# Patient Record
Sex: FEMALE | Race: WHITE | NOT HISPANIC OR LATINO
[De-identification: names, ages, dates, MRNs, and addresses within clinical notes are randomized per-mention and may not be internally consistent; named-entity substitution may affect disease eponyms.]

---

## 2017-10-13 ENCOUNTER — APPOINTMENT (OUTPATIENT)
Dept: ORTHOPEDIC SURGERY | Facility: CLINIC | Age: 62
End: 2017-10-13
Payer: MEDICAID

## 2017-10-13 VITALS
WEIGHT: 293 LBS | DIASTOLIC BLOOD PRESSURE: 82 MMHG | HEART RATE: 77 BPM | SYSTOLIC BLOOD PRESSURE: 160 MMHG | HEIGHT: 70 IN | BODY MASS INDEX: 41.95 KG/M2

## 2017-10-13 DIAGNOSIS — M17.0 BILATERAL PRIMARY OSTEOARTHRITIS OF KNEE: ICD-10-CM

## 2017-10-13 DIAGNOSIS — M25.562 PAIN IN RIGHT KNEE: ICD-10-CM

## 2017-10-13 DIAGNOSIS — M19.011 PRIMARY OSTEOARTHRITIS, RIGHT SHOULDER: ICD-10-CM

## 2017-10-13 DIAGNOSIS — M25.561 PAIN IN RIGHT KNEE: ICD-10-CM

## 2017-10-13 PROCEDURE — 99203 OFFICE O/P NEW LOW 30 MIN: CPT

## 2017-10-13 PROCEDURE — 73562 X-RAY EXAM OF KNEE 3: CPT | Mod: 50

## 2017-10-13 PROCEDURE — 73030 X-RAY EXAM OF SHOULDER: CPT | Mod: RT

## 2019-08-04 ENCOUNTER — EMERGENCY (EMERGENCY)
Facility: HOSPITAL | Age: 64
LOS: 1 days | Discharge: HOME | End: 2019-08-04
Attending: EMERGENCY MEDICINE
Payer: MEDICARE

## 2019-08-04 VITALS
HEART RATE: 79 BPM | HEIGHT: 68.5 IN | TEMPERATURE: 98 F | OXYGEN SATURATION: 96 % | WEIGHT: 293 LBS | RESPIRATION RATE: 20 BRPM | SYSTOLIC BLOOD PRESSURE: 151 MMHG | DIASTOLIC BLOOD PRESSURE: 84 MMHG

## 2019-08-04 LAB
ALBUMIN SERPL ELPH-MCNC: 3.8 G/DL — SIGNIFICANT CHANGE UP (ref 3.3–5)
ALP SERPL-CCNC: 218 U/L — HIGH (ref 40–120)
ALT FLD-CCNC: 60 U/L — HIGH (ref 10–45)
ANION GAP SERPL CALC-SCNC: 17 MMOL/L — SIGNIFICANT CHANGE UP (ref 5–17)
APTT BLD: 31.2 SEC — SIGNIFICANT CHANGE UP (ref 27.5–36.3)
AST SERPL-CCNC: 28 U/L — SIGNIFICANT CHANGE UP (ref 10–40)
BASOPHILS # BLD AUTO: 0.1 K/UL — SIGNIFICANT CHANGE UP (ref 0–0.2)
BASOPHILS NFR BLD AUTO: 0.7 % — SIGNIFICANT CHANGE UP (ref 0–2)
BILIRUB SERPL-MCNC: 0.2 MG/DL — SIGNIFICANT CHANGE UP (ref 0.2–1.2)
BUN SERPL-MCNC: 11 MG/DL — SIGNIFICANT CHANGE UP (ref 7–23)
CALCIUM SERPL-MCNC: 9.2 MG/DL — SIGNIFICANT CHANGE UP (ref 8.4–10.5)
CHLORIDE SERPL-SCNC: 99 MMOL/L — SIGNIFICANT CHANGE UP (ref 96–108)
CO2 SERPL-SCNC: 23 MMOL/L — SIGNIFICANT CHANGE UP (ref 22–31)
CREAT SERPL-MCNC: 0.72 MG/DL — SIGNIFICANT CHANGE UP (ref 0.5–1.3)
EOSINOPHIL # BLD AUTO: 0.5 K/UL — SIGNIFICANT CHANGE UP (ref 0–0.5)
EOSINOPHIL NFR BLD AUTO: 5.8 % — SIGNIFICANT CHANGE UP (ref 0–6)
GLUCOSE SERPL-MCNC: 193 MG/DL — HIGH (ref 70–99)
HCT VFR BLD CALC: 39.5 % — SIGNIFICANT CHANGE UP (ref 34.5–45)
HGB BLD-MCNC: 12.8 G/DL — SIGNIFICANT CHANGE UP (ref 11.5–15.5)
INR BLD: 1.09 RATIO — SIGNIFICANT CHANGE UP (ref 0.88–1.16)
LYMPHOCYTES # BLD AUTO: 1.4 K/UL — SIGNIFICANT CHANGE UP (ref 1–3.3)
LYMPHOCYTES # BLD AUTO: 17.8 % — SIGNIFICANT CHANGE UP (ref 13–44)
MCHC RBC-ENTMCNC: 26.2 PG — LOW (ref 27–34)
MCHC RBC-ENTMCNC: 32.3 GM/DL — SIGNIFICANT CHANGE UP (ref 32–36)
MCV RBC AUTO: 81 FL — SIGNIFICANT CHANGE UP (ref 80–100)
MONOCYTES # BLD AUTO: 0.7 K/UL — SIGNIFICANT CHANGE UP (ref 0–0.9)
MONOCYTES NFR BLD AUTO: 9.1 % — SIGNIFICANT CHANGE UP (ref 2–14)
NEUTROPHILS # BLD AUTO: 5.3 K/UL — SIGNIFICANT CHANGE UP (ref 1.8–7.4)
NEUTROPHILS NFR BLD AUTO: 66.6 % — SIGNIFICANT CHANGE UP (ref 43–77)
PLATELET # BLD AUTO: 295 K/UL — SIGNIFICANT CHANGE UP (ref 150–400)
POTASSIUM SERPL-MCNC: 4.3 MMOL/L — SIGNIFICANT CHANGE UP (ref 3.5–5.3)
POTASSIUM SERPL-SCNC: 4.3 MMOL/L — SIGNIFICANT CHANGE UP (ref 3.5–5.3)
PROT SERPL-MCNC: 6.9 G/DL — SIGNIFICANT CHANGE UP (ref 6–8.3)
PROTHROM AB SERPL-ACNC: 12.6 SEC — SIGNIFICANT CHANGE UP (ref 10–12.9)
RBC # BLD: 4.88 M/UL — SIGNIFICANT CHANGE UP (ref 3.8–5.2)
RBC # FLD: 15.1 % — HIGH (ref 10.3–14.5)
SODIUM SERPL-SCNC: 139 MMOL/L — SIGNIFICANT CHANGE UP (ref 135–145)
WBC # BLD: 7.9 K/UL — SIGNIFICANT CHANGE UP (ref 3.8–10.5)
WBC # FLD AUTO: 7.9 K/UL — SIGNIFICANT CHANGE UP (ref 3.8–10.5)

## 2019-08-04 PROCEDURE — 73140 X-RAY EXAM OF FINGER(S): CPT | Mod: 26,LT

## 2019-08-04 PROCEDURE — 99218: CPT

## 2019-08-04 RX ORDER — SODIUM CHLORIDE 9 MG/ML
1000 INJECTION INTRAMUSCULAR; INTRAVENOUS; SUBCUTANEOUS ONCE
Refills: 0 | Status: COMPLETED | OUTPATIENT
Start: 2019-08-04 | End: 2019-08-04

## 2019-08-04 RX ORDER — METRONIDAZOLE 500 MG
TABLET ORAL
Refills: 0 | Status: DISCONTINUED | OUTPATIENT
Start: 2019-08-05 | End: 2019-08-09

## 2019-08-04 RX ORDER — AMPICILLIN SODIUM AND SULBACTAM SODIUM 250; 125 MG/ML; MG/ML
3 INJECTION, POWDER, FOR SUSPENSION INTRAMUSCULAR; INTRAVENOUS EVERY 6 HOURS
Refills: 0 | Status: DISCONTINUED | OUTPATIENT
Start: 2019-08-04 | End: 2019-08-04

## 2019-08-04 RX ADMIN — AMPICILLIN SODIUM AND SULBACTAM SODIUM 200 GRAM(S): 250; 125 INJECTION, POWDER, FOR SUSPENSION INTRAMUSCULAR; INTRAVENOUS at 22:28

## 2019-08-04 RX ADMIN — SODIUM CHLORIDE 1000 MILLILITER(S): 9 INJECTION INTRAMUSCULAR; INTRAVENOUS; SUBCUTANEOUS at 22:28

## 2019-08-04 NOTE — ED ADULT NURSE NOTE - OBJECTIVE STATEMENT
63 yo f reporting to ED for animal bite (cat). PMH of DM, HLD, HTN, Osteoarthritis, Fibromyalgia, Chronic Sinusitis, & Depression. Pt reporting her cat bit her at midnight on 8/3/19 on left thumb. pt reports she went to Urgent Care yesterday for evaluation and received Augmentin (4 doses taken) as treatment and was told to come to ED if redness spreads up the left arm. Pt reports she felt nauseous/shaky and the redness spread so she reported to ED for evaluation. Small bite wound on anterior and posterior region of thumb. Left thumb and left forearm pink in color. No swelling. Warm to touch. Sister at bedside. bed locked & in lowest position. Safety maintained. Will continue to reassess.

## 2019-08-04 NOTE — ED PROVIDER NOTE - OBJECTIVE STATEMENT
63yo female pt with Obese, DM (on Insulin, Metformin), HLD, HTN, Osteoarthritis, Fibromyalgia, Chronic Sinusitis, Depression, c/o worsening pain, swelling and redness to left non dominant thumb s/p cat bite. Pt stated her own cat bit her left thumb yesterday and evaluated by UC yesterday. She is on Augmentin but noticed worsening swelling/ redness left hand. She also c/o intermittent chills. Denies sensory changes or weakness to extremities. Denies CP/SOB/ABD pain or N/V/D. TD- utded.

## 2019-08-04 NOTE — ED PROVIDER NOTE - ATTENDING CONTRIBUTION TO CARE
64F presenting to the ED w/ cat bite to L thumb, states that it happened midnight yesterday, went to urgent care and was prescribed Augmentin, took 4 doses so far. Patient states that the redness has expanded now and she has moderate pain over the L thumb, non-radiating, exacerbated by palpation and movement, denies alleviating factors. States also noticed worsening swelling to the hand. Denies any sensory changes or weakness to extremities.     PMHx: Obesity, DM (on Insulin, Metformin), HLD, HTN, Osteoarthritis, Fibromyalgia, Chronic Sinusitis, Depression  PSHx: Hernia repair, RODRIGO, paniculectomy  Allergies: see EMR  SocHx: Denies ETOH, drugs smoking    ROS:  GEN: (-) fevers/chills  HEENT: (-) visual change, (-) photophobia  NECK: (-) stiffness, (-) swelling  RESP: (-) shortness of breath, (-) cough, (-) sputum  CV: (-) chest pain, (-) palpitations  GI: (-) nausea, (-) vomiting, (-) pain, (-) constipation, (-) diarrhea  : (-) frequency/urgency, (-) hematuria, (-) dysuria, (-) incontinence, (-) discharge  EXT: (-) edema, (-) cyanosis, (+) thumb pain  ENDO: (-) polyuria  NEURO: (-) paresthesias, (-) weakness, (-) headache, (-) dizziness, (-) syncope  SKIN: (+) erythema, (+) warmth, (+) animal bite    VITALS REVIEWED.  Hypertensive, otherwise normal  GENERALIZED APPEARANCE:  Comfortable, no acute distress, ambulating without difficulty  SKIN:  Warm, dry, no cyanosis  HEAD:  No obvious scalp lesions  EYES:  Conjunctiva pink, no icterus  ENMT:  Mucus membranes moist, no stridor  NECK:  Supple, non-tender  CHEST AND RESPIRATORY:  Clear to auscultation B/L, good air entry B/L, equal chest expansion  HEART AND CARDIOVASCULAR:  Regular rate, no obvious murmur  ABDOMEN AND GI:  Soft, non-tender, non-distended.  No rebound, no guarding, no CVA-area tenderness  EXTREMITIES:  L hand with thumb cat bite over PIP, erythema extending from cat bite to midforearm, slight tenderness over bite site and around it, mild warmth, ROM of thumb intact, radial/median/nerve distributions evaluated and intact, cap refill <2s, radial pulse 2+  NEURO: AAOx3, gross motor and sensory intact    Impression:  Hand cellulitis s/p cat bite, expanding  Labs, imaging, pain management, IV abx brief period, hand evaluation, CDU observation

## 2019-08-05 VITALS
RESPIRATION RATE: 20 BRPM | OXYGEN SATURATION: 98 % | SYSTOLIC BLOOD PRESSURE: 145 MMHG | HEART RATE: 68 BPM | DIASTOLIC BLOOD PRESSURE: 72 MMHG | TEMPERATURE: 98 F

## 2019-08-05 LAB
GLUCOSE BLDC GLUCOMTR-MCNC: 108 MG/DL — HIGH (ref 70–99)
GLUCOSE BLDC GLUCOMTR-MCNC: 155 MG/DL — HIGH (ref 70–99)
GLUCOSE BLDC GLUCOMTR-MCNC: 156 MG/DL — HIGH (ref 70–99)

## 2019-08-05 PROCEDURE — 85027 COMPLETE CBC AUTOMATED: CPT

## 2019-08-05 PROCEDURE — 82962 GLUCOSE BLOOD TEST: CPT

## 2019-08-05 PROCEDURE — 99217: CPT

## 2019-08-05 PROCEDURE — 99284 EMERGENCY DEPT VISIT MOD MDM: CPT | Mod: 25

## 2019-08-05 PROCEDURE — 85730 THROMBOPLASTIN TIME PARTIAL: CPT

## 2019-08-05 PROCEDURE — 73140 X-RAY EXAM OF FINGER(S): CPT

## 2019-08-05 PROCEDURE — 80053 COMPREHEN METABOLIC PANEL: CPT

## 2019-08-05 PROCEDURE — 96375 TX/PRO/DX INJ NEW DRUG ADDON: CPT

## 2019-08-05 PROCEDURE — 94660 CPAP INITIATION&MGMT: CPT

## 2019-08-05 PROCEDURE — 96365 THER/PROPH/DIAG IV INF INIT: CPT

## 2019-08-05 PROCEDURE — 96376 TX/PRO/DX INJ SAME DRUG ADON: CPT

## 2019-08-05 PROCEDURE — 96367 TX/PROPH/DG ADDL SEQ IV INF: CPT

## 2019-08-05 PROCEDURE — G0378: CPT

## 2019-08-05 PROCEDURE — 85610 PROTHROMBIN TIME: CPT

## 2019-08-05 RX ORDER — INSULIN LISPRO 100/ML
VIAL (ML) SUBCUTANEOUS
Refills: 0 | Status: DISCONTINUED | OUTPATIENT
Start: 2019-08-05 | End: 2019-08-09

## 2019-08-05 RX ORDER — BUPROPION HYDROCHLORIDE 150 MG/1
300 TABLET, EXTENDED RELEASE ORAL DAILY
Refills: 0 | Status: DISCONTINUED | OUTPATIENT
Start: 2019-08-05 | End: 2019-08-09

## 2019-08-05 RX ORDER — DEXTROSE 50 % IN WATER 50 %
15 SYRINGE (ML) INTRAVENOUS ONCE
Refills: 0 | Status: DISCONTINUED | OUTPATIENT
Start: 2019-08-05 | End: 2019-08-09

## 2019-08-05 RX ORDER — METFORMIN HYDROCHLORIDE 850 MG/1
1000 TABLET ORAL
Refills: 0 | Status: DISCONTINUED | OUTPATIENT
Start: 2019-08-05 | End: 2019-08-09

## 2019-08-05 RX ORDER — METRONIDAZOLE 500 MG
500 TABLET ORAL EVERY 8 HOURS
Refills: 0 | Status: DISCONTINUED | OUTPATIENT
Start: 2019-08-05 | End: 2019-08-09

## 2019-08-05 RX ORDER — PANTOPRAZOLE SODIUM 20 MG/1
40 TABLET, DELAYED RELEASE ORAL
Refills: 0 | Status: DISCONTINUED | OUTPATIENT
Start: 2019-08-05 | End: 2019-08-09

## 2019-08-05 RX ORDER — DEXTROSE 50 % IN WATER 50 %
25 SYRINGE (ML) INTRAVENOUS ONCE
Refills: 0 | Status: DISCONTINUED | OUTPATIENT
Start: 2019-08-05 | End: 2019-08-09

## 2019-08-05 RX ORDER — ATORVASTATIN CALCIUM 80 MG/1
40 TABLET, FILM COATED ORAL AT BEDTIME
Refills: 0 | Status: DISCONTINUED | OUTPATIENT
Start: 2019-08-05 | End: 2019-08-09

## 2019-08-05 RX ORDER — CELECOXIB 200 MG/1
200 CAPSULE ORAL DAILY
Refills: 0 | Status: DISCONTINUED | OUTPATIENT
Start: 2019-08-05 | End: 2019-08-09

## 2019-08-05 RX ORDER — DEXTROSE 50 % IN WATER 50 %
12.5 SYRINGE (ML) INTRAVENOUS ONCE
Refills: 0 | Status: DISCONTINUED | OUTPATIENT
Start: 2019-08-05 | End: 2019-08-09

## 2019-08-05 RX ORDER — INSULIN LISPRO 100/ML
12 VIAL (ML) SUBCUTANEOUS
Refills: 0 | Status: DISCONTINUED | OUTPATIENT
Start: 2019-08-05 | End: 2019-08-09

## 2019-08-05 RX ORDER — GLUCAGON INJECTION, SOLUTION 0.5 MG/.1ML
1 INJECTION, SOLUTION SUBCUTANEOUS ONCE
Refills: 0 | Status: DISCONTINUED | OUTPATIENT
Start: 2019-08-05 | End: 2019-08-09

## 2019-08-05 RX ORDER — GABAPENTIN 400 MG/1
600 CAPSULE ORAL THREE TIMES A DAY
Refills: 0 | Status: DISCONTINUED | OUTPATIENT
Start: 2019-08-05 | End: 2019-08-09

## 2019-08-05 RX ORDER — LEVOTHYROXINE SODIUM 125 MCG
125 TABLET ORAL DAILY
Refills: 0 | Status: DISCONTINUED | OUTPATIENT
Start: 2019-08-05 | End: 2019-08-09

## 2019-08-05 RX ORDER — INSULIN LISPRO 100/ML
VIAL (ML) SUBCUTANEOUS AT BEDTIME
Refills: 0 | Status: DISCONTINUED | OUTPATIENT
Start: 2019-08-05 | End: 2019-08-09

## 2019-08-05 RX ORDER — ESCITALOPRAM OXALATE 10 MG/1
20 TABLET, FILM COATED ORAL DAILY
Refills: 0 | Status: DISCONTINUED | OUTPATIENT
Start: 2019-08-05 | End: 2019-08-09

## 2019-08-05 RX ORDER — METRONIDAZOLE 500 MG
1 TABLET ORAL
Qty: 30 | Refills: 0
Start: 2019-08-05 | End: 2019-08-14

## 2019-08-05 RX ORDER — HYDROMORPHONE HYDROCHLORIDE 2 MG/ML
4 INJECTION INTRAMUSCULAR; INTRAVENOUS; SUBCUTANEOUS ONCE
Refills: 0 | Status: DISCONTINUED | OUTPATIENT
Start: 2019-08-05 | End: 2019-08-05

## 2019-08-05 RX ORDER — SODIUM CHLORIDE 9 MG/ML
1000 INJECTION, SOLUTION INTRAVENOUS
Refills: 0 | Status: DISCONTINUED | OUTPATIENT
Start: 2019-08-05 | End: 2019-08-09

## 2019-08-05 RX ORDER — METRONIDAZOLE 500 MG
500 TABLET ORAL ONCE
Refills: 0 | Status: COMPLETED | OUTPATIENT
Start: 2019-08-05 | End: 2019-08-05

## 2019-08-05 RX ORDER — ASPIRIN/CALCIUM CARB/MAGNESIUM 324 MG
81 TABLET ORAL DAILY
Refills: 0 | Status: DISCONTINUED | OUTPATIENT
Start: 2019-08-05 | End: 2019-08-09

## 2019-08-05 RX ORDER — INSULIN GLARGINE 100 [IU]/ML
40 INJECTION, SOLUTION SUBCUTANEOUS AT BEDTIME
Refills: 0 | Status: DISCONTINUED | OUTPATIENT
Start: 2019-08-05 | End: 2019-08-09

## 2019-08-05 RX ORDER — CLONAZEPAM 1 MG
0.5 TABLET ORAL ONCE
Refills: 0 | Status: DISCONTINUED | OUTPATIENT
Start: 2019-08-05 | End: 2019-08-05

## 2019-08-05 RX ADMIN — Medication 12 UNIT(S): at 13:24

## 2019-08-05 RX ADMIN — Medication 0.5 MILLIGRAM(S): at 01:37

## 2019-08-05 RX ADMIN — Medication 110 MILLIGRAM(S): at 00:21

## 2019-08-05 RX ADMIN — HYDROMORPHONE HYDROCHLORIDE 4 MILLIGRAM(S): 2 INJECTION INTRAMUSCULAR; INTRAVENOUS; SUBCUTANEOUS at 02:43

## 2019-08-05 RX ADMIN — PANTOPRAZOLE SODIUM 40 MILLIGRAM(S): 20 TABLET, DELAYED RELEASE ORAL at 13:26

## 2019-08-05 RX ADMIN — BUPROPION HYDROCHLORIDE 300 MILLIGRAM(S): 150 TABLET, EXTENDED RELEASE ORAL at 13:27

## 2019-08-05 RX ADMIN — Medication 110 MILLIGRAM(S): at 14:43

## 2019-08-05 RX ADMIN — METFORMIN HYDROCHLORIDE 1000 MILLIGRAM(S): 850 TABLET ORAL at 13:31

## 2019-08-05 RX ADMIN — Medication 125 MICROGRAM(S): at 13:26

## 2019-08-05 RX ADMIN — Medication 20 MILLIGRAM(S): at 13:26

## 2019-08-05 RX ADMIN — Medication 81 MILLIGRAM(S): at 13:28

## 2019-08-05 RX ADMIN — HYDROMORPHONE HYDROCHLORIDE 4 MILLIGRAM(S): 2 INJECTION INTRAMUSCULAR; INTRAVENOUS; SUBCUTANEOUS at 02:04

## 2019-08-05 RX ADMIN — CELECOXIB 200 MILLIGRAM(S): 200 CAPSULE ORAL at 13:27

## 2019-08-05 RX ADMIN — Medication 500 MILLIGRAM(S): at 03:01

## 2019-08-05 RX ADMIN — Medication 100 MILLIGRAM(S): at 11:12

## 2019-08-05 RX ADMIN — Medication 100 MILLIGRAM(S): at 02:01

## 2019-08-05 RX ADMIN — GABAPENTIN 600 MILLIGRAM(S): 400 CAPSULE ORAL at 01:37

## 2019-08-05 RX ADMIN — Medication 1: at 13:24

## 2019-08-05 RX ADMIN — GABAPENTIN 600 MILLIGRAM(S): 400 CAPSULE ORAL at 13:27

## 2019-08-05 RX ADMIN — Medication 100 MILLIGRAM(S): at 01:26

## 2019-08-05 NOTE — ED CDU PROVIDER DISPOSITION NOTE - NSFOLLOWUPINSTRUCTIONS_ED_ALL_ED_FT
1. Follow up with your primary care doctor in the next 1-2 days   2. Recommend call Dr. Soto office tomorrow to make follow up for evaluation of your hand   3. Take antibiotics Doxycycline 1 tab every 12 hours and Flagyl 1 tab every 8 hours for 10 days   4. Continue all other at home medications   5. Return to ED for change of symptoms including increased swelling, redness, streaking, pain, fevers, chills, numbness, weakness and all other concerns

## 2019-08-05 NOTE — ED CDU PROVIDER SUBSEQUENT DAY NOTE - PROGRESS NOTE DETAILS
CDU NOTE KENDY Lawrence: VSS NAD. Patient is resting comfortably and is without any complaints. Overall feels improved. Mild swelling warmth and erythema over left thumb with improved streaking erythema well within marked borders. Full FROM left thumb and wrist without difficulty Spoke to Dr. Soto who states given consult later last night planned to see pt after office hours this afternoon   Marcelina Lawrence PA-C CDU NOTE KENDY Lawrence: VSS NAD. Patient is resting comfortably and is without any complaints. Overall improving. Pending eval by Dr. Soto this afternoon Patient seen and evaluated by hand Dr. Soto who is happy with patient exam and feels comfortable with d/c home. Patient has had continued improvement in CDU. Completed second dose flagyl and scheduled for next dose doxy now. Will d/c after abx infusion. Pt seen and evaluated by Dr. Campbell who agrees for d/c home will send rx flagyl and doxy and follow up w/ Dr. Brittany Lawrence PA-C Patient finished abx will d/c now   Marcelina Lawrence PA-C

## 2019-08-05 NOTE — ED ADULT NURSE REASSESSMENT NOTE - COMFORT CARE
plan of care explained/ambulated to bathroom/po fluids offered/meal provided
darkened lights/repositioned/plan of care explained/po fluids offered/warm blanket provided

## 2019-08-05 NOTE — ED CDU PROVIDER INITIAL DAY NOTE - ATTENDING CONTRIBUTION TO CARE
See ED provider note for HPI/ROS/PE from myself.  See ED provider note from today for MDM. Cat bite, hand cellulitis, not improving with Augmentin, will require IV abx and monitoring, hand evaluation in AM.

## 2019-08-05 NOTE — ED CDU PROVIDER SUBSEQUENT DAY NOTE - HISTORY
CDU PROGRESS NOTE PA ROSY: Pt resting comfortably, NAD, VSS. + L hand with thumb cat bite over PIP, erythema extending from cat bite to midforearm contained within marked lines, slight tenderness over bite site and around it, mild warmth, ROM of thumb intact, radial/median/nerve distributions evaluated and intact, cap refill <2s, radial pulse 2+

## 2019-08-05 NOTE — ED CDU PROVIDER INITIAL DAY NOTE - PHYSICAL EXAMINATION
NAD, VSS, Afebrile, Lungs clear. + Left 1st phalanx; 2 small puncture wound with tender, swelling and cellulitis streaking to left elbow. No active discharge. Full ROMs of fingers with intact N/V. + L hand with thumb cat bite over PIP, erythema extending from cat bite to midforearm, slight tenderness over bite site and around it, mild warmth, ROM of thumb intact, radial/median/nerve distributions evaluated and intact, cap refill <2s, radial pulse 2+

## 2019-08-05 NOTE — ED CDU PROVIDER DISPOSITION NOTE - PLAN OF CARE
As recommended by Hand surgeon..........  Take Doxycycline and Flagyl as prescribed.  Follow up with your Primary Care Physician within the next 2-3 days  Bring a copy of your test results with you to your appointment  Continue your current home medication regimen  Return to the Emergency Room if you experience new or worsening symptoms

## 2019-08-05 NOTE — ED CDU PROVIDER INITIAL DAY NOTE - OBJECTIVE STATEMENT
Patient is 63yo female pt Obese, hx of DM (on Insulin, Metformin), HLD, HTN, Osteoarthritis, Fibromyalgia, Chronic Sinusitis, Depression, c/o worsening pain, swelling and redness to left non dominant thumb s/p cat bite. Pt stated her own cat bit her left thumb yesterday and evaluated by UC yesterday. She is on Augmentin but noticed worsening swelling/ redness left hand s/p 4 doses. She also c/o intermittent chills. Denies sensory changes or weakness to extremities. Denies CP/SOB/ABD pain or N/V/D. TD- utded.  In ED, patient had x ray showing no signs of acute pathology. Pt was started on Flagyl and Doxycycline IVPB. Hand surgeon consulted and patient sent to CDU for frequent reeval, vitals q 4hrs, iv abx, pain control. Patient is 65yo female pt Obese, hx of DM (on Insulin, Metformin), HLD, HTN, Osteoarthritis, Fibromyalgia, Chronic Sinusitis, Depression, c/o worsening pain, swelling and redness to left non dominant thumb s/p cat bite. Pt stated her own cat bit her left thumb yesterday and evaluated by UC yesterday. She is on Augmentin but noticed worsening swelling/ redness left hand s/p 4 doses. She also c/o intermittent chills. Denies sensory changes or weakness to extremities. Denies CP/SOB/ABD pain or N/V/D. TD- utded. Patient reported taking Basaglar 40units prior to ED visit.  In ED, patient had x ray showing no signs of acute pathology. Pt was started on Flagyl and Doxycycline IVPB. Hand surgeon consulted and patient sent to CDU for frequent reeval, vitals q 4hrs, iv abx, pain control.

## 2019-08-05 NOTE — ED CDU PROVIDER SUBSEQUENT DAY NOTE - ATTENDING CONTRIBUTION TO CARE
I, Lauren Campbell MD have personally performed a face to face diagnostic evaluation on this patient.  I have reviewed the ACP note and agree with the history, exam, and plan of care, except as noted.     Exam:  + L hand with thumb cat bite over PIP, erythema extending from cat bite to midforearm contained within marked lines,  mild warmth, ROM of thumb intact, radial/median/nerve distributions evaluated and intact, cap refill <2s, radial pulse 2+    --pt notes that is feeling better pending hand surgery eval and another dose of iv abx

## 2019-08-05 NOTE — ED CDU PROVIDER DISPOSITION NOTE - ATTENDING CONTRIBUTION TO CARE
I, Lauren Campbell MD have personally performed a face to face diagnostic evaluation on this patient.  I have reviewed the ACP note and agree with the history, exam, and plan of care, except as noted.     cat bite no complaints at this time; tx wit habx cleared by hand--redness improving-- will dc

## 2019-08-05 NOTE — ED CDU PROVIDER DISPOSITION NOTE - CLINICAL COURSE
Patient is 63yo female pt Obese, hx of DM (on Insulin, Metformin), HLD, HTN, Osteoarthritis, Fibromyalgia, Chronic Sinusitis, Depression, c/o worsening pain, swelling and redness to left non dominant thumb s/p cat bite. Pt stated her own cat bit her left thumb yesterday and evaluated by UC yesterday. She is on Augmentin but noticed worsening swelling/ redness left hand s/p 4 doses. She also c/o intermittent chills. Denies sensory changes or weakness to extremities. Denies CP/SOB/ABD pain or N/V/D. TD- utded.  In ED, patient had x ray showing no signs of acute pathology. Pt was started on Flagyl and Doxycycline IVPB. Hand surgeon consulted and patient sent to CDU for frequent reeval, vitals q 4hrs, iv abx, pain control. Patient is 63yo female pt Obese, hx of DM (on Insulin, Metformin), HLD, HTN, Osteoarthritis, Fibromyalgia, Chronic Sinusitis, Depression, c/o worsening pain, swelling and redness to left non dominant thumb s/p cat bite. Pt stated her own cat bit her left thumb yesterday and evaluated by UC yesterday. She is on Augmentin but noticed worsening swelling/ redness left hand s/p 4 doses. She also c/o intermittent chills. Denies sensory changes or weakness to extremities. Denies CP/SOB/ABD pain or N/V/D. TD- utded.  In ED, patient had x ray showing no signs of acute pathology. Pt was started on Flagyl and Doxycycline IVPB. Hand surgeon consulted and patient sent to CDU for frequent reeval, vitals q 4hrs, iv abx, pain control. Patient seen and evaluated by hand Dr. Soto who is happy with patient exam and feels comfortable with d/c home. Patient has had continued improvement in CDU. Completed second dose flagyl and scheduled for next dose doxy now. Will d/c after abx infusion. Pt seen and evaluated by Dr. Campbell who agrees for d/c home will send rx flagyl and doxy and follow up w/ Dr. Soto

## 2019-08-05 NOTE — ED CDU PROVIDER DISPOSITION NOTE - CARE PROVIDER_API CALL
Heladio Soto (DO)  Plastic Surgery  [address] [place] [address]  Phone: [phone]  Fax: [phone]  Follow Up Time:

## 2019-08-05 NOTE — ED ADULT NURSE REASSESSMENT NOTE - NS ED NURSE REASSESS COMMENT FT1
Pt awaiting to be evaluated by CDU.
Pt to X-ray
report taken from Deandra RICHARDSON. states pt had good night with no complaints. Will continue to monitor.
Received pt from DEBRA Montiel, received pt alert and responsive, oriented x4, denies any respiratory distress, SOB, or difficulty breathing. Pt transferred to CDU for L thumb redness, swelling and pain with movement after pt was bitten by own cat. Declined pain med at this time. Pt aware will receive IV doxycycline and IV flagyl as ordered. IV in place, patent and free of signs of infiltration,  V/S stable, pt afebrile, pt denies pain at this time. Pt educated on unit and unit rules, instructed patient to notify RN of any needed assistance, Pt verbalizes understanding, Call bell placed within reach. Safety maintained. Will continue to monitor.

## 2019-08-05 NOTE — ED CDU PROVIDER SUBSEQUENT DAY NOTE - PHYSICAL EXAMINATION
NAD, VSS, Afebrile, Lungs clear. + Left 1st phalanx; 2 small puncture wound with tender, swelling and cellulitis streaking to left elbow. No active discharge. Full ROMs of fingers with intact N/V. + L hand with thumb cat bite over PIP, erythema extending from cat bite to midforearm contained within marked lines, slight tenderness over bite site and around it, mild warmth, ROM of thumb intact, radial/median/nerve distributions evaluated and intact, cap refill <2s, radial pulse 2+

## 2020-07-25 ENCOUNTER — INPATIENT (INPATIENT)
Facility: HOSPITAL | Age: 65
LOS: 3 days | Discharge: NURSING, REHAB OR LONG-TERM CARE | DRG: 313 | End: 2020-07-29
Attending: INTERNAL MEDICINE | Admitting: INTERNAL MEDICINE
Payer: MEDICARE

## 2020-07-25 VITALS
RESPIRATION RATE: 19 BRPM | OXYGEN SATURATION: 96 % | HEART RATE: 64 BPM | DIASTOLIC BLOOD PRESSURE: 45 MMHG | HEIGHT: 68 IN | WEIGHT: 293 LBS | TEMPERATURE: 98 F | SYSTOLIC BLOOD PRESSURE: 117 MMHG

## 2020-07-25 DIAGNOSIS — R53.1 WEAKNESS: ICD-10-CM

## 2020-07-25 LAB
A1C WITH ESTIMATED AVERAGE GLUCOSE RESULT: 6.4 % — HIGH (ref 4–5.6)
ALBUMIN SERPL ELPH-MCNC: 3.8 G/DL — SIGNIFICANT CHANGE UP (ref 3.3–5)
ALP SERPL-CCNC: 65 U/L — SIGNIFICANT CHANGE UP (ref 40–120)
ALT FLD-CCNC: 10 U/L — SIGNIFICANT CHANGE UP (ref 10–45)
APPEARANCE UR: CLEAR — SIGNIFICANT CHANGE UP
AST SERPL-CCNC: 11 U/L — SIGNIFICANT CHANGE UP (ref 10–40)
BACTERIA # UR AUTO: NEGATIVE — SIGNIFICANT CHANGE UP
BILIRUB DIRECT SERPL-MCNC: <0.1 MG/DL — SIGNIFICANT CHANGE UP (ref 0–0.2)
BILIRUB INDIRECT FLD-MCNC: >0.3 MG/DL — SIGNIFICANT CHANGE UP (ref 0.2–1)
BILIRUB SERPL-MCNC: 0.4 MG/DL — SIGNIFICANT CHANGE UP (ref 0.2–1.2)
BILIRUB UR-MCNC: NEGATIVE — SIGNIFICANT CHANGE UP
COLOR SPEC: YELLOW — SIGNIFICANT CHANGE UP
DIFF PNL FLD: NEGATIVE — SIGNIFICANT CHANGE UP
EPI CELLS # UR: 3 /HPF — SIGNIFICANT CHANGE UP
ESTIMATED AVERAGE GLUCOSE: 137 MG/DL — HIGH (ref 68–114)
GLUCOSE BLDC GLUCOMTR-MCNC: 126 MG/DL — HIGH (ref 70–99)
GLUCOSE BLDC GLUCOMTR-MCNC: 166 MG/DL — HIGH (ref 70–99)
GLUCOSE UR QL: NEGATIVE — SIGNIFICANT CHANGE UP
HCT VFR BLD CALC: 36.3 % — SIGNIFICANT CHANGE UP (ref 34.5–45)
HGB BLD-MCNC: 11.2 G/DL — LOW (ref 11.5–15.5)
HYALINE CASTS # UR AUTO: 1 /LPF — SIGNIFICANT CHANGE UP (ref 0–2)
KETONES UR-MCNC: NEGATIVE — SIGNIFICANT CHANGE UP
LEUKOCYTE ESTERASE UR-ACNC: NEGATIVE — SIGNIFICANT CHANGE UP
MCHC RBC-ENTMCNC: 25.2 PG — LOW (ref 27–34)
MCHC RBC-ENTMCNC: 30.9 GM/DL — LOW (ref 32–36)
MCV RBC AUTO: 81.8 FL — SIGNIFICANT CHANGE UP (ref 80–100)
NITRITE UR-MCNC: NEGATIVE — SIGNIFICANT CHANGE UP
NRBC # BLD: 0 /100 WBCS — SIGNIFICANT CHANGE UP (ref 0–0)
PH UR: 8 — SIGNIFICANT CHANGE UP (ref 5–8)
PLATELET # BLD AUTO: 306 K/UL — SIGNIFICANT CHANGE UP (ref 150–400)
PROT SERPL-MCNC: 6.9 G/DL — SIGNIFICANT CHANGE UP (ref 6–8.3)
PROT UR-MCNC: ABNORMAL
RBC # BLD: 4.44 M/UL — SIGNIFICANT CHANGE UP (ref 3.8–5.2)
RBC # FLD: 17.4 % — HIGH (ref 10.3–14.5)
RBC CASTS # UR COMP ASSIST: 1 /HPF — SIGNIFICANT CHANGE UP (ref 0–4)
SARS-COV-2 IGG SERPL QL IA: NEGATIVE — SIGNIFICANT CHANGE UP
SARS-COV-2 IGM SERPL IA-ACNC: 0.09 INDEX — SIGNIFICANT CHANGE UP
SARS-COV-2 RNA SPEC QL NAA+PROBE: SIGNIFICANT CHANGE UP
SP GR SPEC: 1.02 — SIGNIFICANT CHANGE UP (ref 1.01–1.02)
T3 SERPL-MCNC: 90 NG/DL — SIGNIFICANT CHANGE UP (ref 80–200)
T4 AB SER-ACNC: 9 UG/DL — SIGNIFICANT CHANGE UP (ref 4.6–12)
TROPONIN T, HIGH SENSITIVITY RESULT: 18 NG/L — SIGNIFICANT CHANGE UP (ref 0–51)
TROPONIN T, HIGH SENSITIVITY RESULT: 19 NG/L — SIGNIFICANT CHANGE UP (ref 0–51)
TSH SERPL-MCNC: 1.97 UIU/ML — SIGNIFICANT CHANGE UP (ref 0.27–4.2)
UROBILINOGEN FLD QL: NEGATIVE — SIGNIFICANT CHANGE UP
WBC # BLD: 7.89 K/UL — SIGNIFICANT CHANGE UP (ref 3.8–10.5)
WBC # FLD AUTO: 7.89 K/UL — SIGNIFICANT CHANGE UP (ref 3.8–10.5)
WBC UR QL: 1 /HPF — SIGNIFICANT CHANGE UP (ref 0–5)

## 2020-07-25 PROCEDURE — 99285 EMERGENCY DEPT VISIT HI MDM: CPT | Mod: CS,GC

## 2020-07-25 PROCEDURE — 71045 X-RAY EXAM CHEST 1 VIEW: CPT | Mod: 26

## 2020-07-25 PROCEDURE — 93010 ELECTROCARDIOGRAM REPORT: CPT

## 2020-07-25 PROCEDURE — 71275 CT ANGIOGRAPHY CHEST: CPT | Mod: 26

## 2020-07-25 RX ORDER — DEXTROSE 50 % IN WATER 50 %
25 SYRINGE (ML) INTRAVENOUS ONCE
Refills: 0 | Status: DISCONTINUED | OUTPATIENT
Start: 2020-07-25 | End: 2020-07-29

## 2020-07-25 RX ORDER — GABAPENTIN 400 MG/1
600 CAPSULE ORAL THREE TIMES A DAY
Refills: 0 | Status: DISCONTINUED | OUTPATIENT
Start: 2020-07-25 | End: 2020-07-29

## 2020-07-25 RX ORDER — ATORVASTATIN CALCIUM 80 MG/1
40 TABLET, FILM COATED ORAL AT BEDTIME
Refills: 0 | Status: DISCONTINUED | OUTPATIENT
Start: 2020-07-25 | End: 2020-07-29

## 2020-07-25 RX ORDER — BUPROPION HYDROCHLORIDE 150 MG/1
300 TABLET, EXTENDED RELEASE ORAL DAILY
Refills: 0 | Status: DISCONTINUED | OUTPATIENT
Start: 2020-07-25 | End: 2020-07-29

## 2020-07-25 RX ORDER — DEXTROSE 50 % IN WATER 50 %
12.5 SYRINGE (ML) INTRAVENOUS ONCE
Refills: 0 | Status: DISCONTINUED | OUTPATIENT
Start: 2020-07-25 | End: 2020-07-29

## 2020-07-25 RX ORDER — ASPIRIN/CALCIUM CARB/MAGNESIUM 324 MG
81 TABLET ORAL DAILY
Refills: 0 | Status: DISCONTINUED | OUTPATIENT
Start: 2020-07-25 | End: 2020-07-29

## 2020-07-25 RX ORDER — GLUCAGON INJECTION, SOLUTION 0.5 MG/.1ML
1 INJECTION, SOLUTION SUBCUTANEOUS ONCE
Refills: 0 | Status: DISCONTINUED | OUTPATIENT
Start: 2020-07-25 | End: 2020-07-29

## 2020-07-25 RX ORDER — FUROSEMIDE 40 MG
40 TABLET ORAL DAILY
Refills: 0 | Status: DISCONTINUED | OUTPATIENT
Start: 2020-07-25 | End: 2020-07-29

## 2020-07-25 RX ORDER — HYDROMORPHONE HYDROCHLORIDE 2 MG/ML
4 INJECTION INTRAMUSCULAR; INTRAVENOUS; SUBCUTANEOUS EVERY 6 HOURS
Refills: 0 | Status: DISCONTINUED | OUTPATIENT
Start: 2020-07-25 | End: 2020-07-29

## 2020-07-25 RX ORDER — INSULIN GLARGINE 100 [IU]/ML
40 INJECTION, SOLUTION SUBCUTANEOUS AT BEDTIME
Refills: 0 | Status: DISCONTINUED | OUTPATIENT
Start: 2020-07-25 | End: 2020-07-25

## 2020-07-25 RX ORDER — ENOXAPARIN SODIUM 100 MG/ML
40 INJECTION SUBCUTANEOUS DAILY
Refills: 0 | Status: DISCONTINUED | OUTPATIENT
Start: 2020-07-25 | End: 2020-07-29

## 2020-07-25 RX ORDER — DEXTROSE 50 % IN WATER 50 %
15 SYRINGE (ML) INTRAVENOUS ONCE
Refills: 0 | Status: DISCONTINUED | OUTPATIENT
Start: 2020-07-25 | End: 2020-07-29

## 2020-07-25 RX ORDER — INSULIN LISPRO 100/ML
VIAL (ML) SUBCUTANEOUS
Refills: 0 | Status: DISCONTINUED | OUTPATIENT
Start: 2020-07-25 | End: 2020-07-29

## 2020-07-25 RX ORDER — SODIUM CHLORIDE 9 MG/ML
1000 INJECTION, SOLUTION INTRAVENOUS
Refills: 0 | Status: DISCONTINUED | OUTPATIENT
Start: 2020-07-25 | End: 2020-07-29

## 2020-07-25 RX ORDER — ESCITALOPRAM OXALATE 10 MG/1
20 TABLET, FILM COATED ORAL DAILY
Refills: 0 | Status: DISCONTINUED | OUTPATIENT
Start: 2020-07-25 | End: 2020-07-29

## 2020-07-25 RX ORDER — CLONAZEPAM 1 MG
0.5 TABLET ORAL AT BEDTIME
Refills: 0 | Status: DISCONTINUED | OUTPATIENT
Start: 2020-07-25 | End: 2020-07-25

## 2020-07-25 RX ORDER — INSULIN GLARGINE 100 [IU]/ML
30 INJECTION, SOLUTION SUBCUTANEOUS AT BEDTIME
Refills: 0 | Status: DISCONTINUED | OUTPATIENT
Start: 2020-07-25 | End: 2020-07-29

## 2020-07-25 RX ORDER — LEVOTHYROXINE SODIUM 125 MCG
125 TABLET ORAL DAILY
Refills: 0 | Status: DISCONTINUED | OUTPATIENT
Start: 2020-07-25 | End: 2020-07-29

## 2020-07-25 RX ORDER — CLONAZEPAM 1 MG
0.5 TABLET ORAL THREE TIMES A DAY
Refills: 0 | Status: DISCONTINUED | OUTPATIENT
Start: 2020-07-25 | End: 2020-07-29

## 2020-07-25 RX ADMIN — ATORVASTATIN CALCIUM 40 MILLIGRAM(S): 80 TABLET, FILM COATED ORAL at 16:57

## 2020-07-25 RX ADMIN — GABAPENTIN 600 MILLIGRAM(S): 400 CAPSULE ORAL at 22:42

## 2020-07-25 RX ADMIN — Medication 1: at 16:57

## 2020-07-25 RX ADMIN — HYDROMORPHONE HYDROCHLORIDE 4 MILLIGRAM(S): 2 INJECTION INTRAMUSCULAR; INTRAVENOUS; SUBCUTANEOUS at 22:18

## 2020-07-25 RX ADMIN — Medication 81 MILLIGRAM(S): at 16:58

## 2020-07-25 RX ADMIN — GABAPENTIN 600 MILLIGRAM(S): 400 CAPSULE ORAL at 18:32

## 2020-07-25 RX ADMIN — INSULIN GLARGINE 30 UNIT(S): 100 INJECTION, SOLUTION SUBCUTANEOUS at 21:45

## 2020-07-25 RX ADMIN — HYDROMORPHONE HYDROCHLORIDE 4 MILLIGRAM(S): 2 INJECTION INTRAMUSCULAR; INTRAVENOUS; SUBCUTANEOUS at 21:44

## 2020-07-25 RX ADMIN — Medication 0.5 MILLIGRAM(S): at 21:44

## 2020-07-25 NOTE — ED PROVIDER NOTE - CARE PLAN
Principal Discharge DX:	Weakness Principal Discharge DX:	Weakness  Secondary Diagnosis:	Chest pain, unspecified type

## 2020-07-25 NOTE — ED ADULT NURSE NOTE - NSIMPLEMENTINTERV_GEN_ALL_ED
Implemented All Universal Safety Interventions:  [place] to call system. Call bell, personal items and telephone within reach. Instruct patient to call for assistance. Room bathroom lighting operational. Non-slip footwear when patient is off stretcher. Physically safe environment: no spills, clutter or unnecessary equipment. Stretcher in lowest position, wheels locked, appropriate side rails in place.

## 2020-07-25 NOTE — ED PROVIDER NOTE - OBJECTIVE STATEMENT
Patient is a 65 year-old female with PMH of DM2 on insulin, fibromyalgia, osteoarthritis, sleep apnea on CPAP, depression, venous insufficiency, endometrial CA s/p hysterectomy (2010), HTN, HLD, hypothyroidism, presenting with increasing weakness x 1 week. Patient has generalized pain and weakness at her baseline, is able to alk using a walker. She has had a gradual worsening of her weakness this past week, but has not come into the ED until today due to concerns about COVID-19. She also reports worsening of her generalized pain, new pain in her right thigh, increased lower extremity swelling L>R, occasional mild chest pain, occasional dyspnea on exertion, and chills. She denies fever. She has not started any new medications recently other than vitamin D. Patient is a 65 year-old female with PMH of DM2 on insulin, fibromyalgia, osteoarthritis, sleep apnea on CPAP, depression, venous insufficiency, endometrial CA s/p hysterectomy (2010), HTN, HLD, hypothyroidism, presenting with increasing weakness x 1 week. Patient has generalized pain and weakness at her baseline, is able to alk using a walker. She has had a gradual worsening of her weakness this past week, with difficulty walking. She has not come into the ED until today due to concerns about COVID-19. She also reports worsening of her generalized pain, new pain in her right thigh, increased lower extremity swelling L>R, occasional mild chest pain, occasional dyspnea on exertion, and chills. She denies fever. She has not started any new medications recently other than vitamin D.

## 2020-07-25 NOTE — ED PROVIDER NOTE - PHYSICAL EXAMINATION
Gen: No acute distress. Obese.  HEENT: Atraumatic, normocephalic, pupils equally round and reactive to light, extraocular muscles intact, no conjunctival injection  CV: Regular rate and rhythym, normal S1/S2, no murmurs, rubs,or gallops  Resp: Lungs clear to ausculatation bilaterally, no rales, whonchi, or wheezes  GI: Soft, nonender, nondistended, BSx4  MSK: Nonpitting lower extremity edema b/l.   Skin: mild redness and warmth of the lower extremities b/l  Neuro: No focal deficits, sensation grossly intact  Psych: Alert and oriented x3, appropriate mood and affect

## 2020-07-25 NOTE — ED PROVIDER NOTE - NS ED ROS FT
Constitutional: +fatigue, +weakness, +generalized pain, no weight loss  ENT/mouth: +right jaw and tooth pain, No hearing changes, no sore throat, no rhinorrhea  Eyes: No eye pain, no eye redness, no eye swelling, no vision changes  CV: +occasional mild chest pain, +mild dyspnea on exertion, no palpitations  Resp: + mild shortness of breath, no cough, no wheezing  GI: No nausea, no vomiting, no diarrhea, no constipation  : +increased urinary frequency, No dysuria, no hematuria  Skin/Ext: +lower extremity swelling L>R  Neuro: + weakness, + mild headache, no numbness, no loss of consciousness, no syncope, no dizziness

## 2020-07-25 NOTE — ED ADULT NURSE NOTE - OBJECTIVE STATEMENT
Received pt via ambulance c/o generalized weakness Pt came in c/o increasing weakness x 1 week. Patient has generalized pain and weakness at her baseline, is able to walk using a walker. Lower extremities with redness and swelling Pt with hx: peripheral venous insufficiency. Pt alert and orientedX4. Pt mildly anxious at times. Emotional support offered. Pt's' skin warm and dry to touch. No chills. No diaphoresis.

## 2020-07-25 NOTE — ED PROVIDER NOTE - PROGRESS NOTE DETAILS
Troponin 15, will f/u repeat level. Will admit to tele. Spoke to Dr Grande who agreed to accept patient.

## 2020-07-25 NOTE — ED PROVIDER NOTE - PMH
Constipation    Depression    Diabetes Mellitus    Diarrhea    Environmental Allergies    Fibromyalgia    GERD with Apnea    High Triglycerides    Hyperlipidemia    Hypothyroidism    Morbidly Obese    Obstructive Sleep Apnea    Osteoarthritis of Knee  right. requires cane  Personal History of Arthritis    Personal History of Female Genital Cancer    Personal History of Hypertension    Restless Legs Syndrome (RLS)    Umbilical Hernia

## 2020-07-25 NOTE — H&P ADULT - HISTORY OF PRESENT ILLNESS
Patient is a 65 year-old female  with PMH of DM2 on insulin, fibromyalgia, osteoarthritis, sleep apnea on CPAP, depression, venous insufficiency, endometrial CA s/p hysterectomy (2010)  , HTN,   HLD,   hypothyroidism,/  ca  uterus,  2010,  obesity     presenting with increasing weakness/  intermiitent  cp  x 1 week . Patient has generalized pain and weakness at her baseline, is able to alk using a walker. She has had a gradual worsening of her weakness this past week, with difficulty walking. She has not come into the ED until today due to concerns about COVID-19.  She also reports worsening of her generalized pain, new pain in her right thigh, i mild chest pain, occasional dyspnea on exertion, and chills.  She denies fever. She has not started any new medications recently other than vitamin D.

## 2020-07-25 NOTE — ED CLERICAL - NS ED CLERK UNITS
CHIEF COMPLAINT: dental pain    HISTORY OF PRESENT ILLNESS:  41-year-old presents with several day history of dental pain. She's had issues with her teeth in the past. She really tell what tooth this is coming from it seems to be a front lower molar. There's been no gum swelling. She doesn't feel ill or sick. No fever. She has a dentist but didn't call them because she is thinking of changing to a different dentist. She's not been really happy with the one she has.    Past Medical History   Diagnosis Date   • Abnormal glandular Papanicolaou smear of cervix    • Calcaneal spur    • Cannabis abuse, unspecified    • Loss of weight    • Lump or mass in breast    • Morbid obesity    • Pain in joint involving ankle and foot    • Pain in limb    • Swelling, mass, or lump in chest        Past Surgical History   Procedure Laterality Date   • Screening mammography  2011   • Ankle arthroscopy w/ open repair     •  section, low transverse     • Pap,thin prep w hpv(inc 82116)  2012     ~Normal - No HPV Detected       Current Outpatient Prescriptions   Medication Sig Dispense Refill   • clindamycin (CLEOCIN) 300 MG capsule Take 1 capsule by mouth 3 times daily for 10 days. 30 capsule 0   • HYDROcodone-acetaminophen (NORCO) 5-325 MG per tablet Take 1 tablet by mouth every 4 hours as needed for Pain. Caution: sedation 10 tablet 0   • Multiple Vitamins-Minerals (MULTIVITAMIN PO) Take  by mouth.       No current facility-administered medications for this visit.        Allergies as of 2017 - Jeff as Reviewed 2017   Allergen Reaction Noted   • Penicillins Other (See Comments) 2017       Social History     Social History   • Marital status: Single     Spouse name: N/A   • Number of children: N/A   • Years of education: N/A     Occupational History   • Not on file.     Social History Main Topics   • Smoking status: Never Smoker   • Smokeless tobacco: Never Used   • Alcohol use 0.0 oz/week     3 - 6  APER Standard drinks or equivalent per week      Comment: couple times per week   • Drug use: No   • Sexual activity: Not on file     Other Topics Concern   • Not on file     Social History Narrative       Family History   Problem Relation Age of Onset   • High blood pressure Father          REVIEW OF SYSTEMS:  General: Denies fevers, chills, fatigue.  Cardiovascular: Denies chest pain.  Respiratory: Denies cough or shortness of breath.  Gastrointestinal: Denies nausea, vomiting, or diarrhea.  Normal appetite.  Genitourinary: Normal urine output.  Musculoskeletal: Denies joint pain.  Neurological: Denies headache.  Skin: Denies rash.      OBJECTIVE:  VITAL SIGNS:    Visit Vitals   • BP (!) 156/92 [place] [facility] [place]: [place]itting, Cuff Size: Large Adult)   • Pulse 72   • Temp 98.7 °F (37.1 °C) (Oral)   • Resp 20   • Ht 5' 6\" (1.676 m)   • Wt 106 kg   • LMP 02/03/2017   • BMI 37.7 kg/m2     GENERAL:  Lily is a 41 year old-year-old female who appears comfortable and in no acute distress.   HEENT: NC/AT(Normocephalic/atraumatic), EOMI(extra-ocular muscles intact), PERRLA(pupils equal, round, reactive to light and accommodation).  Mucous membranes are moist, trmal.  ORAL DENTAL HYGIENE IS FAIR TO GOOD>> No pain with tapping of the teeth. No gum swelling  Redness or drainage  NEUROLOGICAL:  Alert and awake.   PSYCHIATRIC:  Speech and behavior appropriate.         ASSESSMENT:   Dental pain    PLAN:   Clindamycin 300 o. T.i.d. ×10 days  Vicodin 5/325 #10 no efills  Call yourdentist  Patient verbalizes understanding and agreement of the plan as documented in the patient instructions.

## 2020-07-25 NOTE — H&P ADULT - NSHPPHYSICALEXAM_GEN_ALL_CORE
PHYSICAL EXAMINATION:  Vital Signs Last 24 Hrs  T(C): 36.9 (25 Jul 2020 09:51), Max: 36.9 (25 Jul 2020 09:51)  T(F): 98.5 (25 Jul 2020 09:51), Max: 98.5 (25 Jul 2020 09:51)  HR: 64 (25 Jul 2020 09:51) (64 - 64)  BP: 117/45 (25 Jul 2020 09:51) (117/45 - 117/45)  BP(mean): --  RR: 19 (25 Jul 2020 09:51) (19 - 19)  SpO2: 96% (25 Jul 2020 09:51) (96% - 96%)  CAPILLARY BLOOD GLUCOSE            GENERAL: NAD, well-groomed,  HEAD:  atraumatic, normocephalic  EYES: sclera anicteric  ENMT: mucous membranes moist  NECK: supple, No JVD  CHEST/LUNG: clear to auscultation bilaterally;    no      rales   ,   no rhonchi,   HEART: normal S1, S2  ABDOMEN: BS+, soft, ND, NT   EXTREMITIES:    no    edema    b/l LEs  NEURO: awake, ,     moves all extremities  no  leg  weakness  SKIN: no     rash

## 2020-07-25 NOTE — ED PROVIDER NOTE - CLINICAL SUMMARY MEDICAL DECISION MAKING FREE TEXT BOX
Patient is a 65 year-old female with PMH of DM2 on insulin, fibromyalgia, osteoarthritis, sleep apnea on CPAP, depression, venous insufficiency, endometrial CA s/p hysterectomy (2010), HTN, HLD, hypothyroidism, presenting with increasing weakness x 1 week.    -Differential diagnosis is broad and includes infection, metabolic disturbances, worsening diabetes, worsening hypothyroidism, cardiac causes, malignancy  -Labs: CBC/CMP, Hemoglobin A1c, Thyroid function panel, Hepatic function panel, Troponin  -EKG  -Chest Xray  -Urinalysis  -COVID-19 PCR Patient is a 65 year-old female with PMH of DM2 on insulin, fibromyalgia, osteoarthritis, sleep apnea on CPAP, depression, venous insufficiency, endometrial CA s/p hysterectomy (2010), HTN, HLD, hypothyroidism, presenting with increasing weakness x 1 week.    -Differential diagnosis is broad and includes infection, metabolic disturbances, worsening diabetes, worsening hypothyroidism, cardiac causes, malignancy  -Labs: CBC/CMP, Hemoglobin A1c, Thyroid function panel, Hepatic function panel, Troponin  -EKG  sinus domingo ,no acute changes   -Chest Xray  -Urinalysis  -COVID-19 PCR  and re-evaluate ZR

## 2020-07-25 NOTE — H&P ADULT - NSICDXPASTMEDICALHX_GEN_ALL_CORE_FT
PAST MEDICAL HISTORY:  Constipation     Depression     Diabetes Mellitus     Diarrhea     Environmental Allergies     Fibromyalgia     GERD with Apnea     High Triglycerides     Hyperlipidemia     Hypothyroidism     Morbidly Obese     Obstructive Sleep Apnea     Osteoarthritis of Knee right. requires cane    Personal History of Arthritis     Personal History of Female Genital Cancer     Personal History of Hypertension     Restless Legs Syndrome (RLS)     Umbilical Hernia

## 2020-07-25 NOTE — H&P ADULT - ASSESSMENT
Patient is a 65 year-old female    h/o   DM2 on insulin, fibromyalgia, osteoarthritis, sleep apnea on CPAP, depression, venous insufficiency, endometrial CA s/p hysterectomy (2010)  , HTN,   HLD,   hypothyroidism,/  ca  uterus,  2010,  obesity      presenting with increasing weakness/  intermittent  cp  x 1 week . Patient has generalized pain and weakness at her baseline, uses  a walker. She has had a gradual worsening of her weakness this past week, with difficulty walking. She has not come into the ED until today due to concerns about COVID-19.  She also reports worsening of her generalized pain, new pain in her right thigh, i mild chest pain, occasional dyspnea on exertion, and chills.  She denies fever. She has not started any new medications recently other than vitamin D.	    pt  with  cp/  leg  roshni/  morbid  obesity of  60   doppler  legs/  CT angio chest   CP/ tele/  trend  troponin/ negative  troponin in  ER   echo/ card  eval  DM, on lantus/  humalog  hTN, on  asa/  enalapril   depression, on  mlple  meds   on  dvt ppx/  PT  eval Patient is a 65 year-old female    h/o   DM2 on insulin, fibromyalgia, osteoarthritis, sleep apnea on CPAP, depression, venous insufficiency, endometrial CA s/p hysterectomy (2010)  , HTN,   HLD,   hypothyroidism,/  ca  uterus,  2010,  obesity      presenting with increasing weakness/  intermittent  cp  x 1 week . Patient has generalized pain and weakness at her baseline, uses  a walker. She has had a gradual worsening of her weakness this past week, with difficulty walking. She has not come into the ED until today due to concerns about COVID-19.  She also reports worsening of her generalized pain, new pain in her right thigh, i mild chest pain, occasional dyspnea on exertion, and chills.  She denies fever. She has not started any new medications recently other than vitamin D.	    pt  with  cp/  leg  roshni/   h/o  morbid  obesity/  BMI  of  60   doppler  legs/  CT angio chest, ordered   CP/ tele/  trend  troponin/ negative  troponin in  ER/    echo/ card  eval  DM, on lantus/  humalog at  home/  lantus  lowered   insulin, per endo   HTN,  on  asa/  enalapril   depression, on  mlple  meds   on  dvt ppx/  PT  eval Patient is a 65 year-old female    h/o   DM2 on insulin, fibromyalgia, osteoarthritis, sleep apnea on CPAP, depression, venous insufficiency, endometrial CA s/p hysterectomy (2010)  , HTN,   HLD,   hypothyroidism,/  ca  uterus,  2010,  obesity      presenting with increasing weakness/  intermittent  cp  x 1 week . Patient has generalized pain and weakness at her baseline, uses  a walker. She has had a gradual worsening of her weakness this past week, with difficulty walking. She has not come into the ED until today due to concerns about COVID-19.  She also reports worsening of her generalized pain, new pain in her right thigh, i mild chest pain, occasional dyspnea on exertion, and chills.  She denies fever. She has not started any new medications recently other than vitamin D.	    pt  with  cp/  leg  pain/    h/o  morbid  obesity/  BMI  of  60/  with  massive  truncal  obesity   h/o  lymphedema. with hyperpigmented  lesions santosh  right leg   able  to  raise  b/l  lega/  no  weakness   doppler  legs/  CT angio chest, ordered   CP/ tele/  trend  troponin/ negative  troponin in  ER/    echo/ card  eval  DM, on lantus/  humalog at  home/  lantus  lowered   insulin, per endo   HTN,  on  asa/  enalapril   depression, on  mlple  meds/  fibromyalgia / pt wants  clonazepam  and   hydromprphone/  see  i  top/ in  event  note   on  dvt ppx/  PT  eval

## 2020-07-25 NOTE — H&P ADULT - NSHPLABSRESULTS_GEN_ALL_CORE
LABS:                        11.2   7.89  )-----------( 306      ( 2020 10:52 )             36.3     07-25    139  |  100  |  20  ----------------------------<  106<H>  4.1   |  25  |  0.90    Ca    9.4      2020 10:52    TPro  6.9  /  Alb  3.8  /  TBili  0.4  /  DBili  <0.1  /  AST  11  /  ALT  10  /  AlkPhos  65  07-25          Urinalysis Basic - ( 2020 12:14 )    Color: Yellow / Appearance: Clear / S.025 / pH: x  Gluc: x / Ketone: Negative  / Bili: Negative / Urobili: Negative   Blood: x / Protein: Trace / Nitrite: Negative   Leuk Esterase: Negative / RBC: 1 /hpf / WBC 1 /HPF   Sq Epi: x / Non Sq Epi: 3 /hpf / Bacteria: Negative

## 2020-07-25 NOTE — CHART NOTE - NSCHARTNOTEFT_GEN_A_CORE
This report was requested by: Rosas Payton | Reference #: [id number]    You have not added a TELLY number. Keeping your TELLY number(s) up to date on the My TELLY Numbers page will enable the separation of your prescriptions from others in the search results.    Others' Prescriptions  Patient Name: Cyn Suarez Date: 1955  Address: [address] [place] [id number]: Female  Rx Written	Rx Dispensed	Drug	Quantity	Days Supply	Prescriber Name	Payment Method	Dispenser  07/08/2020	07/09/2020	hydromorphone 4 mg tablet	120	30	Thomas Hi S	Medicare	[facility] #[id number]  06/26/2020	06/26/2020	clonazepam 0.5 mg tablet	90	30	Finkelstein, Sharon, A, MD	Medicare	[facility] #[id number]  05/04/2020	05/11/2020	hydromorphone 4 mg tablet	120	30	Thomas iH S	Medicare	[facility] #[id number]  05/01/2020	05/09/2020	clonazepam 0.5 mg tablet	90	30	Finkelstein, Sharon, A, MD	Medicare	[facility] #[id number]  03/31/2020	04/05/2020	hydromorphone 4 mg tablet	100	25	Thomas Hi S	Medicare	[facility] #[id number]  04/03/2020	04/05/2020	clonazepam 0.5 mg tablet	90	30	Finkelstein, Sharon, A, MD	Medicare	[facility] #[id number]  01/28/2020	02/13/2020	hydromorphone 4 mg tablet	120	30	Pati Panchal PA-C	Medicare	[facility] #[id number]  12/27/2019	12/29/2019	hydromorphone 4 mg tablet	120	30	Pati Panchal PA-C	[facility] #[id number]  12/10/2019	12/21/2019	clonazepam 0.5 mg tablet	90	30	Finkelstein, Sharon, A, MD	[facility] #[id number]  10/19/2019	10/23/2019	clonazepam 0.5 mg tablet	90	30	Finkelstein, Sharon, A, MD	[facility] #[id number]  09/26/2019	10/10/2019	hydromorphone 4 mg tablet	120	30	Chuck Cornell	[facility] #[id number]  08/13/2019	08/13/2019	hydromorphone 4 mg tablet	120	30	Pati Panchal PA-C	[facility] #[id number]    Rosas RENDON  Dept of Medicine  [id number]

## 2020-07-26 LAB
A1C WITH ESTIMATED AVERAGE GLUCOSE RESULT: 6.4 % — HIGH (ref 4–5.6)
ANION GAP SERPL CALC-SCNC: 12 MMOL/L — SIGNIFICANT CHANGE UP (ref 5–17)
BUN SERPL-MCNC: 14 MG/DL — SIGNIFICANT CHANGE UP (ref 7–23)
CALCIUM SERPL-MCNC: 9.3 MG/DL — SIGNIFICANT CHANGE UP (ref 8.4–10.5)
CHLORIDE SERPL-SCNC: 104 MMOL/L — SIGNIFICANT CHANGE UP (ref 96–108)
CO2 SERPL-SCNC: 26 MMOL/L — SIGNIFICANT CHANGE UP (ref 22–31)
CREAT SERPL-MCNC: 0.82 MG/DL — SIGNIFICANT CHANGE UP (ref 0.5–1.3)
ESTIMATED AVERAGE GLUCOSE: 137 MG/DL — HIGH (ref 68–114)
GLUCOSE BLDC GLUCOMTR-MCNC: 112 MG/DL — HIGH (ref 70–99)
GLUCOSE BLDC GLUCOMTR-MCNC: 117 MG/DL — HIGH (ref 70–99)
GLUCOSE BLDC GLUCOMTR-MCNC: 156 MG/DL — HIGH (ref 70–99)
GLUCOSE BLDC GLUCOMTR-MCNC: 193 MG/DL — HIGH (ref 70–99)
GLUCOSE SERPL-MCNC: 95 MG/DL — SIGNIFICANT CHANGE UP (ref 70–99)
HCT VFR BLD CALC: 36.3 % — SIGNIFICANT CHANGE UP (ref 34.5–45)
HCV AB S/CO SERPL IA: 0.11 S/CO — SIGNIFICANT CHANGE UP (ref 0–0.99)
HCV AB SERPL-IMP: SIGNIFICANT CHANGE UP
HGB BLD-MCNC: 11.1 G/DL — LOW (ref 11.5–15.5)
MCHC RBC-ENTMCNC: 25 PG — LOW (ref 27–34)
MCHC RBC-ENTMCNC: 30.6 GM/DL — LOW (ref 32–36)
MCV RBC AUTO: 81.8 FL — SIGNIFICANT CHANGE UP (ref 80–100)
NRBC # BLD: 0 /100 WBCS — SIGNIFICANT CHANGE UP (ref 0–0)
PLATELET # BLD AUTO: 280 K/UL — SIGNIFICANT CHANGE UP (ref 150–400)
POTASSIUM SERPL-MCNC: 3.7 MMOL/L — SIGNIFICANT CHANGE UP (ref 3.5–5.3)
POTASSIUM SERPL-SCNC: 3.7 MMOL/L — SIGNIFICANT CHANGE UP (ref 3.5–5.3)
RBC # BLD: 4.44 M/UL — SIGNIFICANT CHANGE UP (ref 3.8–5.2)
RBC # FLD: 17.5 % — HIGH (ref 10.3–14.5)
SODIUM SERPL-SCNC: 142 MMOL/L — SIGNIFICANT CHANGE UP (ref 135–145)
WBC # BLD: 4.98 K/UL — SIGNIFICANT CHANGE UP (ref 3.8–10.5)
WBC # FLD AUTO: 4.98 K/UL — SIGNIFICANT CHANGE UP (ref 3.8–10.5)

## 2020-07-26 PROCEDURE — 93306 TTE W/DOPPLER COMPLETE: CPT | Mod: 26

## 2020-07-26 RX ADMIN — HYDROMORPHONE HYDROCHLORIDE 4 MILLIGRAM(S): 2 INJECTION INTRAMUSCULAR; INTRAVENOUS; SUBCUTANEOUS at 15:57

## 2020-07-26 RX ADMIN — INSULIN GLARGINE 30 UNIT(S): 100 INJECTION, SOLUTION SUBCUTANEOUS at 21:26

## 2020-07-26 RX ADMIN — BUPROPION HYDROCHLORIDE 300 MILLIGRAM(S): 150 TABLET, EXTENDED RELEASE ORAL at 11:30

## 2020-07-26 RX ADMIN — Medication 40 MILLIGRAM(S): at 05:48

## 2020-07-26 RX ADMIN — HYDROMORPHONE HYDROCHLORIDE 4 MILLIGRAM(S): 2 INJECTION INTRAMUSCULAR; INTRAVENOUS; SUBCUTANEOUS at 05:48

## 2020-07-26 RX ADMIN — Medication 0.5 MILLIGRAM(S): at 21:26

## 2020-07-26 RX ADMIN — Medication 1: at 16:42

## 2020-07-26 RX ADMIN — Medication 125 MICROGRAM(S): at 05:49

## 2020-07-26 RX ADMIN — GABAPENTIN 600 MILLIGRAM(S): 400 CAPSULE ORAL at 21:26

## 2020-07-26 RX ADMIN — HYDROMORPHONE HYDROCHLORIDE 4 MILLIGRAM(S): 2 INJECTION INTRAMUSCULAR; INTRAVENOUS; SUBCUTANEOUS at 23:50

## 2020-07-26 RX ADMIN — GABAPENTIN 600 MILLIGRAM(S): 400 CAPSULE ORAL at 05:49

## 2020-07-26 RX ADMIN — HYDROMORPHONE HYDROCHLORIDE 4 MILLIGRAM(S): 2 INJECTION INTRAMUSCULAR; INTRAVENOUS; SUBCUTANEOUS at 23:20

## 2020-07-26 RX ADMIN — ATORVASTATIN CALCIUM 40 MILLIGRAM(S): 80 TABLET, FILM COATED ORAL at 21:26

## 2020-07-26 RX ADMIN — Medication 81 MILLIGRAM(S): at 11:30

## 2020-07-26 RX ADMIN — HYDROMORPHONE HYDROCHLORIDE 4 MILLIGRAM(S): 2 INJECTION INTRAMUSCULAR; INTRAVENOUS; SUBCUTANEOUS at 16:48

## 2020-07-26 RX ADMIN — ENOXAPARIN SODIUM 40 MILLIGRAM(S): 100 INJECTION SUBCUTANEOUS at 11:30

## 2020-07-26 RX ADMIN — HYDROMORPHONE HYDROCHLORIDE 4 MILLIGRAM(S): 2 INJECTION INTRAMUSCULAR; INTRAVENOUS; SUBCUTANEOUS at 07:33

## 2020-07-26 RX ADMIN — ESCITALOPRAM OXALATE 20 MILLIGRAM(S): 10 TABLET, FILM COATED ORAL at 11:30

## 2020-07-26 RX ADMIN — GABAPENTIN 600 MILLIGRAM(S): 400 CAPSULE ORAL at 13:07

## 2020-07-26 NOTE — PROGRESS NOTE ADULT - ASSESSMENT
Patient is a 65 year-old female    h/o   DM2 on insulin, fibromyalgia, osteoarthritis, sleep apnea on CPAP,  RENARD/   depression, venous insufficiency,   endometrial CA s/p hysterectomy (2010)  , HTN,   HLD,   hypothyroidism,  morbid   obesity      presenting with increasing weakness/  intermittent  cp  x 1 week . Patient has generalized pain and weakness at her baseline, uses  a walker. She has had a gradual worsening of her weakness this past week, with difficulty walking    s/p  i mild chest pain, occasional dyspnea on exertion, /  denies  fevers .	    pt  with  cp/  leg  pain/    h/o  morbid  obesity/  BMI  of  60/  with  massive  truncal  obesity/ on nocturnal CPAP   h/o  lymphedema. with hyperpigmented /  raised  lesions santosh  right leg/  wound  care eval   able  to  raise  b/l  legs/   no  weakness   doppler  legs, pending    CT angio chest  ,no PE   CP/  negative  troponin in  ER/   DM, on lantus/  humalog at  home/  lantus  lowered   insulin, per endo   HTN,  on  asa/  enalapril/ lipitor/ synthroid/ lasix   depression, on  mlple  meds/  fibromyalgia / pt wants  clonazepam  and   Hydromorphone  /  see  i S top/ in  event  note  Echo, pending  on dvt ppx                  on  dvt ppx/  PT  eval

## 2020-07-26 NOTE — PHYSICAL THERAPY INITIAL EVALUATION ADULT - ADDITIONAL COMMENTS
Pt lives alone on a pvt home as 4 steps at entry +LHR, and a flight to her bedroom +LHR. Pt was independent w/ all ADLs and functional mobility at baseline owns a rollator. Pt was feeling week since the last 4 weeks where her mobility was very limited and was "hunching over the rollator".

## 2020-07-26 NOTE — PHYSICAL THERAPY INITIAL EVALUATION ADULT - PERTINENT HX OF CURRENT PROBLEM, REHAB EVAL
65 year-old female with PMH of DM2 on insulin, fibromyalgia, osteoarthritis, sleep apnea on CPAP, depression, venous insufficiency, endometrial CA s/p hysterectomy (2010), HTN, HLD, hypothyroidism, presenting with increasing weakness x 1 week. Pt also reports worsening of her generalized pain, new pain in her right thigh, intermittent mild chest pain, occasional dyspnea on exertion, and chills. CTA (-) for PE.

## 2020-07-26 NOTE — CHART NOTE - NSCHARTNOTEFT_GEN_A_CORE
Consult called for diabetes management but the patient has great control inpatient as evidenced by her POC glucose ranging from 117-166 and her HbA1c of 6.4%. Her current Lantus dose is 75% of her home dose so there is no major discrepancy in what she is doing at home and here. As a result, there is no indication for an endocrine consult at this time. If the patient would like to follow-up with our practice an appointment can be made Monday-Friday between the hours of 9am and 5pm: [phone]. Today is Sunday so the office is currently closed. Discussed with MARY Olvera.   Sarah Kelsey MD  Pager: [phone], between 9am-6pm  Nights and Weekends: [phone]

## 2020-07-27 ENCOUNTER — TRANSCRIPTION ENCOUNTER (OUTPATIENT)
Age: 65
End: 2020-07-27

## 2020-07-27 LAB
ANION GAP SERPL CALC-SCNC: 14 MMOL/L — SIGNIFICANT CHANGE UP (ref 5–17)
BUN SERPL-MCNC: 14 MG/DL — SIGNIFICANT CHANGE UP (ref 7–23)
CALCIUM SERPL-MCNC: 9.5 MG/DL — SIGNIFICANT CHANGE UP (ref 8.4–10.5)
CHLORIDE SERPL-SCNC: 103 MMOL/L — SIGNIFICANT CHANGE UP (ref 96–108)
CO2 SERPL-SCNC: 25 MMOL/L — SIGNIFICANT CHANGE UP (ref 22–31)
CREAT SERPL-MCNC: 0.94 MG/DL — SIGNIFICANT CHANGE UP (ref 0.5–1.3)
GLUCOSE BLDC GLUCOMTR-MCNC: 127 MG/DL — HIGH (ref 70–99)
GLUCOSE BLDC GLUCOMTR-MCNC: 153 MG/DL — HIGH (ref 70–99)
GLUCOSE BLDC GLUCOMTR-MCNC: 181 MG/DL — HIGH (ref 70–99)
GLUCOSE SERPL-MCNC: 113 MG/DL — HIGH (ref 70–99)
MAGNESIUM SERPL-MCNC: 2 MG/DL — SIGNIFICANT CHANGE UP (ref 1.6–2.6)
POTASSIUM SERPL-MCNC: 3.7 MMOL/L — SIGNIFICANT CHANGE UP (ref 3.5–5.3)
POTASSIUM SERPL-SCNC: 3.7 MMOL/L — SIGNIFICANT CHANGE UP (ref 3.5–5.3)
SODIUM SERPL-SCNC: 142 MMOL/L — SIGNIFICANT CHANGE UP (ref 135–145)

## 2020-07-27 PROCEDURE — 93970 EXTREMITY STUDY: CPT | Mod: 26

## 2020-07-27 PROCEDURE — 99232 SBSQ HOSP IP/OBS MODERATE 35: CPT

## 2020-07-27 RX ORDER — LORATADINE 10 MG/1
10 TABLET ORAL ONCE
Refills: 0 | Status: COMPLETED | OUTPATIENT
Start: 2020-07-27 | End: 2020-07-27

## 2020-07-27 RX ADMIN — Medication 40 MILLIGRAM(S): at 06:26

## 2020-07-27 RX ADMIN — Medication 0.5 MILLIGRAM(S): at 16:48

## 2020-07-27 RX ADMIN — HYDROMORPHONE HYDROCHLORIDE 4 MILLIGRAM(S): 2 INJECTION INTRAMUSCULAR; INTRAVENOUS; SUBCUTANEOUS at 19:50

## 2020-07-27 RX ADMIN — BUPROPION HYDROCHLORIDE 300 MILLIGRAM(S): 150 TABLET, EXTENDED RELEASE ORAL at 12:42

## 2020-07-27 RX ADMIN — GABAPENTIN 600 MILLIGRAM(S): 400 CAPSULE ORAL at 06:26

## 2020-07-27 RX ADMIN — ESCITALOPRAM OXALATE 20 MILLIGRAM(S): 10 TABLET, FILM COATED ORAL at 12:42

## 2020-07-27 RX ADMIN — GABAPENTIN 600 MILLIGRAM(S): 400 CAPSULE ORAL at 22:04

## 2020-07-27 RX ADMIN — Medication 1: at 16:45

## 2020-07-27 RX ADMIN — ENOXAPARIN SODIUM 40 MILLIGRAM(S): 100 INJECTION SUBCUTANEOUS at 22:05

## 2020-07-27 RX ADMIN — Medication 81 MILLIGRAM(S): at 12:42

## 2020-07-27 RX ADMIN — Medication 0.5 MILLIGRAM(S): at 22:12

## 2020-07-27 RX ADMIN — GABAPENTIN 600 MILLIGRAM(S): 400 CAPSULE ORAL at 12:42

## 2020-07-27 RX ADMIN — HYDROMORPHONE HYDROCHLORIDE 4 MILLIGRAM(S): 2 INJECTION INTRAMUSCULAR; INTRAVENOUS; SUBCUTANEOUS at 12:46

## 2020-07-27 RX ADMIN — ATORVASTATIN CALCIUM 40 MILLIGRAM(S): 80 TABLET, FILM COATED ORAL at 22:04

## 2020-07-27 RX ADMIN — INSULIN GLARGINE 30 UNIT(S): 100 INJECTION, SOLUTION SUBCUTANEOUS at 22:04

## 2020-07-27 RX ADMIN — HYDROMORPHONE HYDROCHLORIDE 4 MILLIGRAM(S): 2 INJECTION INTRAMUSCULAR; INTRAVENOUS; SUBCUTANEOUS at 13:50

## 2020-07-27 RX ADMIN — Medication 125 MICROGRAM(S): at 06:26

## 2020-07-27 RX ADMIN — HYDROMORPHONE HYDROCHLORIDE 4 MILLIGRAM(S): 2 INJECTION INTRAMUSCULAR; INTRAVENOUS; SUBCUTANEOUS at 06:26

## 2020-07-27 RX ADMIN — LORATADINE 10 MILLIGRAM(S): 10 TABLET ORAL at 12:42

## 2020-07-27 RX ADMIN — HYDROMORPHONE HYDROCHLORIDE 4 MILLIGRAM(S): 2 INJECTION INTRAMUSCULAR; INTRAVENOUS; SUBCUTANEOUS at 20:51

## 2020-07-27 NOTE — DISCHARGE NOTE PROVIDER - HOSPITAL COURSE
Patient is a 65 year-old female  with PMH of DM2 on insulin, fibromyalgia, osteoarthritis, sleep apnea on CPAP, depression, venous insufficiency, endometrial CA s/p hysterectomy (2010)  , HTN,   HLD,   hypothyroidism,/  ca  uterus,  2010,  obesity         presented with increasing weakness/  intermittent  cp  x 1 week . Patient has generalized pain and weakness at her baseline, is able to walk using a walker. She has had a gradual worsening of her weakness in the week previous to this admission with difficulty walking. She did not come into the ED when symptoms first occurred due to concerns about COVID-19.  She also reported worsening of her generalized pain, new pain in her right thigh, and mild chest pain, occasional dyspnea on exertion, and chills    chest pain musculoskeletal  with significant risk factor for cad    - beta blocker added  due to run of PAT - tolerating medication, no further arrhythmias were noted on telemetry    echo noted with RV dysfunction probably due to sleep apnea/ obesity    On oral diuretics    CT Angiogram with no acute findings    Will need a stress test as an out patient

## 2020-07-27 NOTE — DISCHARGE NOTE PROVIDER - CARE PROVIDER_API CALL
Norma Britt  INTERNAL MEDICINE  [address] [place] [address]  Phone: [phone]  Fax: [phone]  Follow Up Time:     Don Dawn  SURGERY - [facility] [address] [address]  Phone: [phone]  Fax: [phone]  Follow Up Time:     Abdulkadir Beckett (DPM)  Podiatric Medicine and Surgery  [address] [place] [address]  Phone: [phone]  Fax: [phone]  Follow Up Time: Norma Britt  INTERNAL MEDICINE  [address] [facility] [address]  [place] [address]  Phone: [phone]  Fax: [phone]  Follow Up Time:     Don Dawn  SURGERY - [address] [facility] [address] [address]  Phone: [phone]  Fax: [phone]  Follow Up Time:     Abdulkadir Beckett (DPM)  Podiatric Medicine and Surgery  [address] [place] [address]  Phone: [phone]  Fax: [phone]  Follow Up Time:     Carlos Alberto Cardenas  CARDIOVASCULAR DISEASE  [address] [facility] [address]  [place] [address]  Phone: [phone]  Fax: [phone]  Follow Up Time: Don Dawn  SURGERY - [address] [facility]  [address] [place] [address]  Phone: [phone]  Fax: [phone]  Follow Up Time:     Abdulkadir Beckett (DPM)  Podiatric Medicine and Surgery  [address] [place] [address]  Phone: [phone]  Fax: [phone]  Follow Up Time:     Carlos Alberto Cardenas  CARDIOVASCULAR DISEASE  [address] [facility], [address]  [place] [address]  Phone: [phone]  Fax: [phone]  Follow Up Time:     EVELIO GARRISON  [id number]  [address] [facility], [address]  [place] [address]  Phone: [phone]  Fax: [phone]  Follow Up Time:     DON SINGLETON  [address] [facility] [address]  [place] [address]  Phone: [phone]  Fax: [phone]  Follow Up Time:

## 2020-07-27 NOTE — DISCHARGE NOTE PROVIDER - NSDCCPCAREPLAN_GEN_ALL_CORE_FT
PRINCIPAL DISCHARGE DIAGNOSIS  Diagnosis: Chest pain, unspecified type  Assessment and Plan of Treatment: CT angio negative for pulmonary embolus        SECONDARY DISCHARGE DIAGNOSES  Diagnosis: Weakness  Assessment and Plan of Treatment: Physical therapy at rehab facility PRINCIPAL DISCHARGE DIAGNOSIS  Diagnosis: Chest pain, unspecified type  Assessment and Plan of Treatment: CT angio negative for pulmonary embolus  HOME CARE INSTRUCTIONS  For the next few days, avoid physical activities that bring on chest pain. Continue physical activities as directed.  Do not smoke.  Avoid drinking alcohol.   Only take over-the-counter or prescription medicine for pain, discomfort, or fever as directed by your caregiver.  Follow your caregiver's suggestions for further testing if your chest pain does not go away.  Keep any follow-up appointments you made. If you do not go to an appointment, you could develop lasting (chronic) problems with pain. If there is any problem keeping an appointment, you must call to reschedule.   SEEK MEDICAL CARE IF:  You think you are having problems from the medicine you are taking. Read your medicine instructions carefully.  Your chest pain does not go away, even after treatment.  You develop a rash with blisters on your chest.  SEEK IMMEDIATE MEDICAL CARE IF:  You have increased chest pain or pain that spreads to your arm, neck, jaw, back, or abdomen.   You develop shortness of breath, an increasing cough, or you are coughing up blood.  You have severe back or abdominal pain, feel nauseous, or vomit.  You develop severe weakness, fainting, or chills.  You have a fever.  THIS IS AN EMERGENCY. Do not wait to see if the pain will go away. Get medical help at once. Call your local emergency services (_____________________). Do not drive yourself to the hospital.        SECONDARY DISCHARGE DIAGNOSES  Diagnosis: Weakness  Assessment and Plan of Treatment: Physical therapy at rehab facility PRINCIPAL DISCHARGE DIAGNOSIS  Diagnosis: Chest pain, unspecified type  Assessment and Plan of Treatment: CT angio negative for pulmonary embolus  Cardiac enzyme (troponin)within acceptable range  HOME CARE INSTRUCTIONS  For the next few days, avoid physical activities that bring on chest pain. Continue physical activities as directed.  Do not smoke.  Avoid drinking alcohol.   Only take over-the-counter or prescription medicine for pain, discomfort, or fever as directed by your caregiver.  Follow your caregiver's suggestions for further testing if your chest pain does not go away.  Keep any follow-up appointments you made. If you do not go to an appointment, you could develop lasting (chronic) problems with pain. If there is any problem keeping an appointment, you must call to reschedule.   SEEK MEDICAL CARE IF:  You think you are having problems from the medicine you are taking. Read your medicine instructions carefully.  Your chest pain does not go away, even after treatment.  You develop a rash with blisters on your chest.  SEEK IMMEDIATE MEDICAL CARE IF:  You have increased chest pain or pain that spreads to your arm, neck, jaw, back, or abdomen.   You develop shortness of breath, an increasing cough, or you are coughing up blood.  You have severe back or abdominal pain, feel nauseous, or vomit.  You develop severe weakness, fainting, or chills.  You have a fever.  THIS IS AN EMERGENCY. Do not wait to see if the pain will go away. Get medical help at once. Call your local emergency services (_____________________). Do not drive yourself to the hospital.        SECONDARY DISCHARGE DIAGNOSES  Diagnosis: Weakness  Assessment and Plan of Treatment: Physical therapy at rehab facility PRINCIPAL DISCHARGE DIAGNOSIS  Diagnosis: Chest pain, unspecified type  Assessment and Plan of Treatment: CT angio negative for pulmonary embolus  Cardiac enzyme (troponin)within acceptable range  HOME CARE INSTRUCTIONS  For the next few days, avoid physical activities that bring on chest pain. Continue physical activities as directed.  Do not smoke.  Avoid drinking alcohol.   Only take over-the-counter or prescription medicine for pain, discomfort, or fever as directed by your caregiver.  Follow your caregiver's suggestions for further testing if your chest pain does not go away.  Keep any follow-up appointments you made. If you do not go to an appointment, you could develop lasting (chronic) problems with pain. If there is any problem keeping an appointment, you must call to reschedule.   SEEK MEDICAL CARE IF:  You think you are having problems from the medicine you are taking. Read your medicine instructions carefully.  Your chest pain does not go away, even after treatment.  You develop a rash with blisters on your chest.  SEEK IMMEDIATE MEDICAL CARE IF:  You have increased chest pain or pain that spreads to your arm, neck, jaw, back, or abdomen.   You develop shortness of breath, an increasing cough, or you are coughing up blood.  You have severe back or abdominal pain, feel nauseous, or vomit.  You develop severe weakness, fainting, or chills.  You have a fever.  THIS IS AN EMERGENCY. Do not wait to see if the pain will go away. Get medical help at once. Call your local emergency services (_____________________). Do not drive yourself to the hospital.        SECONDARY DISCHARGE DIAGNOSES  Diagnosis: Venous stasis dermatitis  Assessment and Plan of Treatment: 1.) Emollient therapy: BLE - Moisturize intact skin w/ SWEEN cream daily  2.) BLE elevation  3.) Regular Podiatry follow up for routine diabetic foot and nail care, Dr. Beckett, wound care team Podiatrist, to see patient for nail debridement  4.) Follow up with vascular surgeon or [facility] wound care center for compression options  5.) Nutrition optimization  6.) Glycemic control    Diagnosis: Weakness  Assessment and Plan of Treatment: Physical therapy at rehab facility PRINCIPAL DISCHARGE DIAGNOSIS  Diagnosis: Chest pain, unspecified type  Assessment and Plan of Treatment: CT angio negative for pulmonary embolus  Cardiac enzyme (troponin)within acceptable range  HOME CARE INSTRUCTIONS  For the next few days, avoid physical activities that bring on chest pain. Continue physical activities as directed.  Do not smoke.  Avoid drinking alcohol.   Only take over-the-counter or prescription medicine for pain, discomfort, or fever as directed by your caregiver.  Follow your caregiver's suggestions for further testing if your chest pain does not go away.  Keep any follow-up appointments you made. If you do not go to an appointment, you could develop lasting (chronic) problems with pain. If there is any problem keeping an appointment, you must call to reschedule.   SEEK MEDICAL CARE IF:  You think you are having problems from the medicine you are taking. Read your medicine instructions carefully.  Your chest pain does not go away, even after treatment.  You develop a rash with blisters on your chest.  SEEK IMMEDIATE MEDICAL CARE IF:  You have increased chest pain or pain that spreads to your arm, neck, jaw, back, or abdomen.   You develop shortness of breath, an increasing cough, or you are coughing up blood.  You have severe back or abdominal pain, feel nauseous, or vomit.  You develop severe weakness, fainting, or chills.  You have a fever.  Follow up with with cardiology in 1 week to schedule oupatient STRESS TEST         SECONDARY DISCHARGE DIAGNOSES  Diagnosis: RENARD (obstructive sleep apnea)  Assessment and Plan of Treatment: Follow up with PMD for evalaution    Diagnosis: CHF, chronic  Assessment and Plan of Treatment: Weigh yourself daily.  If you gain 3lbs in 3 days, or 5lbs in a week call your Health Care Provider.  Do not eat or drink foods containing more than 2000mg of salt (sodium) in your diet every day.  Call your Health Care Provider if you have any swelling or increased swelling in your feet, ankles, and/or stomach.  Take all of your medication as directed.  If you become dizzy call your Health Care Provider.      Diagnosis: DM (diabetes mellitus)  Assessment and Plan of Treatment: HgA1C this admission.  Make sure you get your HgA1c checked every three months.  If you take oral diabetes medications, check your blood glucose two times a day.  If you take insulin, check your blood glucose before meals and at bedtime.  It's important not to skip any meals.  Keep a log of your blood glucose results and always take it with you to your doctor appointments.  Keep a list of your current medications including injectables and over the counter medications and bring this medication list with you to all your doctor appointments.  If you have not seen your ophthalmologist this year call for appointment.  Check your feet daily for redness, sores, or openings. Do not self treat. If no improvement in two days call your primary care physician for an appointment.  Low blood sugar (hypoglycemia) is a blood sugar below 70mg/dl. Check your blood sugar if you feel signs/symptoms of hypoglycemia. If your blood sugar is below 70 take 15 grams of carbohydrates (ex 4 oz of apple juice, 3-4 glucose tablets, or 4-6 oz of regular soda) wait 15 minutes and repeat blood sugar to make sure it comes up above 70.  If your blood sugar is above 70 and you are due for a meal, have a meal.  If you are not due for a meal have a snack.  This snack helps keeps your blood sugar at a safe range.      Diagnosis: Venous stasis dermatitis  Assessment and Plan of Treatment: 1.) Emollient therapy: BLE - Moisturize intact skin w/ SWEEN cream daily  2.) BLE elevation  3.) Regular Podiatry follow up for routine diabetic foot and nail care, Dr. Beckett, wound care team Podiatrist, to see patient for nail debridement  4.) Follow up with vascular surgeon or [facility] wound care center for compression options  5.) Nutrition optimization  6.) Glycemic control    Diagnosis: Weakness  Assessment and Plan of Treatment: Physical therapy at rehab facility PRINCIPAL DISCHARGE DIAGNOSIS  Diagnosis: Chest pain, unspecified type  Assessment and Plan of Treatment: CT angio negative for pulmonary embolus  Cardiac enzyme (troponin)within acceptable range  HOME CARE INSTRUCTIONS  For the next few days, avoid physical activities that bring on chest pain. Continue physical activities as directed.  Do not smoke.  Avoid drinking alcohol.   Only take over-the-counter or prescription medicine for pain, discomfort, or fever as directed by your caregiver.  Follow your caregiver's suggestions for further testing if your chest pain does not go away.  Keep any follow-up appointments you made. If you do not go to an appointment, you could develop lasting (chronic) problems with pain. If there is any problem keeping an appointment, you must call to reschedule.   SEEK MEDICAL CARE IF:  You think you are having problems from the medicine you are taking. Read your medicine instructions carefully.  Your chest pain does not go away, even after treatment.  You develop a rash with blisters on your chest.  SEEK IMMEDIATE MEDICAL CARE IF:  You have increased chest pain or pain that spreads to your arm, neck, jaw, back, or abdomen.   You develop shortness of breath, an increasing cough, or you are coughing up blood.  You have severe back or abdominal pain, feel nauseous, or vomit.  You develop severe weakness, fainting, or chills.  You have a fever.  Follow up with with cardiology in 1 week to schedule oupatient STRESS TEST         SECONDARY DISCHARGE DIAGNOSES  Diagnosis: RENARD (obstructive sleep apnea)  Assessment and Plan of Treatment: Follow up with PMD and pulmonology  for management of CPAP    Diagnosis: CHF, chronic  Assessment and Plan of Treatment: Weigh yourself daily.  If you gain 3lbs in 3 days, or 5lbs in a week call your Health Care Provider.  Do not eat or drink foods containing more than 2000mg of salt (sodium) in your diet every day.  Call your Health Care Provider if you have any swelling or increased swelling in your feet, ankles, and/or stomach.  Take all of your medication as directed.  If you become dizzy call your Health Care Provider.      Diagnosis: DM (diabetes mellitus)  Assessment and Plan of Treatment: Make sure you get your HgA1c checked every three months.  If you take oral diabetes medications, check your blood glucose two times a day.  If you take insulin, check your blood glucose before meals and at bedtime.  It's important not to skip any meals.  Keep a log of your blood glucose results and always take it with you to your doctor appointments.  Keep a list of your current medications including injectables and over the counter medications and bring this medication list with you to all your doctor appointments.  If you have not seen your ophthalmologist this year call for appointment.  Check your feet daily for redness, sores, or openings. Do not self treat. If no improvement in two days call your primary care physician for an appointment.  Low blood sugar (hypoglycemia) is a blood sugar below 70mg/dl. Check your blood sugar if you feel signs/symptoms of hypoglycemia. If your blood sugar is below 70 take 15 grams of carbohydrates (ex 4 oz of apple juice, 3-4 glucose tablets, or 4-6 oz of regular soda) wait 15 minutes and repeat blood sugar to make sure it comes up above 70.  If your blood sugar is above 70 and you are due for a meal, have a meal.  If you are not due for a meal have a snack.  This snack helps keeps your blood sugar at a safe range.      Diagnosis: Venous stasis dermatitis  Assessment and Plan of Treatment: 1.) Emollient therapy: BLE - Moisturize intact skin w/ SWEEN cream daily  2.) BLE elevation  3.) Regular Podiatry follow up for routine diabetic foot and nail care, Dr. Beckett, wound care team Podiatrist, to see patient for nail debridement  4.) Follow up with vascular surgeon or [facility] wound care center for compression options  5.) Nutrition optimization  6.) Glycemic control    Diagnosis: Weakness  Assessment and Plan of Treatment: Physical therapy at rehab facility PRINCIPAL DISCHARGE DIAGNOSIS  Diagnosis: Chest pain, unspecified type  Assessment and Plan of Treatment: CT angio negative for pulmonary embolus  Cardiac enzyme (troponin)within acceptable range  HOME CARE INSTRUCTIONS  For the next few days, avoid physical activities that bring on chest pain. Continue physical activities as directed.  Do not smoke.  Avoid drinking alcohol.   Only take over-the-counter or prescription medicine for pain, discomfort, or fever as directed by your caregiver.  Follow your caregiver's suggestions for further testing if your chest pain does not go away.  Keep any follow-up appointments you made. If you do not go to an appointment, you could develop lasting (chronic) problems with pain. If there is any problem keeping an appointment, you must call to reschedule.   SEEK MEDICAL CARE IF:  You think you are having problems from the medicine you are taking. Read your medicine instructions carefully.  Your chest pain does not go away, even after treatment.  You develop a rash with blisters on your chest.  SEEK IMMEDIATE MEDICAL CARE IF:  You have increased chest pain or pain that spreads to your arm, neck, jaw, back, or abdomen.   You develop shortness of breath, an increasing cough, or you are coughing up blood.  You have severe back or abdominal pain, feel nauseous, or vomit.  You develop severe weakness, fainting, or chills.  You have a fever.  ****Follow up with with cardiology in 1 week to schedule oupatient STRESS TEST         SECONDARY DISCHARGE DIAGNOSES  Diagnosis: PAT (paroxysmal atrial tachycardia)  Assessment and Plan of Treatment: Continue current medications, follow up with cardiology    Diagnosis: RENARD (obstructive sleep apnea)  Assessment and Plan of Treatment: Follow up with PMD and pulmonology  for management of CPAP    Diagnosis: CHF, chronic  Assessment and Plan of Treatment: Weigh yourself daily.  If you gain 3lbs in 3 days, or 5lbs in a week call your Health Care Provider.  Do not eat or drink foods containing more than 2000mg of salt (sodium) in your diet every day.  Call your Health Care Provider if you have any swelling or increased swelling in your feet, ankles, and/or stomach.  Take all of your medication as directed.  If you become dizzy call your Health Care Provider.      Diagnosis: DM (diabetes mellitus)  Assessment and Plan of Treatment: Make sure you get your HgA1c checked every three months.  If you take oral diabetes medications, check your blood glucose two times a day.  If you take insulin, check your blood glucose before meals and at bedtime.  It's important not to skip any meals.  Keep a log of your blood glucose results and always take it with you to your doctor appointments.  Keep a list of your current medications including injectables and over the counter medications and bring this medication list with you to all your doctor appointments.  If you have not seen your ophthalmologist this year call for appointment.  Check your feet daily for redness, sores, or openings. Do not self treat. If no improvement in two days call your primary care physician for an appointment.  Low blood sugar (hypoglycemia) is a blood sugar below 70mg/dl. Check your blood sugar if you feel signs/symptoms of hypoglycemia. If your blood sugar is below 70 take 15 grams of carbohydrates (ex 4 oz of apple juice, 3-4 glucose tablets, or 4-6 oz of regular soda) wait 15 minutes and repeat blood sugar to make sure it comes up above 70.  If your blood sugar is above 70 and you are due for a meal, have a meal.  If you are not due for a meal have a snack.  This snack helps keeps your blood sugar at a safe range.      Diagnosis: Venous stasis dermatitis  Assessment and Plan of Treatment: 1.) Emollient therapy: BLE - Moisturize intact skin w/ SWEEN cream daily  2.) BLE elevation  3.) Regular Podiatry follow up for routine diabetic foot and nail care, Dr. Beckett, wound care team Podiatrist, to see patient for nail debridement  4.) Follow up with vascular surgeon or [facility] wound care center for compression options  5.) Nutrition optimization  6.) Glycemic control

## 2020-07-27 NOTE — CONSULT NOTE ADULT - ASSESSMENT
Patient is a 65 year-old female  with PMH of DM2 on insulin, fibromyalgia, osteoarthritis, sleep apnea on CPAP, depression, venous insufficiency, endometrial CA s/p hysterectomy (2010)  , HTN,   HLD,   hypothyroidism,/  ca  uterus,  2010,  obesity     presenting with increasing weakness/  intermittent  cp  x 1 week . Patient has generalized pain and weakness at her baseline, is able to alk using a walker. She has had a gradual worsening of her weakness this past week, with difficulty walking. She has not come into the ED until today due to concerns about COVID-19.  She also reports worsening of her generalized pain, new pain in her right thigh, i mild chest pain, occasional dyspnea on exertion, and chills.  She denies fever. She has not started any new medications recently other than vitamin D. (25 Jul 2020 12:48)  chest pain musculoskeletal , sig risk factor for cad  tele  cardiac enzymes noted  agree with CTA  asa daily  schedule for stress test/ echo  will add beta blocker as tolerate
Impression:    BLE venous stasis dermatitis  PVD  Elongated toenails    Recommend:  1.) Emollient therapy: BLE - Moisturize intact skin w/ SWEEN cream daily  2.) BLE elevation  3.) Regular Podiatry follow up for routine diabetic foot and nail care, Dr. Beckett, wound care team Podiatrist, to see patient for nail debridement  4.) Follow up with vascular surgeon or [facility] wound care center for compression options  5.) Nutrition optimization  6.) Glycemic control    Care as per medicine. Will not follow. Please recall for new issues.  Upon [facility] [address] [place] [address] [phone]  Seen with Dr. Dawn and discussed with clinical nurse  Thank you for this consult  Connie Dalal, MARY-C. [facility] [id number]

## 2020-07-27 NOTE — CONSULT NOTE ADULT - SUBJECTIVE AND OBJECTIVE BOX
CHIEF COMPLAINT:Patient is a 65y old  Female who presents with a chief complaint of cp/  weakness (25 Jul 2020 12:48)      HPI:  Patient is a 65 year-old female  with PMH of DM2 on insulin, fibromyalgia, osteoarthritis, sleep apnea on CPAP, depression, venous insufficiency, endometrial CA s/p hysterectomy (2010)  , HTN,   HLD,   hypothyroidism,/  ca  uterus,  2010,  obesity     presenting with increasing weakness/  intermiitent  cp  x 1 week . Patient has generalized pain and weakness at her baseline, is able to alk using a walker. She has had a gradual worsening of her weakness this past week, with difficulty walking. She has not come into the ED until today due to concerns about COVID-19.  She also reports worsening of her generalized pain, new pain in her right thigh, i mild chest pain, occasional dyspnea on exertion, and chills.  She denies fever. She has not started any new medications recently other than vitamin D. (25 Jul 2020 12:48)      PAST MEDICAL & SURGICAL HISTORY:  Umbilical Hernia  Restless Legs Syndrome (RLS)  GERD with Apnea  Fibromyalgia  Morbidly Obese  Environmental Allergies  Depression  Diarrhea  Constipation  Obstructive Sleep Apnea  Osteoarthritis of Knee: right. requires cane  High Triglycerides  Hyperlipidemia  Diabetes Mellitus  Hypothyroidism  Personal History of Hypertension  Personal History of Female Genital Cancer  Personal History of Arthritis  Incarcerated Ventral Hernia  Endometrial Ca s/p RODRIGO, BSO  Carpal Tunnel Syndrome: release left  S/P FESS (Functional Endoscopic Sinus Surgery)  S/P Tonsillectomy and Adenoidectomy      MEDICATIONS  (STANDING):  aspirin enteric coated 81 milliGRAM(s) Oral daily  atorvastatin 40 milliGRAM(s) Oral at bedtime  buPROPion  milliGRAM(s) Oral daily  clonazePAM  Tablet 0.5 milliGRAM(s) Oral at bedtime  dextrose 5%. 1000 milliLiter(s) (50 mL/Hr) IV Continuous <Continuous>  dextrose 50% Injectable 12.5 Gram(s) IV Push once  dextrose 50% Injectable 25 Gram(s) IV Push once  dextrose 50% Injectable 25 Gram(s) IV Push once  enalapril 20 milliGRAM(s) Oral Once  enoxaparin Injectable 40 milliGRAM(s) SubCutaneous daily  escitalopram 20 milliGRAM(s) Oral daily  gabapentin 600 milliGRAM(s) Oral three times a day  insulin glargine Injectable (LANTUS) 30 Unit(s) SubCutaneous at bedtime  insulin lispro (HumaLOG) corrective regimen sliding scale   SubCutaneous three times a day before meals  levothyroxine 125 MICROGram(s) Oral daily    MEDICATIONS  (PRN):  dextrose 40% Gel 15 Gram(s) Oral once PRN Blood Glucose LESS THAN 70 milliGRAM(s)/deciliter  glucagon  Injectable 1 milliGRAM(s) IntraMuscular once PRN Glucose LESS THAN 70 milligrams/deciliter      FAMILY HISTORY:      SOCIAL HISTORY:    [ ] Non-smoker  [ ] Smoker  [ ] Alcohol    Allergies    adhesives (Rash)  aerosols, perfumes, fabric softeners (Rash; Rhinitis; Rhinorrhea)  dust (Rhinitis; Rhinorrhea)  Lyrica (Rash)  pollen (Rhinitis; Rhinorrhea)  smoke (Rhinitis; Rhinorrhea)  Tin/costume jewelry (Rash)    Intolerances    	    REVIEW OF SYSTEMS:  CONSTITUTIONAL: No fever, weight loss, or fatigue  EYES: No eye pain, visual disturbances, or discharge  ENT:  No difficulty hearing, tinnitus, vertigo; No sinus or throat pain  NECK: No pain or stiffness  RESPIRATORY: No cough, wheezing, chills or hemoptysis; + exertional Shortness of Breath  CARDIOVASCULAR: + chest pain, no  palpitations, passing out, dizziness, or leg swelling  GASTROINTESTINAL: No abdominal or epigastric pain. No nausea, vomiting, or hematemesis; No diarrhea or constipation. No melena or hematochezia.  GENITOURINARY: No dysuria, frequency, hematuria, or incontinence  NEUROLOGICAL: No headaches, memory loss, loss of strength, numbness, or tremors  SKIN: No itching, burning, rashes, or lesions   LYMPH Nodes: No enlarged glands  ENDOCRINE: No heat or cold intolerance; No hair loss  MUSCULOSKELETAL: No joint pain or swelling; No muscle, back, or extremity pain  PSYCHIATRIC: No depression, anxiety, mood swings, or difficulty sleeping  HEME/LYMPH: No easy bruising, or bleeding gums  ALLERGY AND IMMUNOLOGIC: No hives or eczema	    [ ] All others negative	  [ ] Unable to obtain    PHYSICAL EXAM:  T(C): 36.8 (07-25-20 @ 14:27), Max: 36.9 (07-25-20 @ 09:51)  HR: 62 (07-25-20 @ 14:27) (55 - 64)  BP: 146/74 (07-25-20 @ 14:27) (117/45 - 146/74)  RR: 18 (07-25-20 @ 14:27) (18 - 19)  SpO2: 96% (07-25-20 @ 14:27) (96% - 96%)  Wt(kg): --  I&O's Summary    25 Jul 2020 07:01  -  25 Jul 2020 17:21  --------------------------------------------------------  IN: 360 mL / OUT: 350 mL / NET: 10 mL        Appearance: Normal	  HEENT:   Normal oral mucosa, PERRL, EOMI	  Lymphatic: No lymphadenopathy  Cardiovascular: Normal S1 S2, No JVD, + murmurs, No edema  Respiratory: Lungs clear to auscultation, tenderness on acw on palpation	  Psychiatry: A & O x 3, Mood & affect appropriate  Gastrointestinal:  Soft, Non-tender, + BS	  Skin: No rashes, No ecchymoses, No cyanosis	  Neurologic: Non-focal  Extremities: Normal range of motion, No clubbing, cyanosis or edema  Vascular: Peripheral pulses palpable 2+ bilaterally    TELEMETRY: 	    ECG:  	  RADIOLOGY:  OTHER: 	  	  LABS:	 	    CARDIAC MARKERS:                              11.2   7.89  )-----------( 306      ( 25 Jul 2020 10:52 )             36.3     07-25    139  |  100  |  20  ----------------------------<  106<H>  4.1   |  25  |  0.90    Ca    9.4      25 Jul 2020 10:52    TPro  6.9  /  Alb  3.8  /  TBili  0.4  /  DBili  <0.1  /  AST  11  /  ALT  10  /  AlkPhos  65  07-25    proBNP:   Lipid Profile:   HgA1c:   TSH: Thyroid Stimulating Hormone, Serum: 1.97 uIU/mL (07-25 @ 15:03)        PREVIOUS DIAGNOSTIC TESTING:    < from: 12 Lead ECG (09.13.10 @ 03:51) >  Diagnosis Line NORMAL SINUS RHYTHM  MINIMAL VOLTAGE CRITERIA FOR LVH, MAY BE NORMAL VARIANT  INFERIOR INFARCT , AGE UNDETERMINED    Troponin T, High Sensitivity (07.25.20 @ 13:58)    Troponin T, High Sensitivity Result: 18: Rapid upward or downward changes in high-sensitivity troponin levels  suggest acute myocardial injury. Renal impairment may cause sustained  troponin elevations.  Normal: <6 - 14 ng/L  Indeterminate: 15-51 ng/L  Elevated: > 51 ng/L  See http://labs/test/TROPTHS on the [facility] intranet for more  information ng/L    Triiodothyronine, Total (T3 Total) (07.25.20 @ 15:03)    Triiodothyronine, Total (T3 Total): 90 ng/dL  < from: Xray Chest 1 View- PORTABLE-Urgent (07.25.20 @ 11:08) >  INTERPRETATION:  CLINICAL INFORMATION: Chest pain and weakness.    TECHNIQUE: AP view of the chest.    COMPARISON: Chest radiograph from 9/7/2010.    FINDINGS:    The lungs are clear.   No pleural effusion or pneumothorax.   Heart size cannot be appropriately assessed in this projection.   No acute osseous abnormalities.      IMPRESSION:    Lungs are clear.    < end of copied text >
Wound Surgery Consult Note:    HPI:  Patient is a 65 year-old female  with PMH of DM2 on insulin, fibromyalgia, osteoarthritis, sleep apnea on CPAP, depression, venous insufficiency, endometrial CA s/p hysterectomy (2010)  , HTN,   HLD,   hypothyroidism,/  ca  uterus,  2010,  obesity     presenting with increasing weakness/  intermiitent  cp  x 1 week . Patient has generalized pain and weakness at her baseline, is able to alk using a walker. She has had a gradual worsening of her weakness this past week, with difficulty walking. She has not come into the ED until today due to concerns about COVID-19.  She also reports worsening of her generalized pain, new pain in her right thigh, i mild chest pain, occasional dyspnea on exertion, and chills.  She denies fever. She has not started any new medications recently other than vitamin D. (25 Jul 2020 12:48)    Request for wound care consult for BLE swelling received. Patient encountered sitting up in a chair with legs dependent. She reported that she has pain in her right knee when her legs are elevated in a recliner. She further stated that she walks very little and lives alone. She reported that she can not reach her toes to clip her nails and has not been able to get to a podiatrist. She was seen with Dr. Dawn.    PAST MEDICAL & SURGICAL HISTORY:  Umbilical Hernia  Restless Legs Syndrome (RLS)  GERD with Apnea  Fibromyalgia  Morbidly Obese  Environmental Allergies  Depression  Diarrhea  Constipation  Obstructive Sleep Apnea  Osteoarthritis of Knee: right. requires cane  High Triglycerides  Hyperlipidemia  Diabetes Mellitus  Hypothyroidism  Personal History of Hypertension  Personal History of Female Genital Cancer  Personal History of Arthritis  Incarcerated Ventral Hernia  Endometrial Ca s/p RODRIGO, BSO  Carpal Tunnel Syndrome: release left  S/P FESS (Functional Endoscopic Sinus Surgery)  S/P Tonsillectomy and Adenoidectomy      REVIEW OF SYSTEMS  General:	no fevers or chills  Respiratory and Thorax: +RENARD, no SOB   Cardiovascular:	intermittent CP  Gastrointestinal:	 no n/v  Genitourinary:	no dysuria  Musculoskeletal:	 limited walking with cane  Neurological:	 no LOC  Psychiatric:	depression  Endocrine:	DM  Vascular: PVD  Skin: chronic BLE venous stasis dermatitis    MEDICATIONS  (STANDING):  aspirin enteric coated 81 milliGRAM(s) Oral daily  atorvastatin 40 milliGRAM(s) Oral at bedtime  buPROPion  milliGRAM(s) Oral daily  dextrose 5%. 1000 milliLiter(s) (50 mL/Hr) IV Continuous <Continuous>  dextrose 50% Injectable 12.5 Gram(s) IV Push once  dextrose 50% Injectable 25 Gram(s) IV Push once  dextrose 50% Injectable 25 Gram(s) IV Push once  enoxaparin Injectable 40 milliGRAM(s) SubCutaneous daily  escitalopram 20 milliGRAM(s) Oral daily  furosemide   Injectable 40 milliGRAM(s) IV Push daily  gabapentin 600 milliGRAM(s) Oral three times a day  insulin glargine Injectable (LANTUS) 30 Unit(s) SubCutaneous at bedtime  insulin lispro (HumaLOG) corrective regimen sliding scale   SubCutaneous three times a day before meals  levothyroxine 125 MICROGram(s) Oral daily    MEDICATIONS  (PRN):  clonazePAM  Tablet 0.5 milliGRAM(s) Oral three times a day PRN anxiety  dextrose 40% Gel 15 Gram(s) Oral once PRN Blood Glucose LESS THAN 70 milliGRAM(s)/deciliter  glucagon  Injectable 1 milliGRAM(s) IntraMuscular once PRN Glucose LESS THAN 70 milligrams/deciliter  HYDROmorphone   Tablet 4 milliGRAM(s) Oral every 6 hours PRN Severe Pain (7 - 10)    Allergies    adhesives (Rash)  aerosols, perfumes, fabric softeners (Rash; Rhinitis; Rhinorrhea)  dust (Rhinitis; Rhinorrhea)  Lyrica (Rash)  pollen (Rhinitis; Rhinorrhea)  smoke (Rhinitis; Rhinorrhea)  Tin/costume jewelry (Rash)    Intolerances    SOCIAL HISTORY:  Lives alone, Denies smoking, ETOH    FAMILY HISTORY: non contributory    Vital Signs Last 24 Hrs  T(C): 36.7 (27 Jul 2020 12:40), Max: 37.2 (26 Jul 2020 19:42)  T(F): 98 (27 Jul 2020 12:40), Max: 99 (26 Jul 2020 19:42)  HR: 70 (27 Jul 2020 12:40) (57 - 95)  BP: 142/74 (27 Jul 2020 12:40) (126/70 - 142/74)  BP(mean): --  RR: 18 (27 Jul 2020 12:40) (17 - 19)  SpO2: 98% (27 Jul 2020 12:40) (93% - 98%)    Physical Exam:  General: A&Ox3, MO  Respiratory: no SOB on room air  Gastrointestinal: soft NT/ND   Neurology: weakened strength & sensation grossly intact  Musculoskeletal: no contractures or deformities  Vascular: BLE edema equal, DP pulses palpable, BLE equally warm, no acute ischemia noted  Skin:  BLE with hemosiderin staining, dry, flaking intact skin with lipodermatosclerosis in the gaiter area, no drainage, toenails, thick, discolored, long, no wounds  No odor, erythema, increased warmth, tenderness, induration, fluctuance    LABS:  07-27    142  |  103  |  14  ----------------------------<  113<H>  3.7   |  25  |  0.94    Ca    9.5      27 Jul 2020 06:17  Mg     2.0     07-27                            11.1   4.98  )-----------( 280      ( 26 Jul 2020 06:12 )             36.3

## 2020-07-27 NOTE — CONSULT NOTE ADULT - ATTENDING COMMENTS
Above noted   Leg edema reportedly improved with use of diuretics  Weight issues discussed  OP f/u advised- contact info provided

## 2020-07-27 NOTE — DISCHARGE NOTE PROVIDER - NSDCMRMEDTOKEN_GEN_ALL_CORE_FT
aspirin 81 mg oral tablet: 1 tab(s) orally once a day  atorvastatin 40 mg oral tablet: 1 tab(s) orally once a day  Basaglar KwikPen 100 units/mL subcutaneous solution: 40 unit(s) subcutaneous once a day (at bedtime)  buPROPion 300 mg/24 hours (XL) oral tablet, extended release: 1 tab(s) orally every 24 hours  clonazePAM 0.5 mg oral tablet: 1 tab(s) orally once a day (at bedtime)  doxycycline hyclate 100 mg oral capsule: 1 cap(s) orally 2 times a day   enalapril 20 mg oral tablet: 1 tab(s) orally once a day  escitalopram 20 mg oral tablet: 1 tab(s) orally once a day  gabapentin 600 mg oral tablet: 1 tab(s) orally 3 times a day  Levothroid 125 mcg (0.125 mg) oral tablet: 1 tab(s) orally once a day  loratadine 10 mg oral tablet: 1 tab(s) orally once a day  metFORMIN 1000 mg oral tablet: 1 tab(s) orally 2 times a day  metroNIDAZOLE 500 mg oral tablet: 1 tab(s) orally every 8 hours   NovoLOG 100 units/mL subcutaneous solution: 12 unit(s) subcutaneous 3 times a day (before meals)  omeprazole 40 mg oral delayed release capsule: 1 cap(s) orally once a day aspirin 81 mg oral delayed release tablet: 1 tab(s) orally once a day  atorvastatin 40 mg oral tablet: 1 tab(s) orally once a day (at bedtime)  buPROPion 300 mg/24 hours (XL) oral tablet, extended release: 1 tab(s) orally once a day  clonazePAM 0.5 mg oral tablet: 1 tab(s) orally 3 times a day, As needed, anxiety  enalapril 20 mg oral tablet: 1 tab(s) orally once a day  escitalopram 20 mg oral tablet: 1 tab(s) orally once a day  gabapentin 600 mg oral tablet: 1 tab(s) orally 3 times a day  HumaLOG 100 units/mL subcutaneous solution: Sliding Scale:  1 Unit(s) if Glucose 151 - 200  2 Unit(s) if Glucose 201 - 250  3 Unit(s) if Glucose 251 - 300  4 Unit(s) if Glucose 301 - 350  5 Unit(s) if Glucose 351 - 400  6 Unit(s) if Glucose Greater Than 400  Lantus 100 units/mL subcutaneous solution: 30 unit(s) subcutaneous once a day (at bedtime)  Lasix 40 mg oral tablet: 1 tab(s) orally once a day  levothyroxine 125 mcg (0.125 mg) oral tablet: 1 tab(s) orally once a day  omeprazole 40 mg oral delayed release capsule: 1 cap(s) orally once a day  Sween topical cream: Apply topically to both legs once a day

## 2020-07-27 NOTE — PROGRESS NOTE ADULT - ASSESSMENT
Patient is a 65 year-old female    h/o   DM2 on insulin, fibromyalgia, osteoarthritis, sleep apnea on CPAP,  RENARD/   depression, venous insufficiency,   endometrial CA s/p hysterectomy (2010)  , HTN,   HLD,   hypothyroidism,  morbid   obesity      presenting with increasing weakness/  intermittent  cp  x 1 week . Patient has generalized pain and weakness at her baseline, uses  a walker. She has had a gradual worsening of her weakness this past week, with difficulty walking    s/p  i mild chest pain, occasional dyspnea on exertion, /  denies  fevers .	    pt  with  cp/  leg  pain/    h/o  morbid  obesity/  BMI  of  60/  with  massive  truncal  obesity/ on nocturnal CPAP   h/o  lymphedema. with hyperpigmented /  raised  lesions santosh  right leg/  wound  care eval   able  to  raise  b/l  legs/   no  weakness   doppler  legs, pending    CT angio chest  ,no PE   CP/  negative  troponin in  ER/   DM, on lantus/  humalog at  home/  lantus  lowered     per   house endo,   note  seen/  stated , no need  for  endo  comnsult   HTN,  on  asa/  enalapril/ lipitor/ synthroid/ lasix   depression, on  mlple  meds/  fibromyalgia / pt wants  clonazepam  and   Hydromorphone  /  see  i S top/ in  event  note  Echo,   jorje;  ef  on dvt ppx  if  doppler legs, jorje;, then may  /d  cpt                  on  dvt ppx/  PT  eval

## 2020-07-27 NOTE — DISCHARGE NOTE PROVIDER - PROVIDER TOKENS
PROVIDER:[TOKEN:[[id number]]],PROVIDER:[TOKEN:[[id number]]],PROVIDER:[TOKEN:[[id number]]] PROVIDER:[TOKEN:[[id number]]],PROVIDER:[TOKEN:[[id number]]],PROVIDER:[TOKEN:[[id number]]],PROVIDER:[TOKEN:[[id number]]] PROVIDER:[TOKEN:[[id number]]],PROVIDER:[TOKEN:[[id number]]],PROVIDER:[TOKEN:[[id number]]],PROVIDER:[TOKEN:[[id number]]],PROVIDER:[TOKEN:[[id number]]]

## 2020-07-27 NOTE — DISCHARGE NOTE PROVIDER - NSDCFUADDINST_GEN_ALL_CORE_FT
Discharge to Rehab - follow with the providers at rehab  Bilateral Lower Extremity BLE venous stasis dermatitis -Emollient therapy:- Moisturize intact skin w/ SWEEN cream daily and BLE elevation  - Regular Podiatry follow up for routine diabetic foot and nail care (nails debrided by Dr Beckett on 7/28/20)  - Follow up with vascular surgeon or [facility] wound care center for compression options Discharge to Rehab - follow with the providers at rehab  Bilateral Lower Extremity BLE venous stasis dermatitis -Emollient therapy:- Moisturize intact skin w/ SWEEN cream daily and BLE elevation  - Regular Podiatry follow up for routine diabetic foot and nail care (nails debrided by Dr Beckett on 7/28/20)  - Cardiology as out patient for stress test  - Follow up with vascular surgeon or [facility] wound care center for compression options

## 2020-07-27 NOTE — DISCHARGE NOTE PROVIDER - CARE PROVIDERS DIRECT ADDRESSES
,DirectAddress_Unknown,[email or web address] ,[email or web address],[email or web address],DirectAddress_Unknown ,[email or web address],DirectAddress_Unknown,DirectAddress_Unknown,DirectAddress_Unknown

## 2020-07-28 LAB
CULTURE RESULTS: SIGNIFICANT CHANGE UP
GLUCOSE BLDC GLUCOMTR-MCNC: 135 MG/DL — HIGH (ref 70–99)
GLUCOSE BLDC GLUCOMTR-MCNC: 142 MG/DL — HIGH (ref 70–99)
GLUCOSE BLDC GLUCOMTR-MCNC: 142 MG/DL — HIGH (ref 70–99)
GLUCOSE BLDC GLUCOMTR-MCNC: 145 MG/DL — HIGH (ref 70–99)
GLUCOSE BLDC GLUCOMTR-MCNC: 161 MG/DL — HIGH (ref 70–99)
SPECIMEN SOURCE: SIGNIFICANT CHANGE UP

## 2020-07-28 RX ORDER — BUPROPION HYDROCHLORIDE 150 MG/1
1 TABLET, EXTENDED RELEASE ORAL
Qty: 0 | Refills: 0 | DISCHARGE
Start: 2020-07-28

## 2020-07-28 RX ORDER — GABAPENTIN 400 MG/1
1 CAPSULE ORAL
Qty: 0 | Refills: 0 | DISCHARGE

## 2020-07-28 RX ORDER — LEVOTHYROXINE SODIUM 125 MCG
1 TABLET ORAL
Qty: 0 | Refills: 0 | DISCHARGE
Start: 2020-07-28

## 2020-07-28 RX ORDER — ASPIRIN/CALCIUM CARB/MAGNESIUM 324 MG
1 TABLET ORAL
Qty: 0 | Refills: 0 | DISCHARGE

## 2020-07-28 RX ORDER — CLONAZEPAM 1 MG
1 TABLET ORAL
Qty: 0 | Refills: 0 | DISCHARGE

## 2020-07-28 RX ORDER — METFORMIN HYDROCHLORIDE 850 MG/1
1 TABLET ORAL
Qty: 0 | Refills: 0 | DISCHARGE

## 2020-07-28 RX ORDER — BUPROPION HYDROCHLORIDE 150 MG/1
1 TABLET, EXTENDED RELEASE ORAL
Qty: 0 | Refills: 0 | DISCHARGE

## 2020-07-28 RX ORDER — LEVOTHYROXINE SODIUM 125 MCG
1 TABLET ORAL
Qty: 0 | Refills: 0 | DISCHARGE

## 2020-07-28 RX ORDER — ESCITALOPRAM OXALATE 10 MG/1
1 TABLET, FILM COATED ORAL
Qty: 0 | Refills: 0 | DISCHARGE
Start: 2020-07-28

## 2020-07-28 RX ORDER — INSULIN GLARGINE 100 [IU]/ML
40 INJECTION, SOLUTION SUBCUTANEOUS
Qty: 0 | Refills: 0 | DISCHARGE

## 2020-07-28 RX ORDER — CLONAZEPAM 1 MG
1 TABLET ORAL
Qty: 0 | Refills: 0 | DISCHARGE
Start: 2020-07-28

## 2020-07-28 RX ORDER — GABAPENTIN 400 MG/1
1 CAPSULE ORAL
Qty: 0 | Refills: 0 | DISCHARGE
Start: 2020-07-28

## 2020-07-28 RX ORDER — ATORVASTATIN CALCIUM 80 MG/1
1 TABLET, FILM COATED ORAL
Qty: 0 | Refills: 0 | DISCHARGE

## 2020-07-28 RX ORDER — LORATADINE 10 MG/1
1 TABLET ORAL
Qty: 0 | Refills: 0 | DISCHARGE

## 2020-07-28 RX ORDER — INSULIN ASPART 100 [IU]/ML
12 INJECTION, SOLUTION SUBCUTANEOUS
Qty: 0 | Refills: 0 | DISCHARGE

## 2020-07-28 RX ORDER — ESCITALOPRAM OXALATE 10 MG/1
1 TABLET, FILM COATED ORAL
Qty: 0 | Refills: 0 | DISCHARGE

## 2020-07-28 RX ORDER — ASPIRIN/CALCIUM CARB/MAGNESIUM 324 MG
1 TABLET ORAL
Qty: 0 | Refills: 0 | DISCHARGE
Start: 2020-07-28

## 2020-07-28 RX ORDER — ATORVASTATIN CALCIUM 80 MG/1
1 TABLET, FILM COATED ORAL
Qty: 0 | Refills: 0 | DISCHARGE
Start: 2020-07-28

## 2020-07-28 RX ADMIN — HYDROMORPHONE HYDROCHLORIDE 4 MILLIGRAM(S): 2 INJECTION INTRAMUSCULAR; INTRAVENOUS; SUBCUTANEOUS at 11:15

## 2020-07-28 RX ADMIN — ENOXAPARIN SODIUM 40 MILLIGRAM(S): 100 INJECTION SUBCUTANEOUS at 22:10

## 2020-07-28 RX ADMIN — ATORVASTATIN CALCIUM 40 MILLIGRAM(S): 80 TABLET, FILM COATED ORAL at 22:10

## 2020-07-28 RX ADMIN — GABAPENTIN 600 MILLIGRAM(S): 400 CAPSULE ORAL at 22:10

## 2020-07-28 RX ADMIN — ESCITALOPRAM OXALATE 20 MILLIGRAM(S): 10 TABLET, FILM COATED ORAL at 08:47

## 2020-07-28 RX ADMIN — Medication 20 MILLIGRAM(S): at 05:32

## 2020-07-28 RX ADMIN — HYDROMORPHONE HYDROCHLORIDE 4 MILLIGRAM(S): 2 INJECTION INTRAMUSCULAR; INTRAVENOUS; SUBCUTANEOUS at 10:14

## 2020-07-28 RX ADMIN — BUPROPION HYDROCHLORIDE 300 MILLIGRAM(S): 150 TABLET, EXTENDED RELEASE ORAL at 08:47

## 2020-07-28 RX ADMIN — GABAPENTIN 600 MILLIGRAM(S): 400 CAPSULE ORAL at 05:32

## 2020-07-28 RX ADMIN — Medication 81 MILLIGRAM(S): at 08:47

## 2020-07-28 RX ADMIN — INSULIN GLARGINE 30 UNIT(S): 100 INJECTION, SOLUTION SUBCUTANEOUS at 22:20

## 2020-07-28 RX ADMIN — HYDROMORPHONE HYDROCHLORIDE 4 MILLIGRAM(S): 2 INJECTION INTRAMUSCULAR; INTRAVENOUS; SUBCUTANEOUS at 23:38

## 2020-07-28 RX ADMIN — HYDROMORPHONE HYDROCHLORIDE 4 MILLIGRAM(S): 2 INJECTION INTRAMUSCULAR; INTRAVENOUS; SUBCUTANEOUS at 17:34

## 2020-07-28 RX ADMIN — Medication 40 MILLIGRAM(S): at 06:19

## 2020-07-28 RX ADMIN — GABAPENTIN 600 MILLIGRAM(S): 400 CAPSULE ORAL at 13:00

## 2020-07-28 RX ADMIN — Medication 125 MICROGRAM(S): at 05:32

## 2020-07-28 RX ADMIN — Medication 0.5 MILLIGRAM(S): at 22:10

## 2020-07-28 NOTE — DIETITIAN INITIAL EVALUATION ADULT. - ADD RECOMMEND
1. Recommend add Low Sodium to Consistent Carbohydrate (with evening snacks) therapeutic diet 2. Provide 1 glucerna daily per pt request 3. Provide/review nutrition education as indicated 4. Will continue to monitor nutrient intake, wt, labs, f/u per protocol

## 2020-07-28 NOTE — PROGRESS NOTE ADULT - ASSESSMENT
Patient is a 65 year-old female    h/o   DM2 on insulin, fibromyalgia, osteoarthritis, sleep apnea on CPAP,  RENARD/   depression, venous insufficiency,   endometrial CA s/p hysterectomy (2010)  , HTN,   HLD,   hypothyroidism,  morbid   obesity      presenting with increasing weakness/  intermittent  cp  x 1 week . Patient has generalized pain and weakness at her baseline, uses  a walker. She has had a gradual worsening of her weakness this past week, with difficulty walking    s/p  i mild chest pain, occasional dyspnea on exertion, /  denies  fevers .	    pt  with  cp/  leg  pain/    h/o  morbid  obesity/  BMI  of  60/  with  massive  truncal  obesity/ on nocturnal CPAP   h/o  lymphedema. with hyperpigmented /  raised  lesions santosh  right leg/  wound  care eval   able  to  raise  b/l  legs/   no  weakness   doppler  legs, pending    CT angio chest  ,no PE   CP/  negative  troponin in  ER/   DM, on lantus/  humalog at  home/  lantus  lowered     per   house endo,   note  seen/  stated , no need  for  endo  comnsult   HTN,  on  asa/  enalapril/ lipitor/ synthroid/ lasix   depression, on  mlple  meds/  fibromyalgia / pt wants  clonazepam  and   Hydromorphone  /  see  i S top/ in  event  note  Echo,   jorje;  ef  on dvt ppx/  no dvt on dopplers   plan.   d/c to  rehab

## 2020-07-28 NOTE — PROGRESS NOTE ADULT - ASSESSMENT
Assessment/Plan:    -Ingrown toenails bilateral  --DM     --aseptic debridemont of ingrown nails x 10 with betadine applied  --recommend continued dm routine foot care as outpatient  --thank you for consult

## 2020-07-28 NOTE — DIETITIAN INITIAL EVALUATION ADULT. - OTHER INFO
Pt is 65yoF with PMH significant for T2DM, OA, RENARD, depression, endometrial CA s/p hysterectomy, HTN, HLD, morbid obesity, presenting with chest pain, leg pain.    Pt reports recent loss of her sister who used to do the cooking at home. States she lives alone and has been struggling with depression and food apathy, has limited desire to prepare food for herself. States she has been relying on oral nutrition supplements, cereal and milk, and cream cheese sandwiches. Reports skipped meals and is unsure when to check fingersticks as a result of sporadic food intake in recent months.   Therapeutic Diet PTA: low sodium and low sugar; no concentrated sweets or fruit juice. No take out foods.  Pt endorses SMBG at home with usual range ~100 mg/dL, no hypoglycemia events reported. Pt endorses taking metformin, basaglar, novolog, and trulicity for BG control at home. Last HgbA1c 6.4% and pt endorses following up with an endocrinologist.  Nutrition Supplements PTA: vitamin D, vitamin B12 injections, iron  NKFA reported.    Pt UBW: ~375 lbs, recent wt gain with fluid retention noted. Dosing wt 399.9 lbs and wt obtained on 7/26 noted as 381.3 lbs.     Pt reports good po intake in-patient since food is prepared for her.  Pt denies chewing/swallowing difficulties.  Pt has no c/o nausea, vomiting, diarrhea, or constipation.     Nutrition education provided: Pt is 65yoF with PMH significant for T2DM, OA, RENARD, depression, endometrial CA s/p hysterectomy, HTN, HLD, morbid obesity, presenting with chest pain, leg pain.    Pt reports recent loss of her sister who used to do the cooking at home. States she lives alone and has been struggling with depression and food apathy, has limited desire to prepare food for herself. States she has been relying on oral nutrition supplements, cereal and milk, and cream cheese sandwiches. Reports skipped meals and is unsure when to check fingersticks as a result of sporadic food intake in recent months.   Therapeutic Diet PTA: low sodium and low sugar; no concentrated sweets or fruit juice. No take out foods.  Pt endorses SMBG at home with usual range ~100 mg/dL, no hypoglycemia events reported. Pt endorses taking metformin, basaglar, novolog, and trulicity for BG control at home. Last HgbA1c 6.4% and pt endorses following up with an endocrinologist.  Nutrition Supplements PTA: vitamin D, vitamin B12 injections, iron  NKFA reported.    Pt UBW: ~375 lbs, recent wt gain with fluid retention noted. Dosing wt 399.9 lbs and wt obtained on 7/26 noted as 381.3 lbs.     Pt reports good po intake in-patient since food is prepared for her.  Pt denies chewing/swallowing difficulties.  Pt has no c/o nausea, vomiting, diarrhea, or constipation.     Nutrition education provided: reviewed heart healthy Consistent Carbohydrate nutrition recommendations in brief given pt emotional state. Pt teary eyed and voices feelings of depression PTA given recent passing of her sister. Emotional support provided as able, pt encouraged to reach out to mental health providers as needed and to follow up with MDs as needed. Written materials provided for nutrition recommendations per pt request. Pt requesting glucerna in-patient.

## 2020-07-28 NOTE — CHART NOTE - NSCHARTNOTEFT_GEN_A_CORE
Upon Nutritional Assessment by the Registered Dietitian your patient was determined to meet criteria / has evidence of the following diagnosis/diagnoses:          [ ]  Mild Protein Calorie Malnutrition        [ ]  Moderate Protein Calorie Malnutrition        [ ] Severe Protein Calorie Malnutrition        [ ] Unspecified Protein Calorie Malnutrition        [ ] Underweight / BMI <19        [x] Morbid Obesity / BMI > 40      Findings as based on:  [x] Comprehensive nutrition assessment   [ ] Nutrition Focused Physical Exam  [x] Other: BMI 58.0      Nutrition Plan/Recommendations:    1. Recommend add Low Sodium to Consistent Carbohydrate (with evening snacks) therapeutic diet   2. Provide 1 glucerna daily per pt request   3. Provide/review nutrition education as indicated   4. Will continue to monitor nutrient intake, wt, labs, f/u per protocol    RD remains available. Janelle Clemente RD, CDN Pager: [phone]         PROVIDER Section:     By signing this assessment you are acknowledging and agree with the diagnosis/diagnoses assigned by the Registered Dietitian    Comments:

## 2020-07-28 NOTE — DIETITIAN INITIAL EVALUATION ADULT. - PHYSICAL APPEARANCE
obese/well nourished/other (specify) Height: 68 inches, Weight: 381.3 pounds (7/26)  BMI: 58.0 kg/m2 IBW: 140 pounds (+/-10%), %IBW: 272%  Pertinent Info: 2+ edema to b/l legs noted, no pressure injuries noted at this time in nursing flow sheet.

## 2020-07-28 NOTE — DIETITIAN INITIAL EVALUATION ADULT. - PERTINENT MEDS FT
MEDICATIONS  (STANDING):  aspirin enteric coated 81 milliGRAM(s) Oral daily  atorvastatin 40 milliGRAM(s) Oral at bedtime  buPROPion  milliGRAM(s) Oral daily  dextrose 5%. 1000 milliLiter(s) (50 mL/Hr) IV Continuous <Continuous>  dextrose 50% Injectable 12.5 Gram(s) IV Push once  dextrose 50% Injectable 25 Gram(s) IV Push once  dextrose 50% Injectable 25 Gram(s) IV Push once  enalapril 20 milliGRAM(s) Oral daily  enoxaparin Injectable 40 milliGRAM(s) SubCutaneous daily  escitalopram 20 milliGRAM(s) Oral daily  furosemide   Injectable 40 milliGRAM(s) IV Push daily  gabapentin 600 milliGRAM(s) Oral three times a day  insulin glargine Injectable (LANTUS) 30 Unit(s) SubCutaneous at bedtime  insulin lispro (HumaLOG) corrective regimen sliding scale   SubCutaneous three times a day before meals  levothyroxine 125 MICROGram(s) Oral daily    MEDICATIONS  (PRN):  clonazePAM  Tablet 0.5 milliGRAM(s) Oral three times a day PRN anxiety  dextrose 40% Gel 15 Gram(s) Oral once PRN Blood Glucose LESS THAN 70 milliGRAM(s)/deciliter  glucagon  Injectable 1 milliGRAM(s) IntraMuscular once PRN Glucose LESS THAN 70 milligrams/deciliter  HYDROmorphone   Tablet 4 milliGRAM(s) Oral every 6 hours PRN Severe Pain (7 - 10)

## 2020-07-29 ENCOUNTER — TRANSCRIPTION ENCOUNTER (OUTPATIENT)
Age: 65
End: 2020-07-29

## 2020-07-29 VITALS
HEART RATE: 55 BPM | RESPIRATION RATE: 18 BRPM | SYSTOLIC BLOOD PRESSURE: 108 MMHG | TEMPERATURE: 99 F | OXYGEN SATURATION: 96 % | DIASTOLIC BLOOD PRESSURE: 69 MMHG

## 2020-07-29 LAB
CULTURE RESULTS: SIGNIFICANT CHANGE UP
GLUCOSE BLDC GLUCOMTR-MCNC: 150 MG/DL — HIGH (ref 70–99)
GLUCOSE BLDC GLUCOMTR-MCNC: 166 MG/DL — HIGH (ref 70–99)
SARS-COV-2 RNA SPEC QL NAA+PROBE: SIGNIFICANT CHANGE UP
SPECIMEN SOURCE: SIGNIFICANT CHANGE UP

## 2020-07-29 PROCEDURE — 86769 SARS-COV-2 COVID-19 ANTIBODY: CPT

## 2020-07-29 PROCEDURE — U0003: CPT

## 2020-07-29 PROCEDURE — 93005 ELECTROCARDIOGRAM TRACING: CPT

## 2020-07-29 PROCEDURE — 83036 HEMOGLOBIN GLYCOSYLATED A1C: CPT

## 2020-07-29 PROCEDURE — 71275 CT ANGIOGRAPHY CHEST: CPT

## 2020-07-29 PROCEDURE — 99285 EMERGENCY DEPT VISIT HI MDM: CPT

## 2020-07-29 PROCEDURE — 94660 CPAP INITIATION&MGMT: CPT

## 2020-07-29 PROCEDURE — 81001 URINALYSIS AUTO W/SCOPE: CPT

## 2020-07-29 PROCEDURE — 71045 X-RAY EXAM CHEST 1 VIEW: CPT

## 2020-07-29 PROCEDURE — 97162 PT EVAL MOD COMPLEX 30 MIN: CPT

## 2020-07-29 PROCEDURE — 80048 BASIC METABOLIC PNL TOTAL CA: CPT

## 2020-07-29 PROCEDURE — 85027 COMPLETE CBC AUTOMATED: CPT

## 2020-07-29 PROCEDURE — 84436 ASSAY OF TOTAL THYROXINE: CPT

## 2020-07-29 PROCEDURE — 84443 ASSAY THYROID STIM HORMONE: CPT

## 2020-07-29 PROCEDURE — 87177 OVA AND PARASITES SMEARS: CPT

## 2020-07-29 PROCEDURE — 82962 GLUCOSE BLOOD TEST: CPT

## 2020-07-29 PROCEDURE — 83735 ASSAY OF MAGNESIUM: CPT

## 2020-07-29 PROCEDURE — 84484 ASSAY OF TROPONIN QUANT: CPT

## 2020-07-29 PROCEDURE — 93306 TTE W/DOPPLER COMPLETE: CPT

## 2020-07-29 PROCEDURE — 97530 THERAPEUTIC ACTIVITIES: CPT

## 2020-07-29 PROCEDURE — 93970 EXTREMITY STUDY: CPT

## 2020-07-29 PROCEDURE — 86803 HEPATITIS C AB TEST: CPT

## 2020-07-29 PROCEDURE — 80076 HEPATIC FUNCTION PANEL: CPT

## 2020-07-29 PROCEDURE — 84480 ASSAY TRIIODOTHYRONINE (T3): CPT

## 2020-07-29 PROCEDURE — 87507 IADNA-DNA/RNA PROBE TQ 12-25: CPT

## 2020-07-29 PROCEDURE — 97116 GAIT TRAINING THERAPY: CPT

## 2020-07-29 RX ORDER — FUROSEMIDE 40 MG
40 TABLET ORAL DAILY
Refills: 0 | Status: DISCONTINUED | OUTPATIENT
Start: 2020-07-29 | End: 2020-07-29

## 2020-07-29 RX ADMIN — HYDROMORPHONE HYDROCHLORIDE 4 MILLIGRAM(S): 2 INJECTION INTRAMUSCULAR; INTRAVENOUS; SUBCUTANEOUS at 00:40

## 2020-07-29 RX ADMIN — HYDROMORPHONE HYDROCHLORIDE 4 MILLIGRAM(S): 2 INJECTION INTRAMUSCULAR; INTRAVENOUS; SUBCUTANEOUS at 10:40

## 2020-07-29 RX ADMIN — Medication 125 MICROGRAM(S): at 05:09

## 2020-07-29 RX ADMIN — ENOXAPARIN SODIUM 40 MILLIGRAM(S): 100 INJECTION SUBCUTANEOUS at 11:56

## 2020-07-29 RX ADMIN — GABAPENTIN 600 MILLIGRAM(S): 400 CAPSULE ORAL at 05:09

## 2020-07-29 RX ADMIN — Medication 20 MILLIGRAM(S): at 05:09

## 2020-07-29 RX ADMIN — Medication 40 MILLIGRAM(S): at 09:41

## 2020-07-29 RX ADMIN — GABAPENTIN 600 MILLIGRAM(S): 400 CAPSULE ORAL at 13:10

## 2020-07-29 RX ADMIN — HYDROMORPHONE HYDROCHLORIDE 4 MILLIGRAM(S): 2 INJECTION INTRAMUSCULAR; INTRAVENOUS; SUBCUTANEOUS at 09:36

## 2020-07-29 RX ADMIN — Medication 1: at 11:54

## 2020-07-29 RX ADMIN — Medication 81 MILLIGRAM(S): at 11:55

## 2020-07-29 RX ADMIN — ESCITALOPRAM OXALATE 20 MILLIGRAM(S): 10 TABLET, FILM COATED ORAL at 11:55

## 2020-07-29 RX ADMIN — BUPROPION HYDROCHLORIDE 300 MILLIGRAM(S): 150 TABLET, EXTENDED RELEASE ORAL at 11:56

## 2020-07-29 NOTE — CHART NOTE - NSCHARTNOTEFT_GEN_A_CORE
Medically cleared for discharge by Dr. Britt. PT for [facility]. Reports some " wet gas" . Pt states her stomach was a little upset yesterday, seems to be better today. No complaints. Continue current medications, follow up with DR. Cardenas and PMD  Dr. Denise Ovalle

## 2020-07-29 NOTE — PROGRESS NOTE ADULT - PROVIDER SPECIALTY LIST ADULT
Cardiology
Internal Medicine
Podiatry
Cardiology

## 2020-07-29 NOTE — PROGRESS NOTE ADULT - SUBJECTIVE AND OBJECTIVE BOX
CARDIOLOGY     PROGRESS  NOTE   ________________________________________________    CHIEF COMPLAINT:Patient is a 65y old  Female who presents with a chief complaint of cp/  weakness (26 Jul 2020 09:11)  no complain.  	  REVIEW OF SYSTEMS:  CONSTITUTIONAL: No fever, weight loss, or fatigue  EYES: No eye pain, visual disturbances, or discharge  ENT:  No difficulty hearing, tinnitus, vertigo; No sinus or throat pain  NECK: No pain or stiffness  RESPIRATORY: No cough, wheezing, chills or hemoptysis; + Shortness of Breath  CARDIOVASCULAR: No chest pain, palpitations, passing out, dizziness, or leg swelling  GASTROINTESTINAL: No abdominal or epigastric pain. No nausea, vomiting, or hematemesis; No diarrhea or constipation. No melena or hematochezia.  GENITOURINARY: No dysuria, frequency, hematuria, or incontinence  NEUROLOGICAL: No headaches, memory loss, loss of strength, numbness, or tremors  SKIN: No itching, burning, rashes, or lesions   LYMPH Nodes: No enlarged glands  ENDOCRINE: No heat or cold intolerance; No hair loss  MUSCULOSKELETAL: No joint pain or swelling; No muscle, back, or extremity pain  PSYCHIATRIC: No depression, anxiety, mood swings, or difficulty sleeping  HEME/LYMPH: No easy bruising, or bleeding gums  ALLERGY AND IMMUNOLOGIC: No hives or eczema	    [ ] All others negative	  [ ] Unable to obtain    PHYSICAL EXAM:  T(C): 36.7 (07-26-20 @ 05:26), Max: 37.2 (07-25-20 @ 20:03)  HR: 55 (07-26-20 @ 10:02) (53 - 69)  BP: 132/70 (07-26-20 @ 05:26) (117/55 - 146/74)  RR: 17 (07-26-20 @ 05:26) (17 - 18)  SpO2: 97% (07-26-20 @ 10:02) (95% - 99%)  Wt(kg): --  I&O's Summary    25 Jul 2020 07:01  -  26 Jul 2020 07:00  --------------------------------------------------------  IN: 360 mL / OUT: 2090 mL / NET: -1730 mL    26 Jul 2020 07:01  -  26 Jul 2020 10:39  --------------------------------------------------------  IN: 0 mL / OUT: 200 mL / NET: -200 mL        Appearance: Normal	  HEENT:   Normal oral mucosa, PERRL, EOMI	  Lymphatic: No lymphadenopathy  Cardiovascular: Normal S1 S2, No JVD, + murmurs, No edema  Respiratory: Lungs clear to auscultation	  Psychiatry: A & O x 3, Mood & affect appropriate  Gastrointestinal:  Soft, Non-tender, + BS	  Skin: No rashes, No ecchymoses, No cyanosis	  Neurologic: Non-focal  Extremities: Normal range of motion, No clubbing, cyanosis or edema  Vascular: Peripheral pulses palpable 2+ bilaterally    MEDICATIONS  (STANDING):  aspirin enteric coated 81 milliGRAM(s) Oral daily  atorvastatin 40 milliGRAM(s) Oral at bedtime  buPROPion  milliGRAM(s) Oral daily  dextrose 5%. 1000 milliLiter(s) (50 mL/Hr) IV Continuous <Continuous>  dextrose 50% Injectable 12.5 Gram(s) IV Push once  dextrose 50% Injectable 25 Gram(s) IV Push once  dextrose 50% Injectable 25 Gram(s) IV Push once  enoxaparin Injectable 40 milliGRAM(s) SubCutaneous daily  escitalopram 20 milliGRAM(s) Oral daily  furosemide   Injectable 40 milliGRAM(s) IV Push daily  gabapentin 600 milliGRAM(s) Oral three times a day  insulin glargine Injectable (LANTUS) 30 Unit(s) SubCutaneous at bedtime  insulin lispro (HumaLOG) corrective regimen sliding scale   SubCutaneous three times a day before meals  levothyroxine 125 MICROGram(s) Oral daily      TELEMETRY: 	    ECG:  	  RADIOLOGY:  OTHER: 	  	  LABS:	 	    CARDIAC MARKERS:                                11.1   4.98  )-----------( 280      ( 26 Jul 2020 06:12 )             36.3     07-26    142  |  104  |  14  ----------------------------<  95  3.7   |  26  |  0.82    Ca    9.3      26 Jul 2020 06:12    TPro  6.9  /  Alb  3.8  /  TBili  0.4  /  DBili  <0.1  /  AST  11  /  ALT  10  /  AlkPhos  65  07-25    proBNP:   Lipid Profile:   HgA1c:   TSH: Thyroid Stimulating Hormone, Serum: 1.97 uIU/mL (07-25 @ 15:03)    < from: CT Angio Chest w/ IV Cont (07.25.20 @ 17:37) >  1.  Limited exam. The segmental and subsegmental pulmonary arteries are not evaluated secondary to poor opacification.    2.  Clear lungs. No aortic aneurysm or dissection.    < end of copied text >        Assessment and plan  ------------------------  Patient is a 65 year-old female  with PMH of DM2 on insulin, fibromyalgia, osteoarthritis, sleep apnea on CPAP, depression, venous insufficiency, endometrial CA s/p hysterectomy (2010)  , HTN,   HLD,   hypothyroidism,/  ca  uterus,  2010,  obesity     presenting with increasing weakness/  intermittent  cp  x 1 week . Patient has generalized pain and weakness at her baseline, is able to alk using a walker. She has had a gradual worsening of her weakness this past week, with difficulty walking. She has not come into the ED until today due to concerns about COVID-19.  She also reports worsening of her generalized pain, new pain in her right thigh, i mild chest pain, occasional dyspnea on exertion, and chills.  She denies fever. She has not started any new medications recently other than vitamin D. (25 Jul 2020 12:48)  chest pain musculoskeletal , sig risk factor for cad  tele  cardiac enzymes noted  agree with CTA negative for PE  asa daily  schedule for stress test/ echo  will add beta blocker as tolerated sec to run of PAT, observe sec to bradycardia
CARDIOLOGY     PROGRESS  NOTE   ________________________________________________    CHIEF COMPLAINT:Patient is a 65y old  Female who presents with a chief complaint of cp/  weakness (27 Jul 2020 08:20)  doing better.  	  REVIEW OF SYSTEMS:  CONSTITUTIONAL: No fever, weight loss, or fatigue  EYES: No eye pain, visual disturbances, or discharge  ENT:  No difficulty hearing, tinnitus, vertigo; No sinus or throat pain  NECK: No pain or stiffness  RESPIRATORY: No cough, wheezing, chills or hemoptysis; No Shortness of Breath  CARDIOVASCULAR: No chest pain, palpitations, passing out, dizziness, or leg swelling  GASTROINTESTINAL: No abdominal or epigastric pain. No nausea, vomiting, or hematemesis; No diarrhea or constipation. No melena or hematochezia.  GENITOURINARY: No dysuria, frequency, hematuria, or incontinence  NEUROLOGICAL: No headaches, memory loss, loss of strength, numbness, or tremors  SKIN: No itching, burning, rashes, or lesions   LYMPH Nodes: No enlarged glands  ENDOCRINE: No heat or cold intolerance; No hair loss  MUSCULOSKELETAL: No joint pain or swelling; No muscle, back, or extremity pain  PSYCHIATRIC: No depression, anxiety, mood swings, or difficulty sleeping  HEME/LYMPH: No easy bruising, or bleeding gums  ALLERGY AND IMMUNOLOGIC: No hives or eczema	    [ ] All others negative	  [ ] Unable to obtain    PHYSICAL EXAM:  T(C): 36.7 (07-27-20 @ 05:17), Max: 37.2 (07-26-20 @ 19:42)  HR: 57 (07-27-20 @ 05:36) (53 - 61)  BP: 136/65 (07-27-20 @ 05:17) (126/70 - 156/71)  RR: 17 (07-27-20 @ 05:17) (17 - 18)  SpO2: 97% (07-27-20 @ 05:36) (93% - 98%)  Wt(kg): --  I&O's Summary    26 Jul 2020 07:01  -  27 Jul 2020 07:00  --------------------------------------------------------  IN: 537 mL / OUT: 1050 mL / NET: -513 mL    27 Jul 2020 07:01  -  27 Jul 2020 08:33  --------------------------------------------------------  IN: 0 mL / OUT: 400 mL / NET: -400 mL        Appearance: Normal	  HEENT:   Normal oral mucosa, PERRL, EOMI	  Lymphatic: No lymphadenopathy  Cardiovascular: Normal S1 S2, No JVD, + murmurs, No edema  Respiratory: Lungs clear to auscultation	  Psychiatry: A & O x 3, Mood & affect appropriate  Gastrointestinal:  Soft, Non-tender, + BS	  Skin: No rashes, No ecchymoses, No cyanosis	  Neurologic: Non-focal  Extremities: Normal range of motion, No clubbing, cyanosis or edema  Vascular: Peripheral pulses palpable 2+ bilaterally    MEDICATIONS  (STANDING):  aspirin enteric coated 81 milliGRAM(s) Oral daily  atorvastatin 40 milliGRAM(s) Oral at bedtime  buPROPion  milliGRAM(s) Oral daily  dextrose 5%. 1000 milliLiter(s) (50 mL/Hr) IV Continuous <Continuous>  dextrose 50% Injectable 12.5 Gram(s) IV Push once  dextrose 50% Injectable 25 Gram(s) IV Push once  dextrose 50% Injectable 25 Gram(s) IV Push once  enoxaparin Injectable 40 milliGRAM(s) SubCutaneous daily  escitalopram 20 milliGRAM(s) Oral daily  furosemide   Injectable 40 milliGRAM(s) IV Push daily  gabapentin 600 milliGRAM(s) Oral three times a day  insulin glargine Injectable (LANTUS) 30 Unit(s) SubCutaneous at bedtime  insulin lispro (HumaLOG) corrective regimen sliding scale   SubCutaneous three times a day before meals  levothyroxine 125 MICROGram(s) Oral daily      TELEMETRY: 	    ECG:  	  RADIOLOGY:  OTHER: 	  	  LABS:	 	    CARDIAC MARKERS:                                11.1   4.98  )-----------( 280      ( 26 Jul 2020 06:12 )             36.3     07-27    142  |  103  |  14  ----------------------------<  113<H>  3.7   |  25  |  0.94    Ca    9.5      27 Jul 2020 06:17  Mg     2.0     07-27    TPro  6.9  /  Alb  3.8  /  TBili  0.4  /  DBili  <0.1  /  AST  11  /  ALT  10  /  AlkPhos  65  07-25    proBNP:   Lipid Profile:   HgA1c:   TSH: Thyroid Stimulating Hormone, Serum: 1.97 uIU/mL (07-25 @ 15:03)  < from: Transthoracic Echocardiogram (07.26.20 @ 10:36) >  Mitral Valve: Mitral annular calcification, otherwise  normal mitral valve. Minimal mitral regurgitation.  Aortic Valve/Aorta: Calcified trileaflet aortic valve with  normal opening.  Normal aortic root size. (Ao: 3.3 cm at the sinuses of  Valsalva).  Left Atrium: Normal left atrium.  Left Ventricle: Endocardium not well visualized; grossly  preserved overall left ventricular systolic function.  Unable to exclude segmental wall motion abnormalities.  Normal left ventricular internal dimensions and wall  thicknesses.  Right Heart: Normal right atrium. Right ventricular  enlargement with decreased right ventricular systolic  function. Normal tricuspid valve. Minimal tricuspid  regurgitation. Normal pulmonic valve.  Pericardium/Pleura: Normal pericardium with no pericardial  effusion.  Hemodynamic: Estimated right atrial pressure is 8 mm Hg.  ------------------------------------------------------------------------  Conclusions:  Technically difficult study.  1. Mitral annular calcification, otherwise normal mitral  valve. Minimal mitral regurgitation.  2. Normal left ventricular internal dimensions and wall  thicknesses.  3. Endocardium not well visualized; grossly preserved  overall left ventricular systolic function.  Unable to  exclude segmental wall motion abnormalities.  4. Right ventricular enlargement with decreased right  ventricular systolic function.    < from: CT Angio Chest w/ IV Cont (07.25.20 @ 17:37) >  1.  Limited exam. The segmental and subsegmental pulmonary arteries are not evaluated secondary to poor opacification.    2.  Clear lungs. No aortic aneurysm or dissection.    < end of copied text >          Assessment and plan  ---------------------------  Patient is a 65 year-old female  with PMH of DM2 on insulin, fibromyalgia, osteoarthritis, sleep apnea on CPAP, depression, venous insufficiency, endometrial CA s/p hysterectomy (2010)  , HTN,   HLD,   hypothyroidism,/  ca  uterus,  2010,  obesity     presenting with increasing weakness/  intermittent  cp  x 1 week . Patient has generalized pain and weakness at her baseline, is able to alk using a walker. She has had a gradual worsening of her weakness this past week, with difficulty walking. She has not come into the ED until today due to concerns about COVID-19.  She also reports worsening of her generalized pain, new pain in her right thigh, i mild chest pain, occasional dyspnea on exertion, and chills.  She denies fever. She has not started any new medications recently other than vitamin D. (25 Jul 2020 12:48)  chest pain musculoskeletal , sig risk factor for cad  tele  cardiac enzymes noted  asa daily  schedule for stress test/ echo  will add beta blocker as tolerated sec to run of PAT, observe sec to bradycardia  echo noted with RV dysfunction ?sec to sleep apnea/ obesity  change Lasix to po  cta negative
CARDIOLOGY     PROGRESS  NOTE   ________________________________________________    CHIEF COMPLAINT:Patient is a 65y old  Female who presents with a chief complaint of cp/  weakness (28 Jul 2020 07:33)  no complain.  	  REVIEW OF SYSTEMS:  CONSTITUTIONAL: No fever, weight loss, or fatigue  EYES: No eye pain, visual disturbances, or discharge  ENT:  No difficulty hearing, tinnitus, vertigo; No sinus or throat pain  NECK: No pain or stiffness  RESPIRATORY: No cough, wheezing, chills or hemoptysis; No Shortness of Breath  CARDIOVASCULAR: No chest pain, palpitations, passing out, dizziness, or leg swelling  GASTROINTESTINAL: No abdominal or epigastric pain. No nausea, vomiting, or hematemesis; No diarrhea or constipation. No melena or hematochezia.  GENITOURINARY: No dysuria, frequency, hematuria, or incontinence  NEUROLOGICAL: No headaches, memory loss, loss of strength, numbness, or tremors  SKIN: No itching, burning, rashes, or lesions   LYMPH Nodes: No enlarged glands  ENDOCRINE: No heat or cold intolerance; No hair loss  MUSCULOSKELETAL: No joint pain or swelling; No muscle, back, or extremity pain  PSYCHIATRIC: No depression, anxiety, mood swings, or difficulty sleeping  HEME/LYMPH: No easy bruising, or bleeding gums  ALLERGY AND IMMUNOLOGIC: No hives or eczema	    [ ] All others negative	  [ ] Unable to obtain    PHYSICAL EXAM:  T(C): 36.7 (07-28-20 @ 04:39), Max: 36.7 (07-27-20 @ 12:40)  HR: 60 (07-28-20 @ 06:18) (55 - 95)  BP: 134/83 (07-28-20 @ 06:18) (134/83 - 144/70)  RR: 18 (07-28-20 @ 04:39) (18 - 19)  SpO2: 98% (07-28-20 @ 06:16) (97% - 98%)  Wt(kg): --  I&O's Summary    27 Jul 2020 07:01  -  28 Jul 2020 07:00  --------------------------------------------------------  IN: 960 mL / OUT: 1450 mL / NET: -490 mL        Appearance: Normal	  HEENT:   Normal oral mucosa, PERRL, EOMI	  Lymphatic: No lymphadenopathy  Cardiovascular: Normal S1 S2, No JVD, + murmurs, No edema  Respiratory: Lungs clear to auscultation	  Psychiatry: A & O x 3, Mood & affect appropriate  Gastrointestinal:  Soft, Non-tender, + BS	  Skin: No rashes, No ecchymoses, No cyanosis	  Neurologic: Non-focal  Extremities: Normal range of motion, No clubbing, cyanosis or edema  Vascular: Peripheral pulses palpable 2+ bilaterally    MEDICATIONS  (STANDING):  aspirin enteric coated 81 milliGRAM(s) Oral daily  atorvastatin 40 milliGRAM(s) Oral at bedtime  buPROPion  milliGRAM(s) Oral daily  dextrose 5%. 1000 milliLiter(s) (50 mL/Hr) IV Continuous <Continuous>  dextrose 50% Injectable 12.5 Gram(s) IV Push once  dextrose 50% Injectable 25 Gram(s) IV Push once  dextrose 50% Injectable 25 Gram(s) IV Push once  enalapril 20 milliGRAM(s) Oral daily  enoxaparin Injectable 40 milliGRAM(s) SubCutaneous daily  escitalopram 20 milliGRAM(s) Oral daily  furosemide   Injectable 40 milliGRAM(s) IV Push daily  gabapentin 600 milliGRAM(s) Oral three times a day  insulin glargine Injectable (LANTUS) 30 Unit(s) SubCutaneous at bedtime  insulin lispro (HumaLOG) corrective regimen sliding scale   SubCutaneous three times a day before meals  levothyroxine 125 MICROGram(s) Oral daily      TELEMETRY: 	    ECG:  	  RADIOLOGY:  OTHER: 	  	  LABS:	 	    CARDIAC MARKERS:            07-27    142  |  103  |  14  ----------------------------<  113<H>  3.7   |  25  |  0.94    Ca    9.5      27 Jul 2020 06:17  Mg     2.0     07-27      proBNP:   Lipid Profile:   HgA1c:   TSH: Thyroid Stimulating Hormone, Serum: 1.97 uIU/mL (07-25 @ 15:03)          Assessment and plan  ---------------------------  Patient is a 65 year-old female  with PMH of DM2 on insulin, fibromyalgia, osteoarthritis, sleep apnea on CPAP, depression, venous insufficiency, endometrial CA s/p hysterectomy (2010)  , HTN,   HLD,   hypothyroidism,/  ca  uterus,  2010,  obesity     presenting with increasing weakness/  intermittent  cp  x 1 week . Patient has generalized pain and weakness at her baseline, is able to alk using a walker. She has had a gradual worsening of her weakness this past week, with difficulty walking. She has not come into the ED until today due to concerns about COVID-19.  She also reports worsening of her generalized pain, new pain in her right thigh, i mild chest pain, occasional dyspnea on exertion, and chills.  She denies fever. She has not started any new medications recently other than vitamin D. (25 Jul 2020 12:48)  chest pain musculoskeletal , sig risk factor for cad  tele  cardiac enzymes noted  asa daily  schedule for stress test/ echo  will add beta blocker as tolerated sec to run of PAT, observe sec to bradycardia  echo noted with RV dysfunction ?sec to sleep apnea/ obesity  change Lasix to po  cta negative  need stress test as out pt
CARDIOLOGY     PROGRESS  NOTE   ________________________________________________    CHIEF COMPLAINT:Patient is a 65y old  Female who presents with a chief complaint of cp/  weakness (28 Jul 2020 09:21)  no complain.  	  REVIEW OF SYSTEMS:  CONSTITUTIONAL: No fever, weight loss, or fatigue  EYES: No eye pain, visual disturbances, or discharge  ENT:  No difficulty hearing, tinnitus, vertigo; No sinus or throat pain  NECK: No pain or stiffness  RESPIRATORY: No cough, wheezing, chills or hemoptysis; decrease  Shortness of Breath  CARDIOVASCULAR: No chest pain, palpitations, passing out, dizziness, or leg swelling  GASTROINTESTINAL: No abdominal or epigastric pain. No nausea, vomiting, or hematemesis; No diarrhea or constipation. No melena or hematochezia.  GENITOURINARY: No dysuria, frequency, hematuria, or incontinence  NEUROLOGICAL: No headaches, memory loss, loss of strength, numbness, or tremors  SKIN: No itching, burning, rashes, or lesions   LYMPH Nodes: No enlarged glands  ENDOCRINE: No heat or cold intolerance; No hair loss  MUSCULOSKELETAL: No joint pain or swelling; No muscle, back, or extremity pain  PSYCHIATRIC: No depression, anxiety, mood swings, or difficulty sleeping  HEME/LYMPH: No easy bruising, or bleeding gums  ALLERGY AND IMMUNOLOGIC: No hives or eczema	    [ ] All others negative	  [ ] Unable to obtain    PHYSICAL EXAM:  T(C): 36.7 (07-29-20 @ 04:45), Max: 37.1 (07-28-20 @ 21:09)  HR: 54 (07-29-20 @ 06:27) (50 - 60)  BP: 110/62 (07-29-20 @ 04:45) (110/62 - 121/78)  RR: 18 (07-29-20 @ 04:45) (18 - 19)  SpO2: 98% (07-29-20 @ 06:27) (95% - 98%)  Wt(kg): --  I&O's Summary    28 Jul 2020 07:01  -  29 Jul 2020 07:00  --------------------------------------------------------  IN: 960 mL / OUT: 200 mL / NET: 760 mL        Appearance: Normal	  HEENT:   Normal oral mucosa, PERRL, EOMI	  Lymphatic: No lymphadenopathy  Cardiovascular: Normal S1 S2, No JVD, +murmurs, No edema  Respiratory: Lungs clear to auscultation	  Psychiatry: A & O x 3, Mood & affect appropriate  Gastrointestinal:  Soft, Non-tender, + BS	  Skin: No rashes, No ecchymoses, No cyanosis	  Neurologic: Non-focal  Extremities: Normal range of motion, No clubbing, cyanosis or edema  Vascular: Peripheral pulses palpable 2+ bilaterally    MEDICATIONS  (STANDING):  aspirin enteric coated 81 milliGRAM(s) Oral daily  atorvastatin 40 milliGRAM(s) Oral at bedtime  buPROPion  milliGRAM(s) Oral daily  dextrose 5%. 1000 milliLiter(s) (50 mL/Hr) IV Continuous <Continuous>  dextrose 50% Injectable 12.5 Gram(s) IV Push once  dextrose 50% Injectable 25 Gram(s) IV Push once  dextrose 50% Injectable 25 Gram(s) IV Push once  enalapril 20 milliGRAM(s) Oral daily  enoxaparin Injectable 40 milliGRAM(s) SubCutaneous daily  escitalopram 20 milliGRAM(s) Oral daily  gabapentin 600 milliGRAM(s) Oral three times a day  insulin glargine Injectable (LANTUS) 30 Unit(s) SubCutaneous at bedtime  insulin lispro (HumaLOG) corrective regimen sliding scale   SubCutaneous three times a day before meals  levothyroxine 125 MICROGram(s) Oral daily      TELEMETRY: 	    ECG:  	  RADIOLOGY:  OTHER: 	  	  LABS:	 	    CARDIAC MARKERS:                  proBNP:   Lipid Profile:   HgA1c:   TSH: Thyroid Stimulating Hormone, Serum: 1.97 uIU/mL (07-25 @ 15:03)          Assessment and plan  ---------------------------  Patient is a 65 year-old female  with PMH of DM2 on insulin, fibromyalgia, osteoarthritis, sleep apnea on CPAP, depression, venous insufficiency, endometrial CA s/p hysterectomy (2010)  , HTN,   HLD,   hypothyroidism,/  ca  uterus,  2010,  obesity     presenting with increasing weakness/  intermittent  cp  x 1 week . Patient has generalized pain and weakness at her baseline, is able to alk using a walker. She has had a gradual worsening of her weakness this past week, with difficulty walking. She has not come into the ED until today due to concerns about COVID-19.  She also reports worsening of her generalized pain, new pain in her right thigh, i mild chest pain, occasional dyspnea on exertion, and chills.  She denies fever. She has not started any new medications recently other than vitamin D. (25 Jul 2020 12:48)  chest pain musculoskeletal , sig risk factor for cad  tele  cardiac enzymes noted  asa daily  schedule for stress test/ echo  will add beta blocker as tolerated sec to run of PAT, observe sec to bradycardia  echo noted with RV dysfunction ?sec to sleep apnea/ obesity  change Lasix to po  cta negative  need stress test as out pt  needs lasix
Podiatry pager #: [phone]/ [phone]    Patient is a 65y old  Female who presents with a chief complaint of cp/  weakness (28 Jul 2020 08:05) Podiatry seen today for painful toenails      HPI:  Patient is a 65 year-old female  with PMH of DM2 on insulin, fibromyalgia, osteoarthritis, sleep apnea on CPAP, depression, venous insufficiency, endometrial CA s/p hysterectomy (2010)  , HTN,   HLD,   hypothyroidism,/  ca  uterus,  2010,  obesity     presenting with increasing weakness/  intermiitent  cp  x 1 week . Patient has generalized pain and weakness at her baseline, is able to alk using a walker. She has had a gradual worsening of her weakness this past week, with difficulty walking. She has not come into the ED until today due to concerns about COVID-19.  She also reports worsening of her generalized pain, new pain in her right thigh, i mild chest pain, occasional dyspnea on exertion, and chills.  She denies fever. She has not started any new medications recently other than vitamin D. (25 Jul 2020 12:48)      PAST MEDICAL & SURGICAL HISTORY:  Umbilical Hernia  Restless Legs Syndrome (RLS)  GERD with Apnea  Fibromyalgia  Morbidly Obese  Environmental Allergies  Depression  Diarrhea  Constipation  Obstructive Sleep Apnea  Osteoarthritis of Knee: right. requires cane  High Triglycerides  Hyperlipidemia  Diabetes Mellitus  Hypothyroidism  Personal History of Hypertension  Personal History of Female Genital Cancer  Personal History of Arthritis  Incarcerated Ventral Hernia  Endometrial Ca s/p RODRIGO, BSO  Carpal Tunnel Syndrome: release left  S/P FESS (Functional Endoscopic Sinus Surgery)  S/P Tonsillectomy and Adenoidectomy      MEDICATIONS  (STANDING):  aspirin enteric coated 81 milliGRAM(s) Oral daily  atorvastatin 40 milliGRAM(s) Oral at bedtime  buPROPion  milliGRAM(s) Oral daily  dextrose 5%. 1000 milliLiter(s) (50 mL/Hr) IV Continuous <Continuous>  dextrose 50% Injectable 12.5 Gram(s) IV Push once  dextrose 50% Injectable 25 Gram(s) IV Push once  dextrose 50% Injectable 25 Gram(s) IV Push once  enalapril 20 milliGRAM(s) Oral daily  enoxaparin Injectable 40 milliGRAM(s) SubCutaneous daily  escitalopram 20 milliGRAM(s) Oral daily  furosemide   Injectable 40 milliGRAM(s) IV Push daily  gabapentin 600 milliGRAM(s) Oral three times a day  insulin glargine Injectable (LANTUS) 30 Unit(s) SubCutaneous at bedtime  insulin lispro (HumaLOG) corrective regimen sliding scale   SubCutaneous three times a day before meals  levothyroxine 125 MICROGram(s) Oral daily    MEDICATIONS  (PRN):  clonazePAM  Tablet 0.5 milliGRAM(s) Oral three times a day PRN anxiety  dextrose 40% Gel 15 Gram(s) Oral once PRN Blood Glucose LESS THAN 70 milliGRAM(s)/deciliter  glucagon  Injectable 1 milliGRAM(s) IntraMuscular once PRN Glucose LESS THAN 70 milligrams/deciliter  HYDROmorphone   Tablet 4 milliGRAM(s) Oral every 6 hours PRN Severe Pain (7 - 10)      Allergies    adhesives (Rash)  aerosols, perfumes, fabric softeners (Rash; Rhinitis; Rhinorrhea)  dust (Rhinitis; Rhinorrhea)  Lyrica (Rash)  pollen (Rhinitis; Rhinorrhea)  smoke (Rhinitis; Rhinorrhea)  Tin/costume jewelry (Rash)    Intolerances        VITALS:    Vital Signs Last 24 Hrs  T(C): 36.7 (28 Jul 2020 04:39), Max: 36.7 (27 Jul 2020 12:40)  T(F): 98 (28 Jul 2020 04:39), Max: 98 (27 Jul 2020 12:40)  HR: 60 (28 Jul 2020 06:18) (55 - 95)  BP: 134/83 (28 Jul 2020 06:18) (134/83 - 144/70)  BP(mean): --  RR: 18 (28 Jul 2020 04:39) (18 - 19)  SpO2: 98% (28 Jul 2020 06:16) (97% - 98%)    LABS:        07-27    142  |  103  |  14  ----------------------------<  113<H>  3.7   |  25  |  0.94    Ca    9.5      27 Jul 2020 06:17  Mg     2.0     07-27        CAPILLARY BLOOD GLUCOSE      POCT Blood Glucose.: 142 mg/dL (28 Jul 2020 07:47)  POCT Blood Glucose.: 153 mg/dL (27 Jul 2020 21:08)  POCT Blood Glucose.: 181 mg/dL (27 Jul 2020 16:24)          LOWER EXTREMITY PHYSICAL EXAM:    Vasular: DP/PT _1/4, B/L, CFT <_2 seconds B/L, Temperature gradient wnl_, B/L. chronic stasis hyperpigmentation b/l lower extremity  Neuro: Epicritic sensation intact_ to the level of toes_, B/L.  Skin:Positive dystrophic, hypertrophic ingrowing toenails with subungual debris x10, No foot ulcerations or clinical signs of infection
afebrile  REVIEW OF SYSTEMS:  GEN: no fever,    no chills  RESP: no SOB,   no cough  CVS: no chest pain,   no palpitations  GI: no abdominal pain,   no nausea,   no vomiting,   no constipation,   no diarrhea  : no dysuria,   no frequency  NEURO: no headache,   no dizziness  PSYCH: no depression,   not anxious  Derm : no rash    MEDICATIONS  (STANDING):  aspirin enteric coated 81 milliGRAM(s) Oral daily  atorvastatin 40 milliGRAM(s) Oral at bedtime  buPROPion  milliGRAM(s) Oral daily  dextrose 5%. 1000 milliLiter(s) (50 mL/Hr) IV Continuous <Continuous>  dextrose 50% Injectable 12.5 Gram(s) IV Push once  dextrose 50% Injectable 25 Gram(s) IV Push once  dextrose 50% Injectable 25 Gram(s) IV Push once  enalapril 20 milliGRAM(s) Oral daily  enoxaparin Injectable 40 milliGRAM(s) SubCutaneous daily  escitalopram 20 milliGRAM(s) Oral daily  furosemide   Injectable 40 milliGRAM(s) IV Push daily  gabapentin 600 milliGRAM(s) Oral three times a day  insulin glargine Injectable (LANTUS) 30 Unit(s) SubCutaneous at bedtime  insulin lispro (HumaLOG) corrective regimen sliding scale   SubCutaneous three times a day before meals  levothyroxine 125 MICROGram(s) Oral daily    MEDICATIONS  (PRN):  clonazePAM  Tablet 0.5 milliGRAM(s) Oral three times a day PRN anxiety  dextrose 40% Gel 15 Gram(s) Oral once PRN Blood Glucose LESS THAN 70 milliGRAM(s)/deciliter  glucagon  Injectable 1 milliGRAM(s) IntraMuscular once PRN Glucose LESS THAN 70 milligrams/deciliter  HYDROmorphone   Tablet 4 milliGRAM(s) Oral every 6 hours PRN Severe Pain (7 - 10)      Vital Signs Last 24 Hrs  T(C): 36.7 (28 Jul 2020 04:39), Max: 36.7 (27 Jul 2020 12:40)  T(F): 98 (28 Jul 2020 04:39), Max: 98 (27 Jul 2020 12:40)  HR: 60 (28 Jul 2020 06:18) (55 - 95)  BP: 134/83 (28 Jul 2020 06:18) (134/83 - 144/70)  BP(mean): --  RR: 18 (28 Jul 2020 04:39) (18 - 19)  SpO2: 98% (28 Jul 2020 06:16) (97% - 98%)  CAPILLARY BLOOD GLUCOSE      POCT Blood Glucose.: 153 mg/dL (27 Jul 2020 21:08)  POCT Blood Glucose.: 181 mg/dL (27 Jul 2020 16:24)    I&O's Summary    27 Jul 2020 07:01  -  28 Jul 2020 07:00  --------------------------------------------------------  IN: 960 mL / OUT: 1450 mL / NET: -490 mL        PHYSICAL EXAM:  HEAD:  Atraumatic, Normocephalic  NECK: Supple, No   JVD  CHEST/LUNG:   no     rales,     no,    rhonchi  HEART: Regular rate and rhythm;         murmur  ABDOMEN: Soft, Nontender, ;   EXTREMITIES:   less     edema  NEUROLOGY:  alert    LABS:    07-27    142  |  103  |  14  ----------------------------<  113<H>  3.7   |  25  |  0.94    Ca    9.5      27 Jul 2020 06:17  Mg     2.0     07-27                      Thyroid Stimulating Hormone, Serum: 1.97 uIU/mL (07-25 @ 15:03)          Consultant(s) Notes Reviewed:      Care Discussed with Consultants/Other Providers:
afebrile/ no cp/sob at rest    REVIEW OF SYSTEMS:  GEN: no fever,    no chills  RESP: no SOB,   no cough  CVS: no chest pain,   no palpitations  GI: no abdominal pain,   no nausea,   no vomiting,   no constipation,   no diarrhea  : no dysuria,   no frequency  NEURO: no headache,   no dizziness  PSYCH: no depression,   not anxious  Derm : no rash    MEDICATIONS  (STANDING):  aspirin enteric coated 81 milliGRAM(s) Oral daily  atorvastatin 40 milliGRAM(s) Oral at bedtime  buPROPion  milliGRAM(s) Oral daily  dextrose 5%. 1000 milliLiter(s) (50 mL/Hr) IV Continuous <Continuous>  dextrose 50% Injectable 12.5 Gram(s) IV Push once  dextrose 50% Injectable 25 Gram(s) IV Push once  dextrose 50% Injectable 25 Gram(s) IV Push once  enoxaparin Injectable 40 milliGRAM(s) SubCutaneous daily  escitalopram 20 milliGRAM(s) Oral daily  furosemide   Injectable 40 milliGRAM(s) IV Push daily  gabapentin 600 milliGRAM(s) Oral three times a day  insulin glargine Injectable (LANTUS) 30 Unit(s) SubCutaneous at bedtime  insulin lispro (HumaLOG) corrective regimen sliding scale   SubCutaneous three times a day before meals  levothyroxine 125 MICROGram(s) Oral daily    MEDICATIONS  (PRN):  clonazePAM  Tablet 0.5 milliGRAM(s) Oral three times a day PRN anxiety  dextrose 40% Gel 15 Gram(s) Oral once PRN Blood Glucose LESS THAN 70 milliGRAM(s)/deciliter  glucagon  Injectable 1 milliGRAM(s) IntraMuscular once PRN Glucose LESS THAN 70 milligrams/deciliter  HYDROmorphone   Tablet 4 milliGRAM(s) Oral every 6 hours PRN Severe Pain (7 - 10)      Vital Signs Last 24 Hrs  T(C): 36.7 (2020 05:26), Max: 37.2 (2020 20:03)  T(F): 98.1 (2020 05:26), Max: 98.9 (2020 20:03)  HR: 53 (2020 06:14) (53 - 69)  BP: 132/70 (2020 05:26) (117/45 - 146/74)  BP(mean): --  RR: 17 (2020 05:26) (17 - 19)  SpO2: 95% (2020 06:14) (95% - 99%)  CAPILLARY BLOOD GLUCOSE      POCT Blood Glucose.: 117 mg/dL (2020 07:26)  POCT Blood Glucose.: 126 mg/dL (2020 21:03)  POCT Blood Glucose.: 166 mg/dL (2020 16:48)    I&O's Summary    2020 07:01  -  2020 07:00  --------------------------------------------------------  IN: 360 mL / OUT: 2090 mL / NET: -1730 mL    2020 07:01  -  2020 09:11  --------------------------------------------------------  IN: 0 mL / OUT: 200 mL / NET: -200 mL        PHYSICAL EXAM:  HEAD:  Atraumatic, Normocephalic  NECK: Supple, No   JVD  CHEST/LUNG:   no     rales,     no,    rhonchi  HEART: Regular rate and rhythm;         murmur  ABDOMEN: Soft, Nontender, ;   EXTREMITIES:  c/c lymph    edema  NEUROLOGY:  alert    LABS:                        11.1   4.98  )-----------( 280      ( 2020 06:12 )             36.3     -26    142  |  104  |  14  ----------------------------<  95  3.7   |  26  |  0.82    Ca    9.3      2020 06:12    TPro  6.9  /  Alb  3.8  /  TBili  0.4  /  DBili  <0.1  /  AST  11  /  ALT  10  /  AlkPhos  65  07-25          Urinalysis Basic - ( 2020 12:14 )    Color: Yellow / Appearance: Clear / S.025 / pH: x  Gluc: x / Ketone: Negative  / Bili: Negative / Urobili: Negative   Blood: x / Protein: Trace / Nitrite: Negative   Leuk Esterase: Negative / RBC: 1 /hpf / WBC 1 /HPF   Sq Epi: x / Non Sq Epi: 3 /hpf / Bacteria: Negative              Thyroid Stimulating Hormone, Serum: 1.97 uIU/mL ( @ 15:03)          Consultant(s) Notes Reviewed:      Care Discussed with Consultants/Other Providers:
no  cp/sob    REVIEW OF SYSTEMS:  GEN: no fever,    no chills  RESP: no SOB,   no cough  CVS: no chest pain,   no palpitations  GI: no abdominal pain,   no nausea,   no vomiting,   no constipation,   no diarrhea  : no dysuria,   no frequency  NEURO: no headache,   no dizziness  PSYCH: no depression,   not anxious  Derm : no rash    MEDICATIONS  (STANDING):  aspirin enteric coated 81 milliGRAM(s) Oral daily  atorvastatin 40 milliGRAM(s) Oral at bedtime  buPROPion  milliGRAM(s) Oral daily  dextrose 5%. 1000 milliLiter(s) (50 mL/Hr) IV Continuous <Continuous>  dextrose 50% Injectable 12.5 Gram(s) IV Push once  dextrose 50% Injectable 25 Gram(s) IV Push once  dextrose 50% Injectable 25 Gram(s) IV Push once  enoxaparin Injectable 40 milliGRAM(s) SubCutaneous daily  escitalopram 20 milliGRAM(s) Oral daily  furosemide   Injectable 40 milliGRAM(s) IV Push daily  gabapentin 600 milliGRAM(s) Oral three times a day  insulin glargine Injectable (LANTUS) 30 Unit(s) SubCutaneous at bedtime  insulin lispro (HumaLOG) corrective regimen sliding scale   SubCutaneous three times a day before meals  levothyroxine 125 MICROGram(s) Oral daily    MEDICATIONS  (PRN):  clonazePAM  Tablet 0.5 milliGRAM(s) Oral three times a day PRN anxiety  dextrose 40% Gel 15 Gram(s) Oral once PRN Blood Glucose LESS THAN 70 milliGRAM(s)/deciliter  glucagon  Injectable 1 milliGRAM(s) IntraMuscular once PRN Glucose LESS THAN 70 milligrams/deciliter  HYDROmorphone   Tablet 4 milliGRAM(s) Oral every 6 hours PRN Severe Pain (7 - 10)      Vital Signs Last 24 Hrs  T(C): 36.7 (2020 05:17), Max: 37.2 (2020 19:42)  T(F): 98.1 (2020 05:17), Max: 99 (2020 19:42)  HR: 57 (2020 05:36) (53 - 61)  BP: 136/65 (2020 05:17) (126/70 - 156/71)  BP(mean): --  RR: 17 (2020 05:17) (17 - 18)  SpO2: 97% (2020 05:36) (93% - 98%)  CAPILLARY BLOOD GLUCOSE      POCT Blood Glucose.: 127 mg/dL (2020 07:29)  POCT Blood Glucose.: 156 mg/dL (2020 21:06)  POCT Blood Glucose.: 193 mg/dL (2020 16:16)  POCT Blood Glucose.: 112 mg/dL (2020 11:45)    I&O's Summary    2020 07:01  -  2020 07:00  --------------------------------------------------------  IN: 537 mL / OUT: 1050 mL / NET: -513 mL    2020 07:01  -  2020 08:20  --------------------------------------------------------  IN: 0 mL / OUT: 400 mL / NET: -400 mL        PHYSICAL EXAM:  HEAD:  Atraumatic, Normocephalic  NECK: Supple, No   JVD  CHEST/LUNG:   no     rales,     no,    rhonchi  HEART: Regular rate and rhythm;         murmur  ABDOMEN: Soft, Nontender, ;   EXTREMITIES:     no   edema  NEUROLOGY:  alert    LABS:                        11.1   4.98  )-----------( 280      ( 2020 06:12 )             36.3     -27    142  |  103  |  14  ----------------------------<  113<H>  3.7   |  25  |  0.94    Ca    9.5      2020 06:17  Mg     2.0         TPro  6.9  /  Alb  3.8  /  TBili  0.4  /  DBili  <0.1  /  AST  11  /  ALT  10  /  AlkPhos  65  07-25          Urinalysis Basic - ( 2020 12:14 )    Color: Yellow / Appearance: Clear / S.025 / pH: x  Gluc: x / Ketone: Negative  / Bili: Negative / Urobili: Negative   Blood: x / Protein: Trace / Nitrite: Negative   Leuk Esterase: Negative / RBC: 1 /hpf / WBC 1 /HPF   Sq Epi: x / Non Sq Epi: 3 /hpf / Bacteria: Negative              Thyroid Stimulating Hormone, Serum: 1.97 uIU/mL ( @ 15:03)          Consultant(s) Notes Reviewed:      Care Discussed with Consultants/Other Providers:
no cp/sob    REVIEW OF SYSTEMS:  GEN: no fever,    no chills  RESP: no SOB,   no cough  CVS: no chest pain,   no palpitations  GI: no abdominal pain,   no nausea,   no vomiting,   no constipation,   no diarrhea  : no dysuria,   no frequency  NEURO: no headache,   no dizziness  PSYCH: no depression,   not anxious  Derm : no rash    MEDICATIONS  (STANDING):  aspirin enteric coated 81 milliGRAM(s) Oral daily  atorvastatin 40 milliGRAM(s) Oral at bedtime  buPROPion  milliGRAM(s) Oral daily  dextrose 5%. 1000 milliLiter(s) (50 mL/Hr) IV Continuous <Continuous>  dextrose 50% Injectable 12.5 Gram(s) IV Push once  dextrose 50% Injectable 25 Gram(s) IV Push once  dextrose 50% Injectable 25 Gram(s) IV Push once  enalapril 20 milliGRAM(s) Oral daily  enoxaparin Injectable 40 milliGRAM(s) SubCutaneous daily  escitalopram 20 milliGRAM(s) Oral daily  furosemide    Tablet 40 milliGRAM(s) Oral daily  gabapentin 600 milliGRAM(s) Oral three times a day  insulin glargine Injectable (LANTUS) 30 Unit(s) SubCutaneous at bedtime  insulin lispro (HumaLOG) corrective regimen sliding scale   SubCutaneous three times a day before meals  levothyroxine 125 MICROGram(s) Oral daily    MEDICATIONS  (PRN):  clonazePAM  Tablet 0.5 milliGRAM(s) Oral three times a day PRN anxiety  dextrose 40% Gel 15 Gram(s) Oral once PRN Blood Glucose LESS THAN 70 milliGRAM(s)/deciliter  glucagon  Injectable 1 milliGRAM(s) IntraMuscular once PRN Glucose LESS THAN 70 milligrams/deciliter  HYDROmorphone   Tablet 4 milliGRAM(s) Oral every 6 hours PRN Severe Pain (7 - 10)      Vital Signs Last 24 Hrs  T(C): 36.7 (29 Jul 2020 04:45), Max: 37.1 (28 Jul 2020 21:09)  T(F): 98 (29 Jul 2020 04:45), Max: 98.8 (28 Jul 2020 21:09)  HR: 54 (29 Jul 2020 06:27) (50 - 60)  BP: 110/62 (29 Jul 2020 04:45) (110/62 - 121/78)  BP(mean): --  RR: 18 (29 Jul 2020 04:45) (18 - 19)  SpO2: 98% (29 Jul 2020 06:27) (95% - 98%)  CAPILLARY BLOOD GLUCOSE      POCT Blood Glucose.: 150 mg/dL (29 Jul 2020 07:28)  POCT Blood Glucose.: 135 mg/dL (28 Jul 2020 22:17)  POCT Blood Glucose.: 142 mg/dL (28 Jul 2020 21:07)  POCT Blood Glucose.: 161 mg/dL (28 Jul 2020 16:26)  POCT Blood Glucose.: 145 mg/dL (28 Jul 2020 11:03)    I&O's Summary    28 Jul 2020 07:01  -  29 Jul 2020 07:00  --------------------------------------------------------  IN: 960 mL / OUT: 200 mL / NET: 760 mL        PHYSICAL EXAM:  HEAD:  Atraumatic, Normocephalic  NECK: Supple, No   JVD  CHEST/LUNG:   no     rales,     no,    rhonchi  HEART: Regular rate and rhythm;         murmur  ABDOMEN: Soft, Nontender, ;   EXTREMITIES:   less     edema  NEUROLOGY:  alert    LABS:                          Thyroid Stimulating Hormone, Serum: 1.97 uIU/mL (07-25 @ 15:03)        GI PCR Panel, Stool (collected 07-28-20 @ 08:58)  Source: .Stool roldan carroll  Final Report (07-28-20 @ 13:19):    GI PCR Results: NOT detected    *******Please Note:*******    GI panel PCR evaluates for:    Campylobacter, Plesiomonas shigelloides, Salmonella,    Vibrio, Yersinia enterocolitica, Enteroaggregative    Escherichia coli (EAEC), Enteropathogenic E.coli (EPEC),    Enterotoxigenic E. coli (ETEC) lt/st, Shiga-like    toxin-producing E. coli (STEC) stx1/stx2,    Shigella/ Enteroinvasive E. coli (EIEC), Cryptosporidium,    Cyclospora cayetanensis, Entamoeba histolytica,    Giardia lamblia, Adenovirus F 40/41, Astrovirus,    Norovirus GI/GII, Rotavirus A, Sapovirus        Consultant(s) Notes Reviewed:      Care Discussed with Consultants/Other Providers:

## 2020-07-29 NOTE — PROGRESS NOTE ADULT - REASON FOR ADMISSION
cp/  weakness

## 2020-07-29 NOTE — PROGRESS NOTE ADULT - ASSESSMENT
Patient is a 65 year-old female    h/o   DM2 on insulin, fibromyalgia, osteoarthritis, sleep apnea on CPAP,  RENARD/   depression, venous insufficiency,   endometrial CA s/p hysterectomy (2010)  , HTN,   HLD,   hypothyroidism,  morbid   obesity      presenting with increasing weakness/  intermittent  cp  x 1 week . Patient has generalized pain and weakness at her baseline, uses  a walker. She has had a gradual worsening of her weakness this past week, with difficulty walking    s/p  i mild chest pain, occasional dyspnea on exertion, /  denies  fevers .	    pt  with  cp/  leg  pain/    h/o  morbid  obesity/  BMI  of  60/  with  massive  truncal  obesity/ on nocturnal CPAP   h/o  lymphedema. with hyperpigmented /  raised  lesions santosh  right leg/  wound  care eval   able  to  raise  b/l  legs/   no  weakness   doppler  legs, pending    CT angio chest  ,no PE   CP/  negative  troponin in  ER/   DM, on lantus/  humalog at  home/  lantus  lowered     per   house endo,   note  seen/  stated , no need  for  endo  comnsult   HTN,  on  asa/  enalapril/ lipitor/ synthroid/ lasix   depression, on  mlple  meds/  fibromyalgia / pt wants  clonazepam  and   Hydromorphone  /  see  i S top/ in  event  note  Echo,   jorje;  ef  on dvt ppx/  no dvt on dopplers   plan.   d/c to  rehab/  still  awiating   d/c/  awiating acceptance  from facility

## 2020-07-29 NOTE — DISCHARGE NOTE NURSING/CASE MANAGEMENT/SOCIAL WORK - PATIENT PORTAL LINK FT
You can access the FollowMyHealth Patient Portal offered by [facility] by registering at the following website: [email or web address]. By joining [employer]’s FollowMyHealth portal, you will also be able to view your health information using other applications (apps) compatible with our system.

## 2021-02-04 ENCOUNTER — INPATIENT (INPATIENT)
Facility: HOSPITAL | Age: 66
LOS: 4 days | Discharge: NURSING, REHAB OR LONG-TERM CARE | DRG: 872 | End: 2021-02-09
Attending: INTERNAL MEDICINE | Admitting: STUDENT IN AN ORGANIZED HEALTH CARE EDUCATION/TRAINING PROGRAM
Payer: MEDICARE

## 2021-02-04 VITALS
RESPIRATION RATE: 20 BRPM | HEART RATE: 80 BPM | DIASTOLIC BLOOD PRESSURE: 87 MMHG | TEMPERATURE: 99 F | SYSTOLIC BLOOD PRESSURE: 123 MMHG | HEIGHT: 68 IN | OXYGEN SATURATION: 97 % | WEIGHT: 293 LBS

## 2021-02-04 DIAGNOSIS — L03.90 CELLULITIS, UNSPECIFIED: ICD-10-CM

## 2021-02-04 LAB
ALBUMIN SERPL ELPH-MCNC: 4 G/DL — SIGNIFICANT CHANGE UP (ref 3.3–5)
ALP SERPL-CCNC: 101 U/L — SIGNIFICANT CHANGE UP (ref 40–120)
ALT FLD-CCNC: 10 U/L — SIGNIFICANT CHANGE UP (ref 10–45)
ANION GAP SERPL CALC-SCNC: 14 MMOL/L — SIGNIFICANT CHANGE UP (ref 5–17)
APPEARANCE UR: CLEAR — SIGNIFICANT CHANGE UP
AST SERPL-CCNC: 11 U/L — SIGNIFICANT CHANGE UP (ref 10–40)
BASE EXCESS BLDV CALC-SCNC: 6.9 MMOL/L — HIGH (ref -2–2)
BASOPHILS # BLD AUTO: 0 K/UL — SIGNIFICANT CHANGE UP (ref 0–0.2)
BASOPHILS NFR BLD AUTO: 0 % — SIGNIFICANT CHANGE UP (ref 0–2)
BILIRUB SERPL-MCNC: 0.6 MG/DL — SIGNIFICANT CHANGE UP (ref 0.2–1.2)
BILIRUB UR-MCNC: NEGATIVE — SIGNIFICANT CHANGE UP
BUN SERPL-MCNC: 49 MG/DL — HIGH (ref 7–23)
CA-I SERPL-SCNC: 1.18 MMOL/L — SIGNIFICANT CHANGE UP (ref 1.12–1.3)
CALCIUM SERPL-MCNC: 9.9 MG/DL — SIGNIFICANT CHANGE UP (ref 8.4–10.5)
CHLORIDE BLDV-SCNC: 96 MMOL/L — SIGNIFICANT CHANGE UP (ref 96–108)
CHLORIDE SERPL-SCNC: 93 MMOL/L — LOW (ref 96–108)
CK MB BLD-MCNC: 1.2 % — SIGNIFICANT CHANGE UP (ref 0–3.5)
CK MB CFR SERPL CALC: 2.2 NG/ML — SIGNIFICANT CHANGE UP (ref 0–3.8)
CK SERPL-CCNC: 187 U/L — HIGH (ref 25–170)
CO2 BLDV-SCNC: 36 MMOL/L — HIGH (ref 22–30)
CO2 SERPL-SCNC: 30 MMOL/L — SIGNIFICANT CHANGE UP (ref 22–31)
COLOR SPEC: YELLOW — SIGNIFICANT CHANGE UP
CREAT SERPL-MCNC: 1.49 MG/DL — HIGH (ref 0.5–1.3)
DIFF PNL FLD: NEGATIVE — SIGNIFICANT CHANGE UP
EOSINOPHIL # BLD AUTO: 0 K/UL — SIGNIFICANT CHANGE UP (ref 0–0.5)
EOSINOPHIL NFR BLD AUTO: 0 % — SIGNIFICANT CHANGE UP (ref 0–6)
GAS PNL BLDV: 136 MMOL/L — SIGNIFICANT CHANGE UP (ref 135–145)
GAS PNL BLDV: SIGNIFICANT CHANGE UP
GAS PNL BLDV: SIGNIFICANT CHANGE UP
GLUCOSE BLDC GLUCOMTR-MCNC: 133 MG/DL — HIGH (ref 70–99)
GLUCOSE BLDC GLUCOMTR-MCNC: 143 MG/DL — HIGH (ref 70–99)
GLUCOSE BLDC GLUCOMTR-MCNC: 168 MG/DL — HIGH (ref 70–99)
GLUCOSE BLDC GLUCOMTR-MCNC: 191 MG/DL — HIGH (ref 70–99)
GLUCOSE BLDC GLUCOMTR-MCNC: 210 MG/DL — HIGH (ref 70–99)
GLUCOSE BLDV-MCNC: 185 MG/DL — HIGH (ref 70–99)
GLUCOSE SERPL-MCNC: 191 MG/DL — HIGH (ref 70–99)
GLUCOSE UR QL: NEGATIVE — SIGNIFICANT CHANGE UP
HCO3 BLDV-SCNC: 34 MMOL/L — HIGH (ref 21–29)
HCT VFR BLD CALC: 39.4 % — SIGNIFICANT CHANGE UP (ref 34.5–45)
HCT VFR BLDA CALC: 40 % — SIGNIFICANT CHANGE UP (ref 39–50)
HGB BLD CALC-MCNC: 13.2 G/DL — SIGNIFICANT CHANGE UP (ref 11.5–15.5)
HGB BLD-MCNC: 12.4 G/DL — SIGNIFICANT CHANGE UP (ref 11.5–15.5)
KETONES UR-MCNC: NEGATIVE — SIGNIFICANT CHANGE UP
LACTATE BLDV-MCNC: 1.8 MMOL/L — SIGNIFICANT CHANGE UP (ref 0.7–2)
LACTATE BLDV-MCNC: 2.6 MMOL/L — HIGH (ref 0.7–2)
LEUKOCYTE ESTERASE UR-ACNC: NEGATIVE — SIGNIFICANT CHANGE UP
LYMPHOCYTES # BLD AUTO: 0.39 K/UL — LOW (ref 1–3.3)
LYMPHOCYTES # BLD AUTO: 1.8 % — LOW (ref 13–44)
MAGNESIUM SERPL-MCNC: 1.8 MG/DL — SIGNIFICANT CHANGE UP (ref 1.6–2.6)
MANUAL SMEAR VERIFICATION: SIGNIFICANT CHANGE UP
MCHC RBC-ENTMCNC: 25.5 PG — LOW (ref 27–34)
MCHC RBC-ENTMCNC: 31.5 GM/DL — LOW (ref 32–36)
MCV RBC AUTO: 80.9 FL — SIGNIFICANT CHANGE UP (ref 80–100)
MONOCYTES # BLD AUTO: 1.69 K/UL — HIGH (ref 0–0.9)
MONOCYTES NFR BLD AUTO: 7.9 % — SIGNIFICANT CHANGE UP (ref 2–14)
NEUTROPHILS # BLD AUTO: 18.76 K/UL — HIGH (ref 1.8–7.4)
NEUTROPHILS NFR BLD AUTO: 87.7 % — HIGH (ref 43–77)
NITRITE UR-MCNC: NEGATIVE — SIGNIFICANT CHANGE UP
PCO2 BLDV: 61 MMHG — HIGH (ref 35–50)
PH BLDV: 7.36 — SIGNIFICANT CHANGE UP (ref 7.35–7.45)
PH UR: 6.5 — SIGNIFICANT CHANGE UP (ref 5–8)
PHOSPHATE SERPL-MCNC: 3.9 MG/DL — SIGNIFICANT CHANGE UP (ref 2.5–4.5)
PLAT MORPH BLD: NORMAL — SIGNIFICANT CHANGE UP
PLATELET # BLD AUTO: 317 K/UL — SIGNIFICANT CHANGE UP (ref 150–400)
PO2 BLDV: 21 MMHG — LOW (ref 25–45)
POTASSIUM BLDV-SCNC: 3.9 MMOL/L — SIGNIFICANT CHANGE UP (ref 3.5–5.3)
POTASSIUM SERPL-MCNC: 4 MMOL/L — SIGNIFICANT CHANGE UP (ref 3.5–5.3)
POTASSIUM SERPL-SCNC: 4 MMOL/L — SIGNIFICANT CHANGE UP (ref 3.5–5.3)
PROT SERPL-MCNC: 7.9 G/DL — SIGNIFICANT CHANGE UP (ref 6–8.3)
PROT UR-MCNC: SIGNIFICANT CHANGE UP
RBC # BLD: 4.87 M/UL — SIGNIFICANT CHANGE UP (ref 3.8–5.2)
RBC # FLD: 16.8 % — HIGH (ref 10.3–14.5)
RBC BLD AUTO: SIGNIFICANT CHANGE UP
SAO2 % BLDV: 26 % — LOW (ref 67–88)
SARS-COV-2 RNA SPEC QL NAA+PROBE: SIGNIFICANT CHANGE UP
SODIUM SERPL-SCNC: 137 MMOL/L — SIGNIFICANT CHANGE UP (ref 135–145)
SP GR SPEC: 1.02 — SIGNIFICANT CHANGE UP (ref 1.01–1.02)
TROPONIN T, HIGH SENSITIVITY RESULT: 26 NG/L — SIGNIFICANT CHANGE UP (ref 0–51)
UROBILINOGEN FLD QL: NEGATIVE — SIGNIFICANT CHANGE UP
VARIANT LYMPHS # BLD: 2.6 % — SIGNIFICANT CHANGE UP (ref 0–6)
WBC # BLD: 21.39 K/UL — HIGH (ref 3.8–10.5)
WBC # FLD AUTO: 21.39 K/UL — HIGH (ref 3.8–10.5)

## 2021-02-04 PROCEDURE — 71045 X-RAY EXAM CHEST 1 VIEW: CPT | Mod: 26

## 2021-02-04 PROCEDURE — 93010 ELECTROCARDIOGRAM REPORT: CPT | Mod: GC

## 2021-02-04 PROCEDURE — 99285 EMERGENCY DEPT VISIT HI MDM: CPT | Mod: GC

## 2021-02-04 RX ORDER — GABAPENTIN 400 MG/1
600 CAPSULE ORAL THREE TIMES A DAY
Refills: 0 | Status: DISCONTINUED | OUTPATIENT
Start: 2021-02-04 | End: 2021-02-09

## 2021-02-04 RX ORDER — INSULIN LISPRO 100/ML
VIAL (ML) SUBCUTANEOUS
Refills: 0 | Status: DISCONTINUED | OUTPATIENT
Start: 2021-02-04 | End: 2021-02-09

## 2021-02-04 RX ORDER — ATORVASTATIN CALCIUM 80 MG/1
40 TABLET, FILM COATED ORAL AT BEDTIME
Refills: 0 | Status: DISCONTINUED | OUTPATIENT
Start: 2021-02-04 | End: 2021-02-09

## 2021-02-04 RX ORDER — ESCITALOPRAM OXALATE 10 MG/1
20 TABLET, FILM COATED ORAL DAILY
Refills: 0 | Status: DISCONTINUED | OUTPATIENT
Start: 2021-02-04 | End: 2021-02-09

## 2021-02-04 RX ORDER — LANOLIN/MINERAL OIL
1 LOTION (ML) TOPICAL
Qty: 0 | Refills: 0 | DISCHARGE

## 2021-02-04 RX ORDER — DEXTROSE 50 % IN WATER 50 %
15 SYRINGE (ML) INTRAVENOUS ONCE
Refills: 0 | Status: DISCONTINUED | OUTPATIENT
Start: 2021-02-04 | End: 2021-02-09

## 2021-02-04 RX ORDER — SODIUM CHLORIDE 9 MG/ML
1000 INJECTION, SOLUTION INTRAVENOUS
Refills: 0 | Status: DISCONTINUED | OUTPATIENT
Start: 2021-02-04 | End: 2021-02-09

## 2021-02-04 RX ORDER — GLUCAGON INJECTION, SOLUTION 0.5 MG/.1ML
1 INJECTION, SOLUTION SUBCUTANEOUS ONCE
Refills: 0 | Status: DISCONTINUED | OUTPATIENT
Start: 2021-02-04 | End: 2021-02-09

## 2021-02-04 RX ORDER — LEVOTHYROXINE SODIUM 125 MCG
125 TABLET ORAL DAILY
Refills: 0 | Status: DISCONTINUED | OUTPATIENT
Start: 2021-02-04 | End: 2021-02-09

## 2021-02-04 RX ORDER — INSULIN GLARGINE 100 [IU]/ML
18 INJECTION, SOLUTION SUBCUTANEOUS AT BEDTIME
Refills: 0 | Status: DISCONTINUED | OUTPATIENT
Start: 2021-02-04 | End: 2021-02-04

## 2021-02-04 RX ORDER — PIPERACILLIN AND TAZOBACTAM 4; .5 G/20ML; G/20ML
3.38 INJECTION, POWDER, LYOPHILIZED, FOR SOLUTION INTRAVENOUS EVERY 8 HOURS
Refills: 0 | Status: DISCONTINUED | OUTPATIENT
Start: 2021-02-04 | End: 2021-02-04

## 2021-02-04 RX ORDER — ASPIRIN/CALCIUM CARB/MAGNESIUM 324 MG
81 TABLET ORAL DAILY
Refills: 0 | Status: DISCONTINUED | OUTPATIENT
Start: 2021-02-04 | End: 2021-02-09

## 2021-02-04 RX ORDER — ACETAMINOPHEN 500 MG
650 TABLET ORAL ONCE
Refills: 0 | Status: COMPLETED | OUTPATIENT
Start: 2021-02-04 | End: 2021-02-04

## 2021-02-04 RX ORDER — INSULIN GLARGINE 100 [IU]/ML
25 INJECTION, SOLUTION SUBCUTANEOUS AT BEDTIME
Refills: 0 | Status: DISCONTINUED | OUTPATIENT
Start: 2021-02-04 | End: 2021-02-09

## 2021-02-04 RX ORDER — PIPERACILLIN AND TAZOBACTAM 4; .5 G/20ML; G/20ML
3.38 INJECTION, POWDER, LYOPHILIZED, FOR SOLUTION INTRAVENOUS ONCE
Refills: 0 | Status: COMPLETED | OUTPATIENT
Start: 2021-02-04 | End: 2021-02-04

## 2021-02-04 RX ORDER — CLONAZEPAM 1 MG
0.5 TABLET ORAL THREE TIMES A DAY
Refills: 0 | Status: DISCONTINUED | OUTPATIENT
Start: 2021-02-04 | End: 2021-02-09

## 2021-02-04 RX ORDER — DEXTROSE 50 % IN WATER 50 %
25 SYRINGE (ML) INTRAVENOUS ONCE
Refills: 0 | Status: DISCONTINUED | OUTPATIENT
Start: 2021-02-04 | End: 2021-02-09

## 2021-02-04 RX ORDER — PIPERACILLIN AND TAZOBACTAM 4; .5 G/20ML; G/20ML
3.38 INJECTION, POWDER, LYOPHILIZED, FOR SOLUTION INTRAVENOUS EVERY 8 HOURS
Refills: 0 | Status: DISCONTINUED | OUTPATIENT
Start: 2021-02-04 | End: 2021-02-05

## 2021-02-04 RX ORDER — BUPROPION HYDROCHLORIDE 150 MG/1
300 TABLET, EXTENDED RELEASE ORAL DAILY
Refills: 0 | Status: DISCONTINUED | OUTPATIENT
Start: 2021-02-04 | End: 2021-02-09

## 2021-02-04 RX ORDER — DEXTROSE 50 % IN WATER 50 %
12.5 SYRINGE (ML) INTRAVENOUS ONCE
Refills: 0 | Status: DISCONTINUED | OUTPATIENT
Start: 2021-02-04 | End: 2021-02-09

## 2021-02-04 RX ORDER — SODIUM CHLORIDE 9 MG/ML
1000 INJECTION INTRAMUSCULAR; INTRAVENOUS; SUBCUTANEOUS
Refills: 0 | Status: DISCONTINUED | OUTPATIENT
Start: 2021-02-04 | End: 2021-02-07

## 2021-02-04 RX ORDER — ACETAMINOPHEN 500 MG
975 TABLET ORAL ONCE
Refills: 0 | Status: COMPLETED | OUTPATIENT
Start: 2021-02-04 | End: 2021-02-04

## 2021-02-04 RX ORDER — SODIUM CHLORIDE 9 MG/ML
1000 INJECTION INTRAMUSCULAR; INTRAVENOUS; SUBCUTANEOUS ONCE
Refills: 0 | Status: COMPLETED | OUTPATIENT
Start: 2021-02-04 | End: 2021-02-04

## 2021-02-04 RX ORDER — VANCOMYCIN HCL 1 G
1000 VIAL (EA) INTRAVENOUS ONCE
Refills: 0 | Status: COMPLETED | OUTPATIENT
Start: 2021-02-04 | End: 2021-02-04

## 2021-02-04 RX ORDER — HEPARIN SODIUM 5000 [USP'U]/ML
5000 INJECTION INTRAVENOUS; SUBCUTANEOUS EVERY 8 HOURS
Refills: 0 | Status: DISCONTINUED | OUTPATIENT
Start: 2021-02-04 | End: 2021-02-09

## 2021-02-04 RX ADMIN — SODIUM CHLORIDE 1000 MILLILITER(S): 9 INJECTION INTRAMUSCULAR; INTRAVENOUS; SUBCUTANEOUS at 02:31

## 2021-02-04 RX ADMIN — PIPERACILLIN AND TAZOBACTAM 200 GRAM(S): 4; .5 INJECTION, POWDER, LYOPHILIZED, FOR SOLUTION INTRAVENOUS at 04:37

## 2021-02-04 RX ADMIN — ATORVASTATIN CALCIUM 40 MILLIGRAM(S): 80 TABLET, FILM COATED ORAL at 21:51

## 2021-02-04 RX ADMIN — Medication 250 MILLIGRAM(S): at 05:39

## 2021-02-04 RX ADMIN — Medication 2: at 13:56

## 2021-02-04 RX ADMIN — INSULIN GLARGINE 25 UNIT(S): 100 INJECTION, SOLUTION SUBCUTANEOUS at 22:35

## 2021-02-04 RX ADMIN — Medication 975 MILLIGRAM(S): at 05:38

## 2021-02-04 RX ADMIN — GABAPENTIN 600 MILLIGRAM(S): 400 CAPSULE ORAL at 17:09

## 2021-02-04 RX ADMIN — PIPERACILLIN AND TAZOBACTAM 200 GRAM(S): 4; .5 INJECTION, POWDER, LYOPHILIZED, FOR SOLUTION INTRAVENOUS at 17:13

## 2021-02-04 RX ADMIN — HEPARIN SODIUM 5000 UNIT(S): 5000 INJECTION INTRAVENOUS; SUBCUTANEOUS at 17:09

## 2021-02-04 RX ADMIN — SODIUM CHLORIDE 1000 MILLILITER(S): 9 INJECTION INTRAMUSCULAR; INTRAVENOUS; SUBCUTANEOUS at 04:37

## 2021-02-04 RX ADMIN — Medication 650 MILLIGRAM(S): at 18:46

## 2021-02-04 RX ADMIN — Medication 81 MILLIGRAM(S): at 17:08

## 2021-02-04 RX ADMIN — PIPERACILLIN AND TAZOBACTAM 25 GRAM(S): 4; .5 INJECTION, POWDER, LYOPHILIZED, FOR SOLUTION INTRAVENOUS at 21:51

## 2021-02-04 RX ADMIN — GABAPENTIN 600 MILLIGRAM(S): 400 CAPSULE ORAL at 22:35

## 2021-02-04 RX ADMIN — HEPARIN SODIUM 5000 UNIT(S): 5000 INJECTION INTRAVENOUS; SUBCUTANEOUS at 22:35

## 2021-02-04 NOTE — ED ADULT NURSE NOTE - OBJECTIVE STATEMENT
65 yo female with pmh DM2, fibromyalgia, osteoarthritis, sleep apnea, HTN, HLD, obesity presents to ED c/o H/A, new onset weakness, and chills starting this morning. Pt reports associated "low energy and feeling tired," nausea, and "a few episodes of urinary incontinence this afternoon." Pt reports baseline bilateral LLE redness/swelling "for a while." Pt reports "I normally ambulate with a walker but I couldn't even do that today I felt so week." Pt denies CP, SOB, dizziness, lightheadedness, numbness/tingling, loss of sensation, emesis, burning/painful urination, foul smelling urine, fevers, body aches, sick contacts. Upon assessment, pt a&ox3, nad, no increased wob noted, speaking full sentences, skin warm and pallor in color, able to follow all commands, appears tired, bilateral LLE redness and swelling noted, no loss of sensation noted, bilateral peripheral pedal pulses noted, able to move all extremities. MD at bedside. Pt placed on cardiac monitor. Pt safety and comfort provided. 67 yo female with pmh DM2, fibromyalgia, osteoarthritis, sleep apnea, HTN, HLD, obesity presents to ED c/o H/A, new onset weakness, and chills starting this morning. Pt reports associated "low energy and feeling tired," nausea, and "a few episodes of urinary incontinence this afternoon." Pt reports baseline bilateral LLE redness/swelling/pain "for a while," but reports increased pain. Pt reports "I normally ambulate with a walker but I couldn't even do that today I felt so week." Pt denies CP, SOB, dizziness, lightheadedness, numbness/tingling, loss of sensation, emesis, burning/painful urination, foul smelling urine, fevers, body aches, sick contacts. Upon assessment, pt a&ox3, nad, no increased wob noted, speaking full sentences, skin warm and pallor in color, able to follow all commands, appears tired, bilateral LLE redness and swelling noted/warm to touch, no loss of sensation noted, bilateral peripheral pedal pulses noted, able to move all extremities. MD at bedside. Pt placed on cardiac monitor. Pt safety and comfort provided.

## 2021-02-04 NOTE — ED ADULT NURSE REASSESSMENT NOTE - NS ED NURSE REASSESS COMMENT FT1
Pt straight cath as ordered, 600 cc of malodorous yellow collected, UA and UC collected and sent to lab. 2nd RN present to confirm sterility

## 2021-02-04 NOTE — H&P ADULT - NSHPPHYSICALEXAM_GEN_ALL_CORE
PHYSICAL EXAMINATION:  Vital Signs Last 24 Hrs  T(C): 37.2 (04 Feb 2021 08:22), Max: 38.1 (04 Feb 2021 01:52)  T(F): 99 (04 Feb 2021 08:22), Max: 100.5 (04 Feb 2021 01:52)  HR: 70 (04 Feb 2021 08:22) (70 - 80)  BP: 90/56 (04 Feb 2021 08:22) (90/56 - 129/60)  BP(mean): 61 (04 Feb 2021 06:10) (61 - 61)  RR: 16 (04 Feb 2021 08:22) (16 - 20)  SpO2: 94% (04 Feb 2021 08:22) (94% - 100%)  CAPILLARY BLOOD GLUCOSE      POCT Blood Glucose.: 168 mg/dL (04 Feb 2021 09:34)  POCT Blood Glucose.: 191 mg/dL (04 Feb 2021 06:48)        GENERAL: NAD, well-groomed,  HEAD:  atraumatic, normocephalic  EYES: sclera anicteric  ENMT: mucous membranes moist  NECK: supple, No JVD  CHEST/LUNG: clear to auscultation bilaterally;    no      rales   ,   no rhonchi,   HEART: normal S1, S2  ABDOMEN: BS+, soft, ND, NT   EXTREMITIES:    no    edema    b/l LEs  NEURO: awake, ,     moves all extremities  SKIN: no     rash

## 2021-02-04 NOTE — ED PROVIDER NOTE - PROGRESS NOTE DETAILS
UA negative. Will check CXR. If negative most likely source is cellulitis of legs. COVID negative. lactate 2.6  Will give another L bolus. Admit for sepsis 2/2 cellulitis vs. pna UA negative. Will check CXR. If negative most likely source is cellulitis of legs. COVID negative. lactate 2.6  Will give another L bolus. Admit for sepsis 2/2 cellulitis vs. pna. Will give Vanc + zosyn

## 2021-02-04 NOTE — H&P ADULT - NSHPREVIEWOFSYSTEMS_GEN_ALL_CORE
REVIEW OF SYSTEMS:  GEN:  ? fever,    no chills  RESP: no SOB,   no cough  CVS: no chest pain,   no palpitations  GI: no abdominal pain,   no nausea,   no vomiting,   no constipation,   no diarrhea  : no dysuria,   no frequency  NEURO: no headache,   no dizziness  PSYCH: no depression,   not anxious  Derm : no rash

## 2021-02-04 NOTE — ED PROVIDER NOTE - ATTENDING CONTRIBUTION TO CARE
pt presents with fever, chills, generalized weakness. on exam, pt well appearing, not toxic. sepsis w/u done - no clear source of infection on labs, may be due to cellulitis. pt got abx, blood cultures, admitted for further care.

## 2021-02-04 NOTE — ED ADULT NURSE REASSESSMENT NOTE - NS ED NURSE REASSESS COMMENT FT1
Admitting team/MAR #[id number] called and made aware of pt's diastolic BP. As per MD Liriano, no interventions needed at this time, Admitting team/MAR #[id number] called and made aware of pt's diastolic BP. Pt a&ox3, speaking full sentences, skin pallor in color, appears tired. Pt reports "I feel very tired and I have no energy." Admitting team aware. As per MD Liriano (#[id number]), no interventions needed at this time. Admitting team/MAR #[id number] called and made aware of pt's diastolic BP. Pt a&ox3, speaking full sentences, skin pallor in color, appears tired, NSR on cardiac monitor. Pt reports "I feel very tired and I have no energy." Admitting team aware. As per MD Liriano (#[id number]), no interventions needed at this time.

## 2021-02-04 NOTE — H&P ADULT - HISTORY OF PRESENT ILLNESS
66 year-old female   with PMH of DM2 on insulin, fibromyalgia, osteoarthritis, sleep apnea on CPAP, depression, venous insufficiency, endometrial CA s/p hysterectomy (2010),   HTN, HLD, hypothyroidism, obesity   presenting with onset of weakness, chills this morning. Notes she woke up and felt generally crummy. Has had low energy, fatigue, generalized SOB, no fevers at home, episodes of incontinence today without dysuria, foul urine, flank pain. Notes sometimes she feels this was with fibromyalgia flares.   Also reports generalized nausea, but no vomiting. Notes legs are red, but have been this way previously. "maybe a little warmer than usual."

## 2021-02-04 NOTE — ED PROVIDER NOTE - PHYSICAL EXAMINATION
VITALS:     GENERAL: NAD, lying in bed comfortably, fatigued appearing  HEAD:  Atraumatic, Normocephalic  EYES: EOMI, PERRLA, conjunctiva and sclera clear  ENT: Moist mucous membranes  NECK: Supple, No JVD  CHEST/LUNG: Clear to auscultation bilaterally; No rales, rhonchi, wheezing, or rubs. Unlabored respirations  HEART: Regular rate and rhythm; No murmurs, rubs, or gallops  ABDOMEN: Bowel sounds present; Soft, Nontender, Nondistended.   EXTREMITIES:  2+ Peripheral Pulses, brisk capillary refill. No clubbing, cyanosis, or edema  NERVOUS SYSTEM:  Alert & Oriented X3, speech clear. No deficits   MSK: FROM all 4 extremities, full and equal strength. bilateral lower extremity erythema and warmth of shins. No exudate. No boils.  SKIN: No rashes or lesions  PSYCH: No SI/HI, normal affect

## 2021-02-04 NOTE — H&P ADULT - NSICDXPASTSURGICALHX_GEN_ALL_CORE_FT
PAST SURGICAL HISTORY:  Carpal Tunnel Syndrome release left    Endometrial Ca s/p RODRIGO, BSO     Incarcerated Ventral Hernia     S/P FESS (Functional Endoscopic Sinus Surgery)     S/P Tonsillectomy and Adenoidectomy

## 2021-02-04 NOTE — ED ADULT NURSE NOTE - NSIMPLEMENTINTERV_GEN_ALL_ED
Implemented All Fall Risk Interventions:  [place] to call system. Call bell, personal items and telephone within reach. Instruct patient to call for assistance. Room bathroom lighting operational. Non-slip footwear when patient is off stretcher. Physically safe environment: no spills, clutter or unnecessary equipment. Stretcher in lowest position, wheels locked, appropriate side rails in place. Provide visual cue, wrist band, yellow gown, etc. Monitor gait and stability. Monitor for mental status changes and reorient to person, place, and time. Review medications for side effects contributing to fall risk. Reinforce activity limits and safety measures with patient and family.

## 2021-02-04 NOTE — ED PROVIDER NOTE - PSH
Carpal Tunnel Syndrome  release left  Endometrial Ca s/p RODRIGO, BSO    Incarcerated Ventral Hernia    S/P FESS (Functional Endoscopic Sinus Surgery)    S/P Tonsillectomy and Adenoidectomy

## 2021-02-04 NOTE — H&P ADULT - NSHPLABSRESULTS_GEN_ALL_CORE
LABS:                        12.4   21.39 )-----------( 317      ( 2021 02:06 )             39.4     02-04    137  |  93<L>  |  49<H>  ----------------------------<  191<H>  4.0   |  30  |  1.49<H>    Ca    9.9      2021 02:06  Phos  3.9     02-04  Mg     1.8     -04    TPro  7.9  /  Alb  4.0  /  TBili  0.6  /  DBili  x   /  AST  11  /  ALT  10  /  AlkPhos  101  02-04          Urinalysis Basic - ( 2021 03:44 )    Color: Yellow / Appearance: Clear / S.025 / pH: x  Gluc: x / Ketone: Negative  / Bili: Negative / Urobili: Negative   Blood: x / Protein: Trace / Nitrite: Negative   Leuk Esterase: Negative / RBC: x / WBC x   Sq Epi: x / Non Sq Epi: x / Bacteria: x          -04 @ 02:05  3.9  21

## 2021-02-04 NOTE — ED ADULT NURSE REASSESSMENT NOTE - NS ED NURSE REASSESS COMMENT FT1
Pt resting in stretcher, a&ox3, nad, no increased wob noted, skin warm and pallor in color, speaking full sentences, able to follow all commands, appears tired/weak. Pt c/o h/a and feeling weak, MD Garza made aware, awaiting further instructions. Pt denies CP, SOB, numbness/tingling, loss of sensation, abdominal pain, n. Pending dispo

## 2021-02-04 NOTE — ED PROVIDER NOTE - OBJECTIVE STATEMENT
66 year-old female with PMH of DM2 on insulin, fibromyalgia, osteoarthritis, sleep apnea on CPAP, depression, venous insufficiency, endometrial CA s/p hysterectomy (2010), HTN, HLD, hypothyroidism, obesity presenting with onset of weakness, chills this morning. Notes she woke up and felt generally crummy. Has had low energy, fatigue, generalized SOB, no fevers at home, episodes of incontinence today without dysuria, foul urine, flank pain. Notes sometimes she feels this was with fibromyalgia flares. Also reports generalized nausea, but no vomiting. Notes legs are red, but have been this way previously. "maybe a little warmer than usual." No exudate.

## 2021-02-04 NOTE — CONSULT NOTE ADULT - ASSESSMENT
66 year-old femal  with PMH of DM2 on insulin, fibromyalgia, osteoarthritis, sleep apnea on CPAP, depression, venous insufficiency, endometrial CA s/p hysterectomy (2010), HTN, HLD, hypothyroidism, obesity presenting with onset of weakness, chills this morning. Notes she woke up and felt generally crummy. Has had low energy, fatigue, generalized SOB, no fevers at home, episodes of incontinence today without dysuria, foul urine, flank pain. Notes sometimes she feels this was with fibromyalgia flares.   Also reports generalized nausea, but no vomiting. Notes legs are red, but have been this way previously. "maybe a little warmer than usual."  pt with hx of obesity and with  RENARD ,with increasing sob. no chest pain.  hypotension, r/o sepsis  check cultures  ivf  chest x ray noted  will add midodrine if decrease bp  le venous doppler  dvt prophylaxis  tsh  esr  physical therapy

## 2021-02-04 NOTE — H&P ADULT - ASSESSMENT
65 year-old female     h/o   DM2 on insulin, fibromyalgia, osteoarthritis, sleep apnea on CPAP,  RENARD/   depression, venous insufficiency,    endometrial CA s/p hysterectomy (2010)  , HTN,   HLD,   hypothyroidism,  morbid   obesity. CT angio on last visit, no PE       .     presenting with increasing weakness. ?  fevers at home  .  has  generalized pain and weakness at her baseline, uses  a walker.   She has had a gradual worsening of her weakness this past week, with difficulty walking    on arrival, temp is  99, with low  sbp. wbc of  21,000   r/o sepsis, follow blood/urine  c/s   on zosyn         h/o  morbid  obesity/  BMI  of  60/  with  massive  truncal  obesity/ on nocturnal CPAP   h/o  lymphedema. with hyperpigmented /  raised  lesions santosh  right leg/  wound  care eval  DM, on lantus/  humalog at  home   HTN, Hypothyroid,  on  asa/  lipitor/ synthroid    hold lasix  and enalapril,/ low  sbp   depression, on  mlple  meds/  fibromyalgia / pt wants  clonazepam  and   Hydromorphone/ verified  last viist  Echo,   jorje;  ef  on dvt ppx/      ra< from: CT Angio Chest w/ IV Cont (07.25.20 @ 17:37) >  IMPRESSION:  1.  Limited exam. The segmental and subsegmental pulmonary arteries are not evaluated secondary to poor opacification.  2.  Clear lungs. No aortic aneurysm or dissection.  < end of copied text >

## 2021-02-04 NOTE — ED PROVIDER NOTE - CLINICAL SUMMARY MEDICAL DECISION MAKING FREE TEXT BOX
Garza PGY-2: 66 year-old female with PMH of DM2 on insulin, fibromyalgia, osteoarthritis, sleep apnea on CPAP, depression, venous insufficiency, endometrial CA s/p hysterectomy (2010), HTN, HLD, hypothyroidism, obesity presenting with onset of weakness, chills this morning, possibly secondary to UTI, infectious cause?   rectal temp of 100.5. Will send BCx, UA, basic labs. Possibly secondary to fibromyalgia as well. labs--> dispo Garza PGY-2: 66 year-old female with PMH of DM2 on insulin, fibromyalgia, osteoarthritis, sleep apnea on CPAP, depression, venous insufficiency, endometrial CA s/p hysterectomy (2010), HTN, HLD, hypothyroidism, obesity presenting with onset of weakness, chills this morning, possibly secondary to UTI, infectious cause?   rectal temp of 100.5. Will send BCx, UA, basic labs. Possibly secondary to fibromyalgia as well.

## 2021-02-04 NOTE — CONSULT NOTE ADULT - SUBJECTIVE AND OBJECTIVE BOX
CHIEF COMPLAINT:Patient is a 66y old  Female who presents with a chief complaint of     HPI:   66 year-old femal  with PMH of DM2 on insulin, fibromyalgia, osteoarthritis, sleep apnea on CPAP, depression, venous insufficiency, endometrial CA s/p hysterectomy (2010), HTN, HLD, hypothyroidism, obesity presenting with onset of weakness, chills this morning. Notes she woke up and felt generally crummy. Has had low energy, fatigue, generalized SOB, no fevers at home, episodes of incontinence today without dysuria, foul urine, flank pain. Notes sometimes she feels this was with fibromyalgia flares.   Also reports generalized nausea, but no vomiting. Notes legs are red, but have been this way previously. "maybe a little warmer than usual."  pt with hx of obesity and with  RENARD ,with increasing sob. no chest [pain.      PAST MEDICAL & SURGICAL HISTORY:  Umbilical Hernia    Restless Legs Syndrome (RLS)    GERD with Apnea    Fibromyalgia    Morbidly Obese    Environmental Allergies    Depression    Diarrhea    Constipation    Obstructive Sleep Apnea    Osteoarthritis of Knee  right. requires cane    High Triglycerides    Hyperlipidemia    Diabetes Mellitus    Hypothyroidism    Personal History of Hypertension    Personal History of Female Genital Cancer    Personal History of Arthritis    Incarcerated Ventral Hernia    Endometrial Ca s/p RODRIGO, BSO    Carpal Tunnel Syndrome  release left    S/P FESS (Functional Endoscopic Sinus Surgery)    S/P Tonsillectomy and Adenoidectomy        MEDICATIONS  (STANDING):  aspirin enteric coated 81 milliGRAM(s) Oral daily  atorvastatin 40 milliGRAM(s) Oral at bedtime  buPROPion XL . 300 milliGRAM(s) Oral daily  dextrose 40% Gel 15 Gram(s) Oral once  dextrose 5%. 1000 milliLiter(s) (50 mL/Hr) IV Continuous <Continuous>  dextrose 5%. 1000 milliLiter(s) (100 mL/Hr) IV Continuous <Continuous>  dextrose 50% Injectable 25 Gram(s) IV Push once  dextrose 50% Injectable 12.5 Gram(s) IV Push once  dextrose 50% Injectable 25 Gram(s) IV Push once  escitalopram 20 milliGRAM(s) Oral daily  gabapentin 600 milliGRAM(s) Oral three times a day  glucagon  Injectable 1 milliGRAM(s) IntraMuscular once  heparin   Injectable 5000 Unit(s) SubCutaneous every 8 hours  insulin glargine Injectable (LANTUS) 25 Unit(s) SubCutaneous at bedtime  insulin lispro (ADMELOG) corrective regimen sliding scale   SubCutaneous three times a day before meals  levothyroxine 125 MICROGram(s) Oral daily  piperacillin/tazobactam IVPB.. 3.375 Gram(s) IV Intermittent every 8 hours  sodium chloride 0.9%. 1000 milliLiter(s) (80 mL/Hr) IV Continuous <Continuous>    MEDICATIONS  (PRN):  clonazePAM  Tablet 0.5 milliGRAM(s) Oral three times a day PRN anxiety      FAMILY HISTORY:      SOCIAL HISTORY:    [ ] Non-smoker  [ ] Smoker  [ ] Alcohol    Allergies    adhesives (Rash)  aerosols, perfumes, fabric softeners (Rash; Rhinitis; Rhinorrhea)  dust (Rhinitis; Rhinorrhea)  Lyrica (Rash)  pollen (Rhinitis; Rhinorrhea)  smoke (Rhinitis; Rhinorrhea)  Tin/costume jewelry (Rash)    Intolerances    	    REVIEW OF SYSTEMS:  CONSTITUTIONAL: No fever, weight loss, or fatigue  EYES: No eye pain, visual disturbances, or discharge  ENT:  No difficulty hearing, tinnitus, vertigo; No sinus or throat pain  NECK: No pain or stiffness  RESPIRATORY: No cough, wheezing, chills or hemoptysis; + Shortness of Breath  CARDIOVASCULAR: No chest pain, palpitations, passing out, dizziness, or leg swelling  GASTROINTESTINAL: No abdominal or epigastric pain. No nausea, vomiting, or hematemesis; No diarrhea or constipation. No melena or hematochezia.  GENITOURINARY: No dysuria, frequency, hematuria, or incontinence  NEUROLOGICAL: No headaches, memory loss, loss of strength, numbness, or tremors  SKIN: No itching, burning, rashes, or lesions   LYMPH Nodes: No enlarged glands  ENDOCRINE: No heat or cold intolerance; No hair loss  MUSCULOSKELETAL: No joint pain or swelling; No muscle, back, or extremity pain  PSYCHIATRIC: No depression, anxiety, mood swings, or difficulty sleeping  HEME/LYMPH: No easy bruising, or bleeding gums  ALLERGY AND IMMUNOLOGIC: No hives or eczema	    [ ] All others negative	  [ ] Unable to obtain    PHYSICAL EXAM:  T(C): 37 (02-04-21 @ 17:56), Max: 38.1 (02-04-21 @ 01:52)  HR: 68 (02-04-21 @ 17:56) (68 - 80)  BP: 98/48 (02-04-21 @ 17:56) (90/56 - 129/60)  RR: 16 (02-04-21 @ 17:56) (16 - 20)  SpO2: 98% (02-04-21 @ 17:56) (94% - 100%)  Wt(kg): --  I&O's Summary      Appearance: Normal	  HEENT:   Normal oral mucosa, PERRL, EOMI	  Lymphatic: No lymphadenopathy  Cardiovascular: Normal S1 S2, No JVD, + murmurs, + edema  Respiratory: Lungs clear to auscultation	  Psychiatry: A & O x 3, Mood & affect appropriate  Gastrointestinal:  Soft, Non-tender, + BS	  Skin: No rashes, No ecchymoses, No cyanosis	  Neurologic: Non-focal  Extremities: Normal range of motion, No clubbing, cyanosis. +  edema  Vascular: Peripheral pulses palpable 2+ bilaterally    TELEMETRY: 	    ECG:  	  RADIOLOGY:  OTHER: 	  	  LABS:	 	    CARDIAC MARKERS:                              12.4   21.39 )-----------( 317      ( 04 Feb 2021 02:06 )             39.4     02-04    137  |  93<L>  |  49<H>  ----------------------------<  191<H>  4.0   |  30  |  1.49<H>    Ca    9.9      04 Feb 2021 02:06  Phos  3.9     02-04  Mg     1.8     02-04    TPro  7.9  /  Alb  4.0  /  TBili  0.6  /  DBili  x   /  AST  11  /  ALT  10  /  AlkPhos  101  02-04    proBNP:   Lipid Profile:   HgA1c:   TSH:       PREVIOUS DIAGNOSTIC TESTING:      < from: 12 Lead ECG (02.04.21 @ 02:10) >  Diagnosis Line NORMAL SINUS RHYTHM  NONSPECIFIC ST ABNORMALITY  ABNORMAL ECG  WHEN COMPARED WITH ECG OF 25-JUL-2020 11:01,  SIGNIFICANT CHANGES HAVE OCCURRED  LOW VOLTAGE    < from: Transthoracic Echocardiogram (07.26.20 @ 10:36) >  1. Mitral annular calcification, otherwise normal mitral  valve. Minimal mitral regurgitation.  2. Normal left ventricular internal dimensions and wall  thicknesses.  3. Endocardium not well visualized; grossly preserved  overall left ventricular systolic function.  Unable to  exclude segmental wall motion abnormalities.  4. Right ventricular enlargement with decreased right  ventricular systolic function.    < from: CT Angio Chest w/ IV Cont (07.25.20 @ 17:37) >  1.  Limited exam. The segmental and subsegmental pulmonary arteries are not evaluated secondary to poor opacification.    2.  Clear lungs. No aortic aneurysm or dissection.    < end of copied text >    < from: Xray Chest 1 View- PORTABLE-Urgent (Xray Chest 1 View- PORTABLE-Urgent .) (02.04.21 @ 05:30) >  Heart size and the mediastinum cannot be accurately evaluated on this projection. The thoracic aorta is calcified.  An elevated right hemidiaphragm is again seen.  There are low lung volumes.  The lungs are clear.  No pleural effusion or pneumothorax is seen.  There is osteoarthritic degenerative change of the spine.      IMPRESSION:  Low lung volumes.    Continued elevation of right hemidiaphragm.    Clear lungs.    Urinalysis (02.04.21 @ 03:44)    pH Urine: 6.5    Blood, Urine: Negative    Glucose Qualitative, Urine: Negative    Color: Yellow    Urine Appearance: Clear    Bilirubin: Negative    Ketone - Urine: Negative    Specific Gravity: 1.025    Protein, Urine: Trace    Urobilinogen: Negative    Nitrite: Negative    Leukocyte Esterase Concentration: Negative

## 2021-02-05 LAB
A1C WITH ESTIMATED AVERAGE GLUCOSE RESULT: 6.2 % — HIGH (ref 4–5.6)
ANION GAP SERPL CALC-SCNC: 10 MMOL/L — SIGNIFICANT CHANGE UP (ref 5–17)
BUN SERPL-MCNC: 30 MG/DL — HIGH (ref 7–23)
CALCIUM SERPL-MCNC: 9.1 MG/DL — SIGNIFICANT CHANGE UP (ref 8.4–10.5)
CHLORIDE SERPL-SCNC: 102 MMOL/L — SIGNIFICANT CHANGE UP (ref 96–108)
CO2 SERPL-SCNC: 27 MMOL/L — SIGNIFICANT CHANGE UP (ref 22–31)
CREAT SERPL-MCNC: 1.27 MG/DL — SIGNIFICANT CHANGE UP (ref 0.5–1.3)
ESTIMATED AVERAGE GLUCOSE: 131 MG/DL — HIGH (ref 68–114)
GLUCOSE BLDC GLUCOMTR-MCNC: 125 MG/DL — HIGH (ref 70–99)
GLUCOSE BLDC GLUCOMTR-MCNC: 125 MG/DL — HIGH (ref 70–99)
GLUCOSE BLDC GLUCOMTR-MCNC: 150 MG/DL — HIGH (ref 70–99)
GLUCOSE BLDC GLUCOMTR-MCNC: 154 MG/DL — HIGH (ref 70–99)
GLUCOSE SERPL-MCNC: 127 MG/DL — HIGH (ref 70–99)
HCT VFR BLD CALC: 31.6 % — LOW (ref 34.5–45)
HCT VFR BLD CALC: 34.1 % — LOW (ref 34.5–45)
HGB BLD-MCNC: 10.2 G/DL — LOW (ref 11.5–15.5)
HGB BLD-MCNC: 10.7 G/DL — LOW (ref 11.5–15.5)
LACTATE SERPL-SCNC: 0.8 MMOL/L — SIGNIFICANT CHANGE UP (ref 0.7–2)
MCHC RBC-ENTMCNC: 25.6 PG — LOW (ref 27–34)
MCHC RBC-ENTMCNC: 26.1 PG — LOW (ref 27–34)
MCHC RBC-ENTMCNC: 31.4 GM/DL — LOW (ref 32–36)
MCHC RBC-ENTMCNC: 32.3 GM/DL — SIGNIFICANT CHANGE UP (ref 32–36)
MCV RBC AUTO: 80.8 FL — SIGNIFICANT CHANGE UP (ref 80–100)
MCV RBC AUTO: 81.6 FL — SIGNIFICANT CHANGE UP (ref 80–100)
NRBC # BLD: 0 /100 WBCS — SIGNIFICANT CHANGE UP (ref 0–0)
NRBC # BLD: 0 /100 WBCS — SIGNIFICANT CHANGE UP (ref 0–0)
PLATELET # BLD AUTO: 244 K/UL — SIGNIFICANT CHANGE UP (ref 150–400)
PLATELET # BLD AUTO: 247 K/UL — SIGNIFICANT CHANGE UP (ref 150–400)
POTASSIUM SERPL-MCNC: 3.5 MMOL/L — SIGNIFICANT CHANGE UP (ref 3.5–5.3)
POTASSIUM SERPL-SCNC: 3.5 MMOL/L — SIGNIFICANT CHANGE UP (ref 3.5–5.3)
RBC # BLD: 3.91 M/UL — SIGNIFICANT CHANGE UP (ref 3.8–5.2)
RBC # BLD: 4.18 M/UL — SIGNIFICANT CHANGE UP (ref 3.8–5.2)
RBC # FLD: 16.9 % — HIGH (ref 10.3–14.5)
RBC # FLD: 17 % — HIGH (ref 10.3–14.5)
SARS-COV-2 IGG SERPL QL IA: NEGATIVE — SIGNIFICANT CHANGE UP
SARS-COV-2 IGM SERPL IA-ACNC: <0.1 INDEX — SIGNIFICANT CHANGE UP
SODIUM SERPL-SCNC: 139 MMOL/L — SIGNIFICANT CHANGE UP (ref 135–145)
WBC # BLD: 6.39 K/UL — SIGNIFICANT CHANGE UP (ref 3.8–10.5)
WBC # BLD: 7.98 K/UL — SIGNIFICANT CHANGE UP (ref 3.8–10.5)
WBC # FLD AUTO: 6.39 K/UL — SIGNIFICANT CHANGE UP (ref 3.8–10.5)
WBC # FLD AUTO: 7.98 K/UL — SIGNIFICANT CHANGE UP (ref 3.8–10.5)

## 2021-02-05 PROCEDURE — 93970 EXTREMITY STUDY: CPT | Mod: 26

## 2021-02-05 PROCEDURE — 99221 1ST HOSP IP/OBS SF/LOW 40: CPT

## 2021-02-05 PROCEDURE — 99222 1ST HOSP IP/OBS MODERATE 55: CPT | Mod: GC

## 2021-02-05 RX ORDER — LIDOCAINE 4 G/100G
2 CREAM TOPICAL DAILY
Refills: 0 | Status: DISCONTINUED | OUTPATIENT
Start: 2021-02-05 | End: 2021-02-09

## 2021-02-05 RX ORDER — HYDROMORPHONE HYDROCHLORIDE 2 MG/ML
4 INJECTION INTRAMUSCULAR; INTRAVENOUS; SUBCUTANEOUS
Refills: 0 | Status: DISCONTINUED | OUTPATIENT
Start: 2021-02-05 | End: 2021-02-05

## 2021-02-05 RX ORDER — SENNA PLUS 8.6 MG/1
2 TABLET ORAL AT BEDTIME
Refills: 0 | Status: DISCONTINUED | OUTPATIENT
Start: 2021-02-05 | End: 2021-02-08

## 2021-02-05 RX ORDER — FLUTICASONE PROPIONATE 50 MCG
1 SPRAY, SUSPENSION NASAL
Refills: 0 | Status: DISCONTINUED | OUTPATIENT
Start: 2021-02-05 | End: 2021-02-09

## 2021-02-05 RX ORDER — CEFAZOLIN SODIUM 1 G
1000 VIAL (EA) INJECTION EVERY 8 HOURS
Refills: 0 | Status: DISCONTINUED | OUTPATIENT
Start: 2021-02-05 | End: 2021-02-08

## 2021-02-05 RX ORDER — HYDROMORPHONE HYDROCHLORIDE 2 MG/ML
4 INJECTION INTRAMUSCULAR; INTRAVENOUS; SUBCUTANEOUS EVERY 6 HOURS
Refills: 0 | Status: DISCONTINUED | OUTPATIENT
Start: 2021-02-05 | End: 2021-02-09

## 2021-02-05 RX ADMIN — Medication 0: at 07:55

## 2021-02-05 RX ADMIN — Medication 0: at 11:48

## 2021-02-05 RX ADMIN — HEPARIN SODIUM 5000 UNIT(S): 5000 INJECTION INTRAVENOUS; SUBCUTANEOUS at 04:19

## 2021-02-05 RX ADMIN — HYDROMORPHONE HYDROCHLORIDE 4 MILLIGRAM(S): 2 INJECTION INTRAMUSCULAR; INTRAVENOUS; SUBCUTANEOUS at 21:15

## 2021-02-05 RX ADMIN — PIPERACILLIN AND TAZOBACTAM 25 GRAM(S): 4; .5 INJECTION, POWDER, LYOPHILIZED, FOR SOLUTION INTRAVENOUS at 04:20

## 2021-02-05 RX ADMIN — Medication 1: at 16:09

## 2021-02-05 RX ADMIN — HEPARIN SODIUM 5000 UNIT(S): 5000 INJECTION INTRAVENOUS; SUBCUTANEOUS at 21:14

## 2021-02-05 RX ADMIN — SODIUM CHLORIDE 80 MILLILITER(S): 9 INJECTION INTRAMUSCULAR; INTRAVENOUS; SUBCUTANEOUS at 06:39

## 2021-02-05 RX ADMIN — Medication 81 MILLIGRAM(S): at 13:09

## 2021-02-05 RX ADMIN — INSULIN GLARGINE 25 UNIT(S): 100 INJECTION, SOLUTION SUBCUTANEOUS at 21:41

## 2021-02-05 RX ADMIN — Medication 100 MILLIGRAM(S): at 21:13

## 2021-02-05 RX ADMIN — ESCITALOPRAM OXALATE 20 MILLIGRAM(S): 10 TABLET, FILM COATED ORAL at 13:09

## 2021-02-05 RX ADMIN — BUPROPION HYDROCHLORIDE 300 MILLIGRAM(S): 150 TABLET, EXTENDED RELEASE ORAL at 13:09

## 2021-02-05 RX ADMIN — PIPERACILLIN AND TAZOBACTAM 25 GRAM(S): 4; .5 INJECTION, POWDER, LYOPHILIZED, FOR SOLUTION INTRAVENOUS at 13:09

## 2021-02-05 RX ADMIN — GABAPENTIN 600 MILLIGRAM(S): 400 CAPSULE ORAL at 21:15

## 2021-02-05 RX ADMIN — GABAPENTIN 600 MILLIGRAM(S): 400 CAPSULE ORAL at 04:19

## 2021-02-05 RX ADMIN — SENNA PLUS 2 TABLET(S): 8.6 TABLET ORAL at 21:14

## 2021-02-05 RX ADMIN — GABAPENTIN 600 MILLIGRAM(S): 400 CAPSULE ORAL at 13:09

## 2021-02-05 RX ADMIN — ATORVASTATIN CALCIUM 40 MILLIGRAM(S): 80 TABLET, FILM COATED ORAL at 21:14

## 2021-02-05 RX ADMIN — Medication 125 MICROGRAM(S): at 04:19

## 2021-02-05 RX ADMIN — SODIUM CHLORIDE 80 MILLILITER(S): 9 INJECTION INTRAMUSCULAR; INTRAVENOUS; SUBCUTANEOUS at 18:40

## 2021-02-05 RX ADMIN — HEPARIN SODIUM 5000 UNIT(S): 5000 INJECTION INTRAVENOUS; SUBCUTANEOUS at 13:09

## 2021-02-05 NOTE — CONSULT NOTE ADULT - ASSESSMENT
66F PMHx DM2 on insulin, fibromyalgia, osteoarthritis, sleep apnea on CPAP, depression, venous insufficiency, endometrial CA s/p hysterectomy (2010),   HTN, HLD, hypothyroidism, obesity  Admitted with weakness and chills, leukocytosis and fever in ER - sepsis    Current out- patient pain regimen: Dilaudid 4 mg every 6 hours, also on  Klonopin 0.5 mg TID  Out Patient Pain Management provider: Dr. William Hi    Called to consult for chronic pain. Pt with generalized pain 2/2 OA worst at B/L knees R>L and left shoulder   This is her chronic pain, unchanged, good relief with home regimen    Recommend:  Dilaudid home dose - 4 mg PO every 6 hours PRN  Pt requesting Flonase for sinus headache (home med)  Lidoderm patches to right shoulder and right knee daily  Warm/cool packs for comfort  Bowel Regimen - Start Senna, no BM in 2 days, goes daily  Incentive Spirometer  PT per primary team  Monitor for sedation, respiratory depression  Follow up with Dr. Hi for continued pain management after discharge      Time spent on encounter:    30    Minutes    Chronic Pain Service  [phone] 66F PMHx DM2 on insulin, fibromyalgia, osteoarthritis, sleep apnea on CPAP, depression, venous insufficiency, endometrial CA s/p hysterectomy (2010),   HTN, HLD, hypothyroidism, obesity  Admitted with weakness and chills, leukocytosis and fever in ER - sepsis    Current out- patient pain regimen: Dilaudid 4 mg every 6 hours, also on  Klonopin 0.5 mg TID  Out Patient Pain Management provider: Dr. William Hi    Called to consult for chronic pain. Pt with generalized pain 2/2 OA worst at B/L knees R>L and left shoulder   This is her chronic pain, unchanged, good relief with home regimen    Recommend:  Dilaudid home dose - 4 mg PO every 6 hours PRN  Pt requesting Flonase for sinus headache (home med)  Bowel Regimen - Start Senna, no BM in 2 days, goes daily  Lidoderm patches to right shoulder and right knee daily  Continue gabapentin 600 mg every 8 hours, Lexapro 20 mg daily, Wellbutrin  mg daily  Warm/cool packs for comfort  Incentive Spirometer  PT per primary team  Monitor for sedation, respiratory depression  Follow up with Dr. Hi for continued pain management after discharge      Time spent on encounter:    30    Minutes    Chronic Pain Service  [phone]

## 2021-02-05 NOTE — PROGRESS NOTE ADULT - ASSESSMENT
65 year-old female     h/o   DM2 on insulin, fibromyalgia, osteoarthritis, sleep apnea on CPAP,  RENARD/   depression, venous insufficiency,    endometrial CA s/p hysterectomy (2010)  , HTN,   HLD,   hypothyroidism,  morbid   obesity. CT angio on last visit, no PE       .     presenting with increasing weakness. ?  fevers at home  .  has  generalized pain and weakness at her baseline, uses  a walker.   She has had a gradual worsening of her weakness this past week, with difficulty walking    on arrival, temp is  99, with low  sbp. wbc of  21,000   r/o sepsis, follow blood/urine  c/s      h/o  morbid  obesity/  BMI  of  60/  with  massive  truncal  obesity/ on nocturnal CPAP   h/o  lymphedema. with hyperpigmented /  raised  lesions santosh  right leg/  wound  care eval  DM, on lantus/  humalog at  home   HTN, Hypothyroid,  on  asa/  lipitor/ synthroid    hold lasix  and enalapril,/ low  sbp   depression, on  mlple  meds/  fibromyalgia / pt wants  clonazepam  and   Hydromorphone/ verified  last viist  Echo,   jorje;  ef  on dvt ppx/  normal  wbc,  blood  c/s,  negative/  s/p  cp, normal  troponin/  check  rpt  hb/  follow  bp  curve      ra< from: CT Angio Chest w/ IV Cont (07.25.20 @ 17:37) >  IMPRESSION:  1.  Limited exam. The segmental and subsegmental pulmonary arteries are not evaluated secondary to poor opacification.  2.  Clear lungs. No aortic aneurysm or dissection.  < end of copied text >        65 year-old female     h/o   DM2 on insulin, fibromyalgia, osteoarthritis, sleep apnea on CPAP,  RENARD/   depression, venous insufficiency,    endometrial CA s/p hysterectomy (2010)  , HTN,   HLD,   hypothyroidism,  morbid   obesity. CT angio on last visit, no PE       .     presenting with increasing weakness. ?  fevers at home  .  has  generalized pain and weakness at her baseline, uses  a walker.   She has had a gradual worsening of her weakness this past week, with difficulty walking    on arrival, temp is  99, with low  sbp. wbc of  21,000   r/o sepsis, follow blood/urine  c/s      h/o  morbid  obesity/  BMI  of  60/  with  massive  truncal  obesity/ on nocturnal CPAP   h/o  lymphedema. with hyperpigmented /  raised  lesions santosh  right leg/  wound  care eval  DM, on lantus/  humalog at  home   HTN, Hypothyroid,  on  asa/  lipitor/ synthroid    hold lasix  and enalapril,/ low  sbp   depression, on  mlple  meds/  fibromyalgia / pt wants  clonazepam  and   Hydromorphone/ verified  last viist  Echo,   jorje;  ef  on dvt ppx/  normal  wbc,  blood  c/s,  negative/  s/p  cp, normal  troponin/  check  rpt  hb/  follow  bp  curve   c/c  stasis/ redness  , distal legs/  defer ab to ID      ra< from: CT Angio Chest w/ IV Cont (07.25.20 @ 17:37) >  IMPRESSION:  1.  Limited exam. The segmental and subsegmental pulmonary arteries are not evaluated secondary to poor opacification.  2.  Clear lungs. No aortic aneurysm or dissection.  < end of copied text >

## 2021-02-05 NOTE — CONSULT NOTE ADULT - SUBJECTIVE AND OBJECTIVE BOX
Patient is a 66y old  Female who presents with a chief complaint of hypotension (2021 18:11)    HPI:     66 year-old female   with PMH of DM2 on insulin, fibromyalgia, osteoarthritis, sleep apnea on CPAP, depression, venous insufficiency, endometrial CA s/p hysterectomy (),   HTN, HLD, hypothyroidism, obesity   presenting with onset of weakness, chills this morning. Notes she woke up and felt generally crummy. Has had low energy, fatigue, generalized SOB, no fevers at home, episodes of incontinence today without dysuria, foul urine, flank pain. Notes sometimes she feels this was with fibromyalgia flares.   Also reports generalized nausea, but no vomiting. Notes legs are red, but have been this way previously. "maybe a little warmer than usual."     (2021 12:46)       prior hospital charts reviewed [ X ]  primary team notes reviewed [ X ]  other consultant notes reviewed [ X ]    PAST MEDICAL & SURGICAL HISTORY:  Umbilical Hernia    Restless Legs Syndrome (RLS)    GERD with Apnea    Fibromyalgia    Morbidly Obese    Environmental Allergies    Depression    Diarrhea    Constipation    Obstructive Sleep Apnea    Osteoarthritis of Knee  right. requires cane    High Triglycerides    Hyperlipidemia    Diabetes Mellitus    Hypothyroidism    Personal History of Hypertension    Personal History of Female Genital Cancer    Personal History of Arthritis    Incarcerated Ventral Hernia    Endometrial Ca s/p RODRIGO, BSO    Carpal Tunnel Syndrome  release left    S/P FESS (Functional Endoscopic Sinus Surgery)    S/P Tonsillectomy and Adenoidectomy        Allergies  adhesives (Rash)  aerosols, perfumes, fabric softeners (Rash; Rhinitis; Rhinorrhea)  dust (Rhinitis; Rhinorrhea)  Lyrica (Rash)  pollen (Rhinitis; Rhinorrhea)  smoke (Rhinitis; Rhinorrhea)  Tin/costume jewelry (Rash)    ANTIMICROBIALS (past 90 days)  MEDICATIONS  (STANDING):  piperacillin/tazobactam IVPB.   200 mL/Hr IV Intermittent (21 @ 17:13)    piperacillin/tazobactam IVPB..   25 mL/Hr IV Intermittent (21 @ 13:09)   25 mL/Hr IV Intermittent (21 @ 04:20)   25 mL/Hr IV Intermittent (21 @ 21:51)    piperacillin/tazobactam IVPB...   200 mL/Hr IV Intermittent (21 @ 04:37)    vancomycin  IVPB.   250 mL/Hr IV Intermittent (21 @ 05:39)        piperacillin/tazobactam IVPB.. 3.375 every 8 hours    OTHER MEDS: MEDICATIONS  (STANDING):  aspirin enteric coated 81 daily  atorvastatin 40 at bedtime  buPROPion XL . 300 daily  clonazePAM  Tablet 0.5 three times a day PRN  dextrose 40% Gel 15 once  dextrose 50% Injectable 25 once  dextrose 50% Injectable 12.5 once  dextrose 50% Injectable 25 once  escitalopram 20 daily  gabapentin 600 three times a day  glucagon  Injectable 1 once  heparin   Injectable 5000 every 8 hours  HYDROmorphone   Tablet 4 two times a day PRN  insulin glargine Injectable (LANTUS) 25 at bedtime  insulin lispro (ADMELOG) corrective regimen sliding scale  three times a day before meals  levothyroxine 125 daily    SOCIAL HISTORY:       FAMILY HISTORY:    REVIEW OF SYSTEMS  [  ] ROS unobtainable because:    [  ] All other systems negative except as noted below:	    Constitutional:  [ ] fever [ ] chills  [ ] weight loss  [ ] weakness  Skin:  [ ] rash [ ] phlebitis	  Eyes: [ ] icterus [ ] pain  [ ] discharge	  ENMT: [ ] sore throat  [ ] thrush [ ] ulcers [ ] exudates  Respiratory: [ ] dyspnea [ ] hemoptysis [ ] cough [ ] sputum	  Cardiovascular:  [ ] chest pain [ ] palpitations [ ] edema	  Gastrointestinal:  [ ] nausea [ ] vomiting [ ] diarrhea [ ] constipation [ ] pain	  Genitourinary:  [ ] dysuria [ ] frequency [ ] hematuria [ ] discharge [ ] flank pain  [ ] incontinence  Musculoskeletal:  [ ] myalgias [ ] arthralgias [ ] arthritis  [ ] back pain  Neurological:  [ ] headache [ ] seizures  [ ] confusion/altered mental status  Psychiatric:  [ ] anxiety [ ] depression	  Hematology/Lymphatics:  [ ] lymphadenopathy  Endocrine:  [ ] adrenal [ ] thyroid  Allergic/Immunologic:	 [ ] transplant [ ] seasonal    Vital Signs Last 24 Hrs  T(F): 98.4 (21 @ 12:08), Max: 100.5 (21 @ 01:52)  Vital Signs Last 24 Hrs  HR: 64 (21 @ 12:08) (56 - 68)  BP: 142/75 (21 @ 12:08) (95/51 - 142/75)  RR: 18 (21 @ 12:08)  SpO2: 95% (21 @ 12:08) (94% - 98%)  Wt(kg): --    PHYSICAL EXAM:  Constitutional: non-toxic, no distress  HEAD/EYES: anicteric, no conjunctival injection  ENT:  supple, no thrush  Cardiovascular:   normal S1, S2, no murmur, no edema  Respiratory:  clear BS bilaterally, no wheezes, no rales  GI:  soft, non-tender, normal bowel sounds  :  no patel, no CVA tenderness  Musculoskeletal:  no synovitis, normal ROM  Neurologic: awake and alert, normal strength, no focal findings  Skin:  no rash, no erythema, no phlebitis  Heme/Onc: no lymphadenopathy   Psychiatric:  awake, alert, appropriate mood                            10.7   6.39  )-----------( 244      ( 2021 08:37 )             34.1   02-    139  |  102  |  30<H>  ----------------------------<  127<H>  3.5   |  27  |  1.27    Ca    9.1      2021 06:10  Phos  3.9     02-04  Mg     1.8     02-04    TPro  7.9  /  Alb  4.0  /  TBili  0.6  /  DBili  x   /  AST  11  /  ALT  10  /  AlkPhos  101  02-04    Urinalysis Basic - ( 2021 03:44 )    Color: Yellow / Appearance: Clear / S.025 / pH: x  Gluc: x / Ketone: Negative  / Bili: Negative / Urobili: Negative   Blood: x / Protein: Trace / Nitrite: Negative   Leuk Esterase: Negative / RBC: x / WBC x   Sq Epi: x / Non Sq Epi: x / Bacteria: x    MICROBIOLOGY:  Culture - Urine (collected 2021 07:07)  Source: Kidney Bladder (from O.R.)  Preliminary Report (2021 08:28):    No growth    Culture - Blood (collected 2021 05:33)  Source: .Blood Blood-Venous  Preliminary Report (2021 06:01):    No growth to date.    Culture - Blood (collected 2021 05:33)  Source: .Blood Blood-Peripheral  Preliminary Report (2021 06:01):    No growth to date.      RADIOLOGY:  imaging below personally reviewed and agree with findings    < from: Xray Chest 1 View- PORTABLE-Urgent (Xray Chest 1 View- PORTABLE-Urgent .) (21 @ 05:30) >  COMPARISON:  AP chest x-ray andCT scan of the chest from 2020.    TECHNIQUE:   AP Portable chest x-ray.    INTERPRETATION:    Heart size and the mediastinum cannot be accurately evaluated on this projection. The thoracic aorta is calcified.  An elevated right hemidiaphragm is again seen.  There are low lung volumes.  The lungs are clear.  No pleural effusion or pneumothorax is seen.  There is osteoarthritic degenerative change of the spine.      IMPRESSION:  Low lung volumes.    Continued elevation of right hemidiaphragm.    Clear lungs.    < end of copied text > Patient is a 66y old  Female who presents with a chief complaint of hypotension (2021 18:11)    HPI:     66 year-old female with PMH of DM2 on insulin, fibromyalgia, osteoarthritis, sleep apnea on CPAP, depression, venous insufficiency, endometrial CA s/p hysterectomy (), HTN, HLD, hypothyroidism, obesity presenting with onset of weakness, chills this morning. Notes she woke up and felt generally crummy. Has had low energy, fatigue, generalized SOB, no fevers at home, episodes of incontinence today without dysuria, foul urine, flank pain. Notes sometimes she feels this was with fibromyalgia flares. Also reports generalized nausea, but no vomiting. Notes legs are red, but have been this way previously. "maybe a little warmer than usual."  (2021 12:46)    Since admission, redness and pain over lower legs have worsened. Pt has not noticed anything like this before. Pt reports no new sick contacts, no new soaps/detergents, no new medications, and no recent sick contacts. Additionally, no reported fever, chills, CP, SOB, cough, abd pain, or n/v/d.    prior hospital charts reviewed [ X ]  primary team notes reviewed [ X ]  other consultant notes reviewed [ X ]    PAST MEDICAL & SURGICAL HISTORY:  Umbilical Hernia    Restless Legs Syndrome (RLS)    GERD with Apnea    Fibromyalgia    Morbidly Obese    Environmental Allergies    Depression    Diarrhea    Constipation    Obstructive Sleep Apnea    Osteoarthritis of Knee  right. requires cane    High Triglycerides    Hyperlipidemia    Diabetes Mellitus    Hypothyroidism    Personal History of Hypertension    Personal History of Female Genital Cancer    Personal History of Arthritis    Incarcerated Ventral Hernia    Endometrial Ca s/p RODRIGO, BSO    Carpal Tunnel Syndrome  release left    S/P FESS (Functional Endoscopic Sinus Surgery)    S/P Tonsillectomy and Adenoidectomy        Allergies  adhesives (Rash)  aerosols, perfumes, fabric softeners (Rash; Rhinitis; Rhinorrhea)  dust (Rhinitis; Rhinorrhea)  Lyrica (Rash)  pollen (Rhinitis; Rhinorrhea)  smoke (Rhinitis; Rhinorrhea)  Tin/costume jewelry (Rash)    ANTIMICROBIALS (past 90 days)  MEDICATIONS  (STANDING):  piperacillin/tazobactam IVPB.   200 mL/Hr IV Intermittent (21 @ 17:13)    piperacillin/tazobactam IVPB..   25 mL/Hr IV Intermittent (21 @ 13:09)   25 mL/Hr IV Intermittent (21 @ 04:20)   25 mL/Hr IV Intermittent (21 @ 21:51)    piperacillin/tazobactam IVPB...   200 mL/Hr IV Intermittent (21 @ 04:37)    vancomycin  IVPB.   250 mL/Hr IV Intermittent (21 @ 05:39)        piperacillin/tazobactam IVPB.. 3.375 every 8 hours    OTHER MEDS: MEDICATIONS  (STANDING):  aspirin enteric coated 81 daily  atorvastatin 40 at bedtime  buPROPion XL . 300 daily  clonazePAM  Tablet 0.5 three times a day PRN  dextrose 40% Gel 15 once  dextrose 50% Injectable 25 once  dextrose 50% Injectable 12.5 once  dextrose 50% Injectable 25 once  escitalopram 20 daily  gabapentin 600 three times a day  glucagon  Injectable 1 once  heparin   Injectable 5000 every 8 hours  HYDROmorphone   Tablet 4 two times a day PRN  insulin glargine Injectable (LANTUS) 25 at bedtime  insulin lispro (ADMELOG) corrective regimen sliding scale  three times a day before meals  levothyroxine 125 daily    SOCIAL HISTORY:       FAMILY HISTORY:    REVIEW OF SYSTEMS  [  ] ROS unobtainable because:    [ X ] All other systems negative except as noted below:	    Constitutional:  [ ] fever [ ] chills  [ ] weight loss  [ ] weakness  Skin:  [X] rash [ ] phlebitis	  Eyes: [ ] icterus [ ] pain  [ ] discharge	  ENMT: [ ] sore throat  [ ] thrush [ ] ulcers [ ] exudates  Respiratory: [ ] dyspnea [ ] hemoptysis [ ] cough [ ] sputum	  Cardiovascular:  [ ] chest pain [ ] palpitations [ ] edema	  Gastrointestinal:  [ ] nausea [ ] vomiting [ ] diarrhea [ ] constipation [ ] pain	  Genitourinary:  [ ] dysuria [ ] frequency [ ] hematuria [ ] discharge [ ] flank pain  [ ] incontinence  Musculoskeletal:  [ ] myalgias [ ] arthralgias [ ] arthritis  [ ] back pain  Neurological:  [ ] headache [ ] seizures  [ ] confusion/altered mental status  Psychiatric:  [ ] anxiety [ ] depression	  Hematology/Lymphatics:  [ ] lymphadenopathy  Endocrine:  [ ] adrenal [ ] thyroid  Allergic/Immunologic:	 [ ] transplant [ ] seasonal    Vital Signs Last 24 Hrs  T(F): 98.4 (21 @ 12:08), Max: 100.5 (21 @ 01:52)  Vital Signs Last 24 Hrs  HR: 64 (21 @ 12:08) (56 - 68)  BP: 142/75 (21 @ 12:08) (95/51 - 142/75)  RR: 18 (21 @ 12:08)  SpO2: 95% (21 @ 12:08) (94% - 98%)  Wt(kg): --    PHYSICAL EXAM:  Constitutional: non-toxic, no distress  HEAD/EYES: anicteric, no conjunctival injection  ENT:  supple, no thrush  Cardiovascular:   normal S1, S2, no murmur, no edema  Respiratory:  clear BS bilaterally, no wheezes, no rales  GI:  soft, non-tender, normal bowel sounds  :  no patel, no CVA tenderness  Musculoskeletal:  no synovitis, normal ROM  Neurologic: awake and alert, normal strength, no focal findings  Skin:  erythema, warmth, swelling, and tenderness to palpation circumferentially over b/l lower legs. Erythema is most intense over anterior lower legs. Erythema also extends into lateral left foot.  Heme/Onc: no lymphadenopathy   Psychiatric:  awake, alert, appropriate mood                            10.7   6.39  )-----------( 244      ( 2021 08:37 )             34.1   02-05    139  |  102  |  30<H>  ----------------------------<  127<H>  3.5   |  27  |  1.27    Ca    9.1      2021 06:10  Phos  3.9     02-04  Mg     1.8     -    TPro  7.9  /  Alb  4.0  /  TBili  0.6  /  DBili  x   /  AST  11  /  ALT  10  /  AlkPhos  101  02-04    Urinalysis Basic - ( 2021 03:44 )    Color: Yellow / Appearance: Clear / S.025 / pH: x  Gluc: x / Ketone: Negative  / Bili: Negative / Urobili: Negative   Blood: x / Protein: Trace / Nitrite: Negative   Leuk Esterase: Negative / RBC: x / WBC x   Sq Epi: x / Non Sq Epi: x / Bacteria: x    MICROBIOLOGY:  Culture - Urine (collected 2021 07:07)  Source: Kidney Bladder (from O.R.)  Preliminary Report (2021 08:28):    No growth    Culture - Blood (collected 2021 05:33)  Source: .Blood Blood-Venous  Preliminary Report (2021 06:01):    No growth to date.    Culture - Blood (collected 2021 05:33)  Source: .Blood Blood-Peripheral  Preliminary Report (2021 06:01):    No growth to date.      RADIOLOGY:  imaging below personally reviewed and agree with findings    < from: Xray Chest 1 View- PORTABLE-Urgent (Xray Chest 1 View- PORTABLE-Urgent .) (21 @ 05:30) >  COMPARISON:  AP chest x-ray andCT scan of the chest from 2020.    TECHNIQUE:   AP Portable chest x-ray.    INTERPRETATION:    Heart size and the mediastinum cannot be accurately evaluated on this projection. The thoracic aorta is calcified.  An elevated right hemidiaphragm is again seen.  There are low lung volumes.  The lungs are clear.  No pleural effusion or pneumothorax is seen.  There is osteoarthritic degenerative change of the spine.      IMPRESSION:  Low lung volumes.    Continued elevation of right hemidiaphragm.    Clear lungs.    < end of copied text > Patient is a 66y old  Female who presents with a chief complaint of hypotension (2021 18:11)    HPI:     66 year-old female with PMH of DM2 on insulin, fibromyalgia, osteoarthritis, sleep apnea on CPAP, depression, venous insufficiency, endometrial CA s/p hysterectomy (), HTN, HLD, hypothyroidism, obesity presenting with onset of weakness, chills this morning. Notes she woke up and felt generally crummy. Has had low energy, fatigue, generalized SOB, no fevers at home, episodes of incontinence today without dysuria, foul urine, flank pain. Notes sometimes she feels this was with fibromyalgia flares. Also reports generalized nausea, but no vomiting. Notes legs are red, but have been this way previously. "maybe a little warmer than usual."  (2021 12:46)    Since admission, redness and pain over lower legs have worsened. Pt has not noticed anything like this before. Pt reports no new sick contacts, no new soaps/detergents, no new medications, and no recent sick contacts. Additionally, no reported fever, chills, CP, SOB, cough, abd pain, or n/v/d.    prior hospital charts reviewed [ X ]  primary team notes reviewed [ X ]  other consultant notes reviewed [ X ]    PAST MEDICAL & SURGICAL HISTORY:  Umbilical Hernia    Restless Legs Syndrome (RLS)    GERD with Apnea    Fibromyalgia    Morbidly Obese    Environmental Allergies    Depression    Diarrhea    Constipation    Obstructive Sleep Apnea    Osteoarthritis of Knee  right. requires cane    High Triglycerides    Hyperlipidemia    Diabetes Mellitus    Hypothyroidism    Personal History of Hypertension    Personal History of Female Genital Cancer    Personal History of Arthritis    Incarcerated Ventral Hernia    Endometrial Ca s/p RODRIGO, BSO    Carpal Tunnel Syndrome  release left    S/P FESS (Functional Endoscopic Sinus Surgery)    S/P Tonsillectomy and Adenoidectomy        Allergies  adhesives (Rash)  aerosols, perfumes, fabric softeners (Rash; Rhinitis; Rhinorrhea)  dust (Rhinitis; Rhinorrhea)  Lyrica (Rash)  pollen (Rhinitis; Rhinorrhea)  smoke (Rhinitis; Rhinorrhea)  Tin/costume jewelry (Rash)    ANTIMICROBIALS (past 90 days)  MEDICATIONS  (STANDING):  piperacillin/tazobactam IVPB.   200 mL/Hr IV Intermittent (21 @ 17:13)    piperacillin/tazobactam IVPB..   25 mL/Hr IV Intermittent (21 @ 13:09)   25 mL/Hr IV Intermittent (21 @ 04:20)   25 mL/Hr IV Intermittent (21 @ 21:51)    piperacillin/tazobactam IVPB...   200 mL/Hr IV Intermittent (21 @ 04:37)    vancomycin  IVPB.   250 mL/Hr IV Intermittent (21 @ 05:39)        piperacillin/tazobactam IVPB.. 3.375 every 8 hours    OTHER MEDS: MEDICATIONS  (STANDING):  aspirin enteric coated 81 daily  atorvastatin 40 at bedtime  buPROPion XL . 300 daily  clonazePAM  Tablet 0.5 three times a day PRN  dextrose 40% Gel 15 once  dextrose 50% Injectable 25 once  dextrose 50% Injectable 12.5 once  dextrose 50% Injectable 25 once  escitalopram 20 daily  gabapentin 600 three times a day  glucagon  Injectable 1 once  heparin   Injectable 5000 every 8 hours  HYDROmorphone   Tablet 4 two times a day PRN  insulin glargine Injectable (LANTUS) 25 at bedtime  insulin lispro (ADMELOG) corrective regimen sliding scale  three times a day before meals  levothyroxine 125 daily    SOCIAL HISTORY:       FAMILY HISTORY:    REVIEW OF SYSTEMS  [  ] ROS unobtainable because:    [ X ] All other systems negative except as noted below:	    Constitutional:  [ ] fever [ ] chills  [ ] weight loss  [ ] weakness  Skin:  [X] rash [ ] phlebitis	  Eyes: [ ] icterus [ ] pain  [ ] discharge	  ENMT: [ ] sore throat  [ ] thrush [ ] ulcers [ ] exudates  Respiratory: [ ] dyspnea [ ] hemoptysis [ ] cough [ ] sputum	  Cardiovascular:  [ ] chest pain [ ] palpitations [ ] edema	  Gastrointestinal:  [ ] nausea [ ] vomiting [ ] diarrhea [ ] constipation [ ] pain	  Genitourinary:  [ ] dysuria [ ] frequency [ ] hematuria [ ] discharge [ ] flank pain  [ ] incontinence  Musculoskeletal:  [ ] myalgias [ ] arthralgias [ ] arthritis  [ ] back pain  Neurological:  [ ] headache [ ] seizures  [ ] confusion/altered mental status  Psychiatric:  [ ] anxiety [ ] depression	  Hematology/Lymphatics:  [ ] lymphadenopathy  Endocrine:  [ ] adrenal [ ] thyroid  Allergic/Immunologic:	 [ ] transplant [ ] seasonal    Vital Signs Last 24 Hrs  T(F): 98.4 (21 @ 12:08), Max: 100.5 (21 @ 01:52)  Vital Signs Last 24 Hrs  HR: 64 (21 @ 12:08) (56 - 68)  BP: 142/75 (21 @ 12:08) (95/51 - 142/75)  RR: 18 (21 @ 12:08)  SpO2: 95% (21 @ 12:08) (94% - 98%)  Wt(kg): --    PHYSICAL EXAM:  Constitutional: non-toxic, no distress  HEAD/EYES: anicteric, no conjunctival injection  ENT:  supple, no thrush  Cardiovascular:   normal S1, S2, no murmur, no edema  Respiratory:  clear BS bilaterally, no wheezes, no rales  GI:  soft, non-tender, normal bowel sounds  :  no patel, no CVA tenderness  Musculoskeletal:  no synovitis, normal ROM  Neurologic: awake and alert, normal strength, no focal findings  Skin:  erythema, warmth, swelling, and tenderness to palpation circumferentially over b/l lower legs. Erythema is most intense over anterior lower legs. Erythema also extends into lateral left foot.  Heme/Onc: no lymphadenopathy   Psychiatric:  awake, alert, appropriate mood                          10.7   6.39  )-----------( 244      ( 2021 08:37 )             34.1   02-05    139  |  102  |  30<H>  ----------------------------<  127<H>  3.5   |  27  |  1.27    Ca    9.1      2021 06:10  Phos  3.9     02-04  Mg     1.8     -    TPro  7.9  /  Alb  4.0  /  TBili  0.6  /  DBili  x   /  AST  11  /  ALT  10  /  AlkPhos  101  02-04    Urinalysis Basic - ( 2021 03:44 )    Color: Yellow / Appearance: Clear / S.025 / pH: x  Gluc: x / Ketone: Negative  / Bili: Negative / Urobili: Negative   Blood: x / Protein: Trace / Nitrite: Negative   Leuk Esterase: Negative / RBC: x / WBC x   Sq Epi: x / Non Sq Epi: x / Bacteria: x    MICROBIOLOGY:  Culture - Urine (collected 2021 07:07)  Source: Kidney Bladder (from O.R.)  Preliminary Report (2021 08:28):    No growth    Culture - Blood (collected 2021 05:33)  Source: .Blood Blood-Venous  Preliminary Report (2021 06:01):    No growth to date.    Culture - Blood (collected 2021 05:33)  Source: .Blood Blood-Peripheral  Preliminary Report (2021 06:01):    No growth to date.      RADIOLOGY:  imaging below personally reviewed and agree with findings    < from: [facility] Duplex Lower Ext Vein Scan, Bilat (21 @ 15:21) >  COMPARISON: Prior Doppler dated 2020.    TECHNIQUE: Duplex sonography of the BILATERAL LOWER extremity veins with color and spectral Doppler, with and without compression.    FINDINGS:    There is normal compressibility of the bilateral common femoral, femoral and popliteal veins.  Doppler examination shows normal spontaneous and phasic flow.    No calf vein thrombosis is detected.    IMPRESSION:  No evidence of deep venous thrombosis in either lower extremity.    < end of copied text >    < from: Xray Chest 1 View- PORTABLE-Urgent (Xray Chest 1 View- PORTABLE-Urgent .) (21 @ 05:30) >  COMPARISON:  AP chest x-ray andCT scan of the chest from 2020.    TECHNIQUE:   AP Portable chest x-ray.    INTERPRETATION:    Heart size and the mediastinum cannot be accurately evaluated on this projection. The thoracic aorta is calcified.  An elevated right hemidiaphragm is again seen.  There are low lung volumes.  The lungs are clear.  No pleural effusion or pneumothorax is seen.  There is osteoarthritic degenerative change of the spine.      IMPRESSION:  Low lung volumes.    Continued elevation of right hemidiaphragm.    Clear lungs.    < end of copied text > Patient is a 66y old  Female who presents with a chief complaint of hypotension (2021 18:11)    HPI:     66 year-old female with PMH of DM2 on insulin, fibromyalgia, osteoarthritis, sleep apnea on CPAP, depression, venous insufficiency, endometrial CA s/p hysterectomy (), HTN, HLD, hypothyroidism, obesity presenting with onset of weakness, chills this morning. Notes she woke up and felt generally crummy. Has had low energy, fatigue, generalized SOB, no fevers at home, episodes of incontinence today without dysuria, foul urine, flank pain. Notes sometimes she feels this was with fibromyalgia flares. Also reports generalized nausea, but no vomiting. Notes legs are red, but have been this way previously. "maybe a little warmer than usual."  (2021 12:46)    Since admission, redness and pain over lower legs have worsened. Pt has not noticed anything like this before. Pt reports no new sick contacts, no new soaps/detergents, no new medications, and no recent sick contacts. Additionally, no reported fever, chills, CP, SOB, cough, abd pain, or n/v/d.    prior hospital charts reviewed [ X ]  primary team notes reviewed [ X ]  other consultant notes reviewed [ X ]    PAST MEDICAL & SURGICAL HISTORY:  Umbilical Hernia    Restless Legs Syndrome (RLS)    GERD with Apnea    Fibromyalgia    Morbidly Obese    Environmental Allergies    Depression    Diarrhea    Constipation    Obstructive Sleep Apnea    Osteoarthritis of Knee  right. requires cane    High Triglycerides    Hyperlipidemia    Diabetes Mellitus    Hypothyroidism    Personal History of Hypertension    Personal History of Female Genital Cancer    Personal History of Arthritis    Incarcerated Ventral Hernia    Endometrial Ca s/p RODRIGO, BSO    Carpal Tunnel Syndrome  release left    S/P FESS (Functional Endoscopic Sinus Surgery)    S/P Tonsillectomy and Adenoidectomy        Allergies  adhesives (Rash)  aerosols, perfumes, fabric softeners (Rash; Rhinitis; Rhinorrhea)  dust (Rhinitis; Rhinorrhea)  Lyrica (Rash)  pollen (Rhinitis; Rhinorrhea)  smoke (Rhinitis; Rhinorrhea)  Tin/costume jewelry (Rash)    ANTIMICROBIALS (past 90 days)  MEDICATIONS  (STANDING):  piperacillin/tazobactam IVPB.   200 mL/Hr IV Intermittent (21 @ 17:13)    piperacillin/tazobactam IVPB..   25 mL/Hr IV Intermittent (21 @ 13:09)   25 mL/Hr IV Intermittent (21 @ 04:20)   25 mL/Hr IV Intermittent (21 @ 21:51)    piperacillin/tazobactam IVPB...   200 mL/Hr IV Intermittent (21 @ 04:37)    vancomycin  IVPB.   250 mL/Hr IV Intermittent (21 @ 05:39)        piperacillin/tazobactam IVPB.. 3.375 every 8 hours    OTHER MEDS: MEDICATIONS  (STANDING):  aspirin enteric coated 81 daily  atorvastatin 40 at bedtime  buPROPion XL . 300 daily  clonazePAM  Tablet 0.5 three times a day PRN  dextrose 40% Gel 15 once  dextrose 50% Injectable 25 once  dextrose 50% Injectable 12.5 once  dextrose 50% Injectable 25 once  escitalopram 20 daily  gabapentin 600 three times a day  glucagon  Injectable 1 once  heparin   Injectable 5000 every 8 hours  HYDROmorphone   Tablet 4 two times a day PRN  insulin glargine Injectable (LANTUS) 25 at bedtime  insulin lispro (ADMELOG) corrective regimen sliding scale  three times a day before meals  levothyroxine 125 daily    SOCIAL HISTORY:   lives alone, has 3 cats, no smoking, alcohol or drug abuse, no recent travel    FAMILY HISTORY:  no recent febrile illness in family members  REVIEW OF SYSTEMS  [  ] ROS unobtainable because:    [ X ] All other systems negative except as noted below:	    Constitutional:  [ x] fever [x ] chills  [ ] weight loss  [ ] weakness  Skin:  [X] rash [ ] phlebitis	  Eyes: [ ] icterus [ ] pain  [ ] discharge	  ENMT: [ ] sore throat  [ ] thrush [ ] ulcers [ ] exudates  Respiratory: [ ] dyspnea [ ] hemoptysis [ ] cough [ ] sputum	  Cardiovascular:  [ ] chest pain [ ] palpitations [ ] edema	  Gastrointestinal:  [ ] nausea [ ] vomiting [ ] diarrhea [ ] constipation [ ] pain	  Genitourinary:  [ ] dysuria [ ] frequency [ ] hematuria [ ] discharge [ ] flank pain  [ ] incontinence  Musculoskeletal:  Le pain  Neurological:  [ ] headache [ ] seizures  [ ] confusion/altered mental status  Psychiatric:  [ ] anxiety [ ] depression	  Hematology/Lymphatics:  [ ] lymphadenopathy  Endocrine:  [ ] adrenal [ ] thyroid  Allergic/Immunologic:	 [ ] transplant [ ] seasonal    Vital Signs Last 24 Hrs  T(F): 98.4 (21 @ 12:08), Max: 100.5 (21 @ 01:52)  Vital Signs Last 24 Hrs  HR: 64 (21 @ 12:08) (56 - 68)  BP: 142/75 (21 @ 12:08) (95/51 - 142/75)  RR: 18 (21 @ 12:08)  SpO2: 95% (21 @ 12:08) (94% - 98%)  Wt(kg): --    PHYSICAL EXAM:  Constitutional: non-toxic, no distress  HEAD/EYES: anicteric, no conjunctival injection  ENT:  supple, no thrush  Cardiovascular:   normal S1, S2, no murmur, no edema  Respiratory:  clear BS bilaterally, no wheezes, no rales  GI:  soft, non-tender, normal bowel sounds  :  no patel, no CVA tenderness  Musculoskeletal:  no synovitis, normal ROM  Neurologic: awake and alert, normal strength, no focal findings  Skin:  L let with bright red erythema and tenderness/warmth, R leg hyperpigmentation but an area of erythema +tenderness on R lateral ankle and foot  Heme/Onc: no lymphadenopathy   Psychiatric:  awake, alert, appropriate mood                          10.7   6.39  )-----------( 244      ( 2021 08:37 )             34.1   02-    139  |  102  |  30<H>  ----------------------------<  127<H>  3.5   |  27  |  1.27    Ca    9.1      2021 06:10  Phos  3.9     02-04  Mg     1.8     -    TPro  7.9  /  Alb  4.0  /  TBili  0.6  /  DBili  x   /  AST  11  /  ALT  10  /  AlkPhos  101  02-04    Urinalysis Basic - ( 2021 03:44 )    Color: Yellow / Appearance: Clear / S.025 / pH: x  Gluc: x / Ketone: Negative  / Bili: Negative / Urobili: Negative   Blood: x / Protein: Trace / Nitrite: Negative   Leuk Esterase: Negative / RBC: x / WBC x   Sq Epi: x / Non Sq Epi: x / Bacteria: x    MICROBIOLOGY:  Culture - Urine (collected 2021 07:07)  Source: Kidney Bladder (from [facility].R.)  Preliminary Report (2021 08:28):    No growth    Culture - Blood (collected 2021 05:33)  Source: .Blood Blood-Venous  Preliminary Report (2021 06:01):    No growth to date.    Culture - Blood (collected 2021 05:33)  Source: .Blood Blood-Peripheral  Preliminary Report (2021 06:01):    No growth to date.      RADIOLOGY:  imaging below personally reviewed and agree with findings    < from: [facility] Duplex Lower Ext Vein Scan, Bilat (21 @ 15:21) >  COMPARISON: Prior Doppler dated 2020.    TECHNIQUE: Duplex sonography of the BILATERAL LOWER extremity veins with color and spectral Doppler, with and without compression.    FINDINGS:    There is normal compressibility of the bilateral common femoral, femoral and popliteal veins.  Doppler examination shows normal spontaneous and phasic flow.    No calf vein thrombosis is detected.    IMPRESSION:  No evidence of deep venous thrombosis in either lower extremity.    < end of copied text >    < from: Xray Chest 1 View- PORTABLE-Urgent (Xray Chest 1 View- PORTABLE-Urgent .) (21 @ 05:30) >  COMPARISON:  AP chest x-ray andCT scan of the chest from 2020.    TECHNIQUE:   AP Portable chest x-ray.    INTERPRETATION:    Heart size and the mediastinum cannot be accurately evaluated on this projection. The thoracic aorta is calcified.  An elevated right hemidiaphragm is again seen.  There are low lung volumes.  The lungs are clear.  No pleural effusion or pneumothorax is seen.  There is osteoarthritic degenerative change of the spine.      IMPRESSION:  Low lung volumes.    Continued elevation of right hemidiaphragm.    Clear lungs.    < end of copied text >

## 2021-02-05 NOTE — PROGRESS NOTE ADULT - SUBJECTIVE AND OBJECTIVE BOX
no  cp  now    REVIEW OF SYSTEMS:  GEN: no fever,    no chills  RESP: no SOB,   no cough  CVS: no chest pain,   no palpitations  GI: no abdominal pain,   no nausea,   no vomiting,   no constipation,   no diarrhea  : no dysuria,   no frequency  NEURO: no headache,   no dizziness  PSYCH: no depression,   not anxious  Derm : no rash    MEDICATIONS  (STANDING):  aspirin enteric coated 81 milliGRAM(s) Oral daily  atorvastatin 40 milliGRAM(s) Oral at bedtime  buPROPion XL . 300 milliGRAM(s) Oral daily  dextrose 40% Gel 15 Gram(s) Oral once  dextrose 5%. 1000 milliLiter(s) (50 mL/Hr) IV Continuous <Continuous>  dextrose 5%. 1000 milliLiter(s) (100 mL/Hr) IV Continuous <Continuous>  dextrose 50% Injectable 25 Gram(s) IV Push once  dextrose 50% Injectable 12.5 Gram(s) IV Push once  dextrose 50% Injectable 25 Gram(s) IV Push once  escitalopram 20 milliGRAM(s) Oral daily  gabapentin 600 milliGRAM(s) Oral three times a day  glucagon  Injectable 1 milliGRAM(s) IntraMuscular once  heparin   Injectable 5000 Unit(s) SubCutaneous every 8 hours  insulin glargine Injectable (LANTUS) 25 Unit(s) SubCutaneous at bedtime  insulin lispro (ADMELOG) corrective regimen sliding scale   SubCutaneous three times a day before meals  levothyroxine 125 MICROGram(s) Oral daily  piperacillin/tazobactam IVPB.. 3.375 Gram(s) IV Intermittent every 8 hours  sodium chloride 0.9%. 1000 milliLiter(s) (80 mL/Hr) IV Continuous <Continuous>    MEDICATIONS  (PRN):  clonazePAM  Tablet 0.5 milliGRAM(s) Oral three times a day PRN anxiety      Vital Signs Last 24 Hrs  T(C): 36.8 (2021 03:45), Max: 37.3 (2021 21:05)  T(F): 98.2 (2021 03:45), Max: 99.2 (2021 21:05)  HR: 62 (2021 06:21) (56 - 70)  BP: 95/51 (2021 03:45) (90/56 - 107/95)  BP(mean): --  RR: 17 (2021 03:45) (16 - 18)  SpO2: 94% (2021 06:21) (94% - 98%)  CAPILLARY BLOOD GLUCOSE      POCT Blood Glucose.: 133 mg/dL (2021 22:07)  POCT Blood Glucose.: 143 mg/dL (2021 18:45)  POCT Blood Glucose.: 210 mg/dL (2021 13:14)  POCT Blood Glucose.: 168 mg/dL (2021 09:34)    I&O's Summary    2021 07:01  -  2021 06:57  --------------------------------------------------------  IN: 800 mL / OUT: 700 mL / NET: 100 mL        PHYSICAL EXAM:  HEAD:  Atraumatic, Normocephalic  NECK: Supple, No   JVD  CHEST/LUNG:   no     rales,     no,    rhonchi  HEART: Regular rate and rhythm;         murmur  ABDOMEN: Soft, Nontender, ;   EXTREMITIES:        edema  NEUROLOGY:  alert    LABS:                        10.2   7.98  )-----------( 247      ( 2021 06:11 )             31.6     02-05    139  |  102  |  30<H>  ----------------------------<  127<H>  3.5   |  27  |  1.27    Ca    9.1      2021 06:10  Phos  3.9     02-04  Mg     1.8     02-04    TPro  7.9  /  Alb  4.0  /  TBili  0.6  /  DBili  x   /  AST  11  /  ALT  10  /  AlkPhos  101  02-04      CARDIAC MARKERS ( 2021 18:57 )  x     / x     / 187 U/L / x     / 2.2 ng/mL      Urinalysis Basic - ( 2021 03:44 )    Color: Yellow / Appearance: Clear / S.025 / pH: x  Gluc: x / Ketone: Negative  / Bili: Negative / Urobili: Negative   Blood: x / Protein: Trace / Nitrite: Negative   Leuk Esterase: Negative / RBC: x / WBC x   Sq Epi: x / Non Sq Epi: x / Bacteria: x      Lactate, Blood: 0.8 mmol/L ( @ 06:13)       @ 02:05  3.9  21              Consultant(s) Notes Reviewed:      Care Discussed with Consultants/Other Providers:      no  cp  now    REVIEW OF SYSTEMS:  GEN: no fever,    no chills  RESP: no SOB,   no cough  CVS: no chest pain,   no palpitations  GI: no abdominal pain,   no nausea,   no vomiting,   no constipation,   no diarrhea  : no dysuria,   no frequency  NEURO: no headache,   no dizziness  PSYCH: no depression,   not anxious  Derm : no rash    MEDICATIONS  (STANDING):  aspirin enteric coated 81 milliGRAM(s) Oral daily  atorvastatin 40 milliGRAM(s) Oral at bedtime  buPROPion XL . 300 milliGRAM(s) Oral daily  dextrose 40% Gel 15 Gram(s) Oral once  dextrose 5%. 1000 milliLiter(s) (50 mL/Hr) IV Continuous <Continuous>  dextrose 5%. 1000 milliLiter(s) (100 mL/Hr) IV Continuous <Continuous>  dextrose 50% Injectable 25 Gram(s) IV Push once  dextrose 50% Injectable 12.5 Gram(s) IV Push once  dextrose 50% Injectable 25 Gram(s) IV Push once  escitalopram 20 milliGRAM(s) Oral daily  gabapentin 600 milliGRAM(s) Oral three times a day  glucagon  Injectable 1 milliGRAM(s) IntraMuscular once  heparin   Injectable 5000 Unit(s) SubCutaneous every 8 hours  insulin glargine Injectable (LANTUS) 25 Unit(s) SubCutaneous at bedtime  insulin lispro (ADMELOG) corrective regimen sliding scale   SubCutaneous three times a day before meals  levothyroxine 125 MICROGram(s) Oral daily  piperacillin/tazobactam IVPB.. 3.375 Gram(s) IV Intermittent every 8 hours  sodium chloride 0.9%. 1000 milliLiter(s) (80 mL/Hr) IV Continuous <Continuous>    MEDICATIONS  (PRN):  clonazePAM  Tablet 0.5 milliGRAM(s) Oral three times a day PRN anxiety      Vital Signs Last 24 Hrs  T(C): 36.8 (2021 03:45), Max: 37.3 (2021 21:05)  T(F): 98.2 (2021 03:45), Max: 99.2 (2021 21:05)  HR: 62 (2021 06:21) (56 - 70)  BP: 95/51 (2021 03:45) (90/56 - 107/95)  BP(mean): --  RR: 17 (2021 03:45) (16 - 18)  SpO2: 94% (2021 06:21) (94% - 98%)  CAPILLARY BLOOD GLUCOSE      POCT Blood Glucose.: 133 mg/dL (2021 22:07)  POCT Blood Glucose.: 143 mg/dL (2021 18:45)  POCT Blood Glucose.: 210 mg/dL (2021 13:14)  POCT Blood Glucose.: 168 mg/dL (2021 09:34)    I&O's Summary    2021 07:01  -  2021 06:57  --------------------------------------------------------  IN: 800 mL / OUT: 700 mL / NET: 100 mL        PHYSICAL EXAM:  HEAD:  Atraumatic, Normocephalic  NECK: Supple, No   JVD  CHEST/LUNG:   no     rales,     no,    rhonchi  HEART: Regular rate and rhythm;         murmur  ABDOMEN: Soft, Nontender, ;   EXTREMITIES:     c/c  redness/ mild    edema  NEUROLOGY:  alert    LABS:                        10.2   7.98  )-----------( 247      ( 2021 06:11 )             31.6     02-05    139  |  102  |  30<H>  ----------------------------<  127<H>  3.5   |  27  |  1.27    Ca    9.1      2021 06:10  Phos  3.9     02-04  Mg     1.8     02-04    TPro  7.9  /  Alb  4.0  /  TBili  0.6  /  DBili  x   /  AST  11  /  ALT  10  /  AlkPhos  101  02-04      CARDIAC MARKERS ( 2021 18:57 )  x     / x     / 187 U/L / x     / 2.2 ng/mL      Urinalysis Basic - ( 2021 03:44 )    Color: Yellow / Appearance: Clear / S.025 / pH: x  Gluc: x / Ketone: Negative  / Bili: Negative / Urobili: Negative   Blood: x / Protein: Trace / Nitrite: Negative   Leuk Esterase: Negative / RBC: x / WBC x   Sq Epi: x / Non Sq Epi: x / Bacteria: x      Lactate, Blood: 0.8 mmol/L ( @ 06:13)       @ 02:05  3.9  21              Consultant(s) Notes Reviewed:      Care Discussed with Consultants/Other Providers:

## 2021-02-05 NOTE — CONSULT NOTE ADULT - SUBJECTIVE AND OBJECTIVE BOX
Chief Complaint:  Patient is a 66y old  Female who presents with a chief complaint of hypotension (04 Feb 2021 18:11)    HPI:  66 year-old female with PMH of DM2 on insulin, fibromyalgia, osteoarthritis, sleep apnea on CPAP, depression, venous insufficiency, endometrial CA s/p hysterectomy (2010), HTN, HLD, hypothyroidism, obesity  Presenting with onset of weakness, chills this morning. Notes she woke up and felt generally crummy. Has had low energy, fatigue, generalized SOB, no fevers at home, episodes of incontinence today without dysuria, foul urine, flank pain. Notes sometimes she feels this was with fibromyalgia flares.  Also reports generalized nausea, but no vomiting. Notes legs are red, but have been this way previously. "maybe a little warmer than usual." (04 Feb 2021 12:46)    Current out- patient pain regimen: Dilaudid 4 mg every 6 hours, also on  Klonopin 0.5 mg TID    Out Patient Pain Management provider: Dr. William Hi    [facility] Prescription Monitoring Program: Reference #[id number]    Opioid Risk Tool (ORT-OUD) Score: Low    Pain Score: 7/10    Pt seen sitting at edge of bed, appears comfortable. C/O generalized discomfort but worst is right shoulder and right knee 2/2 severe OA, also c/o pain 2/2 OA in left knee. This is the chronic pain that she lives with, not worse, but her legs feel "wooden and stiff" when she stands and prior to admission had been having subjective fevers with chills and teeth chattering. Ambulates at home with a rollator. Feels home regimen works well for her. Interested in trialing CBD and will discuss with Dr. Hi.  Also c/o no BM in 2 days.     PHYSICAL EXAM  GENERAL: Seen at bedside, NAD, well-groomed, obese, appears stated age, no signs of toxicity  NEURO: Alert & Oriented X3, Good concentration; Follows commands  HEENT: Head normocephalic; speech clear and fluent  GI: Appetite good today, last BM 2 days ago - usually goes daily  EXTREMITIES: moving all extremities, B/L LE skin with erythema and stasis changes  PSYCH: affect normal; good eye contact; no signs of depression or anxiety    PAST MEDICAL & SURGICAL HISTORY:  Umbilical Hernia    Restless Legs Syndrome (RLS)    GERD with Apnea    Fibromyalgia    Morbidly Obese    Environmental Allergies    Depression    Diarrhea    Constipation    Obstructive Sleep Apnea    Osteoarthritis of Knee  right. requires cane    High Triglycerides    Hyperlipidemia    Diabetes Mellitus    Hypothyroidism    Personal History of Hypertension    Personal History of Female Genital Cancer    Personal History of Arthritis    Incarcerated Ventral Hernia    Endometrial Ca s/p RODRIGO, BSO    Carpal Tunnel Syndrome  release left    S/P FESS (Functional Endoscopic Sinus Surgery)    S/P Tonsillectomy and Adenoidectomy        FAMILY HISTORY:      Allergies    adhesives (Rash)  aerosols, perfumes, fabric softeners (Rash; Rhinitis; Rhinorrhea)  dust (Rhinitis; Rhinorrhea)  Lyrica (Rash)  pollen (Rhinitis; Rhinorrhea)  smoke (Rhinitis; Rhinorrhea)  Tin/costume jewelry (Rash)      MEDICATIONS  (STANDING):  aspirin enteric coated 81 milliGRAM(s) Oral daily  atorvastatin 40 milliGRAM(s) Oral at bedtime  buPROPion XL . 300 milliGRAM(s) Oral daily  ceFAZolin   IVPB 1000 milliGRAM(s) IV Intermittent every 8 hours  dextrose 40% Gel 15 Gram(s) Oral once  dextrose 5%. 1000 milliLiter(s) (50 mL/Hr) IV Continuous <Continuous>  dextrose 5%. 1000 milliLiter(s) (100 mL/Hr) IV Continuous <Continuous>  dextrose 50% Injectable 25 Gram(s) IV Push once  dextrose 50% Injectable 12.5 Gram(s) IV Push once  dextrose 50% Injectable 25 Gram(s) IV Push once  escitalopram 20 milliGRAM(s) Oral daily  gabapentin 600 milliGRAM(s) Oral three times a day  glucagon  Injectable 1 milliGRAM(s) IntraMuscular once  heparin   Injectable 5000 Unit(s) SubCutaneous every 8 hours  insulin glargine Injectable (LANTUS) 25 Unit(s) SubCutaneous at bedtime  insulin lispro (ADMELOG) corrective regimen sliding scale   SubCutaneous three times a day before meals  levothyroxine 125 MICROGram(s) Oral daily  sodium chloride 0.9%. 1000 milliLiter(s) (80 mL/Hr) IV Continuous <Continuous>    MEDICATIONS  (PRN):  clonazePAM  Tablet 0.5 milliGRAM(s) Oral three times a day PRN anxiety  HYDROmorphone   Tablet 4 milliGRAM(s) Oral two times a day PRN Severe Pain (7 - 10)      Vital Signs:  T(C): 36.9 (02-05-21 @ 12:08)  HR: 75 (02-05-21 @ 15:39)  BP: 142/75 (02-05-21 @ 12:08)  RR: 18 (02-05-21 @ 12:08)  SpO2: 95% (02-05-21 @ 15:39)    Pertinent labs/radiology:  Reviewed                          10.7   6.39  )-----------( 244      ( 05 Feb 2021 08:37 )             34.1       02-05    139  |  102  |  30<H>  ----------------------------<  127<H>  3.5   |  27  |  1.27    Ca    9.1      05 Feb 2021 06:10  Phos  3.9     02-04  Mg     1.8     02-04    TPro  7.9  /  Alb  4.0  /  TBili  0.6  /  DBili  x   /  AST  11  /  ALT  10  /  AlkPhos  101  02-04

## 2021-02-05 NOTE — CONSULT NOTE ADULT - ASSESSMENT
66F w/ h/o IDDM2, venous insufficiency, HTN, HLD, hypothyroidism, obesity, fibromyalgia, osteoarthritis, sleep apnea on CPAP, and depression initially admitted for weakness and inability to ambulate. ID consulted as hospital course complicated by worsening redness over b/l extremities, concerning for cellulitis/sepsis. BCx and UCx show NGTD.    Plan  1.  2.  3.   66 f with DM, HTN, HLD, hypothyroidism, obesity, fibromyalgia, osteoarthritis, sleep apnea on CPAP, and venous insufficiency, p/w rigors, fever, generalize weakness and L leg + R ankle foot erythema, pain and swelling   febrile to 100.5, WBC 21  blood and urine cx negative  CXR clear, doppler negative for DVT      fever, leukocytosis, sepsis due to LE cellulitis (L leg, R lateral ankle and foot)    * looks like strep cellulitis  * DC vanco and zosyn  * start cefazolin 1 q 8  * nasal MRSA PCR    The above assessment and plan was discussed with the primary team    Petrona Murry MD  Pager [phone]  After 5pm and on weekends call [phone]

## 2021-02-06 LAB
ANION GAP SERPL CALC-SCNC: 12 MMOL/L — SIGNIFICANT CHANGE UP (ref 5–17)
BUN SERPL-MCNC: 27 MG/DL — HIGH (ref 7–23)
CALCIUM SERPL-MCNC: 9.4 MG/DL — SIGNIFICANT CHANGE UP (ref 8.4–10.5)
CHLORIDE SERPL-SCNC: 106 MMOL/L — SIGNIFICANT CHANGE UP (ref 96–108)
CO2 SERPL-SCNC: 24 MMOL/L — SIGNIFICANT CHANGE UP (ref 22–31)
CREAT SERPL-MCNC: 1.17 MG/DL — SIGNIFICANT CHANGE UP (ref 0.5–1.3)
CULTURE RESULTS: SIGNIFICANT CHANGE UP
GLUCOSE BLDC GLUCOMTR-MCNC: 102 MG/DL — HIGH (ref 70–99)
GLUCOSE BLDC GLUCOMTR-MCNC: 120 MG/DL — HIGH (ref 70–99)
GLUCOSE BLDC GLUCOMTR-MCNC: 123 MG/DL — HIGH (ref 70–99)
GLUCOSE BLDC GLUCOMTR-MCNC: 142 MG/DL — HIGH (ref 70–99)
GLUCOSE SERPL-MCNC: 121 MG/DL — HIGH (ref 70–99)
HCT VFR BLD CALC: 34.8 % — SIGNIFICANT CHANGE UP (ref 34.5–45)
HGB BLD-MCNC: 10.8 G/DL — LOW (ref 11.5–15.5)
MCHC RBC-ENTMCNC: 25.6 PG — LOW (ref 27–34)
MCHC RBC-ENTMCNC: 31 GM/DL — LOW (ref 32–36)
MCV RBC AUTO: 82.5 FL — SIGNIFICANT CHANGE UP (ref 80–100)
NRBC # BLD: 0 /100 WBCS — SIGNIFICANT CHANGE UP (ref 0–0)
PLATELET # BLD AUTO: 292 K/UL — SIGNIFICANT CHANGE UP (ref 150–400)
POTASSIUM SERPL-MCNC: 3.9 MMOL/L — SIGNIFICANT CHANGE UP (ref 3.5–5.3)
POTASSIUM SERPL-SCNC: 3.9 MMOL/L — SIGNIFICANT CHANGE UP (ref 3.5–5.3)
RBC # BLD: 4.22 M/UL — SIGNIFICANT CHANGE UP (ref 3.8–5.2)
RBC # FLD: 17 % — HIGH (ref 10.3–14.5)
SODIUM SERPL-SCNC: 142 MMOL/L — SIGNIFICANT CHANGE UP (ref 135–145)
SPECIMEN SOURCE: SIGNIFICANT CHANGE UP
WBC # BLD: 6.77 K/UL — SIGNIFICANT CHANGE UP (ref 3.8–10.5)
WBC # FLD AUTO: 6.77 K/UL — SIGNIFICANT CHANGE UP (ref 3.8–10.5)

## 2021-02-06 PROCEDURE — 99232 SBSQ HOSP IP/OBS MODERATE 35: CPT

## 2021-02-06 RX ADMIN — GABAPENTIN 600 MILLIGRAM(S): 400 CAPSULE ORAL at 04:23

## 2021-02-06 RX ADMIN — HYDROMORPHONE HYDROCHLORIDE 4 MILLIGRAM(S): 2 INJECTION INTRAMUSCULAR; INTRAVENOUS; SUBCUTANEOUS at 15:11

## 2021-02-06 RX ADMIN — BUPROPION HYDROCHLORIDE 300 MILLIGRAM(S): 150 TABLET, EXTENDED RELEASE ORAL at 11:31

## 2021-02-06 RX ADMIN — ATORVASTATIN CALCIUM 40 MILLIGRAM(S): 80 TABLET, FILM COATED ORAL at 21:15

## 2021-02-06 RX ADMIN — SENNA PLUS 2 TABLET(S): 8.6 TABLET ORAL at 21:15

## 2021-02-06 RX ADMIN — HEPARIN SODIUM 5000 UNIT(S): 5000 INJECTION INTRAVENOUS; SUBCUTANEOUS at 12:55

## 2021-02-06 RX ADMIN — LIDOCAINE 2 PATCH: 4 CREAM TOPICAL at 11:31

## 2021-02-06 RX ADMIN — HEPARIN SODIUM 5000 UNIT(S): 5000 INJECTION INTRAVENOUS; SUBCUTANEOUS at 21:15

## 2021-02-06 RX ADMIN — LIDOCAINE 2 PATCH: 4 CREAM TOPICAL at 19:40

## 2021-02-06 RX ADMIN — Medication 81 MILLIGRAM(S): at 11:31

## 2021-02-06 RX ADMIN — SODIUM CHLORIDE 80 MILLILITER(S): 9 INJECTION INTRAMUSCULAR; INTRAVENOUS; SUBCUTANEOUS at 05:20

## 2021-02-06 RX ADMIN — HYDROMORPHONE HYDROCHLORIDE 4 MILLIGRAM(S): 2 INJECTION INTRAMUSCULAR; INTRAVENOUS; SUBCUTANEOUS at 07:44

## 2021-02-06 RX ADMIN — HEPARIN SODIUM 5000 UNIT(S): 5000 INJECTION INTRAVENOUS; SUBCUTANEOUS at 04:23

## 2021-02-06 RX ADMIN — GABAPENTIN 600 MILLIGRAM(S): 400 CAPSULE ORAL at 12:55

## 2021-02-06 RX ADMIN — LIDOCAINE 2 PATCH: 4 CREAM TOPICAL at 22:21

## 2021-02-06 RX ADMIN — ESCITALOPRAM OXALATE 20 MILLIGRAM(S): 10 TABLET, FILM COATED ORAL at 11:31

## 2021-02-06 RX ADMIN — Medication 100 MILLIGRAM(S): at 21:13

## 2021-02-06 RX ADMIN — Medication 1 SPRAY(S): at 16:25

## 2021-02-06 RX ADMIN — SODIUM CHLORIDE 80 MILLILITER(S): 9 INJECTION INTRAMUSCULAR; INTRAVENOUS; SUBCUTANEOUS at 15:11

## 2021-02-06 RX ADMIN — HYDROMORPHONE HYDROCHLORIDE 4 MILLIGRAM(S): 2 INJECTION INTRAMUSCULAR; INTRAVENOUS; SUBCUTANEOUS at 23:33

## 2021-02-06 RX ADMIN — Medication 125 MICROGRAM(S): at 04:23

## 2021-02-06 RX ADMIN — INSULIN GLARGINE 25 UNIT(S): 100 INJECTION, SOLUTION SUBCUTANEOUS at 21:15

## 2021-02-06 RX ADMIN — Medication 100 MILLIGRAM(S): at 12:57

## 2021-02-06 RX ADMIN — GABAPENTIN 600 MILLIGRAM(S): 400 CAPSULE ORAL at 21:15

## 2021-02-06 RX ADMIN — Medication 1 SPRAY(S): at 04:23

## 2021-02-06 RX ADMIN — Medication 100 MILLIGRAM(S): at 04:24

## 2021-02-06 NOTE — PROGRESS NOTE ADULT - SUBJECTIVE AND OBJECTIVE BOX
CARDIOLOGY     PROGRESS  NOTE   ________________________________________________    CHIEF COMPLAINT:Patient is a 66y old  Female who presents with a chief complaint of weakness (05 Feb 2021 16:39)  no complain, doing better.  	  REVIEW OF SYSTEMS:  CONSTITUTIONAL: No fever, weight loss, or fatigue  EYES: No eye pain, visual disturbances, or discharge  ENT:  No difficulty hearing, tinnitus, vertigo; No sinus or throat pain  NECK: No pain or stiffness  RESPIRATORY: No cough, wheezing, chills or hemoptysis; + Shortness of Breath  CARDIOVASCULAR: No chest pain, palpitations, passing out, dizziness, o+eg swelling  GASTROINTESTINAL: No abdominal or epigastric pain. No nausea, vomiting, or hematemesis; No diarrhea or constipation. No melena or hematochezia.  GENITOURINARY: No dysuria, frequency, hematuria, or incontinence  NEUROLOGICAL: No headaches, memory loss, loss of strength, numbness, or tremors  SKIN: No itching, burning, rashes, or lesions   LYMPH Nodes: No enlarged glands  ENDOCRINE: No heat or cold intolerance; No hair loss  MUSCULOSKELETAL: No joint pain or swelling; No muscle, back, or extremity pain  PSYCHIATRIC: No depression, anxiety, mood swings, or difficulty sleeping  HEME/LYMPH: No easy bruising, or bleeding gums  ALLERGY AND IMMUNOLOGIC: No hives or eczema	    [ ] All others negative	  [ ] Unable to obtain    PHYSICAL EXAM:  T(C): 36.9 (02-06-21 @ 04:30), Max: 36.9 (02-05-21 @ 12:08)  HR: 82 (02-06-21 @ 07:19) (63 - 84)  BP: 128/62 (02-06-21 @ 04:30) (123/70 - 142/75)  RR: 18 (02-06-21 @ 04:30) (18 - 18)  SpO2: 94% (02-06-21 @ 07:19) (93% - 98%)  Wt(kg): --  I&O's Summary    05 Feb 2021 07:01  -  06 Feb 2021 07:00  --------------------------------------------------------  IN: 1280 mL / OUT: 2150 mL / NET: -870 mL    06 Feb 2021 07:01  -  06 Feb 2021 10:23  --------------------------------------------------------  IN: 290 mL / OUT: 0 mL / NET: 290 mL        Appearance: Normal	  HEENT:   Normal oral mucosa, PERRL, EOMI	  Lymphatic: No lymphadenopathy  Cardiovascular: Normal S1 S2, No JVD, + murmurs, + edema  Respiratory: Lungs clear to auscultation	  Psychiatry: A & O x 3, Mood & affect appropriate  Gastrointestinal:  Soft, Non-tender, + BS	  Skin: No rashes, No ecchymoses, No cyanosis	  Neurologic: Non-focal  Extremities: Normal range of motion, +redness bl le 2/3 up to knee, no change  Vascular: Peripheral pulses palpable 2+ bilaterally    MEDICATIONS  (STANDING):  aspirin enteric coated 81 milliGRAM(s) Oral daily  atorvastatin 40 milliGRAM(s) Oral at bedtime  buPROPion XL . 300 milliGRAM(s) Oral daily  ceFAZolin   IVPB 1000 milliGRAM(s) IV Intermittent every 8 hours  dextrose 40% Gel 15 Gram(s) Oral once  dextrose 5%. 1000 milliLiter(s) (50 mL/Hr) IV Continuous <Continuous>  dextrose 5%. 1000 milliLiter(s) (100 mL/Hr) IV Continuous <Continuous>  dextrose 50% Injectable 25 Gram(s) IV Push once  dextrose 50% Injectable 12.5 Gram(s) IV Push once  dextrose 50% Injectable 25 Gram(s) IV Push once  escitalopram 20 milliGRAM(s) Oral daily  fluticasone propionate 50 MICROgram(s)/spray Nasal Spray 1 Spray(s) Both Nostrils two times a day  gabapentin 600 milliGRAM(s) Oral three times a day  glucagon  Injectable 1 milliGRAM(s) IntraMuscular once  heparin   Injectable 5000 Unit(s) SubCutaneous every 8 hours  insulin glargine Injectable (LANTUS) 25 Unit(s) SubCutaneous at bedtime  insulin lispro (ADMELOG) corrective regimen sliding scale   SubCutaneous three times a day before meals  levothyroxine 125 MICROGram(s) Oral daily  lidocaine   Patch 2 Patch Transdermal daily  senna 2 Tablet(s) Oral at bedtime  sodium chloride 0.9%. 1000 milliLiter(s) (80 mL/Hr) IV Continuous <Continuous>      TELEMETRY: 	    ECG:  	  RADIOLOGY:  OTHER: 	  	  LABS:	 	    CARDIAC MARKERS:  CARDIAC MARKERS ( 04 Feb 2021 18:57 )  x     / x     / 187 U/L / x     / 2.2 ng/mL                                10.8   6.77  )-----------( 292      ( 06 Feb 2021 06:42 )             34.8     02-06    142  |  106  |  27<H>  ----------------------------<  121<H>  3.9   |  24  |  1.17    Ca    9.4      06 Feb 2021 06:42      proBNP:   Lipid Profile:   HgA1c:   TSH:   < from: 12 Lead ECG (02.04.21 @ 17:47) >    Diagnosis Line NORMAL SINUS RHYTHM  MINIMAL VOLTAGE CRITERIA FOR LVH, MAY BE NORMAL VARIANT  SEPTAL INFARCT , AGE UNDETERMINED  INFERIOR INFARCT , AGE UNDETERMINED  ABNORMAL ECG  WHEN COMPARED WITH ECG OF 04-FEB-2021 02:10,  NO SIGNIFICANT CHANGE WAS FOUND    < from: Transthoracic Echocardiogram (07.26.20 @ 10:36) >  1. Mitral annular calcification, otherwise normal mitral  valve. Minimal mitral regurgitation.  2. Normal left ventricular internal dimensions and wall  thicknesses.  3. Endocardium not well visualized; grossly preserved  overall left ventricular systolic function.  Unable to  exclude segmental wall motion abnormalities.  4. Right ventricular enlargement with decreased right  ventricular systolic function.      * looks like strep cellulitis  * DC vanco and zosyn  * start cefazolin 1 q 8  * nasal MRSA PCR        Assessment and plan  ---------------------------   66 year-old femal  with PMH of DM2 on insulin, fibromyalgia, osteoarthritis, sleep apnea on CPAP, depression, venous insufficiency, endometrial CA s/p hysterectomy (2010), HTN, HLD, hypothyroidism, obesity presenting with onset of weakness, chills this morning. Notes she woke up and felt generally crummy. Has had low energy, fatigue, generalized SOB, no fevers at home, episodes of incontinence today without dysuria, foul urine, flank pain. Notes sometimes she feels this was with fibromyalgia flares.   Also reports generalized nausea, but no vomiting. Notes legs are red, but have been this way previously. "maybe a little warmer than usual."  pt with hx of obesity and with  RENARD ,with increasing sob. no chest pain.  hypotension, r/o sepsis  check cultures  ivf  chest x ray noted  le venous doppler  dvt prophylaxis  tsh  esr  physical therapy  oob to chailr  replete k  cellulitis of LE/ ID appreciated  pt with RV dysfunction ?sec to pulmonary htn/sleep apnea  bp has improved  will need to add lasix as needed  pain management  fu blood sugar closely

## 2021-02-06 NOTE — PROGRESS NOTE ADULT - ASSESSMENT
65 year-old female     h/o   DM2 on insulin, fibromyalgia, osteoarthritis, sleep apnea on CPAP,  RENARD/   depression, venous insufficiency,    endometrial CA s/p hysterectomy (2010)  , HTN,   HLD,   hypothyroidism,  morbid   obesity. CT angio on last visit, no PE       .     presenting with increasing weakness. ?  fevers at home  .  has  generalized pain and weakness at her baseline, uses  a walker.     on arrival, temp is  99, with low  sbp. wbc of  21,000    h/o  morbid  obesity/  BMI  of  60/  with  massive  truncal  obesity/ on nocturnal CPAP   h/o  lymphedema. with hyperpigmented /  raised  lesions santosh  right leg/  wound  care eval  DM, on lantus/  humalog at  home   HTN, Hypothyroid,  on  asa/  lipitor/ synthroid    hold lasix  and enalapril,/ low  sbp   depression, on  mlple  meds/  fibromyalgia / pt wants  clonazepam  and   Hydromorphone/ verified  last viist  Echo,   jorje;  ef  normal  wbc,  blood  c/s,  negative/   c/c  stasis/ redness  , distal legs  on iv ancef  per  ID      ra< from: CT Angio Chest w/ IV Cont (07.25.20 @ 17:37) >  IMPRESSION:  1.  Limited exam. The segmental and subsegmental pulmonary arteries are not evaluated secondary to poor opacification.  2.  Clear lungs. No aortic aneurysm or dissection.  < end of copied text >

## 2021-02-06 NOTE — PHYSICAL THERAPY INITIAL EVALUATION ADULT - DISCHARGE DISPOSITION, PT EVAL
Subacute rehab. If pt returns home, recommend Home PT to assess safety, increase strength and endurance assisting with functional activities and ADLs. Assist with all mobility and ADLs. [place] assist into home 2/2 stairs. Pt has HHA 4 hrs/3days; Pt owns rollator, shower chair, shower bars. DME needed: ashley w/juan jose.

## 2021-02-06 NOTE — PROGRESS NOTE ADULT - ASSESSMENT
66 f with DM, HTN, HLD, hypothyroidism, obesity, fibromyalgia, osteoarthritis, sleep apnea on CPAP, and venous insufficiency, p/w rigors, fever, generalize weakness and L leg + R ankle foot erythema, pain and swelling   febrile to 100.5, WBC 21  blood and urine cx negative  CXR clear, doppler negative for DVT      fever, leukocytosis, sepsis due to LE cellulitis (L leg, R lateral ankle and foot)    * looks like strep cellulitis  * c/w cefazolin 1 q 8  * antibiotics started 2/4, now day 3, will likely complete a 10 day course  * when ready for discharge, switch to PO keflex 500 q 6 to complete the course      The above assessment and plan was discussed with the primary team    Petrona Murry MD  Pager [phone]  After 5pm and on weekends call [phone]

## 2021-02-06 NOTE — PHYSICAL THERAPY INITIAL EVALUATION ADULT - ADDITIONAL COMMENTS
Pt lives alone in , 5 MURALI +HRs. Pt reports independent with all mobility and ADLs PTA. HHA 4 hrs/3days for supervision with ADLs. Pt owns rollator, shower chair, shower bars.

## 2021-02-06 NOTE — PROGRESS NOTE ADULT - SUBJECTIVE AND OBJECTIVE BOX
Follow Up:  cellulitis    Interval History: pt felt better, the LE pain and swelling improve    ROS:      All other systems negative    Constitutional: no fever, no chills  Cardiovascular:  no chest pain, no palpitation  Respiratory:  no SOB, no cough  GI:  no abd pain, no vomiting, no diarrhea  urinary: no dysuria, no hematuria, no flank pain  musculoskeletal:  improved b/l LE swelling and pain  skin:  no rash  neurology:  no headache, no seizure    Allergies  adhesives (Rash)  aerosols, perfumes, fabric softeners (Rash; Rhinitis; Rhinorrhea)  dust (Rhinitis; Rhinorrhea)  Lyrica (Rash)  pollen (Rhinitis; Rhinorrhea)  smoke (Rhinitis; Rhinorrhea)  Tin/costume jewelry (Rash)        ANTIMICROBIALS:  ceFAZolin   IVPB 1000 every 8 hours      OTHER MEDS:  aspirin enteric coated 81 milliGRAM(s) Oral daily  atorvastatin 40 milliGRAM(s) Oral at bedtime  buPROPion XL . 300 milliGRAM(s) Oral daily  clonazePAM  Tablet 0.5 milliGRAM(s) Oral three times a day PRN  dextrose 40% Gel 15 Gram(s) Oral once  dextrose 5%. 1000 milliLiter(s) IV Continuous <Continuous>  dextrose 5%. 1000 milliLiter(s) IV Continuous <Continuous>  dextrose 50% Injectable 25 Gram(s) IV Push once  dextrose 50% Injectable 12.5 Gram(s) IV Push once  dextrose 50% Injectable 25 Gram(s) IV Push once  escitalopram 20 milliGRAM(s) Oral daily  fluticasone propionate 50 MICROgram(s)/spray Nasal Spray 1 Spray(s) Both Nostrils two times a day  gabapentin 600 milliGRAM(s) Oral three times a day  glucagon  Injectable 1 milliGRAM(s) IntraMuscular once  heparin   Injectable 5000 Unit(s) SubCutaneous every 8 hours  HYDROmorphone   Tablet 4 milliGRAM(s) Oral every 6 hours PRN  insulin glargine Injectable (LANTUS) 25 Unit(s) SubCutaneous at bedtime  insulin lispro (ADMELOG) corrective regimen sliding scale   SubCutaneous three times a day before meals  levothyroxine 125 MICROGram(s) Oral daily  lidocaine   Patch 2 Patch Transdermal daily  senna 2 Tablet(s) Oral at bedtime  sodium chloride 0.9%. 1000 milliLiter(s) IV Continuous <Continuous>      Vital Signs Last 24 Hrs  T(C): 37 (06 Feb 2021 11:35), Max: 37 (06 Feb 2021 11:35)  T(F): 98.6 (06 Feb 2021 11:35), Max: 98.6 (06 Feb 2021 11:35)  HR: 52 (06 Feb 2021 11:35) (52 - 84)  BP: 115/54 (06 Feb 2021 11:35) (115/54 - 128/62)  BP(mean): --  RR: 18 (06 Feb 2021 11:35) (18 - 18)  SpO2: 97% (06 Feb 2021 11:35) (93% - 98%)    Physical Exam:  General:    NAD,  non toxic  Cardio:     regular S1, S2,  no murmur  Respiratory:    clear b/l,    no wheezing  abd:     soft,   BS +,   no tenderness  :   no CVAT,  no suprapubic tenderness,   no  patel  Musculoskeletal:   LLE improved edema and tenderness, the erythema seems to be less as well, RLE more swollen  vascular: no phlebitis  Skin:    no rash                          10.8   6.77  )-----------( 292      ( 06 Feb 2021 06:42 )             34.8       02-06    142  |  106  |  27<H>  ----------------------------<  121<H>  3.9   |  24  |  1.17    Ca    9.4      06 Feb 2021 06:42            MICROBIOLOGY:  v  Kidney Bladder (from O.R.)  02-04-21   Moderate Lactobacillus species  "Susceptibilities not performed"  --  --      .Blood Blood-Peripheral  02-04-21   No growth to date.  --  --                RADIOLOGY:  Images independently visualized and reviewed personally, findings as below  < from: [facility] Duplex Lower Ext Vein Scan, Bilat (02.05.21 @ 15:21) >  IMPRESSION:  No evidence of deep venous thrombosis in either lower extremity.      < end of copied text >  < from: Xray Chest 1 View- PORTABLE-Urgent (Xray Chest 1 View- PORTABLE-Urgent .) (02.04.21 @ 05:30) >    IMPRESSION:  Low lung volumes.    Continued elevation of right hemidiaphragm.    Clear lungs.    < end of copied text >

## 2021-02-07 LAB
ALBUMIN SERPL ELPH-MCNC: 3 G/DL — LOW (ref 3.3–5)
ALP SERPL-CCNC: 81 U/L — SIGNIFICANT CHANGE UP (ref 40–120)
ALT FLD-CCNC: 11 U/L — SIGNIFICANT CHANGE UP (ref 10–45)
ANION GAP SERPL CALC-SCNC: 11 MMOL/L — SIGNIFICANT CHANGE UP (ref 5–17)
AST SERPL-CCNC: 16 U/L — SIGNIFICANT CHANGE UP (ref 10–40)
BILIRUB SERPL-MCNC: 0.2 MG/DL — SIGNIFICANT CHANGE UP (ref 0.2–1.2)
BUN SERPL-MCNC: 24 MG/DL — HIGH (ref 7–23)
CALCIUM SERPL-MCNC: 9.1 MG/DL — SIGNIFICANT CHANGE UP (ref 8.4–10.5)
CHLORIDE SERPL-SCNC: 106 MMOL/L — SIGNIFICANT CHANGE UP (ref 96–108)
CO2 SERPL-SCNC: 22 MMOL/L — SIGNIFICANT CHANGE UP (ref 22–31)
CREAT SERPL-MCNC: 1.08 MG/DL — SIGNIFICANT CHANGE UP (ref 0.5–1.3)
GLUCOSE BLDC GLUCOMTR-MCNC: 102 MG/DL — HIGH (ref 70–99)
GLUCOSE BLDC GLUCOMTR-MCNC: 121 MG/DL — HIGH (ref 70–99)
GLUCOSE BLDC GLUCOMTR-MCNC: 123 MG/DL — HIGH (ref 70–99)
GLUCOSE BLDC GLUCOMTR-MCNC: 164 MG/DL — HIGH (ref 70–99)
GLUCOSE SERPL-MCNC: 118 MG/DL — HIGH (ref 70–99)
HCT VFR BLD CALC: 33.5 % — LOW (ref 34.5–45)
HGB BLD-MCNC: 10.6 G/DL — LOW (ref 11.5–15.5)
MCHC RBC-ENTMCNC: 26.1 PG — LOW (ref 27–34)
MCHC RBC-ENTMCNC: 31.6 GM/DL — LOW (ref 32–36)
MCV RBC AUTO: 82.5 FL — SIGNIFICANT CHANGE UP (ref 80–100)
NRBC # BLD: 0 /100 WBCS — SIGNIFICANT CHANGE UP (ref 0–0)
PLATELET # BLD AUTO: 273 K/UL — SIGNIFICANT CHANGE UP (ref 150–400)
POTASSIUM SERPL-MCNC: 4.1 MMOL/L — SIGNIFICANT CHANGE UP (ref 3.5–5.3)
POTASSIUM SERPL-SCNC: 4.1 MMOL/L — SIGNIFICANT CHANGE UP (ref 3.5–5.3)
PROT SERPL-MCNC: 6.3 G/DL — SIGNIFICANT CHANGE UP (ref 6–8.3)
RBC # BLD: 4.06 M/UL — SIGNIFICANT CHANGE UP (ref 3.8–5.2)
RBC # FLD: 17 % — HIGH (ref 10.3–14.5)
SODIUM SERPL-SCNC: 139 MMOL/L — SIGNIFICANT CHANGE UP (ref 135–145)
WBC # BLD: 6.02 K/UL — SIGNIFICANT CHANGE UP (ref 3.8–10.5)
WBC # FLD AUTO: 6.02 K/UL — SIGNIFICANT CHANGE UP (ref 3.8–10.5)

## 2021-02-07 RX ORDER — BUMETANIDE 0.25 MG/ML
0.5 INJECTION INTRAMUSCULAR; INTRAVENOUS DAILY
Refills: 0 | Status: DISCONTINUED | OUTPATIENT
Start: 2021-02-07 | End: 2021-02-09

## 2021-02-07 RX ORDER — ACETAMINOPHEN 500 MG
650 TABLET ORAL ONCE
Refills: 0 | Status: COMPLETED | OUTPATIENT
Start: 2021-02-07 | End: 2021-02-07

## 2021-02-07 RX ADMIN — Medication 1 SPRAY(S): at 05:05

## 2021-02-07 RX ADMIN — LIDOCAINE 2 PATCH: 4 CREAM TOPICAL at 11:31

## 2021-02-07 RX ADMIN — INSULIN GLARGINE 25 UNIT(S): 100 INJECTION, SOLUTION SUBCUTANEOUS at 20:58

## 2021-02-07 RX ADMIN — ESCITALOPRAM OXALATE 20 MILLIGRAM(S): 10 TABLET, FILM COATED ORAL at 11:32

## 2021-02-07 RX ADMIN — Medication 125 MICROGRAM(S): at 05:06

## 2021-02-07 RX ADMIN — HEPARIN SODIUM 5000 UNIT(S): 5000 INJECTION INTRAVENOUS; SUBCUTANEOUS at 20:58

## 2021-02-07 RX ADMIN — GABAPENTIN 600 MILLIGRAM(S): 400 CAPSULE ORAL at 05:06

## 2021-02-07 RX ADMIN — Medication 81 MILLIGRAM(S): at 11:32

## 2021-02-07 RX ADMIN — LIDOCAINE 2 PATCH: 4 CREAM TOPICAL at 23:00

## 2021-02-07 RX ADMIN — GABAPENTIN 600 MILLIGRAM(S): 400 CAPSULE ORAL at 14:46

## 2021-02-07 RX ADMIN — ATORVASTATIN CALCIUM 40 MILLIGRAM(S): 80 TABLET, FILM COATED ORAL at 20:58

## 2021-02-07 RX ADMIN — LIDOCAINE 2 PATCH: 4 CREAM TOPICAL at 19:22

## 2021-02-07 RX ADMIN — GABAPENTIN 600 MILLIGRAM(S): 400 CAPSULE ORAL at 20:58

## 2021-02-07 RX ADMIN — BUMETANIDE 0.5 MILLIGRAM(S): 0.25 INJECTION INTRAMUSCULAR; INTRAVENOUS at 11:31

## 2021-02-07 RX ADMIN — Medication 650 MILLIGRAM(S): at 20:58

## 2021-02-07 RX ADMIN — HYDROMORPHONE HYDROCHLORIDE 4 MILLIGRAM(S): 2 INJECTION INTRAMUSCULAR; INTRAVENOUS; SUBCUTANEOUS at 17:57

## 2021-02-07 RX ADMIN — Medication 100 MILLIGRAM(S): at 20:57

## 2021-02-07 RX ADMIN — HYDROMORPHONE HYDROCHLORIDE 4 MILLIGRAM(S): 2 INJECTION INTRAMUSCULAR; INTRAVENOUS; SUBCUTANEOUS at 08:16

## 2021-02-07 RX ADMIN — BUPROPION HYDROCHLORIDE 300 MILLIGRAM(S): 150 TABLET, EXTENDED RELEASE ORAL at 11:32

## 2021-02-07 RX ADMIN — HEPARIN SODIUM 5000 UNIT(S): 5000 INJECTION INTRAVENOUS; SUBCUTANEOUS at 05:06

## 2021-02-07 RX ADMIN — Medication 100 MILLIGRAM(S): at 05:06

## 2021-02-07 RX ADMIN — Medication 1 SPRAY(S): at 16:24

## 2021-02-07 RX ADMIN — Medication 100 MILLIGRAM(S): at 14:45

## 2021-02-07 RX ADMIN — HEPARIN SODIUM 5000 UNIT(S): 5000 INJECTION INTRAVENOUS; SUBCUTANEOUS at 14:47

## 2021-02-07 RX ADMIN — SODIUM CHLORIDE 80 MILLILITER(S): 9 INJECTION INTRAMUSCULAR; INTRAVENOUS; SUBCUTANEOUS at 05:06

## 2021-02-07 NOTE — PROGRESS NOTE ADULT - SUBJECTIVE AND OBJECTIVE BOX
afebrile    REVIEW OF SYSTEMS:  GEN: no fever,    no chills  RESP: no SOB,   no cough  CVS: no chest pain,   no palpitations  GI: no abdominal pain,   no nausea,   no vomiting,   no constipation,   no diarrhea  : no dysuria,   no frequency  NEURO: no headache,   no dizziness  PSYCH: no depression,   not anxious  Derm : no rash    MEDICATIONS  (STANDING):  aspirin enteric coated 81 milliGRAM(s) Oral daily  atorvastatin 40 milliGRAM(s) Oral at bedtime  buPROPion XL . 300 milliGRAM(s) Oral daily  ceFAZolin   IVPB 1000 milliGRAM(s) IV Intermittent every 8 hours  dextrose 40% Gel 15 Gram(s) Oral once  dextrose 5%. 1000 milliLiter(s) (50 mL/Hr) IV Continuous <Continuous>  dextrose 5%. 1000 milliLiter(s) (100 mL/Hr) IV Continuous <Continuous>  dextrose 50% Injectable 25 Gram(s) IV Push once  dextrose 50% Injectable 12.5 Gram(s) IV Push once  dextrose 50% Injectable 25 Gram(s) IV Push once  escitalopram 20 milliGRAM(s) Oral daily  fluticasone propionate 50 MICROgram(s)/spray Nasal Spray 1 Spray(s) Both Nostrils two times a day  gabapentin 600 milliGRAM(s) Oral three times a day  glucagon  Injectable 1 milliGRAM(s) IntraMuscular once  heparin   Injectable 5000 Unit(s) SubCutaneous every 8 hours  insulin glargine Injectable (LANTUS) 25 Unit(s) SubCutaneous at bedtime  insulin lispro (ADMELOG) corrective regimen sliding scale   SubCutaneous three times a day before meals  levothyroxine 125 MICROGram(s) Oral daily  lidocaine   Patch 2 Patch Transdermal daily  senna 2 Tablet(s) Oral at bedtime  sodium chloride 0.9%. 1000 milliLiter(s) (80 mL/Hr) IV Continuous <Continuous>    MEDICATIONS  (PRN):  clonazePAM  Tablet 0.5 milliGRAM(s) Oral three times a day PRN anxiety  HYDROmorphone   Tablet 4 milliGRAM(s) Oral every 6 hours PRN Severe Pain (7 - 10)      Vital Signs Last 24 Hrs  T(C): 36.4 (07 Feb 2021 04:00), Max: 37.2 (06 Feb 2021 20:04)  T(F): 97.6 (07 Feb 2021 04:00), Max: 99 (06 Feb 2021 20:04)  HR: 74 (07 Feb 2021 04:00) (52 - 82)  BP: 128/78 (07 Feb 2021 04:00) (115/54 - 129/70)  BP(mean): --  RR: 18 (07 Feb 2021 04:00) (18 - 18)  SpO2: 99% (07 Feb 2021 04:00) (94% - 99%)  CAPILLARY BLOOD GLUCOSE      POCT Blood Glucose.: 120 mg/dL (06 Feb 2021 21:05)  POCT Blood Glucose.: 102 mg/dL (06 Feb 2021 16:36)  POCT Blood Glucose.: 123 mg/dL (06 Feb 2021 11:32)  POCT Blood Glucose.: 142 mg/dL (06 Feb 2021 07:37)    I&O's Summary    05 Feb 2021 07:01  -  06 Feb 2021 07:00  --------------------------------------------------------  IN: 1280 mL / OUT: 2150 mL / NET: -870 mL    06 Feb 2021 07:01  -  07 Feb 2021 06:57  --------------------------------------------------------  IN: 2320 mL / OUT: 1050 mL / NET: 1270 mL        PHYSICAL EXAM:  HEAD:  Atraumatic, Normocephalic  NECK: Supple, No   JVD  CHEST/LUNG:   no     rales,     no,    rhonchi  HEART: Regular rate and rhythm;         murmur  ABDOMEN: Soft, Nontender, ;   EXTREMITIES:    c/c    redness, bit better  edema  NEUROLOGY:  alert    LABS:                        10.8   6.77  )-----------( 292      ( 06 Feb 2021 06:42 )             34.8     02-06    142  |  106  |  27<H>  ----------------------------<  121<H>  3.9   |  24  |  1.17    Ca    9.4      06 Feb 2021 06:42                              Consultant(s) Notes Reviewed:      Care Discussed with Consultants/Other Providers:

## 2021-02-07 NOTE — PROGRESS NOTE ADULT - SUBJECTIVE AND OBJECTIVE BOX
CARDIOLOGY     PROGRESS  NOTE   ________________________________________________    CHIEF COMPLAINT:Patient is a 66y old  Female who presents with a chief complaint of weakness (05 Feb 2021 16:39)  doing better.  	  REVIEW OF SYSTEMS:  CONSTITUTIONAL: No fever, weight loss, or fatigue  EYES: No eye pain, visual disturbances, or discharge  ENT:  No difficulty hearing, tinnitus, vertigo; No sinus or throat pain  NECK: No pain or stiffness  RESPIRATORY: No cough, wheezing, chills or hemoptysis; No Shortness of Breath  CARDIOVASCULAR: No chest pain, palpitations, passing out, dizziness, or leg swelling  GASTROINTESTINAL: No abdominal or epigastric pain. No nausea, vomiting, or hematemesis; No diarrhea or constipation. No melena or hematochezia.  GENITOURINARY: No dysuria, frequency, hematuria, or incontinence  NEUROLOGICAL: No headaches, memory loss, loss of strength, numbness, or tremors  SKIN: No itching, burning, rashes, or lesions   LYMPH Nodes: No enlarged glands  ENDOCRINE: No heat or cold intolerance; No hair loss  MUSCULOSKELETAL: No joint pain or swelling; No muscle, back, or extremity pain  PSYCHIATRIC: No depression, anxiety, mood swings, or difficulty sleeping  HEME/LYMPH: No easy bruising, or bleeding gums  ALLERGY AND IMMUNOLOGIC: No hives or eczema	    [ ] All others negative	  [ ] Unable to obtain    PHYSICAL EXAM:  T(C): 36.4 (02-07-21 @ 04:00), Max: 37.2 (02-06-21 @ 20:04)  HR: 74 (02-07-21 @ 04:00) (52 - 80)  BP: 128/78 (02-07-21 @ 04:00) (115/54 - 129/70)  RR: 18 (02-07-21 @ 04:00) (18 - 18)  SpO2: 99% (02-07-21 @ 04:00) (95% - 99%)  Wt(kg): --  I&O's Summary    06 Feb 2021 07:01  -  07 Feb 2021 07:00  --------------------------------------------------------  IN: 2320 mL / OUT: 1050 mL / NET: 1270 mL        Appearance: Normal	  HEENT:   Normal oral mucosa, PERRL, EOMI	  Lymphatic: No lymphadenopathy  Cardiovascular: Normal S1 S2, No JVD, + murmurs, No edema  Respiratory: Lungs clear to auscultation	  Psychiatry: A & O x 3, Mood & affect appropriate  Gastrointestinal:  Soft, Non-tender, + BS	  Skin: No rashes, No ecchymoses, No cyanosis	  Neurologic: Non-focal  Extremities: Normal range of motion, decrease redness   Vascular: Peripheral pulses palpable 2+ bilaterally    MEDICATIONS  (STANDING):  aspirin enteric coated 81 milliGRAM(s) Oral daily  atorvastatin 40 milliGRAM(s) Oral at bedtime  buPROPion XL . 300 milliGRAM(s) Oral daily  ceFAZolin   IVPB 1000 milliGRAM(s) IV Intermittent every 8 hours  dextrose 40% Gel 15 Gram(s) Oral once  dextrose 5%. 1000 milliLiter(s) (50 mL/Hr) IV Continuous <Continuous>  dextrose 5%. 1000 milliLiter(s) (100 mL/Hr) IV Continuous <Continuous>  dextrose 50% Injectable 25 Gram(s) IV Push once  dextrose 50% Injectable 12.5 Gram(s) IV Push once  dextrose 50% Injectable 25 Gram(s) IV Push once  escitalopram 20 milliGRAM(s) Oral daily  fluticasone propionate 50 MICROgram(s)/spray Nasal Spray 1 Spray(s) Both Nostrils two times a day  gabapentin 600 milliGRAM(s) Oral three times a day  glucagon  Injectable 1 milliGRAM(s) IntraMuscular once  heparin   Injectable 5000 Unit(s) SubCutaneous every 8 hours  insulin glargine Injectable (LANTUS) 25 Unit(s) SubCutaneous at bedtime  insulin lispro (ADMELOG) corrective regimen sliding scale   SubCutaneous three times a day before meals  levothyroxine 125 MICROGram(s) Oral daily  lidocaine   Patch 2 Patch Transdermal daily  senna 2 Tablet(s) Oral at bedtime  sodium chloride 0.9%. 1000 milliLiter(s) (80 mL/Hr) IV Continuous <Continuous>      TELEMETRY: 	    ECG:  	  RADIOLOGY:  OTHER: 	  	  LABS:	 	    CARDIAC MARKERS:                                10.6   6.02  )-----------( 273      ( 07 Feb 2021 06:57 )             33.5     02-07    139  |  106  |  24<H>  ----------------------------<  118<H>  4.1   |  22  |  1.08    Ca    9.1      07 Feb 2021 06:57    TPro  6.3  /  Alb  3.0<L>  /  TBili  0.2  /  DBili  x   /  AST  16  /  ALT  11  /  AlkPhos  81  02-07    proBNP:   Lipid Profile:   HgA1c:   TSH:   < from: Transthoracic Echocardiogram (07.26.20 @ 10:36) >  1. Mitral annular calcification, otherwise normal mitral  valve. Minimal mitral regurgitation.  2. Normal left ventricular internal dimensions and wall  thicknesses.  3. Endocardium not well visualized; grossly preserved  overall left ventricular systolic function.  Unable to  exclude segmental wall motion abnormalities.  4. Right ventricular enlargement with decreased right  ventricular systolic function.    < end of copied text >        Assessment and plan  ---------------------------   66 year-old femal  with PMH of DM2 on insulin, fibromyalgia, osteoarthritis, sleep apnea on CPAP, depression, venous insufficiency, endometrial CA s/p hysterectomy (2010), HTN, HLD, hypothyroidism, obesity presenting with onset of weakness, chills this morning. Notes she woke up and felt generally crummy. Has had low energy, fatigue, generalized SOB, no fevers at home, episodes of incontinence today without dysuria, foul urine, flank pain. Notes sometimes she feels this was with fibromyalgia flares.   Also reports generalized nausea, but no vomiting. Notes legs are red, but have been this way previously. "maybe a little warmer than usual."  pt with hx of obesity and with  RENARD ,with increasing sob. no chest pain.  hypotension, r/o sepsis  check cultures  ivf  chest x ray noted  le venous doppler  dvt prophylaxis  tsh  esr  physical therapy  oob to chailr  replete k  cellulitis of LE/ ID appreciated  pt with RV dysfunction ?sec to pulmonary htn/sleep apnea  bp has improved  pain management  fu blood sugar closely  add bumex 0. 5 mg daily  continue abx as per ID

## 2021-02-07 NOTE — PROGRESS NOTE ADULT - ASSESSMENT
65 year-old female     h/o   DM2 on insulin, fibromyalgia, osteoarthritis, sleep apnea on CPAP,  RENARD/   depression, venous insufficiency,    endometrial CA s/p hysterectomy (2010)  , HTN,   HLD,   hypothyroidism,  morbid   obesity. CT angio on last visit, no PE       .     presenting with increasing weakness. ?  fevers at home  .  has  generalized pain and weakness at her baseline, uses  a walker.     on arrival, temp is  99, with low  sbp. wbc of  21,000    h/o  morbid  obesity/  BMI  of  60/  with  massive  truncal  obesity/ on nocturnal CPAP   h/o  lymphedema. with hyperpigmented /  raised  lesions santosh  right leg/  wound  care eval  DM, on lantus/  humalog at  home   HTN, Hypothyroid,  on  asa/  lipitor/ synthroid    hold lasix  and enalapril,/ low  sbp   depression, on  mlple  meds/  fibromyalgia / pt wants  clonazepam  and   Hydromorphone/ verified  last viist  Echo,   jorje;  ef  normal  wbc,  blood  c/s,  negative/   c/c  stasis/ redness  , distal legs  on iv ancef  per  ID , pt with  h/o   c/c  stasis  dematitis  anemia,  gi  dr sheikh geronimo< from: CT Angio Chest w/ IV Cont (07.25.20 @ 17:37) >  IMPRESSION:  1.  Limited exam. The segmental and subsegmental pulmonary arteries are not evaluated secondary to poor opacification.  2.  Clear lungs. No aortic aneurysm or dissection.  < end of copied text >        65 year-old female     h/o   DM2 on insulin, fibromyalgia, osteoarthritis, sleep apnea on CPAP,  RENARD/   depression, venous insufficiency,    endometrial CA s/p hysterectomy (2010)  , HTN,   HLD,   hypothyroidism,  morbid   obesity. CT angio on last visit, no PE       .     presenting with increasing weakness. ?  fevers at home  .  has  generalized pain and weakness at her baseline, uses  a walker.     on arrival, temp is  99, with low  sbp. wbc of  21,000    h/o  morbid  obesity/  BMI  of  60/  with  massive  truncal  obesity/ on nocturnal CPAP   h/o  lymphedema. with hyperpigmented /  raised  lesions santosh  right leg/  wound  care eval  DM, on lantus/  humalog at  home   HTN, Hypothyroid,  on  asa/  lipitor/ synthroid    hold lasix  and enalapril,/ low  sbp   depression, on  mlple  meds/  fibromyalgia / pt wants  clonazepam  and   Hydromorphone/ verified  last viist  Echo,   jorje;  ef  normal  wbc,  blood  c/s,  negative/   c/c  stasis/ redness  , distal legs  on iv ancef  per  ID , pt with  h/o   c/c  stasis  dematitis  anemia,   s/p  colonoscopy 5 yrs ago      ra< from: CT Angio Chest w/ IV Cont (07.25.20 @ 17:37) >  IMPRESSION:  1.  Limited exam. The segmental and subsegmental pulmonary arteries are not evaluated secondary to poor opacification.  2.  Clear lungs. No aortic aneurysm or dissection.  < end of copied text >

## 2021-02-08 ENCOUNTER — TRANSCRIPTION ENCOUNTER (OUTPATIENT)
Age: 66
End: 2021-02-08

## 2021-02-08 LAB
ANION GAP SERPL CALC-SCNC: 10 MMOL/L — SIGNIFICANT CHANGE UP (ref 5–17)
BUN SERPL-MCNC: 24 MG/DL — HIGH (ref 7–23)
CALCIUM SERPL-MCNC: 8.8 MG/DL — SIGNIFICANT CHANGE UP (ref 8.4–10.5)
CHLORIDE SERPL-SCNC: 105 MMOL/L — SIGNIFICANT CHANGE UP (ref 96–108)
CO2 SERPL-SCNC: 25 MMOL/L — SIGNIFICANT CHANGE UP (ref 22–31)
CREAT SERPL-MCNC: 1.13 MG/DL — SIGNIFICANT CHANGE UP (ref 0.5–1.3)
GLUCOSE BLDC GLUCOMTR-MCNC: 105 MG/DL — HIGH (ref 70–99)
GLUCOSE BLDC GLUCOMTR-MCNC: 108 MG/DL — HIGH (ref 70–99)
GLUCOSE BLDC GLUCOMTR-MCNC: 124 MG/DL — HIGH (ref 70–99)
GLUCOSE BLDC GLUCOMTR-MCNC: 95 MG/DL — SIGNIFICANT CHANGE UP (ref 70–99)
GLUCOSE SERPL-MCNC: 131 MG/DL — HIGH (ref 70–99)
HCT VFR BLD CALC: 33.3 % — LOW (ref 34.5–45)
HGB BLD-MCNC: 10.4 G/DL — LOW (ref 11.5–15.5)
MAGNESIUM SERPL-MCNC: 1.8 MG/DL — SIGNIFICANT CHANGE UP (ref 1.6–2.6)
MCHC RBC-ENTMCNC: 25.6 PG — LOW (ref 27–34)
MCHC RBC-ENTMCNC: 31.2 GM/DL — LOW (ref 32–36)
MCV RBC AUTO: 81.8 FL — SIGNIFICANT CHANGE UP (ref 80–100)
NRBC # BLD: 0 /100 WBCS — SIGNIFICANT CHANGE UP (ref 0–0)
PLATELET # BLD AUTO: 269 K/UL — SIGNIFICANT CHANGE UP (ref 150–400)
POTASSIUM SERPL-MCNC: 4 MMOL/L — SIGNIFICANT CHANGE UP (ref 3.5–5.3)
POTASSIUM SERPL-SCNC: 4 MMOL/L — SIGNIFICANT CHANGE UP (ref 3.5–5.3)
RBC # BLD: 4.07 M/UL — SIGNIFICANT CHANGE UP (ref 3.8–5.2)
RBC # FLD: 16.5 % — HIGH (ref 10.3–14.5)
SARS-COV-2 RNA SPEC QL NAA+PROBE: SIGNIFICANT CHANGE UP
SODIUM SERPL-SCNC: 140 MMOL/L — SIGNIFICANT CHANGE UP (ref 135–145)
WBC # BLD: 5.47 K/UL — SIGNIFICANT CHANGE UP (ref 3.8–10.5)
WBC # FLD AUTO: 5.47 K/UL — SIGNIFICANT CHANGE UP (ref 3.8–10.5)

## 2021-02-08 PROCEDURE — 99232 SBSQ HOSP IP/OBS MODERATE 35: CPT

## 2021-02-08 RX ORDER — BUMETANIDE 0.25 MG/ML
1 INJECTION INTRAMUSCULAR; INTRAVENOUS
Qty: 30 | Refills: 0
Start: 2021-02-08 | End: 2021-03-09

## 2021-02-08 RX ORDER — FLUTICASONE PROPIONATE 50 MCG
1 SPRAY, SUSPENSION NASAL
Qty: 1 | Refills: 0
Start: 2021-02-08 | End: 2021-02-14

## 2021-02-08 RX ORDER — CEPHALEXIN 500 MG
1 CAPSULE ORAL
Qty: 21 | Refills: 0
Start: 2021-02-08 | End: 2021-02-12

## 2021-02-08 RX ORDER — CEPHALEXIN 500 MG
500 CAPSULE ORAL EVERY 6 HOURS
Refills: 0 | Status: DISCONTINUED | OUTPATIENT
Start: 2021-02-08 | End: 2021-02-09

## 2021-02-08 RX ORDER — LIDOCAINE 4 G/100G
1 CREAM TOPICAL
Qty: 1 | Refills: 0
Start: 2021-02-08 | End: 2021-02-21

## 2021-02-08 RX ORDER — LACTOBACILLUS ACIDOPHILUS 100MM CELL
1 CAPSULE ORAL
Refills: 0 | Status: DISCONTINUED | OUTPATIENT
Start: 2021-02-08 | End: 2021-02-09

## 2021-02-08 RX ORDER — LACTOBACILLUS ACIDOPHILUS 100MM CELL
1 CAPSULE ORAL
Qty: 7 | Refills: 0
Start: 2021-02-08 | End: 2021-02-14

## 2021-02-08 RX ORDER — ACETAMINOPHEN 500 MG
650 TABLET ORAL ONCE
Refills: 0 | Status: COMPLETED | OUTPATIENT
Start: 2021-02-08 | End: 2021-02-08

## 2021-02-08 RX ADMIN — Medication 1 SPRAY(S): at 17:06

## 2021-02-08 RX ADMIN — INSULIN GLARGINE 25 UNIT(S): 100 INJECTION, SOLUTION SUBCUTANEOUS at 21:27

## 2021-02-08 RX ADMIN — ESCITALOPRAM OXALATE 20 MILLIGRAM(S): 10 TABLET, FILM COATED ORAL at 11:50

## 2021-02-08 RX ADMIN — GABAPENTIN 600 MILLIGRAM(S): 400 CAPSULE ORAL at 20:52

## 2021-02-08 RX ADMIN — Medication 1 TABLET(S): at 17:04

## 2021-02-08 RX ADMIN — HEPARIN SODIUM 5000 UNIT(S): 5000 INJECTION INTRAVENOUS; SUBCUTANEOUS at 04:52

## 2021-02-08 RX ADMIN — BUPROPION HYDROCHLORIDE 300 MILLIGRAM(S): 150 TABLET, EXTENDED RELEASE ORAL at 11:51

## 2021-02-08 RX ADMIN — Medication 500 MILLIGRAM(S): at 21:27

## 2021-02-08 RX ADMIN — Medication 650 MILLIGRAM(S): at 21:14

## 2021-02-08 RX ADMIN — Medication 100 MILLIGRAM(S): at 04:51

## 2021-02-08 RX ADMIN — BUMETANIDE 0.5 MILLIGRAM(S): 0.25 INJECTION INTRAMUSCULAR; INTRAVENOUS at 04:53

## 2021-02-08 RX ADMIN — LIDOCAINE 2 PATCH: 4 CREAM TOPICAL at 11:50

## 2021-02-08 RX ADMIN — GABAPENTIN 600 MILLIGRAM(S): 400 CAPSULE ORAL at 12:58

## 2021-02-08 RX ADMIN — Medication 81 MILLIGRAM(S): at 11:50

## 2021-02-08 RX ADMIN — HYDROMORPHONE HYDROCHLORIDE 4 MILLIGRAM(S): 2 INJECTION INTRAMUSCULAR; INTRAVENOUS; SUBCUTANEOUS at 23:55

## 2021-02-08 RX ADMIN — LIDOCAINE 2 PATCH: 4 CREAM TOPICAL at 23:53

## 2021-02-08 RX ADMIN — Medication 125 MICROGRAM(S): at 04:52

## 2021-02-08 RX ADMIN — LIDOCAINE 2 PATCH: 4 CREAM TOPICAL at 20:43

## 2021-02-08 RX ADMIN — HYDROMORPHONE HYDROCHLORIDE 4 MILLIGRAM(S): 2 INJECTION INTRAMUSCULAR; INTRAVENOUS; SUBCUTANEOUS at 02:52

## 2021-02-08 RX ADMIN — GABAPENTIN 600 MILLIGRAM(S): 400 CAPSULE ORAL at 04:52

## 2021-02-08 RX ADMIN — HEPARIN SODIUM 5000 UNIT(S): 5000 INJECTION INTRAVENOUS; SUBCUTANEOUS at 20:53

## 2021-02-08 RX ADMIN — Medication 100 MILLIGRAM(S): at 12:59

## 2021-02-08 RX ADMIN — HEPARIN SODIUM 5000 UNIT(S): 5000 INJECTION INTRAVENOUS; SUBCUTANEOUS at 12:58

## 2021-02-08 RX ADMIN — Medication 1 TABLET(S): at 20:52

## 2021-02-08 RX ADMIN — HYDROMORPHONE HYDROCHLORIDE 4 MILLIGRAM(S): 2 INJECTION INTRAMUSCULAR; INTRAVENOUS; SUBCUTANEOUS at 17:06

## 2021-02-08 RX ADMIN — Medication 1 SPRAY(S): at 04:52

## 2021-02-08 RX ADMIN — ATORVASTATIN CALCIUM 40 MILLIGRAM(S): 80 TABLET, FILM COATED ORAL at 20:52

## 2021-02-08 NOTE — CONSULT NOTE ADULT - ASSESSMENT
Impression:    BLE venous stasis dermatitis  BLE edema    Recommend:  1.) BLE elevation  2.) Diuresis per Medicine/Cards  3.) Emollient therapy: Moisturize intact skin w/ SWEEN cream daily  4.) Nutrition optimization  5.) Glycemic control  6.) Follow up in the [facility] wound care center for compression garment fitting    Care as per medicine. Will not follow. Please recall for new issues.  Upon discharge f/u as outpatient at [facility] [address] [facility] [phone]  Seen with Dr. Dawn.  Thank you for this consult  Connie Dalal, NP-C, CWOCN [id number]

## 2021-02-08 NOTE — DISCHARGE NOTE PROVIDER - NSDCMRMEDTOKEN_GEN_ALL_CORE_FT
aspirin 81 mg oral delayed release tablet: 1 tab(s) orally once a day  atorvastatin 40 mg oral tablet: 1 tab(s) orally once a day (at bedtime)  buPROPion 300 mg/24 hours (XL) oral tablet, extended release: 1 tab(s) orally once a day  clonazePAM 0.5 mg oral tablet: 1 tab(s) orally 3 times a day, As needed, anxiety  enalapril 20 mg oral tablet: 1 tab(s) orally once a day  escitalopram 20 mg oral tablet: 1 tab(s) orally once a day  gabapentin 600 mg oral tablet: 1 tab(s) orally 3 times a day  HumaLOG 100 units/mL subcutaneous solution: Sliding Scale:  1 Unit(s) if Glucose 151 - 200  2 Unit(s) if Glucose 201 - 250  3 Unit(s) if Glucose 251 - 300  4 Unit(s) if Glucose 301 - 350  5 Unit(s) if Glucose 351 - 400  6 Unit(s) if Glucose Greater Than 400  Lantus 100 units/mL subcutaneous solution: 30 unit(s) subcutaneous once a day (at bedtime)  Lasix 40 mg oral tablet: 1 tab(s) orally once a day  levothyroxine 125 mcg (0.125 mg) oral tablet: 1 tab(s) orally once a day  omeprazole 40 mg oral delayed release capsule: 1 cap(s) orally once a day   aspirin 81 mg oral delayed release tablet: 1 tab(s) orally once a day  atorvastatin 40 mg oral tablet: 1 tab(s) orally once a day (at bedtime)  buPROPion 300 mg/24 hours (XL) oral tablet, extended release: 1 tab(s) orally once a day  clonazePAM 0.5 mg oral tablet: 1 tab(s) orally 3 times a day, As needed, anxiety  escitalopram 20 mg oral tablet: 1 tab(s) orally once a day  gabapentin 600 mg oral tablet: 1 tab(s) orally 3 times a day  Lantus 100 units/mL subcutaneous solution: 25 unit(s) subcutaneous once a day (at bedtime)  levothyroxine 125 mcg (0.125 mg) oral tablet: 1 tab(s) orally once a day  omeprazole 40 mg oral delayed release capsule: 1 cap(s) orally once a day  spironolactone 25 mg oral tablet: 1 tab(s) orally once a day   aspirin 81 mg oral delayed release tablet: 1 tab(s) orally once a day  atorvastatin 40 mg oral tablet: 1 tab(s) orally once a day (at bedtime)  buPROPion 300 mg/24 hours (XL) oral tablet, extended release: 1 tab(s) orally once a day  clonazePAM 0.5 mg oral tablet: 1 tab(s) orally 3 times a day, As needed, anxiety  escitalopram 20 mg oral tablet: 1 tab(s) orally once a day  gabapentin 600 mg oral tablet: 1 tab(s) orally 3 times a day  HYDROmorphone 4 mg oral tablet: 1 tab(s) orally every 6 hours, As needed, Severe Pain (7 - 10)  Lantus 100 units/mL subcutaneous solution: 25 unit(s) subcutaneous once a day (at bedtime)  levothyroxine 125 mcg (0.125 mg) oral tablet: 1 tab(s) orally once a day  omeprazole 40 mg oral delayed release capsule: 1 cap(s) orally once a day  spironolactone 25 mg oral tablet: 1 tab(s) orally once a day

## 2021-02-08 NOTE — PROGRESS NOTE ADULT - SUBJECTIVE AND OBJECTIVE BOX
no  complaints  REVIEW OF SYSTEMS:  GEN: no fever,    no chills  RESP: no SOB,   no cough  CVS: no chest pain,   no palpitations  GI: no abdominal pain,   no nausea,   no vomiting,   no constipation,   no diarrhea  : no dysuria,   no frequency  NEURO: no headache,   no dizziness  PSYCH: no depression,   not anxious  Derm : no rash    MEDICATIONS  (STANDING):  aspirin enteric coated 81 milliGRAM(s) Oral daily  atorvastatin 40 milliGRAM(s) Oral at bedtime  buMETAnide 0.5 milliGRAM(s) Oral daily  buPROPion XL . 300 milliGRAM(s) Oral daily  ceFAZolin   IVPB 1000 milliGRAM(s) IV Intermittent every 8 hours  dextrose 40% Gel 15 Gram(s) Oral once  dextrose 5%. 1000 milliLiter(s) (50 mL/Hr) IV Continuous <Continuous>  dextrose 5%. 1000 milliLiter(s) (100 mL/Hr) IV Continuous <Continuous>  dextrose 50% Injectable 25 Gram(s) IV Push once  dextrose 50% Injectable 12.5 Gram(s) IV Push once  dextrose 50% Injectable 25 Gram(s) IV Push once  escitalopram 20 milliGRAM(s) Oral daily  fluticasone propionate 50 MICROgram(s)/spray Nasal Spray 1 Spray(s) Both Nostrils two times a day  gabapentin 600 milliGRAM(s) Oral three times a day  glucagon  Injectable 1 milliGRAM(s) IntraMuscular once  heparin   Injectable 5000 Unit(s) SubCutaneous every 8 hours  insulin glargine Injectable (LANTUS) 25 Unit(s) SubCutaneous at bedtime  insulin lispro (ADMELOG) corrective regimen sliding scale   SubCutaneous three times a day before meals  levothyroxine 125 MICROGram(s) Oral daily  lidocaine   Patch 2 Patch Transdermal daily  senna 2 Tablet(s) Oral at bedtime    MEDICATIONS  (PRN):  clonazePAM  Tablet 0.5 milliGRAM(s) Oral three times a day PRN anxiety  HYDROmorphone   Tablet 4 milliGRAM(s) Oral every 6 hours PRN Severe Pain (7 - 10)      Vital Signs Last 24 Hrs  T(C): 36.3 (08 Feb 2021 04:41), Max: 36.9 (07 Feb 2021 20:12)  T(F): 97.4 (08 Feb 2021 04:41), Max: 98.5 (07 Feb 2021 20:12)  HR: 51 (08 Feb 2021 04:41) (50 - 70)  BP: 134/82 (08 Feb 2021 04:41) (101/71 - 134/82)  BP(mean): --  RR: 18 (08 Feb 2021 04:41) (18 - 18)  SpO2: 97% (08 Feb 2021 04:41) (95% - 98%)  CAPILLARY BLOOD GLUCOSE      POCT Blood Glucose.: 164 mg/dL (07 Feb 2021 20:55)  POCT Blood Glucose.: 102 mg/dL (07 Feb 2021 16:21)  POCT Blood Glucose.: 123 mg/dL (07 Feb 2021 11:24)  POCT Blood Glucose.: 121 mg/dL (07 Feb 2021 07:40)    I&O's Summary    06 Feb 2021 07:01  -  07 Feb 2021 07:00  --------------------------------------------------------  IN: 2320 mL / OUT: 1050 mL / NET: 1270 mL    07 Feb 2021 07:01  -  08 Feb 2021 06:51  --------------------------------------------------------  IN: 1110 mL / OUT: 2300 mL / NET: -1190 mL        PHYSICAL EXAM:  HEAD:  Atraumatic, Normocephalic  NECK: Supple, No   JVD  CHEST/LUNG:   no     rales,     no,    rhonchi  HEART: Regular rate and rhythm;         murmur  ABDOMEN: Soft, Nontender, ;   EXTREMITIES:   less  redness.     edema  NEUROLOGY:  alert    LABS:                        10.4   5.47  )-----------( 269      ( 08 Feb 2021 06:34 )             33.3     02-07    139  |  106  |  24<H>  ----------------------------<  118<H>  4.1   |  22  |  1.08    Ca    9.1      07 Feb 2021 06:57    TPro  6.3  /  Alb  3.0<L>  /  TBili  0.2  /  DBili  x   /  AST  16  /  ALT  11  /  AlkPhos  81  02-07                            Consultant(s) Notes Reviewed:      Care Discussed with Consultants/Other Providers:

## 2021-02-08 NOTE — DISCHARGE NOTE PROVIDER - NSDCCPCAREPLAN_GEN_ALL_CORE_FT
PRINCIPAL DISCHARGE DIAGNOSIS  Diagnosis: Cellulitis  Assessment and Plan of Treatment: Continue PO Keflex, now off IV Ancef.      SECONDARY DISCHARGE DIAGNOSES  Diagnosis: Venous stasis dermatitis  Assessment and Plan of Treatment: F/u with PMD within 1 week. F/u with outpatient wound doctor to discuss options for leg compression once cellulitis resolves.    Diagnosis: Anemia  Assessment and Plan of Treatment: H/H stable. s/p colonoscopy 5 years ago.   F/u outpatient for routine colonoscopy    Diagnosis: Fibromyalgia  Assessment and Plan of Treatment: Continue home regimen:  Dilaudid 4 mg PO every 6 hours PRN, gabapentin 600 mg every 8 hours, Lexapro 20 mg daily, Wellbutrin  mg daily  Bowel Regimen  Lidoderm patches to right shoulder and right knee daily  Warm/cool packs for comfort  Follow up with Dr. Hi for continued pain management after discharge    Diagnosis: Hypotension  Assessment and Plan of Treatment:      PRINCIPAL DISCHARGE DIAGNOSIS  Diagnosis: Cellulitis  Assessment and Plan of Treatment: Ensure that you complete your entire course of antibiotics.   Call your Health Care Provider within two days of arriving home to make a follow up appointment within one week.  If the affected cellulitic area increases in redness, warmth, pain or swelling call your Health Care Provider.  If you develop fever, chills, and/or malaise, call your Health Care Provider.        SECONDARY DISCHARGE DIAGNOSES  Diagnosis: Venous stasis dermatitis  Assessment and Plan of Treatment: F/u with PMD within 1 week. F/u with outpatient wound doctor to discuss options for leg compression once cellulitis resolves.  Moisturize intact skin daily.    Diagnosis: Anemia  Assessment and Plan of Treatment: H/H stable. s/p colonoscopy 5 years ago.   F/u outpatient for routine colonoscopy    Diagnosis: Fibromyalgia  Assessment and Plan of Treatment: Continue home regimen:  Dilaudid 4 mg PO every 6 hours PRN, gabapentin 600 mg every 8 hours, Lexapro 20 mg daily, Wellbutrin  mg daily  Bowel Regimen  Lidoderm patches to right shoulder and right knee daily  Warm/cool packs for comfort  Follow up with Dr. Hi for continued pain management after discharge    Diagnosis: Hypotension  Assessment and Plan of Treatment: Now resolved

## 2021-02-08 NOTE — CONSULT NOTE ADULT - SUBJECTIVE AND OBJECTIVE BOX
Wound Surgery Consult Note:    HPI:     66 year-old female   with PMH of DM2 on insulin, fibromyalgia, osteoarthritis, sleep apnea on CPAP, depression, venous insufficiency, endometrial CA s/p hysterectomy (2010),   HTN, HLD, hypothyroidism, obesity   presenting with onset of weakness, chills this morning. Notes she woke up and felt generally crummy. Has had low energy, fatigue, generalized SOB, no fevers at home, episodes of incontinence today without dysuria, foul urine, flank pain. Notes sometimes she feels this was with fibromyalgia flares.   Also reports generalized nausea, but no vomiting. Notes legs are red, but have been this way previously. "maybe a little warmer than usual."     (04 Feb 2021 12:46)    Request for wound care consult for BLE received. Ms. Alvarez was encountered on an alternating air with low air loss surface. She reported that her legs are sometimes as red as they are now but that the erythema and edema are  much improved since she was admitted. She reported that she has compression hose at home but is unable to apply them independently. She was seen with Dr. Dawn who suggested follow up in the [facility] wound center for alternative compression garment fitting.    PAST MEDICAL & SURGICAL HISTORY:  Umbilical Hernia  Restless Legs Syndrome (RLS)  GERD with Apnea  Fibromyalgia  Morbidly Obese  Environmental Allergies  Depression  Diarrhea  Constipation  Obstructive Sleep Apnea  Osteoarthritis of Knee  right. requires cane  High Triglycerides  Hyperlipidemia  Diabetes Mellitus  Hypothyroidism  Personal History of Hypertension  Personal History of Female Genital Cancer  Personal History of Arthritis  Incarcerated Ventral Hernia  Endometrial Ca s/p RODRIGO, BSO  Carpal Tunnel Syndrome  release left  S/P FESS (Functional Endoscopic Sinus Surgery)  S/P Tonsillectomy and Adenoidectomy    REVIEW OF SYSTEMS  General: felf chills	  Respiratory and Thorax: +SOB  Cardiovascular:	no CP  Gastrointestinal:	 + nausea, no vomiting  Genitourinary: incontinence	  Musculoskeletal:	 ambulatory with difficulty  Neurological:	no LOC  Endocrine:	DM  Vascular: frequent BLE edema  Skin: frequent BLE erythema now decreased    MEDICATIONS  (STANDING):  aspirin enteric coated 81 milliGRAM(s) Oral daily  atorvastatin 40 milliGRAM(s) Oral at bedtime  buMETAnide 0.5 milliGRAM(s) Oral daily  buPROPion XL . 300 milliGRAM(s) Oral daily  cephalexin 500 milliGRAM(s) Oral every 6 hours  dextrose 40% Gel 15 Gram(s) Oral once  dextrose 5%. 1000 milliLiter(s) (50 mL/Hr) IV Continuous <Continuous>  dextrose 5%. 1000 milliLiter(s) (100 mL/Hr) IV Continuous <Continuous>  dextrose 50% Injectable 25 Gram(s) IV Push once  dextrose 50% Injectable 12.5 Gram(s) IV Push once  dextrose 50% Injectable 25 Gram(s) IV Push once  escitalopram 20 milliGRAM(s) Oral daily  fluticasone propionate 50 MICROgram(s)/spray Nasal Spray 1 Spray(s) Both Nostrils two times a day  gabapentin 600 milliGRAM(s) Oral three times a day  glucagon  Injectable 1 milliGRAM(s) IntraMuscular once  heparin   Injectable 5000 Unit(s) SubCutaneous every 8 hours  insulin glargine Injectable (LANTUS) 25 Unit(s) SubCutaneous at bedtime  insulin lispro (ADMELOG) corrective regimen sliding scale   SubCutaneous three times a day before meals  lactobacillus acidophilus 1 Tablet(s) Oral two times a day  levothyroxine 125 MICROGram(s) Oral daily  lidocaine   Patch 2 Patch Transdermal daily    MEDICATIONS  (PRN):  clonazePAM  Tablet 0.5 milliGRAM(s) Oral three times a day PRN anxiety  HYDROmorphone   Tablet 4 milliGRAM(s) Oral every 6 hours PRN Severe Pain (7 - 10)    Allergies    adhesives (Rash)  aerosols, perfumes, fabric softeners (Rash; Rhinitis; Rhinorrhea)  dust (Rhinitis; Rhinorrhea)  Lyrica (Rash)  pollen (Rhinitis; Rhinorrhea)  smoke (Rhinitis; Rhinorrhea)  Tin/costume jewelry (Rash)    Intolerances    SOCIAL HISTORY:   Denies smoking, ETOH, drugs    FAMILY HISTORY: non contributory    Vital Signs Last 24 Hrs  T(C): 36.6 (08 Feb 2021 11:17), Max: 36.6 (08 Feb 2021 11:17)  T(F): 97.9 (08 Feb 2021 11:17), Max: 97.9 (08 Feb 2021 11:17)  HR: 54 (08 Feb 2021 11:17) (51 - 70)  BP: 136/63 (08 Feb 2021 11:17) (134/82 - 136/63)  BP(mean): --  RR: 18 (08 Feb 2021 11:17) (18 - 18)  SpO2: 97% (08 Feb 2021 11:17) (95% - 97%)    Physical Exam:  General: A&Ox3, MO  Respiratory: no SOB on room air   Gastrointestinal: soft NT/ND   Neurology: weakened strength & sensation grossly intact  Musculoskeletal: no contractures, ROM limited by body habitus  Vascular: BLE edema equal, DP/PT pulses palpable, BLE equally warm  Skin:  BLE gaiter area erythema, in tact skin, no drainage  No odor, erythema, increased warmth, tenderness, induration, fluctuance    LABS:  02-08    140  |  105  |  24<H>  ----------------------------<  131<H>  4.0   |  25  |  1.13    Ca    8.8      08 Feb 2021 06:33  Mg     1.8     02-08    TPro  6.3  /  Alb  3.0<L>  /  TBili  0.2  /  DBili  x   /  AST  16  /  ALT  11  /  AlkPhos  81  02-07                          10.4   5.47  )-----------( 269      ( 08 Feb 2021 06:34 )             33.3           RADIOLOGY & ADDITIONAL STUDIES:EXAM:  DUPLEX SCAN EXT VEINS LOWER BI                            PROCEDURE DATE:  02/05/2021            INTERPRETATION:  CLINICAL INFORMATION: Bilateral lower extremity swelling and redness.    COMPARISON: Prior Doppler dated 7/27/2020.    TECHNIQUE: Duplex sonography of the BILATERAL LOWER extremity veins with color and spectral Doppler, with and without compression.    FINDINGS:    There is normal compressibility of the bilateral common femoral, femoral and popliteal veins.  Doppler examination shows normal spontaneous and phasic flow.    No calf vein thrombosis is detected.    IMPRESSION:  No evidence of deep venous thrombosis in either lower extremity.

## 2021-02-08 NOTE — DISCHARGE NOTE PROVIDER - HOSPITAL COURSE
66 year-old female with PMHx of DM II, fibromyalgia, osteoarthritis, sleep apnea on CPAP, depression, venous insufficiency, endometrial CA s/p hysterectomy (2010), HTN, HLD, hypothyroidism, morbid obesity presents with increasing weakness, fatigue, and chills.    1. Sepsis 2/2 LE cellulitis. Pt evaluated by ID, treated with Ancef. Transitioned to PO Keflex on discharge.    2. Hx of chronic pain, Fibromyalgia on Dilaudid PRN. Seen by chronic pain management. Pt will f/u with Dr. Beth outpatient.    3. Hypotension. Lasix  and enalapril were held for low  systolic blood pressure. Pressure improved and now placed on Bumex 0.5mg daily.    4. Depression, on  multiple  meds    5. Venous Stasis , distal legs, stasis dermatitis. Recommend compression stockings    6. Anemia, s/p  colonoscopy 5 yrs ago. F/u outpatient for colonoscopy. H/H stable during course of stay.      Pt discharged to rehab. F/u with cardiology

## 2021-02-08 NOTE — DISCHARGE NOTE NURSING/CASE MANAGEMENT/SOCIAL WORK - PATIENT PORTAL LINK FT
You can access the FollowMyHealth Patient Portal offered by [facility] by registering at the following website: [email or web address]. By joining [facility]’s FollowMyHealth portal, you will also be able to view your health information using other applications (apps) compatible with our system.

## 2021-02-08 NOTE — DISCHARGE NOTE PROVIDER - CARE PROVIDER_API CALL
Dr Liz Guevara  PMD  Phone: (   )    -  Fax: (   )    -  Follow Up Time:     Thomas Hi  ANESTHESIOLOGY  [address], [address]  [place] [address]  Phone: [phone]  Fax: [phone]  Follow Up Time:

## 2021-02-08 NOTE — PROGRESS NOTE ADULT - ASSESSMENT
65 year-old female     h/o   DM2 on insulin, fibromyalgia, osteoarthritis, sleep apnea on CPAP,  RENARD/   depression, venous insufficiency,    endometrial CA s/p hysterectomy (2010)  , HTN,   HLD,   hypothyroidism,  morbid   obesity. CT angio on last visit, no PE       .     presenting with increasing weakness. ?  fevers at home  .  has  generalized pain and weakness at her baseline, uses  a walker.     on arrival, temp is  99, with low  sbp. wbc of  21,000    h/o  morbid  obesity/  BMI  of  60/  with  massive  truncal  obesity/ on nocturnal CPAP   h/o  lymphedema. with hyperpigmented /  raised  lesions santosh  right leg/  wound  care eval  DM, on lantus/  humalog at  home   HTN, Hypothyroid,  on  asa/  lipitor/ synthroid    hold lasix  and enalapril,/ low  sbp   depression, on  mlple  meds/  fibromyalgia / pt wants  clonazepam  and   Hydromorphone/ verified  last viist  Echo,   jorje;  ef  normal  wbc,  blood  c/s,  negative/   c/c  stasis/ redness  , distal legs  on iv ancef  per  ID , pt with  h/o   c/c  stasis  dematitis  anemia,   s/p  colonoscopy 5 yrs agolegs  hav e  improved.  change to po  ab      ra< from: CT Angio Chest w/ IV Cont (07.25.20 @ 17:37) >  IMPRESSION:  1.  Limited exam. The segmental and subsegmental pulmonary arteries are not evaluated secondary to poor opacification.  2.  Clear lungs. No aortic aneurysm or dissection.  < end of copied text >

## 2021-02-08 NOTE — DISCHARGE NOTE PROVIDER - PROVIDER TOKENS
FREE:[LAST:[Paola],FIRST:[Dr Hill],PHONE:[(   )    -],FAX:[(   )    -],ADDRESS:[PMD]],PROVIDER:[TOKEN:[[id number]]]

## 2021-02-08 NOTE — CHART NOTE - NSCHARTNOTEFT_GEN_A_CORE
65 year-old female     h/o   DM2 on insulin, fibromyalgia, osteoarthritis, sleep apnea on CPAP,  RENARD/   depression, venous insufficiency,    endometrial CA s/p hysterectomy (2010)  , HTN,   HLD,   hypothyroidism,  morbid   obesity. CT angio on last visit, no PE  c/o chest pain  non radiating 5/10 last 2 min  cardiac enzyme   admit to tel  D/w Dr Britt
66F PMHx DM2 on insulin, fibromyalgia, osteoarthritis, sleep apnea on CPAP, depression, venous insufficiency, endometrial CA s/p hysterectomy (2010),   HTN, HLD, hypothyroidism, obesity  Admitted with weakness and chills, leukocytosis and fever in ER - sepsis    Current out- patient pain regimen: Dilaudid 4 mg every 6 hours, also on  Klonopin 0.5 mg TID  Out Patient Pain Management provider: Dr. William Hi    EMR review, RN documentation of pain is 0/10, no Dilaudid use since yesterday.   During consult pt was clear that this is her chronic pain (B/L knees R>L and right shoulder), unchanged, good relief with home regimen    Continue home regimen:  Dilaudid 4 mg PO every 6 hours PRN, gabapentin 600 mg every 8 hours, Lexapro 20 mg daily, Wellbutrin  mg daily  Bowel Regimen  Lidoderm patches to right shoulder and right knee daily  Warm/cool packs for comfort  Incentive Spirometer  PT per primary team  Monitor for sedation, respiratory depression  Follow up with Dr. Hi for continued pain management after discharge    Signing off    Chronic Pain Service  [phone]
TO BE COMPLETED WITHIN 6 HOURS OF INITIAL ASSESSMENT:     Meets criteria for Fever and Leukocytosis   For use in patients that have 2 sepsis criteria and new organ dysfunction   - Lactate of 2.6 -> 1.8     If patient persistent hypotension (SBP<90) or any lactate >4 then provider evaluation (Physician/PA/NP) within 30 minutes of bolus completion is required.    Vital Signs Last 24 Hrs  T(C): 37.2 (04 Feb 2021 06:42), Max: 38.1 (04 Feb 2021 01:52)  T(F): 98.9 (04 Feb 2021 06:42), Max: 100.5 (04 Feb 2021 01:52)  HR: 75 (04 Feb 2021 06:42) (75 - 80)  BP: 113/44 (04 Feb 2021 06:42) (113/44 - 129/60)  BP(mean): 61 (04 Feb 2021 06:10) (61 - 61)  RR: 16 (04 Feb 2021 06:42) (16 - 20)  SpO2: 98% (04 Feb 2021 06:42) (97% - 100%)  		  LUNGS:  [ X ] Clear bilaterally [  ] Wheeze [  ] Rhonchi [  ] Rales [  ] Crackles; Other:  HEART: [X  ]RRR [  ] No murmur [  ]  Normal S1S2 [  ] Tachycardia;  Other:  CAPILLARY REFILL:  	Fingers: [ X ] less than 2 seconds [  ] more than 2 seconds                                           Toes: [X  ]  less than 2 seconds [  ] more than 2 seconds   PERIPHERAL PULSES:  Radial: [X ] Palpable  [  ]  non-palpable                                         Dorsalis Pedis: [   Palpable  [  ] non-palpable                                         Posterior Tibial: [  ] Palpable  [  ] non-palpable                                          Other:  SKIN:   [  ]  Diaphoretic  [  ]  Mottling  [  ]  Cold extremities  [x  ]  Warm [  ]  Dry                      Other:    BEDSIDE ULTRASOUND FINDINGS (IF APPLICABLE):    Labs:  04 Feb 2021 02:06    137    |  93     |  49     ----------------------------<  191    4.0     |  30     |  1.49     Ca    9.9        04 Feb 2021 02:06  Phos  3.9       04 Feb 2021 02:06  Mg     1.8       04 Feb 2021 02:06    TPro  7.9    /  Alb  4.0    /  TBili  0.6    /  DBili  x      /  AST  11     /  ALT  10     /  AlkPhos  101    04 Feb 2021 02:06                          12.4   21.39 )-----------( 317      ( 04 Feb 2021 02:06 )             39.4       Lactate:    [ X] I have re-evaluated the patient's fluid status and reviewed vital signs. Clinical evaluation demonstrates an appropriate response to fluid resuscitation, with subsequent plan as follows:  S/P 2 L IVF Bolus     Patient received a modified total of fluid resuscitation for the following reason:  [ ] obesity BMI > 30, patient received 30 cc/kg according to Ideal Body Weight   [ ] acute, decompensated CHF   [ ] pulmonary edema    [ ] ESRD with signs of fluid overload  [ ] presence of LVAD     Plan (orders must be placed in EMR):     [X  ]  Check Repeat Lactate   [ X ]  No change in current plan  [  ]  Start Vasopressors:  [  ]  Repeat Fluid Bolus:  [  ] other:    Care Discussed with Consultants/Other Providers [ ] YES  [ ] NO

## 2021-02-08 NOTE — PROGRESS NOTE ADULT - SUBJECTIVE AND OBJECTIVE BOX
CARDIOLOGY     PROGRESS  NOTE   ________________________________________________    CHIEF COMPLAINT:Patient is a 66y old  Female who presents with a chief complaint of weakness (05 Feb 2021 16:39)  doing better.  	  REVIEW OF SYSTEMS:  CONSTITUTIONAL: No fever, weight loss, or fatigue  EYES: No eye pain, visual disturbances, or discharge  ENT:  No difficulty hearing, tinnitus, vertigo; No sinus or throat pain  NECK: No pain or stiffness  RESPIRATORY: No cough, wheezing, chills or hemoptysis; No Shortness of Breath  CARDIOVASCULAR: No chest pain, palpitations, passing out, dizziness, or leg swelling  GASTROINTESTINAL: No abdominal or epigastric pain. No nausea, vomiting, or hematemesis; No diarrhea or constipation. No melena or hematochezia.  GENITOURINARY: No dysuria, frequency, hematuria, or incontinence  NEUROLOGICAL: No headaches, memory loss, loss of strength, numbness, or tremors  SKIN: No itching, burning, rashes, or lesions   LYMPH Nodes: No enlarged glands  ENDOCRINE: No heat or cold intolerance; No hair loss  MUSCULOSKELETAL: No joint pain or swelling; No muscle, back, or extremity pain  PSYCHIATRIC: No depression, anxiety, mood swings, or difficulty sleeping  HEME/LYMPH: No easy bruising, or bleeding gums  ALLERGY AND IMMUNOLOGIC: No hives or eczema	    [ ] All others negative	  [ ] Unable to obtain    PHYSICAL EXAM:  T(C): 36.3 (02-08-21 @ 04:41), Max: 36.9 (02-07-21 @ 20:12)  HR: 51 (02-08-21 @ 04:41) (50 - 70)  BP: 134/82 (02-08-21 @ 04:41) (101/71 - 134/82)  RR: 18 (02-08-21 @ 04:41) (18 - 18)  SpO2: 97% (02-08-21 @ 04:41) (95% - 98%)  Wt(kg): --  I&O's Summary    07 Feb 2021 07:01  -  08 Feb 2021 07:00  --------------------------------------------------------  IN: 1110 mL / OUT: 2300 mL / NET: -1190 mL        Appearance: Normal	  HEENT:   Normal oral mucosa, PERRL, EOMI	  Lymphatic: No lymphadenopathy  Cardiovascular: Normal S1 S2, No JVD, + murmurs, + trace edema  Respiratory: Lungs clear to auscultation	  Psychiatry: A & O x 3, Mood & affect appropriate  Gastrointestinal:  Soft, Non-tender, + BS	  Skin: No rashes, No ecchymoses, No cyanosis	  Neurologic: Non-focal  Extremities: Normal range of motion, No clubbing, cyanosis. +edema sig improvement in redness in both LE  Vascular: Peripheral pulses palpable 2+ bilaterally    MEDICATIONS  (STANDING):  aspirin enteric coated 81 milliGRAM(s) Oral daily  atorvastatin 40 milliGRAM(s) Oral at bedtime  buMETAnide 0.5 milliGRAM(s) Oral daily  buPROPion XL . 300 milliGRAM(s) Oral daily  ceFAZolin   IVPB 1000 milliGRAM(s) IV Intermittent every 8 hours  dextrose 40% Gel 15 Gram(s) Oral once  dextrose 5%. 1000 milliLiter(s) (50 mL/Hr) IV Continuous <Continuous>  dextrose 5%. 1000 milliLiter(s) (100 mL/Hr) IV Continuous <Continuous>  dextrose 50% Injectable 25 Gram(s) IV Push once  dextrose 50% Injectable 12.5 Gram(s) IV Push once  dextrose 50% Injectable 25 Gram(s) IV Push once  escitalopram 20 milliGRAM(s) Oral daily  fluticasone propionate 50 MICROgram(s)/spray Nasal Spray 1 Spray(s) Both Nostrils two times a day  gabapentin 600 milliGRAM(s) Oral three times a day  glucagon  Injectable 1 milliGRAM(s) IntraMuscular once  heparin   Injectable 5000 Unit(s) SubCutaneous every 8 hours  insulin glargine Injectable (LANTUS) 25 Unit(s) SubCutaneous at bedtime  insulin lispro (ADMELOG) corrective regimen sliding scale   SubCutaneous three times a day before meals  levothyroxine 125 MICROGram(s) Oral daily  lidocaine   Patch 2 Patch Transdermal daily  senna 2 Tablet(s) Oral at bedtime      TELEMETRY: 	    ECG:  	  RADIOLOGY:  OTHER: 	  	  LABS:	 	    CARDIAC MARKERS:                                10.4   5.47  )-----------( 269      ( 08 Feb 2021 06:34 )             33.3     02-08    140  |  105  |  24<H>  ----------------------------<  131<H>  4.0   |  25  |  1.13    Ca    8.8      08 Feb 2021 06:33  Mg     1.8     02-08    TPro  6.3  /  Alb  3.0<L>  /  TBili  0.2  /  DBili  x   /  AST  16  /  ALT  11  /  AlkPhos  81  02-07    proBNP:   Lipid Profile:   HgA1c:   TSH:   * looks like strep cellulitis  * c/w cefazolin 1 q 8  * antibiotics started 2/4, now day 3, will likely complete a 10 day course  * when ready for discharge, switch to PO keflex 500 q 6 to complete the course      Assessment and plan  ---------------------------   66 year-old femal  with PMH of DM2 on insulin, fibromyalgia, osteoarthritis, sleep apnea on CPAP, depression, venous insufficiency, endometrial CA s/p hysterectomy (2010), HTN, HLD, hypothyroidism, obesity presenting with onset of weakness, chills this morning. Notes she woke up and felt generally crummy. Has had low energy, fatigue, generalized SOB, no fevers at home, episodes of incontinence today without dysuria, foul urine, flank pain. Notes sometimes she feels this was with fibromyalgia flares.   Also reports generalized nausea, but no vomiting. Notes legs are red, but have been this way previously. "maybe a little warmer than usual."  pt with hx of obesity and with  RENARD ,with increasing sob. no chest pain.  hypotension, r/o sepsis  check cultures  ivf  chest x ray noted  le venous doppler  dvt prophylaxis  tsh  esr  physical therapy  oob to chailr  replete k  cellulitis of LE/ ID appreciated  pt with RV dysfunction ?sec to pulmonary htn/sleep apnea  bp has improved  pain management  fu blood sugar closely  add bumex 0. 5 mg daily  continue abx as per ID  will re start on po losartan as bp improves

## 2021-02-08 NOTE — CONSULT NOTE ADULT - ATTENDING COMMENTS
Obese 65 yo diabetic female , initially admitted fro cellulitis of leg  reports that she had been treated by wound Care at [facility] , and was last advised to use support hose  She has not benoit successful in using these , related to the difficulty of placing them on her legs  Currently cellulitis of left  leg is resolving  there are no leg wounds  There is bilateral stasis dermatitis present and a VD which is negative for DVT  There is resolving edema of the legs   continue diuresis  Discussed various options of compression   OP f/u info provided or return to [facility]
Above noted   See attending note

## 2021-02-09 VITALS — HEART RATE: 56 BPM | OXYGEN SATURATION: 95 %

## 2021-02-09 LAB
ANION GAP SERPL CALC-SCNC: 12 MMOL/L — SIGNIFICANT CHANGE UP (ref 5–17)
BUN SERPL-MCNC: 22 MG/DL — SIGNIFICANT CHANGE UP (ref 7–23)
CALCIUM SERPL-MCNC: 9.3 MG/DL — SIGNIFICANT CHANGE UP (ref 8.4–10.5)
CHLORIDE SERPL-SCNC: 106 MMOL/L — SIGNIFICANT CHANGE UP (ref 96–108)
CO2 SERPL-SCNC: 25 MMOL/L — SIGNIFICANT CHANGE UP (ref 22–31)
CREAT SERPL-MCNC: 1.1 MG/DL — SIGNIFICANT CHANGE UP (ref 0.5–1.3)
CULTURE RESULTS: SIGNIFICANT CHANGE UP
CULTURE RESULTS: SIGNIFICANT CHANGE UP
GLUCOSE BLDC GLUCOMTR-MCNC: 127 MG/DL — HIGH (ref 70–99)
GLUCOSE BLDC GLUCOMTR-MCNC: 129 MG/DL — HIGH (ref 70–99)
GLUCOSE BLDC GLUCOMTR-MCNC: 142 MG/DL — HIGH (ref 70–99)
GLUCOSE SERPL-MCNC: 147 MG/DL — HIGH (ref 70–99)
HCT VFR BLD CALC: 36.2 % — SIGNIFICANT CHANGE UP (ref 34.5–45)
HGB BLD-MCNC: 11.2 G/DL — LOW (ref 11.5–15.5)
MAGNESIUM SERPL-MCNC: 2 MG/DL — SIGNIFICANT CHANGE UP (ref 1.6–2.6)
MCHC RBC-ENTMCNC: 25.5 PG — LOW (ref 27–34)
MCHC RBC-ENTMCNC: 30.9 GM/DL — LOW (ref 32–36)
MCV RBC AUTO: 82.5 FL — SIGNIFICANT CHANGE UP (ref 80–100)
NRBC # BLD: 0 /100 WBCS — SIGNIFICANT CHANGE UP (ref 0–0)
PHOSPHATE SERPL-MCNC: 3.6 MG/DL — SIGNIFICANT CHANGE UP (ref 2.5–4.5)
PLATELET # BLD AUTO: 312 K/UL — SIGNIFICANT CHANGE UP (ref 150–400)
POTASSIUM SERPL-MCNC: 3.7 MMOL/L — SIGNIFICANT CHANGE UP (ref 3.5–5.3)
POTASSIUM SERPL-SCNC: 3.7 MMOL/L — SIGNIFICANT CHANGE UP (ref 3.5–5.3)
RBC # BLD: 4.39 M/UL — SIGNIFICANT CHANGE UP (ref 3.8–5.2)
RBC # FLD: 16.6 % — HIGH (ref 10.3–14.5)
SODIUM SERPL-SCNC: 143 MMOL/L — SIGNIFICANT CHANGE UP (ref 135–145)
SPECIMEN SOURCE: SIGNIFICANT CHANGE UP
SPECIMEN SOURCE: SIGNIFICANT CHANGE UP
WBC # BLD: 6.46 K/UL — SIGNIFICANT CHANGE UP (ref 3.8–10.5)
WBC # FLD AUTO: 6.46 K/UL — SIGNIFICANT CHANGE UP (ref 3.8–10.5)

## 2021-02-09 PROCEDURE — 83036 HEMOGLOBIN GLYCOSYLATED A1C: CPT

## 2021-02-09 PROCEDURE — 94640 AIRWAY INHALATION TREATMENT: CPT

## 2021-02-09 PROCEDURE — 87635 SARS-COV-2 COVID-19 AMP PRB: CPT

## 2021-02-09 PROCEDURE — 82947 ASSAY GLUCOSE BLOOD QUANT: CPT

## 2021-02-09 PROCEDURE — 71045 X-RAY EXAM CHEST 1 VIEW: CPT

## 2021-02-09 PROCEDURE — 82550 ASSAY OF CK (CPK): CPT

## 2021-02-09 PROCEDURE — 97110 THERAPEUTIC EXERCISES: CPT

## 2021-02-09 PROCEDURE — 84132 ASSAY OF SERUM POTASSIUM: CPT

## 2021-02-09 PROCEDURE — U0005: CPT

## 2021-02-09 PROCEDURE — 84484 ASSAY OF TROPONIN QUANT: CPT

## 2021-02-09 PROCEDURE — 85018 HEMOGLOBIN: CPT

## 2021-02-09 PROCEDURE — 80053 COMPREHEN METABOLIC PANEL: CPT

## 2021-02-09 PROCEDURE — 83735 ASSAY OF MAGNESIUM: CPT

## 2021-02-09 PROCEDURE — 82803 BLOOD GASES ANY COMBINATION: CPT

## 2021-02-09 PROCEDURE — 97116 GAIT TRAINING THERAPY: CPT

## 2021-02-09 PROCEDURE — 85014 HEMATOCRIT: CPT

## 2021-02-09 PROCEDURE — 84100 ASSAY OF PHOSPHORUS: CPT

## 2021-02-09 PROCEDURE — 99285 EMERGENCY DEPT VISIT HI MDM: CPT | Mod: 25

## 2021-02-09 PROCEDURE — 84295 ASSAY OF SERUM SODIUM: CPT

## 2021-02-09 PROCEDURE — 93970 EXTREMITY STUDY: CPT

## 2021-02-09 PROCEDURE — 81003 URINALYSIS AUTO W/O SCOPE: CPT

## 2021-02-09 PROCEDURE — 82553 CREATINE MB FRACTION: CPT

## 2021-02-09 PROCEDURE — 94660 CPAP INITIATION&MGMT: CPT

## 2021-02-09 PROCEDURE — 93005 ELECTROCARDIOGRAM TRACING: CPT | Mod: XU

## 2021-02-09 PROCEDURE — 87086 URINE CULTURE/COLONY COUNT: CPT

## 2021-02-09 PROCEDURE — 83605 ASSAY OF LACTIC ACID: CPT

## 2021-02-09 PROCEDURE — 97162 PT EVAL MOD COMPLEX 30 MIN: CPT

## 2021-02-09 PROCEDURE — 86769 SARS-COV-2 COVID-19 ANTIBODY: CPT

## 2021-02-09 PROCEDURE — 82962 GLUCOSE BLOOD TEST: CPT

## 2021-02-09 PROCEDURE — 82435 ASSAY OF BLOOD CHLORIDE: CPT

## 2021-02-09 PROCEDURE — 85025 COMPLETE CBC W/AUTO DIFF WBC: CPT

## 2021-02-09 PROCEDURE — 96374 THER/PROPH/DIAG INJ IV PUSH: CPT | Mod: XU

## 2021-02-09 PROCEDURE — 87040 BLOOD CULTURE FOR BACTERIA: CPT

## 2021-02-09 PROCEDURE — 80048 BASIC METABOLIC PNL TOTAL CA: CPT

## 2021-02-09 PROCEDURE — 82330 ASSAY OF CALCIUM: CPT

## 2021-02-09 PROCEDURE — 85027 COMPLETE CBC AUTOMATED: CPT

## 2021-02-09 PROCEDURE — 51701 INSERT BLADDER CATHETER: CPT

## 2021-02-09 PROCEDURE — U0003: CPT

## 2021-02-09 RX ORDER — CEPHALEXIN 500 MG
1 CAPSULE ORAL
Qty: 21 | Refills: 0
Start: 2021-02-09 | End: 2021-02-13

## 2021-02-09 RX ORDER — SPIRONOLACTONE 25 MG/1
25 TABLET, FILM COATED ORAL DAILY
Refills: 0 | Status: DISCONTINUED | OUTPATIENT
Start: 2021-02-09 | End: 2021-02-09

## 2021-02-09 RX ORDER — FUROSEMIDE 40 MG
1 TABLET ORAL
Qty: 0 | Refills: 0 | DISCHARGE

## 2021-02-09 RX ORDER — HYDROMORPHONE HYDROCHLORIDE 2 MG/ML
1 INJECTION INTRAMUSCULAR; INTRAVENOUS; SUBCUTANEOUS
Qty: 0 | Refills: 0 | DISCHARGE
Start: 2021-02-09

## 2021-02-09 RX ORDER — SPIRONOLACTONE 25 MG/1
1 TABLET, FILM COATED ORAL
Qty: 0 | Refills: 0 | DISCHARGE
Start: 2021-02-09

## 2021-02-09 RX ORDER — INSULIN LISPRO 100/ML
1 VIAL (ML) SUBCUTANEOUS
Qty: 0 | Refills: 0 | DISCHARGE

## 2021-02-09 RX ORDER — INSULIN GLARGINE 100 [IU]/ML
30 INJECTION, SOLUTION SUBCUTANEOUS
Qty: 0 | Refills: 0 | DISCHARGE

## 2021-02-09 RX ADMIN — SPIRONOLACTONE 25 MILLIGRAM(S): 25 TABLET, FILM COATED ORAL at 11:03

## 2021-02-09 RX ADMIN — Medication 500 MILLIGRAM(S): at 15:52

## 2021-02-09 RX ADMIN — HEPARIN SODIUM 5000 UNIT(S): 5000 INJECTION INTRAVENOUS; SUBCUTANEOUS at 12:06

## 2021-02-09 RX ADMIN — LIDOCAINE 2 PATCH: 4 CREAM TOPICAL at 11:04

## 2021-02-09 RX ADMIN — BUMETANIDE 0.5 MILLIGRAM(S): 0.25 INJECTION INTRAMUSCULAR; INTRAVENOUS at 04:58

## 2021-02-09 RX ADMIN — Medication 125 MICROGRAM(S): at 04:58

## 2021-02-09 RX ADMIN — BUPROPION HYDROCHLORIDE 300 MILLIGRAM(S): 150 TABLET, EXTENDED RELEASE ORAL at 11:04

## 2021-02-09 RX ADMIN — HYDROMORPHONE HYDROCHLORIDE 4 MILLIGRAM(S): 2 INJECTION INTRAMUSCULAR; INTRAVENOUS; SUBCUTANEOUS at 15:51

## 2021-02-09 RX ADMIN — Medication 1 SPRAY(S): at 04:58

## 2021-02-09 RX ADMIN — GABAPENTIN 600 MILLIGRAM(S): 400 CAPSULE ORAL at 12:06

## 2021-02-09 RX ADMIN — Medication 81 MILLIGRAM(S): at 11:04

## 2021-02-09 RX ADMIN — ESCITALOPRAM OXALATE 20 MILLIGRAM(S): 10 TABLET, FILM COATED ORAL at 11:04

## 2021-02-09 RX ADMIN — HEPARIN SODIUM 5000 UNIT(S): 5000 INJECTION INTRAVENOUS; SUBCUTANEOUS at 04:58

## 2021-02-09 RX ADMIN — GABAPENTIN 600 MILLIGRAM(S): 400 CAPSULE ORAL at 04:58

## 2021-02-09 RX ADMIN — HYDROMORPHONE HYDROCHLORIDE 4 MILLIGRAM(S): 2 INJECTION INTRAMUSCULAR; INTRAVENOUS; SUBCUTANEOUS at 06:02

## 2021-02-09 RX ADMIN — Medication 500 MILLIGRAM(S): at 11:03

## 2021-02-09 RX ADMIN — Medication 500 MILLIGRAM(S): at 04:58

## 2021-02-09 NOTE — PROGRESS NOTE ADULT - PROVIDER SPECIALTY LIST ADULT
Cardiology
Infectious Disease
Internal Medicine

## 2021-02-09 NOTE — PROGRESS NOTE ADULT - SUBJECTIVE AND OBJECTIVE BOX
CARDIOLOGY     PROGRESS  NOTE   ________________________________________________    CHIEF COMPLAINT:Patient is a 66y old  Female who presents with a chief complaint of weakness (05 Feb 2021 16:39)  no complain.  	  REVIEW OF SYSTEMS:  CONSTITUTIONAL: No fever, weight loss, or fatigue  EYES: No eye pain, visual disturbances, or discharge  ENT:  No difficulty hearing, tinnitus, vertigo; No sinus or throat pain  NECK: No pain or stiffness  RESPIRATORY: No cough, wheezing, chills or hemoptysis; No Shortness of Breath  CARDIOVASCULAR: No chest pain, palpitations, passing out, dizziness, or leg swelling  GASTROINTESTINAL: No abdominal or epigastric pain. No nausea, vomiting, or hematemesis; No diarrhea or constipation. No melena or hematochezia.  GENITOURINARY: No dysuria, frequency, hematuria, or incontinence  NEUROLOGICAL: No headaches, memory loss, loss of strength, numbness, or tremors  SKIN: No itching, burning, rashes, or lesions   LYMPH Nodes: No enlarged glands  ENDOCRINE: No heat or cold intolerance; No hair loss  MUSCULOSKELETAL: No joint pain or swelling; No muscle, back, or extremity pain  PSYCHIATRIC: No depression, anxiety, mood swings, or difficulty sleeping  HEME/LYMPH: No easy bruising, or bleeding gums  ALLERGY AND IMMUNOLOGIC: No hives or eczema	    [ ] All others negative	  [ ] Unable to obtain    PHYSICAL EXAM:  T(C): 36.4 (02-09-21 @ 04:57), Max: 36.9 (02-08-21 @ 20:49)  HR: 62 (02-09-21 @ 06:21) (48 - 56)  BP: 117/71 (02-09-21 @ 04:46) (117/71 - 136/63)  RR: 18 (02-09-21 @ 04:46) (18 - 20)  SpO2: 99% (02-09-21 @ 06:21) (94% - 99%)  Wt(kg): --  I&O's Summary    08 Feb 2021 07:01  -  09 Feb 2021 07:00  --------------------------------------------------------  IN: 1150 mL / OUT: 1800 mL / NET: -650 mL        Appearance: Normal	  HEENT:   Normal oral mucosa, PERRL, EOMI	  Lymphatic: No lymphadenopathy  Cardiovascular: Normal S1 S2, No JVD, + murmurs, +trace edema  Respiratory: Lungs clear to auscultation	  Psychiatry: A & O x 3, Mood & affect appropriate  Gastrointestinal:  Soft, Non-tender, + BS	  Skin: No rashes, No ecchymoses, No cyanosis	  Neurologic: Non-focal  Extremities: Normal range of motion, improving erythema  Vascular: Peripheral pulses palpable 2+ bilaterally    MEDICATIONS  (STANDING):  aspirin enteric coated 81 milliGRAM(s) Oral daily  atorvastatin 40 milliGRAM(s) Oral at bedtime  buMETAnide 0.5 milliGRAM(s) Oral daily  buPROPion XL . 300 milliGRAM(s) Oral daily  cephalexin 500 milliGRAM(s) Oral every 6 hours  dextrose 40% Gel 15 Gram(s) Oral once  dextrose 5%. 1000 milliLiter(s) (50 mL/Hr) IV Continuous <Continuous>  dextrose 5%. 1000 milliLiter(s) (100 mL/Hr) IV Continuous <Continuous>  dextrose 50% Injectable 25 Gram(s) IV Push once  dextrose 50% Injectable 12.5 Gram(s) IV Push once  dextrose 50% Injectable 25 Gram(s) IV Push once  escitalopram 20 milliGRAM(s) Oral daily  fluticasone propionate 50 MICROgram(s)/spray Nasal Spray 1 Spray(s) Both Nostrils two times a day  gabapentin 600 milliGRAM(s) Oral three times a day  glucagon  Injectable 1 milliGRAM(s) IntraMuscular once  heparin   Injectable 5000 Unit(s) SubCutaneous every 8 hours  insulin glargine Injectable (LANTUS) 25 Unit(s) SubCutaneous at bedtime  insulin lispro (ADMELOG) corrective regimen sliding scale   SubCutaneous three times a day before meals  lactobacillus acidophilus 1 Tablet(s) Oral two times a day  levothyroxine 125 MICROGram(s) Oral daily  lidocaine   Patch 2 Patch Transdermal daily      TELEMETRY: 	    ECG:  	  RADIOLOGY:  OTHER: 	  	  LABS:	 	    CARDIAC MARKERS:                                11.2   6.46  )-----------( 312      ( 09 Feb 2021 07:10 )             36.2     02-09    143  |  106  |  22  ----------------------------<  147<H>  3.7   |  25  |  1.10    Ca    9.3      09 Feb 2021 07:09  Phos  3.6     02-09  Mg     2.0     02-09      proBNP:   Lipid Profile:   HgA1c:   TSH:         Assessment and plan  ---------------------------   66 year-old femal  with PMH of DM2 on insulin, fibromyalgia, osteoarthritis, sleep apnea on CPAP, depression, venous insufficiency, endometrial CA s/p hysterectomy (2010), HTN, HLD, hypothyroidism, obesity presenting with onset of weakness, chills this morning. Notes she woke up and felt generally crummy. Has had low energy, fatigue, generalized SOB, no fevers at home, episodes of incontinence today without dysuria, foul urine, flank pain. Notes sometimes she feels this was with fibromyalgia flares.   Also reports generalized nausea, but no vomiting. Notes legs are red, but have been this way previously. "maybe a little warmer than usual."  pt with hx of obesity and with  RENARD ,with increasing sob. no chest pain.  hypotension, r/o sepsis  check cultures  ivf  chest x ray noted  le venous doppler  dvt prophylaxis  tsh  esr  physical therapy  oob to chailr  replete k  cellulitis of LE/ ID appreciated  pt with RV dysfunction ?sec to pulmonary htn/sleep apnea  bp has improved  pain management  fu blood sugar closely  add bumex 0. 5 mg daily  continue abx as per ID will switch to po  will re start on po losartan as bp improves  pt needs sleep studies  check orthostatic  awaiting tsh

## 2021-02-09 NOTE — PROGRESS NOTE ADULT - ASSESSMENT
65 year-old female     h/o   DM2 on insulin, fibromyalgia, osteoarthritis, sleep apnea on CPAP,  RENARD/   depression, venous insufficiency,    endometrial CA s/p hysterectomy (2010)  , HTN,   HLD,   hypothyroidism,  morbid   obesity. CT angio on last visit, no PE       .     presenting with increasing weakness. ?  fevers at home  .  has  generalized pain and weakness at her baseline, uses  a walker.     on arrival, temp is  99, with low  sbp. wbc of  21,000    h/o  morbid  obesity/  BMI  of  60/  with  massive  truncal  obesity/ on nocturnal CPAP   h/o  lymphedema. with hyperpigmented /  raised  lesions santosh  right leg/  wound  care eval  DM, on lantus/  humalog at  home   HTN, Hypothyroid,  on  asa/  lipitor/ synthroid    hold lasix  and enalapril,/ low  sbp   depression, on  mlple  meds/  fibromyalgia / pt wants  clonazepam  and   Hydromorphone/ verified  last viist  Echo,   jorje;  ef  normal  wbc,  blood  c/s,  negative/   c/c  stasis/ redness  , distal legs    was  on  iv ancef  per  ID , pt with  h/o   c/c  stasis  dematitis  anemia,   s/p  colonoscopy 5 yrs ago  pn po  keflex/  start   d/c  planning      ra< from: CT Angio Chest w/ IV Cont (07.25.20 @ 17:37) >  IMPRESSION:  1.  Limited exam. The segmental and subsegmental pulmonary arteries are not evaluated secondary to poor opacification.  2.  Clear lungs. No aortic aneurysm or dissection.  < end of copied text >        65 year-old female     h/o   DM2 on insulin, fibromyalgia, osteoarthritis, sleep apnea on CPAP,  RENARD/   depression, venous insufficiency,    endometrial CA s/p hysterectomy (2010)  , HTN,   HLD,   hypothyroidism,  morbid   obesity. CT angio on last visit, no PE       .     presenting with increasing weakness. ?  fevers at home  .  has  generalized pain and weakness at her baseline, uses  a walker.     on arrival, temp is  99, with low  sbp. wbc of  21,000    h/o  morbid  obesity/  BMI  of  60/  with  massive  truncal  obesity/ on nocturnal CPAP   h/o  lymphedema. with hyperpigmented /  raised  lesions santosh  right leg/  wound  care eval  DM, on lantus/  humalog at  home   HTN, Hypothyroid,  on  asa/  lipitor/ synthroid    hold lasix  and enalapril,/ low  sbp   depression, on  mlple  meds/  fibromyalgia / pt wants  clonazepam  and   Hydromorphone/ verified  last viist  Echo,   jorje;  ef  normal  wbc,  blood  c/s,  negative/   c/c  stasis/ redness  , distal legs    was  on  iv ancef  per  ID , pt with  h/o   c/c  stasis  dematitis  anemia,   s/p  colonoscopy 5 yrs ago  pn po  keflex/  bradycardia, rx  pe r card      ra< from: CT Angio Chest w/ IV Cont (07.25.20 @ 17:37) >  IMPRESSION:  1.  Limited exam. The segmental and subsegmental pulmonary arteries are not evaluated secondary to poor opacification.  2.  Clear lungs. No aortic aneurysm or dissection.  < end of copied text >

## 2021-02-09 NOTE — PROGRESS NOTE ADULT - SUBJECTIVE AND OBJECTIVE BOX
afebrile  REVIEW OF SYSTEMS:  GEN: no fever,    no chills  RESP: no SOB,   no cough  CVS: no chest pain,   no palpitations  GI: no abdominal pain,   no nausea,   no vomiting,   no constipation,   no diarrhea  : no dysuria,   no frequency  NEURO: no headache,   no dizziness  PSYCH: no depression,   not anxious  Derm : no rash    MEDICATIONS  (STANDING):  aspirin enteric coated 81 milliGRAM(s) Oral daily  atorvastatin 40 milliGRAM(s) Oral at bedtime  buMETAnide 0.5 milliGRAM(s) Oral daily  buPROPion XL . 300 milliGRAM(s) Oral daily  cephalexin 500 milliGRAM(s) Oral every 6 hours  dextrose 40% Gel 15 Gram(s) Oral once  dextrose 5%. 1000 milliLiter(s) (50 mL/Hr) IV Continuous <Continuous>  dextrose 5%. 1000 milliLiter(s) (100 mL/Hr) IV Continuous <Continuous>  dextrose 50% Injectable 25 Gram(s) IV Push once  dextrose 50% Injectable 12.5 Gram(s) IV Push once  dextrose 50% Injectable 25 Gram(s) IV Push once  escitalopram 20 milliGRAM(s) Oral daily  fluticasone propionate 50 MICROgram(s)/spray Nasal Spray 1 Spray(s) Both Nostrils two times a day  gabapentin 600 milliGRAM(s) Oral three times a day  glucagon  Injectable 1 milliGRAM(s) IntraMuscular once  heparin   Injectable 5000 Unit(s) SubCutaneous every 8 hours  insulin glargine Injectable (LANTUS) 25 Unit(s) SubCutaneous at bedtime  insulin lispro (ADMELOG) corrective regimen sliding scale   SubCutaneous three times a day before meals  lactobacillus acidophilus 1 Tablet(s) Oral two times a day  levothyroxine 125 MICROGram(s) Oral daily  lidocaine   Patch 2 Patch Transdermal daily    MEDICATIONS  (PRN):  clonazePAM  Tablet 0.5 milliGRAM(s) Oral three times a day PRN anxiety  HYDROmorphone   Tablet 4 milliGRAM(s) Oral every 6 hours PRN Severe Pain (7 - 10)      Vital Signs Last 24 Hrs  T(C): 36.4 (09 Feb 2021 04:57), Max: 36.9 (08 Feb 2021 20:49)  T(F): 97.5 (09 Feb 2021 04:57), Max: 98.4 (08 Feb 2021 20:49)  HR: 48 (09 Feb 2021 06:21) (48 - 56)  BP: 117/71 (09 Feb 2021 04:46) (117/71 - 136/63)  BP(mean): --  RR: 18 (09 Feb 2021 04:46) (18 - 20)  SpO2: 99% (09 Feb 2021 06:21) (94% - 99%)  CAPILLARY BLOOD GLUCOSE      POCT Blood Glucose.: 124 mg/dL (08 Feb 2021 21:19)  POCT Blood Glucose.: 105 mg/dL (08 Feb 2021 16:23)  POCT Blood Glucose.: 95 mg/dL (08 Feb 2021 11:47)  POCT Blood Glucose.: 108 mg/dL (08 Feb 2021 07:33)    I&O's Summary    08 Feb 2021 07:01  -  09 Feb 2021 07:00  --------------------------------------------------------  IN: 1150 mL / OUT: 1800 mL / NET: -650 mL        PHYSICAL EXAM:  HEAD:  Atraumatic, Normocephalic  NECK: Supple, No   JVD  CHEST/LUNG:   no     rales,     no,    rhonchi  HEART: Regular rate and rhythm;         murmur  ABDOMEN: Soft, Nontender, ;   EXTREMITIES:   c/c redness/      edema  NEUROLOGY:  alert    LABS:                        10.4   5.47  )-----------( 269      ( 08 Feb 2021 06:34 )             33.3     02-08    140  |  105  |  24<H>  ----------------------------<  131<H>  4.0   |  25  |  1.13    Ca    8.8      08 Feb 2021 06:33  Mg     1.8     02-08                              Consultant(s) Notes Reviewed:      Care Discussed with Consultants/Other Providers:

## 2021-04-30 ENCOUNTER — INPATIENT (INPATIENT)
Facility: HOSPITAL | Age: 66
LOS: 6 days | Discharge: NURSING, REHAB OR LONG-TERM CARE | DRG: 603 | End: 2021-05-07
Attending: INTERNAL MEDICINE | Admitting: INTERNAL MEDICINE
Payer: MEDICARE

## 2021-04-30 VITALS
DIASTOLIC BLOOD PRESSURE: 97 MMHG | TEMPERATURE: 98 F | RESPIRATION RATE: 23 BRPM | WEIGHT: 293 LBS | HEIGHT: 68 IN | OXYGEN SATURATION: 98 % | SYSTOLIC BLOOD PRESSURE: 157 MMHG | HEART RATE: 83 BPM

## 2021-04-30 DIAGNOSIS — L03.116 CELLULITIS OF LEFT LOWER LIMB: ICD-10-CM

## 2021-04-30 LAB
ALBUMIN SERPL ELPH-MCNC: 3.8 G/DL — SIGNIFICANT CHANGE UP (ref 3.3–5)
ALP SERPL-CCNC: 113 U/L — SIGNIFICANT CHANGE UP (ref 40–120)
ALT FLD-CCNC: 9 U/L — LOW (ref 10–45)
ANION GAP SERPL CALC-SCNC: 15 MMOL/L — SIGNIFICANT CHANGE UP (ref 5–17)
APTT BLD: 31.6 SEC — SIGNIFICANT CHANGE UP (ref 27.5–35.5)
AST SERPL-CCNC: 10 U/L — SIGNIFICANT CHANGE UP (ref 10–40)
BASOPHILS # BLD AUTO: 0.04 K/UL — SIGNIFICANT CHANGE UP (ref 0–0.2)
BASOPHILS NFR BLD AUTO: 0.3 % — SIGNIFICANT CHANGE UP (ref 0–2)
BILIRUB SERPL-MCNC: 0.6 MG/DL — SIGNIFICANT CHANGE UP (ref 0.2–1.2)
BUN SERPL-MCNC: 24 MG/DL — HIGH (ref 7–23)
CALCIUM SERPL-MCNC: 9.6 MG/DL — SIGNIFICANT CHANGE UP (ref 8.4–10.5)
CHLORIDE SERPL-SCNC: 97 MMOL/L — SIGNIFICANT CHANGE UP (ref 96–108)
CO2 SERPL-SCNC: 24 MMOL/L — SIGNIFICANT CHANGE UP (ref 22–31)
CREAT SERPL-MCNC: 1.06 MG/DL — SIGNIFICANT CHANGE UP (ref 0.5–1.3)
EOSINOPHIL # BLD AUTO: 0.15 K/UL — SIGNIFICANT CHANGE UP (ref 0–0.5)
EOSINOPHIL NFR BLD AUTO: 1.2 % — SIGNIFICANT CHANGE UP (ref 0–6)
GAS PNL BLDV: SIGNIFICANT CHANGE UP
GLUCOSE SERPL-MCNC: 167 MG/DL — HIGH (ref 70–99)
HCT VFR BLD CALC: 40.5 % — SIGNIFICANT CHANGE UP (ref 34.5–45)
HGB BLD-MCNC: 12.5 G/DL — SIGNIFICANT CHANGE UP (ref 11.5–15.5)
IMM GRANULOCYTES NFR BLD AUTO: 0.3 % — SIGNIFICANT CHANGE UP (ref 0–1.5)
INR BLD: 1.18 RATIO — HIGH (ref 0.88–1.16)
LYMPHOCYTES # BLD AUTO: 0.74 K/UL — LOW (ref 1–3.3)
LYMPHOCYTES # BLD AUTO: 6 % — LOW (ref 13–44)
MCHC RBC-ENTMCNC: 24.4 PG — LOW (ref 27–34)
MCHC RBC-ENTMCNC: 30.9 GM/DL — LOW (ref 32–36)
MCV RBC AUTO: 78.9 FL — LOW (ref 80–100)
MONOCYTES # BLD AUTO: 1.02 K/UL — HIGH (ref 0–0.9)
MONOCYTES NFR BLD AUTO: 8.2 % — SIGNIFICANT CHANGE UP (ref 2–14)
NEUTROPHILS # BLD AUTO: 10.38 K/UL — HIGH (ref 1.8–7.4)
NEUTROPHILS NFR BLD AUTO: 84 % — HIGH (ref 43–77)
NRBC # BLD: 0 /100 WBCS — SIGNIFICANT CHANGE UP (ref 0–0)
PLATELET # BLD AUTO: 333 K/UL — SIGNIFICANT CHANGE UP (ref 150–400)
POTASSIUM SERPL-MCNC: 4.2 MMOL/L — SIGNIFICANT CHANGE UP (ref 3.5–5.3)
POTASSIUM SERPL-SCNC: 4.2 MMOL/L — SIGNIFICANT CHANGE UP (ref 3.5–5.3)
PROT SERPL-MCNC: 7.6 G/DL — SIGNIFICANT CHANGE UP (ref 6–8.3)
PROTHROM AB SERPL-ACNC: 14.1 SEC — HIGH (ref 10.6–13.6)
RBC # BLD: 5.13 M/UL — SIGNIFICANT CHANGE UP (ref 3.8–5.2)
RBC # FLD: 17 % — HIGH (ref 10.3–14.5)
SODIUM SERPL-SCNC: 136 MMOL/L — SIGNIFICANT CHANGE UP (ref 135–145)
WBC # BLD: 12.37 K/UL — HIGH (ref 3.8–10.5)
WBC # FLD AUTO: 12.37 K/UL — HIGH (ref 3.8–10.5)

## 2021-04-30 PROCEDURE — 99285 EMERGENCY DEPT VISIT HI MDM: CPT | Mod: CS,GC

## 2021-04-30 PROCEDURE — 71045 X-RAY EXAM CHEST 1 VIEW: CPT | Mod: 26

## 2021-04-30 RX ORDER — CEFAZOLIN SODIUM 1 G
1000 VIAL (EA) INJECTION ONCE
Refills: 0 | Status: COMPLETED | OUTPATIENT
Start: 2021-04-30 | End: 2021-04-30

## 2021-04-30 NOTE — ED PROVIDER NOTE - SKIN COLOR
Nonblanching erythema of the B/L LE (L>R) with ill defined borders with blanching erythema. Left lower leg TTP and hot to the touch, no swelling or open wounds.

## 2021-04-30 NOTE — ED ADULT NURSE NOTE - DRUG PRE-SCREENING (DAST -1)
Psychiatry Outpatient Follow Up Progress Note    Reason for Visit or Chief Complaint:Follow-up from January of 2021 in regards to dysthymia, social anxiety, cognitive concerns, family history dementia, and treatment of seizures.    30 minutes were spent on video visit for medical and medication management.    This visit was performed via live two-way video with patient's verbal consent.   Clinician Location: Clinic.  Patient Location: Home.    Ruddy is in [place] and identity has been established.     He was informed that consent to treat includes permission to submit charges to the applicable insurance on file. Ruddy was advised regarding the potential risk inherent in video visits, as the assessment may be limited due to what can be seen on the screen which potentially results in an incomplete assessment; as well as either of us may discontinue the video visit if it is.      History:     Patient report:  Follow-up from January of 2021 visit in regards to dysthymia, social anxiety, cognitive concerns, family history dementia, and treatment of seizures.    At the last visit we affirmed the patient's change of primary care physicians so the has a better sense of empowerment in his own care.  Encouraged ongoing Neurology follow-up despite conflict and frustrations.  Continue to give compassion and support.  Patient was deferring any use of psychotropic medications due to frustrations and fears.    Today the patient states that he is doing “okay.  He notes that he is working hard to stay busy.  The patient relates a number of concerns of somatic symptoms such as erectile dysfunction which according to his “research “he decided to treat with increased exercise this did appear to help his emotions but caused him to have more leg pain therefore he decided to go on iron supplements.  I encouraged him of course to try to coordinate his medical challenges with his providers.  He would like to go home to visit [place]  that he has not been to an extended period of time.  He is trying to come to terms with his age and finances with these decisions.  He has had a lot of problems and frustrations but finds that he is having some better acceptance and tolerance of such struggles.  He does not feel consistently depressed but admits that he gets angry and frustrated with others.  He relates that it has likely been 20-30 years since he went back to [place] and lost contact with much of his family.    He has been following up with Psychology and finds this overall to be beneficial to him.  To working through a series of medical issues for instance the perception that his heart surgery caused him to have many allergies.  He does comment that he is trying to exercise more regularly at the gym and that his arms are getting larger and “I know the women notice that.\"He had a difficult colonoscopy where he had inadequate prep and therefore got rescheduled for year-this was be performed due to his history of Crohn's disease.  He is working hard to try to navigate medical conflicts which are frequent struggles who due to his assumptions about the medical community and specialists in general.  I gave him much credit about frequent exercise which clearly helps his mental and psychological functioning and improve his stress tolerance.  Initially the patient states that he is off all the psychotropic medications including lamotrigine but then corrects himself that he is still taking lamotrigine.  He perceives no notable help with the medication and questions if this is causing a mild sedation.  He will stay on the medication of course because he wants to maintain his license although he is uncertain about the validity of such epilepsy diagnosis or the need for treatment.  He has opted that this difference of opinion is not worth it given the fact that it threatens his ability to drive.    Substance use: Denies all  Occupational and social  functioning: fair  Sleep:adequate  Appetite:adequate    Allergy:  ALLERGIES:  No Known Allergies     Medications:  Outpatient Medications Prior to Visit   Medication Sig Dispense Refill   • polyethylene glycol (MiraLax) 17 GM/SCOOP powder Take 17 g by mouth 2 times daily. Stir and dissolve powder in any 4 to 8 ounces of beverage, then drink. 255 g 0   • azaTHIOprine (IMURAN) 50 MG tablet TAKE 1 TABLET ON EVEN DAYS AND TAKE 1/2 TABLET ON ODD DAYS 135 tablet 3   • tamsulosin (FLOMAX) 0.4 MG Cap Take 1 capsule by mouth daily after a meal. 7 capsule 0   • gabapentin (NEURONTIN) 300 MG capsule Take 1 capsule by mouth 3 times daily. 15 capsule 0   • acetaminophen (TYLENOL) 500 MG tablet Take 2 tablets by mouth every 8 hours. 30 tablet 0   • oxyCODONE, IMM REL, (ROXICODONE) 5 MG immediate release tablet Take 1-2 tablets by mouth every 6 hours as needed for Pain. 15 tablet 0   • levothyroxine 125 MCG tablet TAKE 1 TABLET EVERY DAY 90 tablet 1   • lamoTRIgine (LaMICtal) 100 MG tablet Take 0.5 tablets by mouth 2 times daily. 90 tablet 3   • amphetamine-dextroamphetamine (Adderall) 5 MG tablet Take 1 tab PO qAM and 1 tab qPM 30 tablet 0   • colchicine/probenecid (ColBENEMID) 0.5-500 MG per tablet TAKE 1 TABLET EVERY DAY AND MAY INCREASE THE DOSE TO 4 TABLETS TOTAL PER DAY FOR AN ACUTE ATTACK AS DIRECTED. 110 tablet 0   • metFORMIN (GLUCOPHAGE) 500 MG tablet Take 500 mg p.o. once a day in the morning. 90 tablet 1   • mometasone (ELOCON) 0.1 % cream Apply 1 application topically daily as needed (Itchiness of feet). 50 g 3   • amLODIPine (NORVASC) 5 MG tablet Take 0.5 tablets by mouth daily. 45 tablet 1   • metoPROLOL tartrate (LOPRESSOR) 50 MG tablet Take 0.5 tablets by mouth every 12 hours. 180 tablet 1   • amoxicillin (AMOXIL) 500 MG capsule Take 4 capsules by mouth as needed (1 hour prior to dental work and cleaning). 4 capsule 3   • pravastatin (PRAVACHOL) 80 MG tablet Take 1 tablet by mouth nightly. 90 tablet 3   •  furosemide (LASIX) 20 MG tablet Take 0.5 tablets by mouth daily. 45 tablet 1   • ipratropium-albuterol (DUONEB) 0.5-2.5 (3) MG/3ML nebulizer solution Take 3 mLs by nebulization every 6 hours as needed for Wheezing or Shortness of Breath. 90 mL 3   • TRUE METRIX BLOOD GLUCOSE TEST test strip USE 1 STRIP TO CHECK GLUCOSE ONCE DAILY AS DIRECTED 100 each 3   • Cholecalciferol (VITAMIN D3) 1000 units capsule Take 5,000 Units by mouth daily.      • Cyanocobalamin (VITAMIN B-12 PO) Take 2,500 mcg by mouth.     • Calcium polycarbophil (FIBERCON) 625 MG tablet Take 500 mg by mouth daily. Takes every 4 days     • aspirin 81 MG tablet Take 81 mg by mouth daily.       No facility-administered medications prior to visit.      Past Medical History:   Diagnosis Date   • ADD (attention deficit disorder)    • Aortic valve stenosis    • Blood transfusion     ? in 2009   • CAD S/P percutaneous coronary angioplasty 9/14     Dr Bacon    • Claudication of both lower extremities (CMS/[facility])    • COPD (chronic obstructive pulmonary disease) (CMS/[facility])    • Crohn's disease (CMS/[facility]) ~2001   • Emphysema of lung (CMS/[facility])    • Essential (primary) hypertension    • Finger laceration     left thumb   • Generalized headaches    • Gout    • Heart murmur    • History of tobacco abuse    • Hyperlipemia    • Hypertensive kidney disease with CKD (chronic kidney disease) stage II    • IFG (impaired fasting glucose)    • Inflammatory bowel disease    • Lumbar spondylosis 10/11/2019   • Neurogenic orthostatic hypotension (CMS/[facility])    • Pneumonia    • Routine eye exam 06/25/2020    no diabetic retinopathy   • S/P aortic valve replacement 7/29/2020    With 25 mm Inspiris Tissue valve   • S/P CABG x 1 7/29/2020    LIMA -> LAD   • S/P left atrial appendage ligation 7/29/2020   • Seizure (CMS/[facility])    • Severe head trauma    • Thrombocytopenia (CMS/[facility])    • Thyroid disease     Hypothyroid   • Wears glasses    • Wears hearing aid        Patient Active Problem  List   Diagnosis   • Essential hypertension, benign   • Thyroid disease   • CAD S/P percutaneous coronary angioplasty   • Finger laceration   • Open fracture of distal phalanx of finger   • Hyperlipidemia   • Moderate aortic valve stenosis   • Long-term use of immunosuppressant medication   • Hypertensive kidney disease with CKD (chronic kidney disease) stage II   • Moderate COPD (chronic obstructive pulmonary disease) (CMS/[facility])   • Other chest pain   • ADD (attention deficit disorder) predominantly inattentive presentation, moderate   • Lumbar spondylosis   • Peripheral sensory-motor axonal polyneuropathy   • Left inguinal hernia   • Lumbar stenosis with neurogenic claudication   • Neurogenic orthostatic hypotension (CMS/H[facility])   • S/P CABG x 1   • S/P aortic valve replacement   • S/P left atrial appendage ligation   • Type 2 diabetes mellitus with diabetic neuropathy, without long-term current use of insulin (CMS/[facility])   • Seizure (CMS/[facility])   • Acquired hypothyroidism   • Vitamin D deficiency   • Idiopathic gout         Laboratory Tests or other studies:   no further labwork indicated    Review of Systems:  A complete review of systems was conducted and is negative apart from as follows or as mentioned above.     negative    Mental Status Examination:    Appearance  [x] Unremarkable  []  Thin  [] Uncomfortable  [] Intoxicated  [] Other:    Hygiene  [x] Unremarkable  []  Marginal  []  Disheveled  []  Malodorous  []  Unkempt    []  Other:    Interpersonal behavior  [x]  Appropriate  [x]  Pleasant  [x]  Engaged  []  Guarded  []  Hostile  []  Withdrawn  [x]  Good eye contact  []  Avoidant eye contact  []  Other:    Speech Rate  [x] Normal  []   Fast  []   Slow  []   Pressured    Speech clarity  [x]   Clear  []   Dysarthric  []   Other:    Speech Volume  []   Soft  []   Loud  [x]   Normal    Speech Latency  [x]   Normal  []   Reduced  []   Prolonged    Mood  [x]   \"good\"  []  \"angry”  []   “sad”  [] \"depressed\"  []    \"anxious\"  []   \"worried\"  []   Not stated  [x]   Other:  Frustrated    Affect  []   Euthymic  []   Dysthymic  [x]   Anxious  []   Tense  []   Irritable  []   Sad    []   Tearful  []   Bright  []   Constricted  []   Blunted  []   Flat  []   Expansive  []   Euphoric  [x]   Congruent with stated mood  [] Not congruent with stated mood  []   Appropriate to situation and conversation  []   Inappropriate to situation or conversation  []   Stable  [] Labile  []   Fluid  []   Other:    Thought process  [x]   Linear  [x]   Logical  [x]   Well organized  []   Circumstantial  [] Tangential   []   Flight of ideas  []   Diablo  []   Rigid  []   Difficult to follow   []   Disorganized  [] Other:    Thought Content  []  Unremarkable  []   Suspiciousness  []   Paranoia  [x]   Rumination   []  Self-critical  []   Catastrophizing  []   Grandiose  []   Delusional  []  Ideas of reference  []   Thought broadcasting or insertion  []  Obsessions or intrusive thoughts  []  Hopelessness  [x]  Somatic preoccupation  []  Other:    Perception/hallucinations  [x]  None reported or observed  []  Auditory hallucinations  [] Visual hallucinations  []  Second person  []  Command  []  Derogatory  []  Third person  []   Vague  []  Faint  []  Overpowering []  Grossly impaired or clouded sensorium    []  Other impairment:    Orientation, oriented to  [x]  Self  [x]  Place  [x]  Time  [x]  Situation  [x]  Other:  []  Not assessed    Attention and concentration  [] No impairment noted in course of interview  [x]  Distractible  []  Difficulty sustaining or shifting attention  [] Assessed/screened as follows:    Memory  [x]  No impairment noted in course of interview as evidenced by recall of recent and distant events  []  Some impairment suspected from gaps in interview as follows:  []  Assessed/screened as follows:    Insight  []  Good as evidenced by awareness of illness  [x]  Fair as evidenced by awareness of illness, with some limitations  []   Poor, with substantial limitations to awareness of or nature of illness  []  Other:    Judgment  []  Good as evidenced by reasonable decision making and interpersonal appropriateness  [x]  Fair, some limitations noted such as impulsivity or marked ambivalence  []  Poor, as follows:  []  Other:    Suicidal thoughts  [x]  Denies  []  Present, denies intent or plan  []  At baseline  []  Present, with some intent or plan  []  Other:    Homicidal/Assaultive Ideation   [x]  Denies  []  Other, specify:    Gait   []  Steady  []  Well-paced  []  Wide-space  []  Slow  []  Unsteady  []  Requires assistive device  [x]  Not assessed  []  Other:      Medical Decision Making    Diagnoses:  ADD (attention deficit disorder) without hyperactivity  (primary encounter diagnosis)  Dysthymic disorder  Social anxiety disorder     Family history of dementia     Narcissistic personality traits    Narrative assessment and plan:   73-year-old  [place] citizen male with a history for possible mild attention issues with more notable this time the presence of prominent negativity and dysthymia and likely longitudinal social anxiety which have been magnified with skilled nursing, low self-esteem, grief over loss of mother due to dementia, fears of his own memory issues.  Recent diagnosis of possible epilepsy following head trauma over a decade ago, upcoming heart surgery, and COVID-19 has certainly not helped his temperament or outlook.    1. Encouraged ongoing physical exercise to improve his stress tolerance.  2. Encouraged ongoing psychotherapy to help give insights and empowerment.  3. Encouraged mind fullness meditation practice to help give more awareness.  4. Encouraged awareness of the summed thinking and improved relationships with his medical providers.  5. Will follow-up patient on lamotrigine, no other psychotropics at this time.    Risk Assessment: no indication of acutely increased risk of harm to self or others above personal  baseline    Follow up: 2 months                          Statement Selected

## 2021-04-30 NOTE — ED PROVIDER NOTE - OBJECTIVE STATEMENT
66y F pt with PMHx of Morbid Obesity, RENARD, DMT2, RLS, GERD, Umbilical Hernia, Fibromyalgia, Depression, HLD, Hypothyroidism, Endometrial CA S/P RODRIGO, BSO presents to the ED c/o B/L lower leg pain pain with redness since this morning. States that she feels more pain in the right lower leg. Ambulates with a rollator at baseline. Denies visual changes, F/C, SOB, CP, cough, N/V, abdominal pain.

## 2021-04-30 NOTE — ED PROVIDER NOTE - ATTENDING CONTRIBUTION TO CARE
Pt with h/o cellulitis with sudden and rapidly worsening cellulitis first of L leg and now b/l lower legs, onset yesterday.  No fever. Pt appears nontoxic, bright red erythema b/l lower extremities, pulse intact distal.

## 2021-04-30 NOTE — ED PROVIDER NOTE - CLINICAL SUMMARY MEDICAL DECISION MAKING FREE TEXT BOX
Dr. Reza Note: recurrent bilateral cellulitis of lower extremities with h/o strep cellulitis requiring multiple days of IV antibiotics in past and is morbidly obese, will likely require inpatient care for IV antibiotics, ID consult, and medical care with bariatric bed

## 2021-04-30 NOTE — ED ADULT NURSE NOTE - OBJECTIVE STATEMENT
65 yo female with pmh morbid obesity, RENARD, DM2, GERD, umbilical hernia, depression, HLD, endometrial CA presents to ED c/o bilateral LE pain and redness x 1 day. Pt reports more pain in LLE compared to RLE. Pt reports ambulates with rollator at baseline. Pt denies trauma/wounds to legs, CP, SOB, fevers, chills, body aches, abdominal pain, n/v/d, numbness/tingling, loss of sensation, cough/hemoptysis. Upon assessment, pt a&ox3, morbidly obese, nad, no increased wob noted, able to move all extremities and follow all commands; bilateral LE edema, erythema, and warm to touch noted; no loss of sensation noted, able to wiggle toes, bilateral pedal pulses noted. MD at bedside. Pt safety and comfort provided.

## 2021-05-01 DIAGNOSIS — L03.119 CELLULITIS OF UNSPECIFIED PART OF LIMB: ICD-10-CM

## 2021-05-01 DIAGNOSIS — E11.9 TYPE 2 DIABETES MELLITUS WITHOUT COMPLICATIONS: ICD-10-CM

## 2021-05-01 DIAGNOSIS — F32.9 MAJOR DEPRESSIVE DISORDER, SINGLE EPISODE, UNSPECIFIED: ICD-10-CM

## 2021-05-01 DIAGNOSIS — M79.7 FIBROMYALGIA: ICD-10-CM

## 2021-05-01 DIAGNOSIS — E66.9 OBESITY, UNSPECIFIED: ICD-10-CM

## 2021-05-01 DIAGNOSIS — E03.9 HYPOTHYROIDISM, UNSPECIFIED: ICD-10-CM

## 2021-05-01 LAB
A1C WITH ESTIMATED AVERAGE GLUCOSE RESULT: 7.1 % — HIGH (ref 4–5.6)
ANION GAP SERPL CALC-SCNC: 14 MMOL/L — SIGNIFICANT CHANGE UP (ref 5–17)
BASOPHILS # BLD AUTO: 0.03 K/UL — SIGNIFICANT CHANGE UP (ref 0–0.2)
BASOPHILS NFR BLD AUTO: 0.4 % — SIGNIFICANT CHANGE UP (ref 0–2)
BUN SERPL-MCNC: 20 MG/DL — SIGNIFICANT CHANGE UP (ref 7–23)
CALCIUM SERPL-MCNC: 9 MG/DL — SIGNIFICANT CHANGE UP (ref 8.4–10.5)
CHLORIDE SERPL-SCNC: 100 MMOL/L — SIGNIFICANT CHANGE UP (ref 96–108)
CO2 SERPL-SCNC: 23 MMOL/L — SIGNIFICANT CHANGE UP (ref 22–31)
CREAT SERPL-MCNC: 1.01 MG/DL — SIGNIFICANT CHANGE UP (ref 0.5–1.3)
EOSINOPHIL # BLD AUTO: 0.01 K/UL — SIGNIFICANT CHANGE UP (ref 0–0.5)
EOSINOPHIL NFR BLD AUTO: 0.1 % — SIGNIFICANT CHANGE UP (ref 0–6)
ESTIMATED AVERAGE GLUCOSE: 157 MG/DL — HIGH (ref 68–114)
GLUCOSE BLDC GLUCOMTR-MCNC: 112 MG/DL — HIGH (ref 70–99)
GLUCOSE BLDC GLUCOMTR-MCNC: 129 MG/DL — HIGH (ref 70–99)
GLUCOSE BLDC GLUCOMTR-MCNC: 135 MG/DL — HIGH (ref 70–99)
GLUCOSE BLDC GLUCOMTR-MCNC: 160 MG/DL — HIGH (ref 70–99)
GLUCOSE BLDC GLUCOMTR-MCNC: 96 MG/DL — SIGNIFICANT CHANGE UP (ref 70–99)
GLUCOSE SERPL-MCNC: 169 MG/DL — HIGH (ref 70–99)
HCT VFR BLD CALC: 35.4 % — SIGNIFICANT CHANGE UP (ref 34.5–45)
HGB BLD-MCNC: 10.9 G/DL — LOW (ref 11.5–15.5)
IMM GRANULOCYTES NFR BLD AUTO: 0.4 % — SIGNIFICANT CHANGE UP (ref 0–1.5)
LYMPHOCYTES # BLD AUTO: 0.77 K/UL — LOW (ref 1–3.3)
LYMPHOCYTES # BLD AUTO: 9.3 % — LOW (ref 13–44)
MCHC RBC-ENTMCNC: 24.3 PG — LOW (ref 27–34)
MCHC RBC-ENTMCNC: 30.8 GM/DL — LOW (ref 32–36)
MCV RBC AUTO: 78.8 FL — LOW (ref 80–100)
MONOCYTES # BLD AUTO: 0.86 K/UL — SIGNIFICANT CHANGE UP (ref 0–0.9)
MONOCYTES NFR BLD AUTO: 10.4 % — SIGNIFICANT CHANGE UP (ref 2–14)
NEUTROPHILS # BLD AUTO: 6.59 K/UL — SIGNIFICANT CHANGE UP (ref 1.8–7.4)
NEUTROPHILS NFR BLD AUTO: 79.4 % — HIGH (ref 43–77)
NRBC # BLD: 0 /100 WBCS — SIGNIFICANT CHANGE UP (ref 0–0)
PLATELET # BLD AUTO: 280 K/UL — SIGNIFICANT CHANGE UP (ref 150–400)
POTASSIUM SERPL-MCNC: 4 MMOL/L — SIGNIFICANT CHANGE UP (ref 3.5–5.3)
POTASSIUM SERPL-SCNC: 4 MMOL/L — SIGNIFICANT CHANGE UP (ref 3.5–5.3)
PROCALCITONIN SERPL-MCNC: 0.2 NG/ML — HIGH (ref 0.02–0.1)
RBC # BLD: 4.49 M/UL — SIGNIFICANT CHANGE UP (ref 3.8–5.2)
RBC # FLD: 16.7 % — HIGH (ref 10.3–14.5)
SARS-COV-2 RNA SPEC QL NAA+PROBE: SIGNIFICANT CHANGE UP
SODIUM SERPL-SCNC: 137 MMOL/L — SIGNIFICANT CHANGE UP (ref 135–145)
WBC # BLD: 8.29 K/UL — SIGNIFICANT CHANGE UP (ref 3.8–10.5)
WBC # FLD AUTO: 8.29 K/UL — SIGNIFICANT CHANGE UP (ref 3.8–10.5)

## 2021-05-01 PROCEDURE — 99223 1ST HOSP IP/OBS HIGH 75: CPT

## 2021-05-01 PROCEDURE — 93010 ELECTROCARDIOGRAM REPORT: CPT

## 2021-05-01 RX ORDER — PIPERACILLIN AND TAZOBACTAM 4; .5 G/20ML; G/20ML
3.38 INJECTION, POWDER, LYOPHILIZED, FOR SOLUTION INTRAVENOUS ONCE
Refills: 0 | Status: COMPLETED | OUTPATIENT
Start: 2021-05-01 | End: 2021-05-01

## 2021-05-01 RX ORDER — GLUCAGON INJECTION, SOLUTION 0.5 MG/.1ML
1 INJECTION, SOLUTION SUBCUTANEOUS ONCE
Refills: 0 | Status: DISCONTINUED | OUTPATIENT
Start: 2021-05-01 | End: 2021-05-07

## 2021-05-01 RX ORDER — PIPERACILLIN AND TAZOBACTAM 4; .5 G/20ML; G/20ML
3.38 INJECTION, POWDER, LYOPHILIZED, FOR SOLUTION INTRAVENOUS EVERY 8 HOURS
Refills: 0 | Status: DISCONTINUED | OUTPATIENT
Start: 2021-05-01 | End: 2021-05-01

## 2021-05-01 RX ORDER — ASPIRIN/CALCIUM CARB/MAGNESIUM 324 MG
0 TABLET ORAL
Qty: 0 | Refills: 0 | DISCHARGE

## 2021-05-01 RX ORDER — HYDROMORPHONE HYDROCHLORIDE 2 MG/ML
0 INJECTION INTRAMUSCULAR; INTRAVENOUS; SUBCUTANEOUS
Qty: 0 | Refills: 0 | DISCHARGE

## 2021-05-01 RX ORDER — INSULIN GLARGINE 100 [IU]/ML
0 INJECTION, SOLUTION SUBCUTANEOUS
Qty: 0 | Refills: 0 | DISCHARGE

## 2021-05-01 RX ORDER — SPIRONOLACTONE 25 MG/1
0 TABLET, FILM COATED ORAL
Qty: 0 | Refills: 0 | DISCHARGE

## 2021-05-01 RX ORDER — DEXTROSE 50 % IN WATER 50 %
25 SYRINGE (ML) INTRAVENOUS ONCE
Refills: 0 | Status: DISCONTINUED | OUTPATIENT
Start: 2021-05-01 | End: 2021-05-07

## 2021-05-01 RX ORDER — ASPIRIN/CALCIUM CARB/MAGNESIUM 324 MG
81 TABLET ORAL DAILY
Refills: 0 | Status: DISCONTINUED | OUTPATIENT
Start: 2021-05-01 | End: 2021-05-07

## 2021-05-01 RX ORDER — CEFAZOLIN SODIUM 1 G
2000 VIAL (EA) INJECTION EVERY 8 HOURS
Refills: 0 | Status: DISCONTINUED | OUTPATIENT
Start: 2021-05-02 | End: 2021-05-03

## 2021-05-01 RX ORDER — ENOXAPARIN SODIUM 100 MG/ML
40 INJECTION SUBCUTANEOUS EVERY 12 HOURS
Refills: 0 | Status: DISCONTINUED | OUTPATIENT
Start: 2021-05-01 | End: 2021-05-07

## 2021-05-01 RX ORDER — ARIPIPRAZOLE 15 MG/1
2 TABLET ORAL DAILY
Refills: 0 | Status: DISCONTINUED | OUTPATIENT
Start: 2021-05-01 | End: 2021-05-07

## 2021-05-01 RX ORDER — ESCITALOPRAM OXALATE 10 MG/1
0 TABLET, FILM COATED ORAL
Qty: 0 | Refills: 0 | DISCHARGE

## 2021-05-01 RX ORDER — SODIUM CHLORIDE 9 MG/ML
1000 INJECTION, SOLUTION INTRAVENOUS
Refills: 0 | Status: DISCONTINUED | OUTPATIENT
Start: 2021-05-01 | End: 2021-05-07

## 2021-05-01 RX ORDER — INSULIN GLARGINE 100 [IU]/ML
25 INJECTION, SOLUTION SUBCUTANEOUS
Qty: 0 | Refills: 0 | DISCHARGE

## 2021-05-01 RX ORDER — ATORVASTATIN CALCIUM 80 MG/1
0 TABLET, FILM COATED ORAL
Qty: 0 | Refills: 1 | DISCHARGE

## 2021-05-01 RX ORDER — HYDROMORPHONE HYDROCHLORIDE 2 MG/ML
4 INJECTION INTRAMUSCULAR; INTRAVENOUS; SUBCUTANEOUS EVERY 6 HOURS
Refills: 0 | Status: DISCONTINUED | OUTPATIENT
Start: 2021-05-01 | End: 2021-05-07

## 2021-05-01 RX ORDER — INSULIN GLARGINE 100 [IU]/ML
25 INJECTION, SOLUTION SUBCUTANEOUS EVERY MORNING
Refills: 0 | Status: DISCONTINUED | OUTPATIENT
Start: 2021-05-01 | End: 2021-05-07

## 2021-05-01 RX ORDER — INSULIN LISPRO 100/ML
VIAL (ML) SUBCUTANEOUS AT BEDTIME
Refills: 0 | Status: DISCONTINUED | OUTPATIENT
Start: 2021-05-01 | End: 2021-05-07

## 2021-05-01 RX ORDER — GABAPENTIN 400 MG/1
0 CAPSULE ORAL
Qty: 0 | Refills: 0 | DISCHARGE

## 2021-05-01 RX ORDER — INSULIN ASPART 100 [IU]/ML
0 INJECTION, SOLUTION SUBCUTANEOUS
Qty: 0 | Refills: 1 | DISCHARGE

## 2021-05-01 RX ORDER — LEVOTHYROXINE SODIUM 125 MCG
125 TABLET ORAL DAILY
Refills: 0 | Status: DISCONTINUED | OUTPATIENT
Start: 2021-05-01 | End: 2021-05-07

## 2021-05-01 RX ORDER — OMEPRAZOLE 10 MG/1
1 CAPSULE, DELAYED RELEASE ORAL
Qty: 0 | Refills: 0 | DISCHARGE

## 2021-05-01 RX ORDER — ESCITALOPRAM OXALATE 10 MG/1
20 TABLET, FILM COATED ORAL DAILY
Refills: 0 | Status: DISCONTINUED | OUTPATIENT
Start: 2021-05-01 | End: 2021-05-07

## 2021-05-01 RX ORDER — DEXTROSE 50 % IN WATER 50 %
15 SYRINGE (ML) INTRAVENOUS ONCE
Refills: 0 | Status: DISCONTINUED | OUTPATIENT
Start: 2021-05-01 | End: 2021-05-07

## 2021-05-01 RX ORDER — FUROSEMIDE 40 MG
0 TABLET ORAL
Qty: 0 | Refills: 5 | DISCHARGE

## 2021-05-01 RX ORDER — ACETAMINOPHEN 500 MG
650 TABLET ORAL ONCE
Refills: 0 | Status: COMPLETED | OUTPATIENT
Start: 2021-05-01 | End: 2021-05-01

## 2021-05-01 RX ORDER — INSULIN LISPRO 100/ML
VIAL (ML) SUBCUTANEOUS
Refills: 0 | Status: DISCONTINUED | OUTPATIENT
Start: 2021-05-01 | End: 2021-05-07

## 2021-05-01 RX ORDER — BUMETANIDE 0.25 MG/ML
0.5 INJECTION INTRAMUSCULAR; INTRAVENOUS DAILY
Refills: 0 | Status: DISCONTINUED | OUTPATIENT
Start: 2021-05-01 | End: 2021-05-07

## 2021-05-01 RX ORDER — BUPROPION HYDROCHLORIDE 150 MG/1
300 TABLET, EXTENDED RELEASE ORAL DAILY
Refills: 0 | Status: DISCONTINUED | OUTPATIENT
Start: 2021-05-01 | End: 2021-05-07

## 2021-05-01 RX ORDER — SPIRONOLACTONE 25 MG/1
25 TABLET, FILM COATED ORAL DAILY
Refills: 0 | Status: DISCONTINUED | OUTPATIENT
Start: 2021-05-01 | End: 2021-05-07

## 2021-05-01 RX ORDER — GABAPENTIN 400 MG/1
600 CAPSULE ORAL THREE TIMES A DAY
Refills: 0 | Status: DISCONTINUED | OUTPATIENT
Start: 2021-05-01 | End: 2021-05-07

## 2021-05-01 RX ORDER — ATORVASTATIN CALCIUM 80 MG/1
40 TABLET, FILM COATED ORAL AT BEDTIME
Refills: 0 | Status: DISCONTINUED | OUTPATIENT
Start: 2021-05-01 | End: 2021-05-07

## 2021-05-01 RX ORDER — INSULIN LISPRO 100/ML
10 VIAL (ML) SUBCUTANEOUS
Refills: 0 | Status: DISCONTINUED | OUTPATIENT
Start: 2021-05-01 | End: 2021-05-07

## 2021-05-01 RX ORDER — DEXTROSE 50 % IN WATER 50 %
12.5 SYRINGE (ML) INTRAVENOUS ONCE
Refills: 0 | Status: DISCONTINUED | OUTPATIENT
Start: 2021-05-01 | End: 2021-05-07

## 2021-05-01 RX ORDER — BUPROPION HYDROCHLORIDE 150 MG/1
0 TABLET, EXTENDED RELEASE ORAL
Qty: 0 | Refills: 0 | DISCHARGE

## 2021-05-01 RX ORDER — PANTOPRAZOLE SODIUM 20 MG/1
40 TABLET, DELAYED RELEASE ORAL
Refills: 0 | Status: DISCONTINUED | OUTPATIENT
Start: 2021-05-01 | End: 2021-05-07

## 2021-05-01 RX ORDER — CLONAZEPAM 1 MG
0.5 TABLET ORAL AT BEDTIME
Refills: 0 | Status: DISCONTINUED | OUTPATIENT
Start: 2021-05-01 | End: 2021-05-07

## 2021-05-01 RX ORDER — LEVOTHYROXINE SODIUM 125 MCG
0 TABLET ORAL
Qty: 0 | Refills: 1 | DISCHARGE

## 2021-05-01 RX ADMIN — HYDROMORPHONE HYDROCHLORIDE 4 MILLIGRAM(S): 2 INJECTION INTRAMUSCULAR; INTRAVENOUS; SUBCUTANEOUS at 23:07

## 2021-05-01 RX ADMIN — PIPERACILLIN AND TAZOBACTAM 25 GRAM(S): 4; .5 INJECTION, POWDER, LYOPHILIZED, FOR SOLUTION INTRAVENOUS at 18:23

## 2021-05-01 RX ADMIN — ATORVASTATIN CALCIUM 40 MILLIGRAM(S): 80 TABLET, FILM COATED ORAL at 22:37

## 2021-05-01 RX ADMIN — ARIPIPRAZOLE 2 MILLIGRAM(S): 15 TABLET ORAL at 13:43

## 2021-05-01 RX ADMIN — BUMETANIDE 0.5 MILLIGRAM(S): 0.25 INJECTION INTRAMUSCULAR; INTRAVENOUS at 05:14

## 2021-05-01 RX ADMIN — PIPERACILLIN AND TAZOBACTAM 25 GRAM(S): 4; .5 INJECTION, POWDER, LYOPHILIZED, FOR SOLUTION INTRAVENOUS at 11:39

## 2021-05-01 RX ADMIN — HYDROMORPHONE HYDROCHLORIDE 4 MILLIGRAM(S): 2 INJECTION INTRAMUSCULAR; INTRAVENOUS; SUBCUTANEOUS at 16:30

## 2021-05-01 RX ADMIN — HYDROMORPHONE HYDROCHLORIDE 4 MILLIGRAM(S): 2 INJECTION INTRAMUSCULAR; INTRAVENOUS; SUBCUTANEOUS at 05:31

## 2021-05-01 RX ADMIN — ENOXAPARIN SODIUM 40 MILLIGRAM(S): 100 INJECTION SUBCUTANEOUS at 05:24

## 2021-05-01 RX ADMIN — SPIRONOLACTONE 25 MILLIGRAM(S): 25 TABLET, FILM COATED ORAL at 05:31

## 2021-05-01 RX ADMIN — Medication 650 MILLIGRAM(S): at 02:56

## 2021-05-01 RX ADMIN — ENOXAPARIN SODIUM 40 MILLIGRAM(S): 100 INJECTION SUBCUTANEOUS at 18:25

## 2021-05-01 RX ADMIN — HYDROMORPHONE HYDROCHLORIDE 4 MILLIGRAM(S): 2 INJECTION INTRAMUSCULAR; INTRAVENOUS; SUBCUTANEOUS at 15:00

## 2021-05-01 RX ADMIN — Medication 1: at 09:22

## 2021-05-01 RX ADMIN — Medication 10 UNIT(S): at 13:43

## 2021-05-01 RX ADMIN — HYDROMORPHONE HYDROCHLORIDE 4 MILLIGRAM(S): 2 INJECTION INTRAMUSCULAR; INTRAVENOUS; SUBCUTANEOUS at 22:37

## 2021-05-01 RX ADMIN — Medication 125 MICROGRAM(S): at 05:15

## 2021-05-01 RX ADMIN — GABAPENTIN 600 MILLIGRAM(S): 400 CAPSULE ORAL at 05:14

## 2021-05-01 RX ADMIN — INSULIN GLARGINE 25 UNIT(S): 100 INJECTION, SOLUTION SUBCUTANEOUS at 09:22

## 2021-05-01 RX ADMIN — Medication 10 UNIT(S): at 18:25

## 2021-05-01 RX ADMIN — Medication 100 MILLIGRAM(S): at 00:12

## 2021-05-01 RX ADMIN — Medication 10 UNIT(S): at 09:23

## 2021-05-01 RX ADMIN — GABAPENTIN 600 MILLIGRAM(S): 400 CAPSULE ORAL at 13:43

## 2021-05-01 RX ADMIN — ESCITALOPRAM OXALATE 20 MILLIGRAM(S): 10 TABLET, FILM COATED ORAL at 13:43

## 2021-05-01 RX ADMIN — GABAPENTIN 600 MILLIGRAM(S): 400 CAPSULE ORAL at 22:37

## 2021-05-01 RX ADMIN — Medication 81 MILLIGRAM(S): at 13:47

## 2021-05-01 RX ADMIN — BUPROPION HYDROCHLORIDE 300 MILLIGRAM(S): 150 TABLET, EXTENDED RELEASE ORAL at 13:43

## 2021-05-01 RX ADMIN — PANTOPRAZOLE SODIUM 40 MILLIGRAM(S): 20 TABLET, DELAYED RELEASE ORAL at 09:32

## 2021-05-01 RX ADMIN — PIPERACILLIN AND TAZOBACTAM 200 GRAM(S): 4; .5 INJECTION, POWDER, LYOPHILIZED, FOR SOLUTION INTRAVENOUS at 05:17

## 2021-05-01 NOTE — CONSULT NOTE ADULT - ATTENDING COMMENTS
Patient with bilateral leg erythema  LLE exam appears more prominent  Patient with leukocytosis on admission  Exam of LLE appears consistent with possible streptococcal cellulitis  I would switch to Cefazolin 2g IV Q8H  Check MRSA/MSSA PCR    I will continue to follow. Please feel free to contact me with any further questions.    Kyle Paul M.D.  [facility] Division of Infectious Disease  8AM-5PM: Pager Number [phone]  After Hours (or if no response): Please contact the Infectious Diseases Office at [phone]     The above assessment and plan were discussed with medicine team Unknown if ever smoked

## 2021-05-01 NOTE — H&P ADULT - PROBLEM SELECTOR PLAN 3
Patient is on numerous medications for her depression and has been doing well. Long term would consider whether abilify may be able to be discontinued given patient's obesity, although abilify is less obesogenic than other antipsychotics if patient does not require an atypical antipsychotic this may improve weight outcome and long term health outcomes.

## 2021-05-01 NOTE — H&P ADULT - NSHPSOURCEINFORD_GEN_ALL_CORE
Chart(s)/Patient Provider: Nasir  Caller: maribel  Time of call: [id number]     Phone #:  [phone]  Last visit: 10/06/17    Next visit: PRN             Reason for call: Pt called and states he has contacted Dr. Solis's office for a f/u visit regarding his neck pain. The pt has an appointment scheduled for 10/11/18 and wanted to know if Dr. Best recommended going to an Urgent Care center to receive an injection instead.     I called the pt and let him know this is not a service a UC would provide. I recommended  Conservative measures including ice, heat and OTC pain relievers until he can be seen. Pt verbalized understanding.

## 2021-05-01 NOTE — PROGRESS NOTE ADULT - ASSESSMENT
66   year-old female     h/o   DM2 on insulin, fibromyalgia, osteoarthritis, sleep apnea on CPAP,  RENARD/   depression, venous insufficiency,    endometrial CA s/p hysterectomy (2010)  , HTN,   HLD,   hypothyroidism,  morbid   obesity. CT angio  no PE       .    .  has  generalized pain and weakness at her baseline, uses  a walker.     admitted with cellulitis. legs. super imposed on  c/c  stasis dermatitis  on  zosyn/  hous  ID         *  h/o  morbid  obesity/  BMI  of  60/  with  massive  truncal  obesity/ on nocturnal CPAP    *  h/o  lymphedema. with hyperpigmented /  raised  lesions santosh  right leg/  wound  care eval   *  DM, on lantus/  humalog at  home/  ENDO       *   HTN, Hypothyroid,  on  asa/  lipitor/ synthroid           bumex/ aldactone   *  depression, on  mlple  meds/  fibromyalgia / pt wants  clonazepam  and   Hydromorphone// bupropion,  lexapro,  gabapentin  Echo,   jorje;  ef   c/c  stasis/ redness  , distal legs     *  anemia,   s/p  colonoscopy 5 yrs ago    on dvt  ppx      ra< from: CT Angio Chest w/ IV Cont (07.25.20 @ 17:37) >  IMPRESSION:  1.  Limited exam. The segmental and subsegmental pulmonary arteries are not evaluated secondary to poor opacification.  2.  Clear lungs. No aortic aneurysm or dissection.  < end of copied text >        66   year-old female     h/o   DM2 on insulin, fibromyalgia, osteoarthritis, sleep apnea on CPAP,  RENARD/   depression, venous insufficiency,    endometrial CA s/p hysterectomy (2010)  , HTN,   HLD,   hypothyroidism,  morbid   obesity. CT angio  no PE       .    .  has  generalized pain and weakness at her baseline, uses  a walker.     admitted with cellulitis. legs. super imposed on  c/c  stasis dermatitis  on  zosyn  by admitting team  house  ID         *  h/o  morbid  obesity/  BMI  of  60/  with  massive  truncal  obesity/ on nocturnal CPAP    *  h/o  lymphedema   *  DM, on lantus/  humalog at  home/  ENDO       *   HTN, Hypothyroid,  on  asa/  lipitor/ synthroid           bumex/ aldactone   *  depression, on  mlple  meds/  fibromyalgia / pt wants  clonazepam  and   Hydromorphone// bupropion,  lexapro,  gabapentin    Echo,   jorje;  ef   c/c  stasis     *  anemia,   s/p  colonoscopy 5 yrs ago    on dvt  ppx    on iv ancef by ID      ra< from: CT Angio Chest w/ IV Cont (07.25.20 @ 17:37) >  IMPRESSION:  1.  Limited exam. The segmental and subsegmental pulmonary arteries are not evaluated secondary to poor opacification.  2.  Clear lungs. No aortic aneurysm or dissection.  < end of copied text >

## 2021-05-01 NOTE — H&P ADULT - PROBLEM SELECTOR PLAN 5
Will reduce long acting and short acting insulins while inpatient by approx 20% given decreased po intake with illness and hospital diet. ISS  Check A1C with AM labwork

## 2021-05-01 NOTE — H&P ADULT - HISTORY OF PRESENT ILLNESS
66 year-old female with PMHx of DM2 on insulin/metformin but no longer on Trulicity as this was DC'd during a hospitalization, fibromyalgia, osteoarthritis, sleep apnea on CPAP, depression, venous insufficiency, endometrial CA s/p hysterectomy (2010), HTN, HLD, hypothyroidism, obesity class III, now presenting with recurrent cellulitis. Patient has previous hx of hospitalizations for lower extremity cellulitis that has historically required IV antibiotics to treat. Reports that since her prior hospitalization a couple months ago that she had been doing well, with some hyperpigmentation chronically of her LE but no redness or discomfort. Reports that yesterday she began to notice redness and swelling initially in her LLE but this rapidly spread to her RLE as well. Pain over anterior LLE shin area with this redness and swelling and she also began to notice chills. Otherwise no nausea/vomiting/coughing/myalgias/diarrhea. No swelling of her LE. Is doing well on new regimen of diuretics with bumex and spironolactone only (no longer on metolazone or furosemide).

## 2021-05-01 NOTE — H&P ADULT - PROBLEM SELECTOR PLAN 4
Patient was previously on Trulicity in addition to her Basaglar and Novolog but reports that due to multiple hospitalizations has not resumed this as her PMD didn't want to change her regimen after hospitals did not give her this? This was prescribed by patient's endocrinologist. Patient would most likely benefit long term from resuming GLP-1 receptor agonist for weight control especially given use of other agents. Is also on metformin and bupropion which may help control weight long term. Given patient's BMI would most likely also benefit from surgical intervention as this may help reduce her frequent hospitalizations long term.

## 2021-05-01 NOTE — H&P ADULT - PROBLEM SELECTOR PLAN 1
Given concurrent DM2 will change abx to Zosyn to cover for both pseudomonas and anaerobic organisms as diabetic patients are higher risk for these. No obvious pustules noted on exam so MRSA somewhat less likely but if patient does not have good response to Zosyn would add vancomycin given repeated bouts of cellulitis.  Check [facility] Duplex US to r/o DVT though feel cellulitis more likely given clinical picture, patient is less mobile due to her weight.  Monitor for improvement

## 2021-05-01 NOTE — PROGRESS NOTE ADULT - SUBJECTIVE AND OBJECTIVE BOX
afebrile    REVIEW OF SYSTEMS:  GEN: no fever,    no chills  RESP: no SOB,   no cough  CVS: no chest pain,   no palpitations  GI: no abdominal pain,   no nausea,   no vomiting,   no constipation,   no diarrhea  : no dysuria,   no frequency  NEURO: no headache,   no dizziness  PSYCH: no depression,   not anxious  Derm : no rash    MEDICATIONS  (STANDING):  ARIPiprazole 2 milliGRAM(s) Oral daily  aspirin enteric coated 81 milliGRAM(s) Oral daily  atorvastatin 40 milliGRAM(s) Oral at bedtime  buMETAnide 0.5 milliGRAM(s) Oral daily  buPROPion XL . 300 milliGRAM(s) Oral daily  dextrose 40% Gel 15 Gram(s) Oral once  dextrose 5%. 1000 milliLiter(s) (50 mL/Hr) IV Continuous <Continuous>  dextrose 5%. 1000 milliLiter(s) (100 mL/Hr) IV Continuous <Continuous>  dextrose 50% Injectable 25 Gram(s) IV Push once  dextrose 50% Injectable 12.5 Gram(s) IV Push once  dextrose 50% Injectable 25 Gram(s) IV Push once  enalapril 10 milliGRAM(s) Oral daily  enoxaparin Injectable 40 milliGRAM(s) SubCutaneous every 12 hours  escitalopram 20 milliGRAM(s) Oral daily  gabapentin 600 milliGRAM(s) Oral three times a day  glucagon  Injectable 1 milliGRAM(s) IntraMuscular once  insulin glargine Injectable (LANTUS) 25 Unit(s) SubCutaneous every morning  insulin lispro (ADMELOG) corrective regimen sliding scale   SubCutaneous three times a day before meals  insulin lispro (ADMELOG) corrective regimen sliding scale   SubCutaneous at bedtime  insulin lispro Injectable (ADMELOG) 10 Unit(s) SubCutaneous three times a day before meals  levothyroxine 125 MICROGram(s) Oral daily  pantoprazole    Tablet 40 milliGRAM(s) Oral before breakfast  piperacillin/tazobactam IVPB.. 3.375 Gram(s) IV Intermittent every 8 hours  spironolactone 25 milliGRAM(s) Oral daily    MEDICATIONS  (PRN):  clonazePAM  Tablet 0.5 milliGRAM(s) Oral at bedtime PRN insomnia/anxiety  HYDROmorphone   Tablet 4 milliGRAM(s) Oral every 6 hours PRN Severe Pain (7 - 10)      Vital Signs Last 24 Hrs  T(C): 36.9 (01 May 2021 08:51), Max: 37.7 (30 Apr 2021 22:11)  T(F): 98.5 (01 May 2021 08:51), Max: 99.8 (30 Apr 2021 22:11)  HR: 67 (01 May 2021 08:51) (66 - 83)  BP: 107/61 (01 May 2021 08:51) (93/58 - 157/97)  BP(mean): --  RR: 18 (01 May 2021 08:51) (16 - 23)  SpO2: 96% (01 May 2021 08:51) (96% - 100%)  CAPILLARY BLOOD GLUCOSE      POCT Blood Glucose.: 160 mg/dL (01 May 2021 09:17)    I&O's Summary    01 May 2021 07:01  -  01 May 2021 09:46  --------------------------------------------------------  IN: 240 mL / OUT: 0 mL / NET: 240 mL        PHYSICAL EXAM:  HEAD:  Atraumatic, Normocephalic  NECK: Supple, No   JVD  CHEST/LUNG:   no     rales,     no,    rhonchi  HEART: Regular rate and rhythm;         murmur  ABDOMEN: Soft, Nontender, ;   EXTREMITIES:    no    edema  NEUROLOGY:  alert    LABS:                        10.9   8.29  )-----------( 280      ( 01 May 2021 05:48 )             35.4     05-01    137  |  100  |  20  ----------------------------<  169<H>  4.0   |  23  |  1.01    Ca    9.0      01 May 2021 05:48    TPro  7.6  /  Alb  3.8  /  TBili  0.6  /  DBili  x   /  AST  10  /  ALT  9<L>  /  AlkPhos  113  04-30    PT/INR - ( 30 Apr 2021 22:28 )   PT: 14.1 sec;   INR: 1.18 ratio         PTT - ( 30 Apr 2021 22:28 )  PTT:31.6 sec            04-30 @ 22:28  3.9  21              Consultant(s) Notes Reviewed:      Care Discussed with Consultants/Other Providers:

## 2021-05-01 NOTE — H&P ADULT - NSHPPHYSICALEXAM_GEN_ALL_CORE
Vital Signs Last 24 Hrs  T(C): 36.9 (01 May 2021 03:02), Max: 37.7 (30 Apr 2021 22:11)  T(F): 98.4 (01 May 2021 03:02), Max: 99.8 (30 Apr 2021 22:11)  HR: 73 (01 May 2021 03:02) (66 - 83)  BP: 117/59 (01 May 2021 03:02) (117/59 - 157/97)  BP(mean): --  RR: 17 (01 May 2021 03:02) (17 - 23)  SpO2: 100% (01 May 2021 03:02) (98% - 100%)    GENERAL: No acute distress, well-developed  HEAD:  Atraumatic, Normocephalic  EYES: EOMI, PERRLA, conjunctiva and sclera clear  ENT: Oral mucosa moist  NECK: Neck supple  CHEST/LUNG: Clear to auscultation bilaterally; No wheeze, no rales, no rhonchi.    HEART: Regular rate and rhythm; No murmurs, rubs, or gallops  ABDOMEN: Soft, Nontender, Nondistended; Bowel sounds present  EXTREMITIES:  b/l lower extremity erythema with tenderness primarily of LLE with upwards tracking of erythema concerning for cellulitis   VASCULAR: Posterior tibialis pulses intact bilaterally  PSYCH: Normal behavior, normal affect  NEUROLOGY: AAOx3  SKIN: grossly warm and dry

## 2021-05-01 NOTE — H&P ADULT - ASSESSMENT
66 year-old female with PMHx of DM2 on insulin/metformin but no longer on Trulicity as this was DC'd during a hospitalization, fibromyalgia, osteoarthritis, sleep apnea on CPAP, depression, venous insufficiency, endometrial CA s/p hysterectomy (2010), HTN, HLD, hypothyroidism, obesity class III, now presenting with recurrent cellulitis

## 2021-05-01 NOTE — CONSULT NOTE ADULT - ASSESSMENT
66 year-old female with PMHx of DM2 on insulin/metformin but no longer on Trulicity as this was DC'd during a hospitalization, fibromyalgia, osteoarthritis, sleep apnea on CPAP, depression, venous insufficiency, endometrial CA s/p hysterectomy (2010), HTN, HLD, hypothyroidism, obesity class III, now presenting with recurrent cellulitis. Patient has previous hx of hospitalizations for lower extremity cellulitis that has historically required IV antibiotics to treat. Reports that since her prior hospitalization a couple months ago that she had been doing well, with some hyperpigmentation chronically of her LE but no redness or discomfort. Reports that yesterday she began to notice redness and swelling initially in her LLE but this rapidly spread to her RLE as well. Pain over anterior LLE shin area with this redness and swelling and she also began to notice chills. Otherwise no nausea/vomiting/coughing/myalgias/diarrhea. No swelling of her LE. Is doing well on new regimen of diuretics with bumex and spironolactone.  pt is well known to me with hx of htn CHF RV dysfunction with pulmonary htn ?sec tyop obesity and sleep apnea with recurrent LE cellulitis.  chf/ rv dysfunction continue Bumex Aldactone will adjust meds  ?venous insufficieny will consider RF ablation  dvt prophylaxis  abx  Bipap

## 2021-05-01 NOTE — H&P ADULT - NSHPLABSRESULTS_GEN_ALL_CORE
Labs personally reviewed:                        12.5   12.37 )-----------( 333      ( 30 Apr 2021 22:28 )             40.5     04-30    136  |  97  |  24<H>  ----------------------------<  167<H>  4.2   |  24  |  1.06    Ca    9.6      30 Apr 2021 22:28    TPro  7.6  /  Alb  3.8  /  TBili  0.6  /  DBili  x   /  AST  10  /  ALT  9<L>  /  AlkPhos  113  04-30        LIVER FUNCTIONS - ( 30 Apr 2021 22:28 )  Alb: 3.8 g/dL / Pro: 7.6 g/dL / ALK PHOS: 113 U/L / ALT: 9 U/L / AST: 10 U/L / GGT: x           PT/INR - ( 30 Apr 2021 22:28 )   PT: 14.1 sec;   INR: 1.18 ratio         PTT - ( 30 Apr 2021 22:28 )  PTT:31.6 sec    Imaging personally reviewed    VA duplex us of b/l le ordered    EKG ordered

## 2021-05-01 NOTE — CONSULT NOTE ADULT - ASSESSMENT
\66 year-old female with PMHx of DM2,  fibromyalgia, osteoarthritis, sleep apnea on CPAP, depression, venous insufficiency, endometrial CA s/p hysterectomy (2010), HTN, HLD, hypothyroidism, obesity class III, now presenting with recurrent cellulitis.   Last admitted in 2/2021 for b/l LE cellulitis and received ancef/keflex x10 days.    Cellulitis, leukocytosis - b/l lower leg erythema upto mid shin.  On Zosyn. With leukocytosis to 12.3 improved to 8.29 this morning.   Remains afebrile.      Rec:  - Start Ancef 2gq8, d/c zosyn  - trend leukocytosis and monitor for fever  - send bl clx if febrile      Fcaundo Hdez MD  Fellow, Infectious Diseases, PGY-4  Pager: [phone]  Before 9am or after 5pm/Weekends: Call [phone]       \66 year-old female with PMHx of DM2,  fibromyalgia, osteoarthritis, sleep apnea on CPAP, depression, venous insufficiency, endometrial CA s/p hysterectomy (2010), HTN, HLD, hypothyroidism, obesity class III, now presenting with recurrent cellulitis.   Last admitted in 2/2021 for b/l LE cellulitis and received ancef/keflex x10 days.    Cellulitis, leukocytosis - b/l lower leg erythema upto mid shin.  On Zosyn. With leukocytosis to 12.3 improved to 8.29 this morning.   Remains afebrile.      Rec:  - Start Ancef 2gq8, d/c zosyn  - trend leukocytosis and monitor for fever  - send bl clx if febrile  - check MRSA PCR    Facundo Hdez MD  Fellow, Infectious Diseases, PGY-4  Pager: [phone]  Before 9am or after 5pm/Weekends: Call [phone]

## 2021-05-02 LAB
COVID-19 SPIKE DOMAIN AB INTERP: NEGATIVE — SIGNIFICANT CHANGE UP
COVID-19 SPIKE DOMAIN ANTIBODY RESULT: 0.4 U/ML — SIGNIFICANT CHANGE UP
GLUCOSE BLDC GLUCOMTR-MCNC: 122 MG/DL — HIGH (ref 70–99)
GLUCOSE BLDC GLUCOMTR-MCNC: 134 MG/DL — HIGH (ref 70–99)
GLUCOSE BLDC GLUCOMTR-MCNC: 144 MG/DL — HIGH (ref 70–99)
GLUCOSE BLDC GLUCOMTR-MCNC: 145 MG/DL — HIGH (ref 70–99)
HCT VFR BLD CALC: 35.3 % — SIGNIFICANT CHANGE UP (ref 34.5–45)
HGB BLD-MCNC: 10.7 G/DL — LOW (ref 11.5–15.5)
MCHC RBC-ENTMCNC: 24.2 PG — LOW (ref 27–34)
MCHC RBC-ENTMCNC: 30.3 GM/DL — LOW (ref 32–36)
MCV RBC AUTO: 79.9 FL — LOW (ref 80–100)
MRSA PCR RESULT.: SIGNIFICANT CHANGE UP
NRBC # BLD: 0 /100 WBCS — SIGNIFICANT CHANGE UP (ref 0–0)
PLATELET # BLD AUTO: 281 K/UL — SIGNIFICANT CHANGE UP (ref 150–400)
RBC # BLD: 4.42 M/UL — SIGNIFICANT CHANGE UP (ref 3.8–5.2)
RBC # FLD: 17.6 % — HIGH (ref 10.3–14.5)
S AUREUS DNA NOSE QL NAA+PROBE: SIGNIFICANT CHANGE UP
SARS-COV-2 IGG+IGM SERPL QL IA: 0.4 U/ML — SIGNIFICANT CHANGE UP
SARS-COV-2 IGG+IGM SERPL QL IA: NEGATIVE — SIGNIFICANT CHANGE UP
WBC # BLD: 6.2 K/UL — SIGNIFICANT CHANGE UP (ref 3.8–10.5)
WBC # FLD AUTO: 6.2 K/UL — SIGNIFICANT CHANGE UP (ref 3.8–10.5)

## 2021-05-02 PROCEDURE — 93970 EXTREMITY STUDY: CPT | Mod: 26

## 2021-05-02 PROCEDURE — 99232 SBSQ HOSP IP/OBS MODERATE 35: CPT

## 2021-05-02 RX ORDER — ACETAMINOPHEN 500 MG
1000 TABLET ORAL ONCE
Refills: 0 | Status: COMPLETED | OUTPATIENT
Start: 2021-05-02 | End: 2021-05-02

## 2021-05-02 RX ORDER — MEN-PHOR .5; .5 G/G; G/G
1 LOTION TOPICAL EVERY 8 HOURS
Refills: 0 | Status: DISCONTINUED | OUTPATIENT
Start: 2021-05-02 | End: 2021-05-07

## 2021-05-02 RX ADMIN — Medication 100 MILLIGRAM(S): at 08:45

## 2021-05-02 RX ADMIN — SPIRONOLACTONE 25 MILLIGRAM(S): 25 TABLET, FILM COATED ORAL at 04:38

## 2021-05-02 RX ADMIN — Medication 400 MILLIGRAM(S): at 21:42

## 2021-05-02 RX ADMIN — ENOXAPARIN SODIUM 40 MILLIGRAM(S): 100 INJECTION SUBCUTANEOUS at 17:39

## 2021-05-02 RX ADMIN — Medication 125 MICROGRAM(S): at 04:37

## 2021-05-02 RX ADMIN — GABAPENTIN 600 MILLIGRAM(S): 400 CAPSULE ORAL at 04:38

## 2021-05-02 RX ADMIN — BUMETANIDE 0.5 MILLIGRAM(S): 0.25 INJECTION INTRAMUSCULAR; INTRAVENOUS at 04:38

## 2021-05-02 RX ADMIN — Medication 1000 MILLIGRAM(S): at 02:40

## 2021-05-02 RX ADMIN — ENOXAPARIN SODIUM 40 MILLIGRAM(S): 100 INJECTION SUBCUTANEOUS at 04:39

## 2021-05-02 RX ADMIN — HYDROMORPHONE HYDROCHLORIDE 4 MILLIGRAM(S): 2 INJECTION INTRAMUSCULAR; INTRAVENOUS; SUBCUTANEOUS at 10:36

## 2021-05-02 RX ADMIN — Medication 100 MILLIGRAM(S): at 16:24

## 2021-05-02 RX ADMIN — HYDROMORPHONE HYDROCHLORIDE 4 MILLIGRAM(S): 2 INJECTION INTRAMUSCULAR; INTRAVENOUS; SUBCUTANEOUS at 23:35

## 2021-05-02 RX ADMIN — Medication 100 MILLIGRAM(S): at 00:28

## 2021-05-02 RX ADMIN — BUPROPION HYDROCHLORIDE 300 MILLIGRAM(S): 150 TABLET, EXTENDED RELEASE ORAL at 12:41

## 2021-05-02 RX ADMIN — Medication 10 UNIT(S): at 08:46

## 2021-05-02 RX ADMIN — ATORVASTATIN CALCIUM 40 MILLIGRAM(S): 80 TABLET, FILM COATED ORAL at 21:42

## 2021-05-02 RX ADMIN — Medication 1000 MILLIGRAM(S): at 22:12

## 2021-05-02 RX ADMIN — PANTOPRAZOLE SODIUM 40 MILLIGRAM(S): 20 TABLET, DELAYED RELEASE ORAL at 04:38

## 2021-05-02 RX ADMIN — Medication 10 UNIT(S): at 17:38

## 2021-05-02 RX ADMIN — Medication 400 MILLIGRAM(S): at 02:10

## 2021-05-02 RX ADMIN — HYDROMORPHONE HYDROCHLORIDE 4 MILLIGRAM(S): 2 INJECTION INTRAMUSCULAR; INTRAVENOUS; SUBCUTANEOUS at 05:03

## 2021-05-02 RX ADMIN — HYDROMORPHONE HYDROCHLORIDE 4 MILLIGRAM(S): 2 INJECTION INTRAMUSCULAR; INTRAVENOUS; SUBCUTANEOUS at 04:33

## 2021-05-02 RX ADMIN — ESCITALOPRAM OXALATE 20 MILLIGRAM(S): 10 TABLET, FILM COATED ORAL at 12:41

## 2021-05-02 RX ADMIN — ARIPIPRAZOLE 2 MILLIGRAM(S): 15 TABLET ORAL at 12:40

## 2021-05-02 RX ADMIN — HYDROMORPHONE HYDROCHLORIDE 4 MILLIGRAM(S): 2 INJECTION INTRAMUSCULAR; INTRAVENOUS; SUBCUTANEOUS at 16:50

## 2021-05-02 RX ADMIN — GABAPENTIN 600 MILLIGRAM(S): 400 CAPSULE ORAL at 12:41

## 2021-05-02 RX ADMIN — HYDROMORPHONE HYDROCHLORIDE 4 MILLIGRAM(S): 2 INJECTION INTRAMUSCULAR; INTRAVENOUS; SUBCUTANEOUS at 16:23

## 2021-05-02 RX ADMIN — Medication 10 UNIT(S): at 12:42

## 2021-05-02 RX ADMIN — HYDROMORPHONE HYDROCHLORIDE 4 MILLIGRAM(S): 2 INJECTION INTRAMUSCULAR; INTRAVENOUS; SUBCUTANEOUS at 23:05

## 2021-05-02 RX ADMIN — HYDROMORPHONE HYDROCHLORIDE 4 MILLIGRAM(S): 2 INJECTION INTRAMUSCULAR; INTRAVENOUS; SUBCUTANEOUS at 12:42

## 2021-05-02 RX ADMIN — INSULIN GLARGINE 25 UNIT(S): 100 INJECTION, SOLUTION SUBCUTANEOUS at 08:45

## 2021-05-02 RX ADMIN — Medication 81 MILLIGRAM(S): at 12:40

## 2021-05-02 RX ADMIN — GABAPENTIN 600 MILLIGRAM(S): 400 CAPSULE ORAL at 21:43

## 2021-05-02 RX ADMIN — MEN-PHOR 1 APPLICATION(S): .5; .5 LOTION TOPICAL at 21:43

## 2021-05-02 NOTE — PROVIDER CONTACT NOTE (OTHER) - ASSESSMENT
Sonya x4, VSS. Pt requesting for CPAP at night, states she uses this at home but unaware of settings.
Sonya x4, VSS. Pt requesting for pain medication prn for R thigh, dilaudid too early to give. No other prn medications ordered for pt.
pt A&Ox4 in no distress, denies pain
Sonya x4, VSS. Pt requesting for pain medication prn, next dose of dilaudid too early to give. No other prn medications ordered for pt.

## 2021-05-02 NOTE — PROVIDER CONTACT NOTE (OTHER) - SITUATION
Pt requesting for pain medication prn, next dose of dilaudid too early
Pt requesting for pain medication prn for R thigh, next dose of dilaudid too early to give
Pt requesting for CPAP at night, states she uses this at home but unaware of settings

## 2021-05-02 NOTE — PHYSICAL THERAPY INITIAL EVALUATION ADULT - PERTINENT HX OF CURRENT PROBLEM, REHAB EVAL
66 year-old female with PMHx of DM2 on insulin/metformin, fibromyalgia, osteoarthritis, sleep apnea on CPAP, depression, venous insufficiency, endometrial CA s/p hysterectomy (2010), HTN, HLD, hypothyroidism, obesity class III, now presenting with recurrent cellulitis.

## 2021-05-02 NOTE — PROVIDER CONTACT NOTE (OTHER) - ACTION/TREATMENT ORDERED:
NP notified and aware. Will put in 1x dose of IV tylenol for pt. Continue to monitor pt.
NP notified and aware. Will put in 1x dose of IV tylenol for pt. Continue to monitor pt.
shown to NP, placed in patient's chart
NP notified and aware. Will consult with respiratory in regards to CPAP settings for pt. Continue to monitor pt.

## 2021-05-02 NOTE — PROGRESS NOTE ADULT - ASSESSMENT
66 year-old female with PMHx of DM2,  fibromyalgia, osteoarthritis, sleep apnea on CPAP, depression, venous insufficiency, endometrial CA s/p hysterectomy (2010), HTN, HLD, hypothyroidism, obesity class III, now presenting with recurrent cellulitis.   Last admitted in 2/2021 for b/l LE cellulitis and received ancef/keflex x10 days.    Patient with bilateral leg erythema  LLE exam appears more prominent  Patient with leukocytosis on admission  Exam of LLE appears consistent with possible streptococcal cellulitis  MRSA/MSSA PCR Negative    I would continue Cefazolin for now  Anticipate discharge on keflex    Overall, Cellulitis, Leukocytosis    I will continue to follow. Please feel free to contact me with any further questions.    Kyle Paul M.D.  [facility] Division of Infectious Disease  8AM-5PM: Pager Number [phone]  After Hours (or if no response): Please contact the Infectious Diseases Office at [phone]

## 2021-05-02 NOTE — PROGRESS NOTE ADULT - SUBJECTIVE AND OBJECTIVE BOX
CARDIOLOGY     PROGRESS  NOTE   ________________________________________________    CHIEF COMPLAINT:Patient is a 66y old  Female who presents with a chief complaint of Cellulitis (02 May 2021 08:24)  no complain.  	  REVIEW OF SYSTEMS:  CONSTITUTIONAL: No fever, weight loss, or fatigue  EYES: No eye pain, visual disturbances, or discharge  ENT:  No difficulty hearing, tinnitus, vertigo; No sinus or throat pain  NECK: No pain or stiffness  RESPIRATORY: No cough, wheezing, chills or hemoptysis; + mild  Shortness of Breath  CARDIOVASCULAR: No chest pain, palpitations, passing out, dizziness, or leg swelling  GASTROINTESTINAL: No abdominal or epigastric pain. No nausea, vomiting, or hematemesis; No diarrhea or constipation. No melena or hematochezia.  GENITOURINARY: No dysuria, frequency, hematuria, or incontinence  NEUROLOGICAL: No headaches, memory loss, loss of strength, numbness, or tremors  SKIN: No itching, burning, rashes, or lesions   LYMPH Nodes: No enlarged glands  ENDOCRINE: No heat or cold intolerance; No hair loss  MUSCULOSKELETAL: No joint pain or swelling; No muscle, back, or extremity pain  PSYCHIATRIC: No depression, anxiety, mood swings, or difficulty sleeping  HEME/LYMPH: No easy bruising, or bleeding gums  ALLERGY AND IMMUNOLOGIC: No hives or eczema	    [ ] All others negative	  [ ] Unable to obtain    PHYSICAL EXAM:  T(C): 36.5 (05-02-21 @ 04:22), Max: 37.2 (05-01-21 @ 12:41)  HR: 60 (05-02-21 @ 05:56) (52 - 85)  BP: 116/66 (05-02-21 @ 04:22) (100/56 - 136/57)  RR: 18 (05-02-21 @ 04:22) (18 - 18)  SpO2: 96% (05-02-21 @ 05:56) (95% - 97%)  Wt(kg): --  I&O's Summary    01 May 2021 07:01  -  02 May 2021 07:00  --------------------------------------------------------  IN: 870 mL / OUT: 1900 mL / NET: -1030 mL    02 May 2021 07:01  -  02 May 2021 08:55  --------------------------------------------------------  IN: 50 mL / OUT: 0 mL / NET: 50 mL        Appearance: Normal	  HEENT:   Normal oral mucosa, PERRL, EOMI	  Lymphatic: No lymphadenopathy  Cardiovascular: Normal S1 S2, No JVD, +murmurs, No edema  Respiratory: Lungs clear to auscultation	  Psychiatry: A & O x 3, Mood & affect appropriate  Gastrointestinal:  Soft, Non-tender, + BS	  Skin: No rashes, No ecchymoses, No cyanosis	  Neurologic: Non-focal  Extremities: Normal range of motion, No clubbing, cyanosis , erythema of both LE  Vascular: Peripheral pulses palpable 2+ bilaterally    MEDICATIONS  (STANDING):  ARIPiprazole 2 milliGRAM(s) Oral daily  aspirin enteric coated 81 milliGRAM(s) Oral daily  atorvastatin 40 milliGRAM(s) Oral at bedtime  buMETAnide 0.5 milliGRAM(s) Oral daily  buPROPion XL . 300 milliGRAM(s) Oral daily  ceFAZolin   IVPB 2000 milliGRAM(s) IV Intermittent every 8 hours  dextrose 40% Gel 15 Gram(s) Oral once  dextrose 5%. 1000 milliLiter(s) (50 mL/Hr) IV Continuous <Continuous>  dextrose 5%. 1000 milliLiter(s) (100 mL/Hr) IV Continuous <Continuous>  dextrose 50% Injectable 25 Gram(s) IV Push once  dextrose 50% Injectable 25 Gram(s) IV Push once  dextrose 50% Injectable 12.5 Gram(s) IV Push once  enalapril 10 milliGRAM(s) Oral daily  enoxaparin Injectable 40 milliGRAM(s) SubCutaneous every 12 hours  escitalopram 20 milliGRAM(s) Oral daily  gabapentin 600 milliGRAM(s) Oral three times a day  glucagon  Injectable 1 milliGRAM(s) IntraMuscular once  insulin glargine Injectable (LANTUS) 25 Unit(s) SubCutaneous every morning  insulin lispro (ADMELOG) corrective regimen sliding scale   SubCutaneous three times a day before meals  insulin lispro (ADMELOG) corrective regimen sliding scale   SubCutaneous at bedtime  insulin lispro Injectable (ADMELOG) 10 Unit(s) SubCutaneous three times a day before meals  levothyroxine 125 MICROGram(s) Oral daily  pantoprazole    Tablet 40 milliGRAM(s) Oral before breakfast  spironolactone 25 milliGRAM(s) Oral daily      TELEMETRY: 	    ECG:  	  RADIOLOGY:  OTHER: 	  	  LABS:	 	    CARDIAC MARKERS:                                10.7   6.20  )-----------( 281      ( 02 May 2021 07:15 )             35.3     05-01    137  |  100  |  20  ----------------------------<  169<H>  4.0   |  23  |  1.01    Ca    9.0      01 May 2021 05:48    TPro  7.6  /  Alb  3.8  /  TBili  0.6  /  DBili  x   /  AST  10  /  ALT  9<L>  /  AlkPhos  113  04-30    proBNP:   Lipid Profile:   HgA1c:   TSH:   PT/INR - ( 30 Apr 2021 22:28 )   PT: 14.1 sec;   INR: 1.18 ratio         PTT - ( 30 Apr 2021 22:28 )  PTT:31.6 sec    - Start Ancef 2gq8, d/c zosyn  - trend leukocytosis and monitor for fever  - send bl clx if febrile  - check MRSA PCR    Assessment and plan  ---------------------------  66 year-old female with PMHx of DM2 on insulin/metformin but no longer on Trulicity as this was DC'd during a hospitalization, fibromyalgia, osteoarthritis, sleep apnea on CPAP, depression, venous insufficiency, endometrial CA s/p hysterectomy (2010), HTN, HLD, hypothyroidism, obesity class III, now presenting with recurrent cellulitis. Patient has previous hx of hospitalizations for lower extremity cellulitis that has historically required IV antibiotics to treat. Reports that since her prior hospitalization a couple months ago that she had been doing well, with some hyperpigmentation chronically of her LE but no redness or discomfort. Reports that yesterday she began to notice redness and swelling initially in her LLE but this rapidly spread to her RLE as well. Pain over anterior LLE shin area with this redness and swelling and she also began to notice chills. Otherwise no nausea/vomiting/coughing/myalgias/diarrhea. No swelling of her LE. Is doing well on new regimen of diuretics with bumex and spironolactone.  pt is well known to me with hx of htn CHF RV dysfunction with pulmonary htn ?sec tyop obesity and sleep apnea with recurrent LE cellulitis.  chf/ rv dysfunction continue Bumex Aldactone will adjust meds  ?venous insufficieny will consider RF ablation  dvt prophylaxis  abx  Bipap  id appreciated  oob to chair  continue Bumex/  aldactone

## 2021-05-02 NOTE — PROGRESS NOTE ADULT - SUBJECTIVE AND OBJECTIVE BOX
Follow Up:  Cellulitis     Interval History:    REVIEW OF SYSTEMS  [  ] ROS unobtainable because:    [  ] All other systems negative except as noted below    Constitutional:  [ ] fever [ ] chills  [ ] weight loss  [ ] weakness  Skin:  [ ] rash [ ] phlebitis	  Eyes: [ ] icterus [ ] pain  [ ] discharge	  ENMT: [ ] sore throat  [ ] thrush [ ] ulcers [ ] exudates  Respiratory: [ ] dyspnea [ ] hemoptysis [ ] cough [ ] sputum	  Cardiovascular:  [ ] chest pain [ ] palpitations [ ] edema	  Gastrointestinal:  [ ] nausea [ ] vomiting [ ] diarrhea [ ] constipation [ ] pain	  Genitourinary:  [ ] dysuria [ ] frequency [ ] hematuria [ ] discharge [ ] flank pain  [ ] incontinence  Musculoskeletal:  [ ] myalgias [ ] arthralgias [ ] arthritis  [ ] back pain  Neurological:  [ ] headache [ ] seizures  [ ] confusion/altered mental status    Allergies  adhesives (Rash)  aerosols, perfumes, fabric softeners (Rash; Rhinitis; Rhinorrhea)  dust (Rhinitis; Rhinorrhea)  Lyrica (Rash)  pollen (Rhinitis; Rhinorrhea)  smoke (Rhinitis; Rhinorrhea)  Tin/costume jewelry (Rash)        ANTIMICROBIALS:  ceFAZolin   IVPB 2000 every 8 hours      OTHER MEDS:  MEDICATIONS  (STANDING):  ARIPiprazole 2 daily  aspirin enteric coated 81 daily  atorvastatin 40 at bedtime  buMETAnide 0.5 daily  buPROPion XL . 300 daily  clonazePAM  Tablet 0.5 at bedtime PRN  dextrose 40% Gel 15 once  dextrose 50% Injectable 25 once  dextrose 50% Injectable 12.5 once  dextrose 50% Injectable 25 once  enalapril 10 daily  enoxaparin Injectable 40 every 12 hours  escitalopram 20 daily  gabapentin 600 three times a day  glucagon  Injectable 1 once  HYDROmorphone   Tablet 4 every 6 hours PRN  insulin glargine Injectable (LANTUS) 25 every morning  insulin lispro (ADMELOG) corrective regimen sliding scale  three times a day before meals  insulin lispro (ADMELOG) corrective regimen sliding scale  at bedtime  insulin lispro Injectable (ADMELOG) 10 three times a day before meals  levothyroxine 125 daily  pantoprazole    Tablet 40 before breakfast  spironolactone 25 daily      Vital Signs Last 24 Hrs  T(C): 36.4 (02 May 2021 12:21), Max: 36.9 (02 May 2021 00:22)  T(F): 97.5 (02 May 2021 12:21), Max: 98.4 (02 May 2021 00:22)  HR: 52 (02 May 2021 12:21) (52 - 85)  BP: 100/64 (02 May 2021 12:21) (100/56 - 125/73)  BP(mean): --  RR: 18 (02 May 2021 12:21) (17 - 18)  SpO2: 96% (02 May 2021 12:21) (95% - 96%)    PHYSICAL EXAMINATION:  General: Alert and Awake, NAD  HEENT: PERRL, EOMI  Neck: Supple  Cardiac: RRR, No M/R/G  Resp: CTAB, No Wh/Rh/Ra  Abdomen: NBS, NT/ND, No HSM, No rigidity or guarding  MSK: No LE edema. No Calf tenderness  : No patel  Skin: No rashes or lesions. Skin is warm and dry to the touch.   Neuro: Alert and Awake. CN 2-12 Grossly intact. Moves all four extremities spontaneously.  Psych: Calm, Pleasant, Cooperative                          10.7   6.20  )-----------( 281      ( 02 May 2021 07:15 )             35.3       05-01    137  |  100  |  20  ----------------------------<  169<H>  4.0   |  23  |  1.01    Ca    9.0      01 May 2021 05:48    TPro  7.6  /  Alb  3.8  /  TBili  0.6  /  DBili  x   /  AST  10  /  ALT  9<L>  /  AlkPhos  113  04-30          MICROBIOLOGY:  v  .Blood Blood  05-01-21   No growth to date.  --  --                RADIOLOGY:    <The imaging below has been reviewed and visualized by me independently. Findings as detailed in report below> Follow Up:  Cellulitis     Interval History: pain in the LE is improving. afebrile.     REVIEW OF SYSTEMS  [  ] ROS unobtainable because:    [ x] All other systems negative except as noted below    Constitutional:  [ ] fever [ ] chills  [ ] weight loss  [ ] weakness  Skin:  [x ] rash [ ] phlebitis	  Eyes: [ ] icterus [ ] pain  [ ] discharge	  ENMT: [ ] sore throat  [ ] thrush [ ] ulcers [ ] exudates  Respiratory: [ ] dyspnea [ ] hemoptysis [ ] cough [ ] sputum	  Cardiovascular:  [ ] chest pain [ ] palpitations [ ] edema	  Gastrointestinal:  [ ] nausea [ ] vomiting [ ] diarrhea [ ] constipation [ ] pain	  Genitourinary:  [ ] dysuria [ ] frequency [ ] hematuria [ ] discharge [ ] flank pain  [ ] incontinence  Musculoskeletal:  [ ] myalgias [ ] arthralgias [ ] arthritis  [ ] back pain  Neurological:  [ ] headache [ ] seizures  [ ] confusion/altered mental status    Allergies  adhesives (Rash)  aerosols, perfumes, fabric softeners (Rash; Rhinitis; Rhinorrhea)  dust (Rhinitis; Rhinorrhea)  Lyrica (Rash)  pollen (Rhinitis; Rhinorrhea)  smoke (Rhinitis; Rhinorrhea)  Tin/costume jewelry (Rash)        ANTIMICROBIALS:  ceFAZolin   IVPB 2000 every 8 hours      OTHER MEDS:  MEDICATIONS  (STANDING):  ARIPiprazole 2 daily  aspirin enteric coated 81 daily  atorvastatin 40 at bedtime  buMETAnide 0.5 daily  buPROPion XL . 300 daily  clonazePAM  Tablet 0.5 at bedtime PRN  dextrose 40% Gel 15 once  dextrose 50% Injectable 25 once  dextrose 50% Injectable 12.5 once  dextrose 50% Injectable 25 once  enalapril 10 daily  enoxaparin Injectable 40 every 12 hours  escitalopram 20 daily  gabapentin 600 three times a day  glucagon  Injectable 1 once  HYDROmorphone   Tablet 4 every 6 hours PRN  insulin glargine Injectable (LANTUS) 25 every morning  insulin lispro (ADMELOG) corrective regimen sliding scale  three times a day before meals  insulin lispro (ADMELOG) corrective regimen sliding scale  at bedtime  insulin lispro Injectable (ADMELOG) 10 three times a day before meals  levothyroxine 125 daily  pantoprazole    Tablet 40 before breakfast  spironolactone 25 daily      Vital Signs Last 24 Hrs  T(C): 36.4 (02 May 2021 12:21), Max: 36.9 (02 May 2021 00:22)  T(F): 97.5 (02 May 2021 12:21), Max: 98.4 (02 May 2021 00:22)  HR: 52 (02 May 2021 12:21) (52 - 85)  BP: 100/64 (02 May 2021 12:21) (100/56 - 125/73)  BP(mean): --  RR: 18 (02 May 2021 12:21) (17 - 18)  SpO2: 96% (02 May 2021 12:21) (95% - 96%)    PHYSICAL EXAMINATION:  General: Alert and Awake, NAD  Cardiac: RRR, No M/R/G  Resp: CTAB, No Wh/Rh/Ra  Abdomen: NBS, NT/ND, No HSM, No rigidity or guarding  MSK: +Bilateral LE erythema with L > R. 1-2+ bilateral LE edema. No Calf tenderness  Skin: As per MSk section. Skin is warm and dry to the touch.   Neuro: Alert and Awake. CN 2-12 Grossly intact. Moves all four extremities spontaneously.  Psych: Calm, Pleasant, Cooperative                          10.7   6.20  )-----------( 281      ( 02 May 2021 07:15 )             35.3       05-01    137  |  100  |  20  ----------------------------<  169<H>  4.0   |  23  |  1.01    Ca    9.0      01 May 2021 05:48    TPro  7.6  /  Alb  3.8  /  TBili  0.6  /  DBili  x   /  AST  10  /  ALT  9<L>  /  AlkPhos  113  04-30          MICROBIOLOGY:    .Blood Blood  05-01-21   No growth to date.  --  --    RADIOLOGY:    <The imaging below has been reviewed and visualized by me independently. Findings as detailed in report below>    EXAM:  DUPLEX SCAN EXT VEINS LOWER BI                        PROCEDURE DATE:  05/02/2021    No evidence of bilateral lower extremity deep venous thrombosis.

## 2021-05-02 NOTE — PROGRESS NOTE ADULT - ASSESSMENT
66   year-old female     h/o   DM2 on insulin, fibromyalgia, osteoarthritis, sleep apnea on CPAP,  RENARD/   depression, venous insufficiency,    endometrial CA s/p hysterectomy (2010)  , HTN,   HLD,   hypothyroidism,  morbid   obesity. CT angio  no PE       .    .  has  generalized pain and weakness at her baseline, uses  a walker.     admitted with cellulitis. legs. super imposed on  c/c  stasis dermatitis  on  zosyn  by admitting team  house  ID         *  h/o  morbid  obesity/  BMI  of  60/  with  massive  truncal  obesity/ on nocturnal CPAP    *  h/o  lymphedema   *  DM, on lantus/  humalog at  home/  ENDO       *   HTN, Hypothyroid,  on  asa/  lipitor/ synthroid           bumex/ aldactone   *  depression, on  mlple  meds/  fibromyalgia / pt wants  clonazepam  and   Hydromorphone// bupropion,  lexapro,  gabapentin    Echo,   jorje;  ef   c/c  stasis     *  anemia,   s/p  colonoscopy 5 yrs ago    on dvt  ppx    on iv ancef by ID,  await  duration  from  ID  pt  to  be  ambulated      ra< from: CT Angio Chest w/ IV Cont (07.25.20 @ 17:37) >  IMPRESSION:  1.  Limited exam. The segmental and subsegmental pulmonary arteries are not evaluated secondary to poor opacification.  2.  Clear lungs. No aortic aneurysm or dissection.  < end of copied text >

## 2021-05-02 NOTE — PROVIDER CONTACT NOTE (OTHER) - REASON
Pt requesting for pain medication prn, next dose of dilaudid too early
Pt requesting for CPAP at night, states she uses this at home
EKG done and shown to MARY Ruiz, reviewed, signed, placed in chart
Pt requesting for pain medication prn, next dose of Dilaudid too early

## 2021-05-02 NOTE — PHYSICAL THERAPY INITIAL EVALUATION ADULT - ADDITIONAL COMMENTS
Can service to neuropsych be re done, needing the information below?  
In looking at the referral, Mom is now asking if we can remove all of the mental illness diagnosis and ADD Concussion, short term memory loss only.    Maybe that would help?    
Mother called back.  She contacted the insurance company and was told the following information.    If the diagnosis is deemed medically necessary, it will be covered.   Neuro psych test should be covered.    Mother is aware that office is gone for the day.  
Per pt mom, insurance will not pay for the neurophysch visit, scheduled in May, due to the dx of ADHD.  Mom is asking that the dx be changed so insurance will cover the cost.   Mom is calling [employer] now to see which dx would be appropriate to use.   
Spoke to pt mom, she states the referral would be for Dr. Sanju Miller at [facility] in [place], the referral needs to be for dx code F07.81, post concussion only and deemed medically necessity.   Mom is aware that the new referral will be entered today.   
Pt lives alone in house with 5 MURALI + HR, does not need to negotiate stairs inside home. PTA, pt reports that she was ambulating with rollator, also has tub bench and grab bars in shower

## 2021-05-02 NOTE — PROGRESS NOTE ADULT - SUBJECTIVE AND OBJECTIVE BOX
afebrile    REVIEW OF SYSTEMS:  GEN: no fever,    no chills  RESP: no SOB,   no cough  CVS: no chest pain,   no palpitations  GI: no abdominal pain,   no nausea,   no vomiting,   no constipation,   no diarrhea  : no dysuria,   no frequency  NEURO: no headache,   no dizziness  PSYCH: no depression,   not anxious  Derm : no rash    MEDICATIONS  (STANDING):  ARIPiprazole 2 milliGRAM(s) Oral daily  aspirin enteric coated 81 milliGRAM(s) Oral daily  atorvastatin 40 milliGRAM(s) Oral at bedtime  buMETAnide 0.5 milliGRAM(s) Oral daily  buPROPion XL . 300 milliGRAM(s) Oral daily  ceFAZolin   IVPB 2000 milliGRAM(s) IV Intermittent every 8 hours  dextrose 40% Gel 15 Gram(s) Oral once  dextrose 5%. 1000 milliLiter(s) (50 mL/Hr) IV Continuous <Continuous>  dextrose 5%. 1000 milliLiter(s) (100 mL/Hr) IV Continuous <Continuous>  dextrose 50% Injectable 25 Gram(s) IV Push once  dextrose 50% Injectable 12.5 Gram(s) IV Push once  dextrose 50% Injectable 25 Gram(s) IV Push once  enalapril 10 milliGRAM(s) Oral daily  enoxaparin Injectable 40 milliGRAM(s) SubCutaneous every 12 hours  escitalopram 20 milliGRAM(s) Oral daily  gabapentin 600 milliGRAM(s) Oral three times a day  glucagon  Injectable 1 milliGRAM(s) IntraMuscular once  insulin glargine Injectable (LANTUS) 25 Unit(s) SubCutaneous every morning  insulin lispro (ADMELOG) corrective regimen sliding scale   SubCutaneous three times a day before meals  insulin lispro (ADMELOG) corrective regimen sliding scale   SubCutaneous at bedtime  insulin lispro Injectable (ADMELOG) 10 Unit(s) SubCutaneous three times a day before meals  levothyroxine 125 MICROGram(s) Oral daily  pantoprazole    Tablet 40 milliGRAM(s) Oral before breakfast  spironolactone 25 milliGRAM(s) Oral daily    MEDICATIONS  (PRN):  clonazePAM  Tablet 0.5 milliGRAM(s) Oral at bedtime PRN insomnia/anxiety  HYDROmorphone   Tablet 4 milliGRAM(s) Oral every 6 hours PRN Severe Pain (7 - 10)      Vital Signs Last 24 Hrs  T(C): 36.5 (02 May 2021 04:22), Max: 37.2 (01 May 2021 12:41)  T(F): 97.7 (02 May 2021 04:22), Max: 99 (01 May 2021 12:41)  HR: 60 (02 May 2021 05:56) (52 - 85)  BP: 116/66 (02 May 2021 04:22) (100/56 - 136/57)  BP(mean): --  RR: 18 (02 May 2021 04:22) (18 - 18)  SpO2: 96% (02 May 2021 05:56) (95% - 97%)  CAPILLARY BLOOD GLUCOSE      POCT Blood Glucose.: 129 mg/dL (01 May 2021 22:44)  POCT Blood Glucose.: 96 mg/dL (01 May 2021 18:07)  POCT Blood Glucose.: 112 mg/dL (01 May 2021 13:28)  POCT Blood Glucose.: 135 mg/dL (01 May 2021 12:16)  POCT Blood Glucose.: 160 mg/dL (01 May 2021 09:17)    I&O's Summary    01 May 2021 07:01  -  02 May 2021 07:00  --------------------------------------------------------  IN: 870 mL / OUT: 1900 mL / NET: -1030 mL        PHYSICAL EXAM:  HEAD:  Atraumatic, Normocephalic  NECK: Supple, No   JVD  CHEST/LUNG:   no     rales,     no,    rhonchi  HEART: Regular rate and rhythm;         murmur  ABDOMEN: Soft, Nontender, ;   EXTREMITIES: no       edema  NEUROLOGY:  alert    LABS:                        10.7   6.20  )-----------( 281      ( 02 May 2021 07:15 )             35.3     05-01    137  |  100  |  20  ----------------------------<  169<H>  4.0   |  23  |  1.01    Ca    9.0      01 May 2021 05:48    TPro  7.6  /  Alb  3.8  /  TBili  0.6  /  DBili  x   /  AST  10  /  ALT  9<L>  /  AlkPhos  113  04-30    PT/INR - ( 30 Apr 2021 22:28 )   PT: 14.1 sec;   INR: 1.18 ratio         PTT - ( 30 Apr 2021 22:28 )  PTT:31.6 sec            04-30 @ 22:28  3.9  21              Consultant(s) Notes Reviewed:      Care Discussed with Consultants/Other Providers:

## 2021-05-02 NOTE — PHYSICAL THERAPY INITIAL EVALUATION ADULT - PLANNED THERAPY INTERVENTIONS, PT EVAL
To independently negotiate 5 steps w/one handrail and step-to-step pattern, 2 weeks/balance training/gait training/transfer training

## 2021-05-03 LAB
GLUCOSE BLDC GLUCOMTR-MCNC: 134 MG/DL — HIGH (ref 70–99)
GLUCOSE BLDC GLUCOMTR-MCNC: 139 MG/DL — HIGH (ref 70–99)
GLUCOSE BLDC GLUCOMTR-MCNC: 146 MG/DL — HIGH (ref 70–99)
GLUCOSE BLDC GLUCOMTR-MCNC: 149 MG/DL — HIGH (ref 70–99)

## 2021-05-03 PROCEDURE — 73630 X-RAY EXAM OF FOOT: CPT | Mod: 26,LT

## 2021-05-03 PROCEDURE — 99232 SBSQ HOSP IP/OBS MODERATE 35: CPT

## 2021-05-03 RX ORDER — CEPHALEXIN 500 MG
500 CAPSULE ORAL EVERY 6 HOURS
Refills: 0 | Status: DISCONTINUED | OUTPATIENT
Start: 2021-05-03 | End: 2021-05-07

## 2021-05-03 RX ORDER — MUPIROCIN 20 MG/G
1 OINTMENT TOPICAL
Refills: 0 | Status: DISCONTINUED | OUTPATIENT
Start: 2021-05-03 | End: 2021-05-07

## 2021-05-03 RX ADMIN — ESCITALOPRAM OXALATE 20 MILLIGRAM(S): 10 TABLET, FILM COATED ORAL at 12:11

## 2021-05-03 RX ADMIN — ATORVASTATIN CALCIUM 40 MILLIGRAM(S): 80 TABLET, FILM COATED ORAL at 22:09

## 2021-05-03 RX ADMIN — GABAPENTIN 600 MILLIGRAM(S): 400 CAPSULE ORAL at 22:09

## 2021-05-03 RX ADMIN — ARIPIPRAZOLE 2 MILLIGRAM(S): 15 TABLET ORAL at 13:08

## 2021-05-03 RX ADMIN — Medication 100 MILLIGRAM(S): at 08:27

## 2021-05-03 RX ADMIN — SPIRONOLACTONE 25 MILLIGRAM(S): 25 TABLET, FILM COATED ORAL at 08:28

## 2021-05-03 RX ADMIN — BUMETANIDE 0.5 MILLIGRAM(S): 0.25 INJECTION INTRAMUSCULAR; INTRAVENOUS at 08:27

## 2021-05-03 RX ADMIN — GABAPENTIN 600 MILLIGRAM(S): 400 CAPSULE ORAL at 12:11

## 2021-05-03 RX ADMIN — HYDROMORPHONE HYDROCHLORIDE 4 MILLIGRAM(S): 2 INJECTION INTRAMUSCULAR; INTRAVENOUS; SUBCUTANEOUS at 05:46

## 2021-05-03 RX ADMIN — Medication 125 MICROGRAM(S): at 05:16

## 2021-05-03 RX ADMIN — MEN-PHOR 1 APPLICATION(S): .5; .5 LOTION TOPICAL at 22:09

## 2021-05-03 RX ADMIN — ENOXAPARIN SODIUM 40 MILLIGRAM(S): 100 INJECTION SUBCUTANEOUS at 05:15

## 2021-05-03 RX ADMIN — PANTOPRAZOLE SODIUM 40 MILLIGRAM(S): 20 TABLET, DELAYED RELEASE ORAL at 05:16

## 2021-05-03 RX ADMIN — MEN-PHOR 1 APPLICATION(S): .5; .5 LOTION TOPICAL at 05:17

## 2021-05-03 RX ADMIN — MEN-PHOR 1 APPLICATION(S): .5; .5 LOTION TOPICAL at 13:07

## 2021-05-03 RX ADMIN — HYDROMORPHONE HYDROCHLORIDE 4 MILLIGRAM(S): 2 INJECTION INTRAMUSCULAR; INTRAVENOUS; SUBCUTANEOUS at 05:16

## 2021-05-03 RX ADMIN — Medication 81 MILLIGRAM(S): at 12:10

## 2021-05-03 RX ADMIN — Medication 10 UNIT(S): at 17:51

## 2021-05-03 RX ADMIN — Medication 10 UNIT(S): at 08:28

## 2021-05-03 RX ADMIN — Medication 100 MILLIGRAM(S): at 16:56

## 2021-05-03 RX ADMIN — Medication 100 MILLIGRAM(S): at 00:18

## 2021-05-03 RX ADMIN — ENOXAPARIN SODIUM 40 MILLIGRAM(S): 100 INJECTION SUBCUTANEOUS at 16:58

## 2021-05-03 RX ADMIN — HYDROMORPHONE HYDROCHLORIDE 4 MILLIGRAM(S): 2 INJECTION INTRAMUSCULAR; INTRAVENOUS; SUBCUTANEOUS at 17:52

## 2021-05-03 RX ADMIN — Medication 10 MILLIGRAM(S): at 05:16

## 2021-05-03 RX ADMIN — BUPROPION HYDROCHLORIDE 300 MILLIGRAM(S): 150 TABLET, EXTENDED RELEASE ORAL at 12:11

## 2021-05-03 RX ADMIN — GABAPENTIN 600 MILLIGRAM(S): 400 CAPSULE ORAL at 05:28

## 2021-05-03 RX ADMIN — INSULIN GLARGINE 25 UNIT(S): 100 INJECTION, SOLUTION SUBCUTANEOUS at 08:27

## 2021-05-03 RX ADMIN — Medication 500 MILLIGRAM(S): at 22:09

## 2021-05-03 RX ADMIN — Medication 10 UNIT(S): at 12:10

## 2021-05-03 RX ADMIN — HYDROMORPHONE HYDROCHLORIDE 4 MILLIGRAM(S): 2 INJECTION INTRAMUSCULAR; INTRAVENOUS; SUBCUTANEOUS at 17:46

## 2021-05-03 RX ADMIN — HYDROMORPHONE HYDROCHLORIDE 4 MILLIGRAM(S): 2 INJECTION INTRAMUSCULAR; INTRAVENOUS; SUBCUTANEOUS at 11:53

## 2021-05-03 NOTE — DIETITIAN INITIAL EVALUATION ADULT. - REASON INDICATOR FOR ASSESSMENT
HgbA1C 7.1%.   "66 year-old female with PMHx of DM2 on insulin/metformin but no longer on Trulicity as this was DC'd during a hospitalization, fibromyalgia, osteoarthritis, sleep apnea on CPAP, depression, venous insufficiency, endometrial CA s/p hysterectomy (2010), HTN, HLD, hypothyroidism, obesity class III, now presenting with recurrent cellulitis."

## 2021-05-03 NOTE — PROGRESS NOTE ADULT - SUBJECTIVE AND OBJECTIVE BOX
CARDIOLOGY     PROGRESS  NOTE   ________________________________________________    CHIEF COMPLAINT:Patient is a 66y old  Female who presents with a chief complaint of Cellulitis (02 May 2021 13:43)  no complain.  	  REVIEW OF SYSTEMS:  CONSTITUTIONAL: No fever, weight loss, or fatigue  EYES: No eye pain, visual disturbances, or discharge  ENT:  No difficulty hearing, tinnitus, vertigo; No sinus or throat pain  NECK: No pain or stiffness  RESPIRATORY: No cough, wheezing, chills or hemoptysis; + mild Shortness of Breath  CARDIOVASCULAR: No chest pain, palpitations, passing out, dizziness, or leg swelling  GASTROINTESTINAL: No abdominal or epigastric pain. No nausea, vomiting, or hematemesis; No diarrhea or constipation. No melena or hematochezia.  GENITOURINARY: No dysuria, frequency, hematuria, or incontinence  NEUROLOGICAL: No headaches, memory loss, loss of strength, numbness, or tremors  SKIN: No itching, burning, rashes, or lesions   LYMPH Nodes: No enlarged glands  ENDOCRINE: No heat or cold intolerance; No hair loss  MUSCULOSKELETAL: No joint pain or swelling; No muscle, back, or extremity pain  PSYCHIATRIC: No depression, anxiety, mood swings, or difficulty sleeping  HEME/LYMPH: No easy bruising, or bleeding gums  ALLERGY AND IMMUNOLOGIC: No hives or eczema	    [ ] All others negative	  [ ] Unable to obtain    PHYSICAL EXAM:  T(C): 36.4 (05-03-21 @ 05:13), Max: 36.9 (05-03-21 @ 00:28)  HR: 54 (05-03-21 @ 06:08) (50 - 65)  BP: 119/66 (05-03-21 @ 05:13) (100/64 - 125/73)  RR: 18 (05-03-21 @ 05:13) (16 - 18)  SpO2: 97% (05-03-21 @ 06:08) (95% - 98%)  Wt(kg): --  I&O's Summary    02 May 2021 07:01  -  03 May 2021 07:00  --------------------------------------------------------  IN: 850 mL / OUT: 1500 mL / NET: -650 mL        Appearance: Normal	  HEENT:   Normal oral mucosa, PERRL, EOMI	  Lymphatic: No lymphadenopathy  Cardiovascular: Normal S1 S2, No JVD, + murmurs, No edema  Respiratory: Lungs clear to auscultation	  Psychiatry: A & O x 3, Mood & affect appropriate  Gastrointestinal:  Soft, Non-tender, + BS	  Skin: No rashes, No ecchymoses, No cyanosis	  Neurologic: Non-focal  Extremities: Normal range of motion, No clubbing, cyanosis or edema  Vascular: + sig erythema both le mildly has improved    MEDICATIONS  (STANDING):  ARIPiprazole 2 milliGRAM(s) Oral daily  aspirin enteric coated 81 milliGRAM(s) Oral daily  atorvastatin 40 milliGRAM(s) Oral at bedtime  buMETAnide 0.5 milliGRAM(s) Oral daily  buPROPion XL . 300 milliGRAM(s) Oral daily  camphor 0.5%/menthol 0.5% Topical Lotion 1 Application(s) Topical every 8 hours  ceFAZolin   IVPB 2000 milliGRAM(s) IV Intermittent every 8 hours  dextrose 40% Gel 15 Gram(s) Oral once  dextrose 5%. 1000 milliLiter(s) (50 mL/Hr) IV Continuous <Continuous>  dextrose 5%. 1000 milliLiter(s) (100 mL/Hr) IV Continuous <Continuous>  dextrose 50% Injectable 25 Gram(s) IV Push once  dextrose 50% Injectable 12.5 Gram(s) IV Push once  dextrose 50% Injectable 25 Gram(s) IV Push once  enalapril 10 milliGRAM(s) Oral daily  enoxaparin Injectable 40 milliGRAM(s) SubCutaneous every 12 hours  escitalopram 20 milliGRAM(s) Oral daily  gabapentin 600 milliGRAM(s) Oral three times a day  glucagon  Injectable 1 milliGRAM(s) IntraMuscular once  insulin glargine Injectable (LANTUS) 25 Unit(s) SubCutaneous every morning  insulin lispro (ADMELOG) corrective regimen sliding scale   SubCutaneous three times a day before meals  insulin lispro (ADMELOG) corrective regimen sliding scale   SubCutaneous at bedtime  insulin lispro Injectable (ADMELOG) 10 Unit(s) SubCutaneous three times a day before meals  levothyroxine 125 MICROGram(s) Oral daily  pantoprazole    Tablet 40 milliGRAM(s) Oral before breakfast  spironolactone 25 milliGRAM(s) Oral daily      TELEMETRY: 	    ECG:  	  RADIOLOGY:  OTHER: 	  	  LABS:	 	    CARDIAC MARKERS:                                10.7   6.20  )-----------( 281      ( 02 May 2021 07:15 )             35.3           proBNP:   Lipid Profile:   HgA1c:   TSH:       Patient with bilateral leg erythema  LLE exam appears more prominent  Patient with leukocytosis on admission  Exam of LLE appears consistent with possible streptococcal cellulitis  MRSA/MSSA PCR Negative    I would continue Cefazolin for now  Anticipate discharge on keflex    Overall, Cellulitis, Leukocytosis    Assessment and plan  ---------------------------  66 year-old female with PMHx of DM2 on insulin/metformin but no longer on Trulicity as this was DC'd during a hospitalization, fibromyalgia, osteoarthritis, sleep apnea on CPAP, depression, venous insufficiency, endometrial CA s/p hysterectomy (2010), HTN, HLD, hypothyroidism, obesity class III, now presenting with recurrent cellulitis. Patient has previous hx of hospitalizations for lower extremity cellulitis that has historically required IV antibiotics to treat. Reports that since her prior hospitalization a couple months ago that she had been doing well, with some hyperpigmentation chronically of her LE but no redness or discomfort. Reports that yesterday she began to notice redness and swelling initially in her LLE but this rapidly spread to her RLE as well. Pain over anterior LLE shin area with this redness and swelling and she also began to notice chills. Otherwise no nausea/vomiting/coughing/myalgias/diarrhea. No swelling of her LE. Is doing well on new regimen of diuretics with bumex and spironolactone.  pt is well known to me with hx of htn CHF RV dysfunction with pulmonary htn ?sec tyop obesity and sleep apnea with recurrent LE cellulitis.  chf/ rv dysfunction continue Bumex Aldactone will adjust meds  ?venous insufficieny will consider RF ablation  dvt prophylaxis  abx  Bipap  id appreciated, continue abx  oob to chair  continue Bumex/  aldactone  elevate le  bradycardia, observe no av blocking agent, repeat tsh

## 2021-05-03 NOTE — DIETITIAN INITIAL EVALUATION ADULT. - PERTINENT MEDS FT
MEDICATIONS  (STANDING):  ARIPiprazole 2 milliGRAM(s) Oral daily  aspirin enteric coated 81 milliGRAM(s) Oral daily  atorvastatin 40 milliGRAM(s) Oral at bedtime  buMETAnide 0.5 milliGRAM(s) Oral daily  buPROPion XL . 300 milliGRAM(s) Oral daily  camphor 0.5%/menthol 0.5% Topical Lotion 1 Application(s) Topical every 8 hours  ceFAZolin   IVPB 2000 milliGRAM(s) IV Intermittent every 8 hours  dextrose 40% Gel 15 Gram(s) Oral once  dextrose 5%. 1000 milliLiter(s) (100 mL/Hr) IV Continuous <Continuous>  dextrose 5%. 1000 milliLiter(s) (50 mL/Hr) IV Continuous <Continuous>  dextrose 50% Injectable 25 Gram(s) IV Push once  dextrose 50% Injectable 12.5 Gram(s) IV Push once  dextrose 50% Injectable 25 Gram(s) IV Push once  enalapril 10 milliGRAM(s) Oral daily  enoxaparin Injectable 40 milliGRAM(s) SubCutaneous every 12 hours  escitalopram 20 milliGRAM(s) Oral daily  gabapentin 600 milliGRAM(s) Oral three times a day  glucagon  Injectable 1 milliGRAM(s) IntraMuscular once  insulin glargine Injectable (LANTUS) 25 Unit(s) SubCutaneous every morning  insulin lispro (ADMELOG) corrective regimen sliding scale   SubCutaneous three times a day before meals  insulin lispro (ADMELOG) corrective regimen sliding scale   SubCutaneous at bedtime  insulin lispro Injectable (ADMELOG) 10 Unit(s) SubCutaneous three times a day before meals  levothyroxine 125 MICROGram(s) Oral daily  pantoprazole    Tablet 40 milliGRAM(s) Oral before breakfast  spironolactone 25 milliGRAM(s) Oral daily

## 2021-05-03 NOTE — CONSULT NOTE ADULT - ASSESSMENT
Assessment/Plan:    --left foot 3rd toe wound  --Ingrown toenails bilateral  --DM     --blister on left 3rd toe deroofed and site cleansed, dressed with bacitracin and gauze  --rec daily dressing changes with mupirocin and gauze  --xrays of left foot ordered to r/o toe fracture  --aseptic debridement of ingrown nails x 10 using sterile nippers  --pt educated on proper diabetic foot care

## 2021-05-03 NOTE — PROGRESS NOTE ADULT - ASSESSMENT
66 year-old female with PMHx of DM2,  fibromyalgia, osteoarthritis, sleep apnea on CPAP, depression, venous insufficiency, endometrial CA s/p hysterectomy (2010), HTN, HLD, hypothyroidism, obesity class III, now presenting with recurrent cellulitis.   Last admitted in 2/2021 for b/l LE cellulitis and received ancef/keflex x10 days.    Patient with bilateral leg erythema  LLE exam appears more prominent  Patient with leukocytosis on admission  Exam of LLE appears consistent with possible streptococcal cellulitis  MRSA/MSSA PCR Negative    While there is still erythema at bilateral LE it appears more dusky  She also is now with resolution of leukocytosis, afebrile and improving pain in LE  Much of the erythema is likely secondary to chronic venous stasis  I would switch to Keflex 500 mg PO Q6H with plan to treat through 5/10/21    Overall, Cellulitis, Leukocytosis    I will continue to follow. Please feel free to contact me with any further questions.    Kyle Paul M.D.  [facility] Division of Infectious Disease  8AM-5PM: Pager Number [phone]  After Hours (or if no response): Please contact the Infectious Diseases Office at [phone]     The above assessment and plan were discussed with medicine NP

## 2021-05-03 NOTE — PROGRESS NOTE ADULT - SUBJECTIVE AND OBJECTIVE BOX
Follow Up:  Cellulitis    Interval History:    REVIEW OF SYSTEMS  [  ] ROS unobtainable because:    [  ] All other systems negative except as noted below    Constitutional:  [ ] fever [ ] chills  [ ] weight loss  [ ] weakness  Skin:  [ ] rash [ ] phlebitis	  Eyes: [ ] icterus [ ] pain  [ ] discharge	  ENMT: [ ] sore throat  [ ] thrush [ ] ulcers [ ] exudates  Respiratory: [ ] dyspnea [ ] hemoptysis [ ] cough [ ] sputum	  Cardiovascular:  [ ] chest pain [ ] palpitations [ ] edema	  Gastrointestinal:  [ ] nausea [ ] vomiting [ ] diarrhea [ ] constipation [ ] pain	  Genitourinary:  [ ] dysuria [ ] frequency [ ] hematuria [ ] discharge [ ] flank pain  [ ] incontinence  Musculoskeletal:  [ ] myalgias [ ] arthralgias [ ] arthritis  [ ] back pain  Neurological:  [ ] headache [ ] seizures  [ ] confusion/altered mental status    Allergies  adhesives (Rash)  aerosols, perfumes, fabric softeners (Rash; Rhinitis; Rhinorrhea)  dust (Rhinitis; Rhinorrhea)  Lyrica (Rash)  pollen (Rhinitis; Rhinorrhea)  smoke (Rhinitis; Rhinorrhea)  Tin/costume jewelry (Rash)        ANTIMICROBIALS:  ceFAZolin   IVPB 2000 every 8 hours      OTHER MEDS:  MEDICATIONS  (STANDING):  ARIPiprazole 2 daily  aspirin enteric coated 81 daily  atorvastatin 40 at bedtime  buMETAnide 0.5 daily  buPROPion XL . 300 daily  clonazePAM  Tablet 0.5 at bedtime PRN  dextrose 40% Gel 15 once  dextrose 50% Injectable 25 once  dextrose 50% Injectable 25 once  dextrose 50% Injectable 12.5 once  enalapril 10 daily  enoxaparin Injectable 40 every 12 hours  escitalopram 20 daily  gabapentin 600 three times a day  glucagon  Injectable 1 once  HYDROmorphone   Tablet 4 every 6 hours PRN  insulin glargine Injectable (LANTUS) 25 every morning  insulin lispro (ADMELOG) corrective regimen sliding scale  three times a day before meals  insulin lispro (ADMELOG) corrective regimen sliding scale  at bedtime  insulin lispro Injectable (ADMELOG) 10 three times a day before meals  levothyroxine 125 daily  pantoprazole    Tablet 40 before breakfast  spironolactone 25 daily      Vital Signs Last 24 Hrs  T(C): 36.8 (03 May 2021 16:21), Max: 36.9 (03 May 2021 00:28)  T(F): 98.3 (03 May 2021 16:21), Max: 98.4 (03 May 2021 00:28)  HR: 67 (03 May 2021 16:21) (50 - 67)  BP: 108/52 (03 May 2021 16:21) (96/64 - 123/86)  BP(mean): --  RR: 20 (03 May 2021 16:21) (17 - 20)  SpO2: 93% (03 May 2021 16:21) (93% - 98%)    PHYSICAL EXAMINATION:  General: Alert and Awake, NAD  HEENT: PERRL, EOMI  Neck: Supple  Cardiac: RRR, No M/R/G  Resp: CTAB, No Wh/Rh/Ra  Abdomen: NBS, NT/ND, No HSM, No rigidity or guarding  MSK: No LE edema. No Calf tenderness  : No patel  Skin: No rashes or lesions. Skin is warm and dry to the touch.   Neuro: Alert and Awake. CN 2-12 Grossly intact. Moves all four extremities spontaneously.  Psych: Calm, Pleasant, Cooperative                          10.7   6.20  )-----------( 281      ( 02 May 2021 07:15 )             35.3                   MICROBIOLOGY:  v  .Blood Blood  05-01-21   No growth to date.  --  --                RADIOLOGY:    <The imaging below has been reviewed and visualized by me independently. Findings as detailed in report below> Follow Up:  Cellulitis    Interval History: afebrile. pain in LE improving    REVIEW OF SYSTEMS  [  ] ROS unobtainable because:    [ x] All other systems negative except as noted below    Constitutional:  [ ] fever [ ] chills  [ ] weight loss  [ ] weakness  Skin:  [x ] rash [ ] phlebitis	  Eyes: [ ] icterus [ ] pain  [ ] discharge	  ENMT: [ ] sore throat  [ ] thrush [ ] ulcers [ ] exudates  Respiratory: [ ] dyspnea [ ] hemoptysis [ ] cough [ ] sputum	  Cardiovascular:  [ ] chest pain [ ] palpitations [ ] edema	  Gastrointestinal:  [ ] nausea [ ] vomiting [ ] diarrhea [ ] constipation [ ] pain	  Genitourinary:  [ ] dysuria [ ] frequency [ ] hematuria [ ] discharge [ ] flank pain  [ ] incontinence  Musculoskeletal:  [ ] myalgias [ ] arthralgias [ ] arthritis  [ ] back pain  Neurological:  [ ] headache [ ] seizures  [ ] confusion/altered mental status    Allergies  adhesives (Rash)  aerosols, perfumes, fabric softeners (Rash; Rhinitis; Rhinorrhea)  dust (Rhinitis; Rhinorrhea)  Lyrica (Rash)  pollen (Rhinitis; Rhinorrhea)  smoke (Rhinitis; Rhinorrhea)  Tin/costume jewelry (Rash)        ANTIMICROBIALS:  ceFAZolin   IVPB 2000 every 8 hours      OTHER MEDS:  MEDICATIONS  (STANDING):  ARIPiprazole 2 daily  aspirin enteric coated 81 daily  atorvastatin 40 at bedtime  buMETAnide 0.5 daily  buPROPion XL . 300 daily  clonazePAM  Tablet 0.5 at bedtime PRN  dextrose 40% Gel 15 once  dextrose 50% Injectable 25 once  dextrose 50% Injectable 25 once  dextrose 50% Injectable 12.5 once  enalapril 10 daily  enoxaparin Injectable 40 every 12 hours  escitalopram 20 daily  gabapentin 600 three times a day  glucagon  Injectable 1 once  HYDROmorphone   Tablet 4 every 6 hours PRN  insulin glargine Injectable (LANTUS) 25 every morning  insulin lispro (ADMELOG) corrective regimen sliding scale  three times a day before meals  insulin lispro (ADMELOG) corrective regimen sliding scale  at bedtime  insulin lispro Injectable (ADMELOG) 10 three times a day before meals  levothyroxine 125 daily  pantoprazole    Tablet 40 before breakfast  spironolactone 25 daily      Vital Signs Last 24 Hrs  T(C): 36.8 (03 May 2021 16:21), Max: 36.9 (03 May 2021 00:28)  T(F): 98.3 (03 May 2021 16:21), Max: 98.4 (03 May 2021 00:28)  HR: 67 (03 May 2021 16:21) (50 - 67)  BP: 108/52 (03 May 2021 16:21) (96/64 - 123/86)  BP(mean): --  RR: 20 (03 May 2021 16:21) (17 - 20)  SpO2: 93% (03 May 2021 16:21) (93% - 98%)    PHYSICAL EXAMINATION:  General: Alert and Awake, NAD  Cardiac: RRR, No M/R/G  Resp: CTAB, No Wh/Rh/Ra  Abdomen: NBS, NT/ND, No HSM, No rigidity or guarding  MSK: +Bilateral LE erythema with L > R (brightness of erythema improxing). 1-2+ bilateral LE edema. No Calf tenderness  Skin: As per MSk section. Skin is warm and dry to the touch.   Neuro: Alert and Awake. CN 2-12 Grossly intact. Moves all four extremities spontaneously.  Psych: Calm, Pleasant, Cooperative                            10.7   6.20  )-----------( 281      ( 02 May 2021 07:15 )             35.3       MICROBIOLOGY:    .Blood Blood  05-01-21   No growth to date.  --  --    RADIOLOGY:    <The imaging below has been reviewed and visualized by me independently. Findings as detailed in report below>    EXAM:  DUPLEX SCAN EXT VEINS LOWER BI                        PROCEDURE DATE:  05/02/2021    No evidence of bilateral lower extremity deep venous thrombosis.

## 2021-05-03 NOTE — DIETITIAN NUTRITION RISK NOTIFICATION - TREATMENT: THE FOLLOWING DIET HAS BEEN RECOMMENDED
Diet, DASH/TLC:   Sodium & Cholesterol Restricted  Consistent Carbohydrate {No Snacks} (CSTCHO) (05-01-21 @ 04:48) [Active]

## 2021-05-03 NOTE — DIETITIAN INITIAL EVALUATION ADULT. - OTHER INFO
Patient was interested in 1500 calorie 5 day meal pattern handout   and  the 1800 5 day meal pattern. Tips on appetite control, increased fiber, and substitutes for sweets discussed. Heart healthy low sodium diet presented also

## 2021-05-03 NOTE — GOALS OF CARE CONVERSATION - ADVANCED CARE PLANNING - CONVERSATION DETAILS
Goals of care discussed with the patient. Patient has thought about HCP but does not have one. Given to her conversation guide, our HCP and MOLST form to discuss with her family.

## 2021-05-03 NOTE — DIETITIAN INITIAL EVALUATION ADULT. - ORAL INTAKE PTA/DIET HISTORY
patient has good knowledge of diabetic control. taking her medication and checking FS x2 daily. Patient lives alone does  limited cooking, and receives Meals on wheels 5 meals/week. In addition she orders cooked food or fresh food and groceries delivered from [employer]. Patient states here weight is attributed to hypothyroidism. Still patient  and   has h/o weight loss with regain stating her current weight is her usual weight.

## 2021-05-04 ENCOUNTER — TRANSCRIPTION ENCOUNTER (OUTPATIENT)
Age: 66
End: 2021-05-04

## 2021-05-04 LAB
ANION GAP SERPL CALC-SCNC: 14 MMOL/L — SIGNIFICANT CHANGE UP (ref 5–17)
BUN SERPL-MCNC: 29 MG/DL — HIGH (ref 7–23)
CALCIUM SERPL-MCNC: 9.1 MG/DL — SIGNIFICANT CHANGE UP (ref 8.4–10.5)
CHLORIDE SERPL-SCNC: 101 MMOL/L — SIGNIFICANT CHANGE UP (ref 96–108)
CO2 SERPL-SCNC: 22 MMOL/L — SIGNIFICANT CHANGE UP (ref 22–31)
CREAT SERPL-MCNC: 1.15 MG/DL — SIGNIFICANT CHANGE UP (ref 0.5–1.3)
GLUCOSE BLDC GLUCOMTR-MCNC: 118 MG/DL — HIGH (ref 70–99)
GLUCOSE BLDC GLUCOMTR-MCNC: 118 MG/DL — HIGH (ref 70–99)
GLUCOSE BLDC GLUCOMTR-MCNC: 145 MG/DL — HIGH (ref 70–99)
GLUCOSE BLDC GLUCOMTR-MCNC: 148 MG/DL — HIGH (ref 70–99)
GLUCOSE SERPL-MCNC: 122 MG/DL — HIGH (ref 70–99)
HCT VFR BLD CALC: 36.5 % — SIGNIFICANT CHANGE UP (ref 34.5–45)
HGB BLD-MCNC: 11 G/DL — LOW (ref 11.5–15.5)
MCHC RBC-ENTMCNC: 24.3 PG — LOW (ref 27–34)
MCHC RBC-ENTMCNC: 30.1 GM/DL — LOW (ref 32–36)
MCV RBC AUTO: 80.6 FL — SIGNIFICANT CHANGE UP (ref 80–100)
NRBC # BLD: 0 /100 WBCS — SIGNIFICANT CHANGE UP (ref 0–0)
PLATELET # BLD AUTO: 323 K/UL — SIGNIFICANT CHANGE UP (ref 150–400)
POTASSIUM SERPL-MCNC: 4.5 MMOL/L — SIGNIFICANT CHANGE UP (ref 3.5–5.3)
POTASSIUM SERPL-SCNC: 4.5 MMOL/L — SIGNIFICANT CHANGE UP (ref 3.5–5.3)
RBC # BLD: 4.53 M/UL — SIGNIFICANT CHANGE UP (ref 3.8–5.2)
RBC # FLD: 17.7 % — HIGH (ref 10.3–14.5)
SARS-COV-2 RNA SPEC QL NAA+PROBE: SIGNIFICANT CHANGE UP
SODIUM SERPL-SCNC: 137 MMOL/L — SIGNIFICANT CHANGE UP (ref 135–145)
TSH SERPL-MCNC: 3.44 UIU/ML — SIGNIFICANT CHANGE UP (ref 0.27–4.2)
WBC # BLD: 5.91 K/UL — SIGNIFICANT CHANGE UP (ref 3.8–10.5)
WBC # FLD AUTO: 5.91 K/UL — SIGNIFICANT CHANGE UP (ref 3.8–10.5)

## 2021-05-04 PROCEDURE — 99232 SBSQ HOSP IP/OBS MODERATE 35: CPT

## 2021-05-04 RX ADMIN — BUPROPION HYDROCHLORIDE 300 MILLIGRAM(S): 150 TABLET, EXTENDED RELEASE ORAL at 12:21

## 2021-05-04 RX ADMIN — GABAPENTIN 600 MILLIGRAM(S): 400 CAPSULE ORAL at 12:26

## 2021-05-04 RX ADMIN — MEN-PHOR 1 APPLICATION(S): .5; .5 LOTION TOPICAL at 04:43

## 2021-05-04 RX ADMIN — HYDROMORPHONE HYDROCHLORIDE 4 MILLIGRAM(S): 2 INJECTION INTRAMUSCULAR; INTRAVENOUS; SUBCUTANEOUS at 18:20

## 2021-05-04 RX ADMIN — Medication 10 UNIT(S): at 17:56

## 2021-05-04 RX ADMIN — MUPIROCIN 1 APPLICATION(S): 20 OINTMENT TOPICAL at 06:00

## 2021-05-04 RX ADMIN — Medication 500 MILLIGRAM(S): at 17:24

## 2021-05-04 RX ADMIN — ENOXAPARIN SODIUM 40 MILLIGRAM(S): 100 INJECTION SUBCUTANEOUS at 04:40

## 2021-05-04 RX ADMIN — INSULIN GLARGINE 25 UNIT(S): 100 INJECTION, SOLUTION SUBCUTANEOUS at 08:41

## 2021-05-04 RX ADMIN — Medication 10 UNIT(S): at 08:40

## 2021-05-04 RX ADMIN — ESCITALOPRAM OXALATE 20 MILLIGRAM(S): 10 TABLET, FILM COATED ORAL at 12:21

## 2021-05-04 RX ADMIN — MEN-PHOR 1 APPLICATION(S): .5; .5 LOTION TOPICAL at 12:26

## 2021-05-04 RX ADMIN — SPIRONOLACTONE 25 MILLIGRAM(S): 25 TABLET, FILM COATED ORAL at 09:55

## 2021-05-04 RX ADMIN — HYDROMORPHONE HYDROCHLORIDE 4 MILLIGRAM(S): 2 INJECTION INTRAMUSCULAR; INTRAVENOUS; SUBCUTANEOUS at 03:15

## 2021-05-04 RX ADMIN — HYDROMORPHONE HYDROCHLORIDE 4 MILLIGRAM(S): 2 INJECTION INTRAMUSCULAR; INTRAVENOUS; SUBCUTANEOUS at 03:45

## 2021-05-04 RX ADMIN — Medication 81 MILLIGRAM(S): at 12:20

## 2021-05-04 RX ADMIN — Medication 500 MILLIGRAM(S): at 04:39

## 2021-05-04 RX ADMIN — BUMETANIDE 0.5 MILLIGRAM(S): 0.25 INJECTION INTRAMUSCULAR; INTRAVENOUS at 09:58

## 2021-05-04 RX ADMIN — HYDROMORPHONE HYDROCHLORIDE 4 MILLIGRAM(S): 2 INJECTION INTRAMUSCULAR; INTRAVENOUS; SUBCUTANEOUS at 11:45

## 2021-05-04 RX ADMIN — Medication 500 MILLIGRAM(S): at 21:05

## 2021-05-04 RX ADMIN — Medication 500 MILLIGRAM(S): at 12:21

## 2021-05-04 RX ADMIN — HYDROMORPHONE HYDROCHLORIDE 4 MILLIGRAM(S): 2 INJECTION INTRAMUSCULAR; INTRAVENOUS; SUBCUTANEOUS at 17:56

## 2021-05-04 RX ADMIN — PANTOPRAZOLE SODIUM 40 MILLIGRAM(S): 20 TABLET, DELAYED RELEASE ORAL at 04:42

## 2021-05-04 RX ADMIN — MEN-PHOR 1 APPLICATION(S): .5; .5 LOTION TOPICAL at 21:02

## 2021-05-04 RX ADMIN — ENOXAPARIN SODIUM 40 MILLIGRAM(S): 100 INJECTION SUBCUTANEOUS at 17:24

## 2021-05-04 RX ADMIN — Medication 10 UNIT(S): at 13:11

## 2021-05-04 RX ADMIN — GABAPENTIN 600 MILLIGRAM(S): 400 CAPSULE ORAL at 21:02

## 2021-05-04 RX ADMIN — Medication 125 MICROGRAM(S): at 04:42

## 2021-05-04 RX ADMIN — ATORVASTATIN CALCIUM 40 MILLIGRAM(S): 80 TABLET, FILM COATED ORAL at 21:02

## 2021-05-04 RX ADMIN — HYDROMORPHONE HYDROCHLORIDE 4 MILLIGRAM(S): 2 INJECTION INTRAMUSCULAR; INTRAVENOUS; SUBCUTANEOUS at 11:02

## 2021-05-04 RX ADMIN — GABAPENTIN 600 MILLIGRAM(S): 400 CAPSULE ORAL at 04:42

## 2021-05-04 RX ADMIN — ARIPIPRAZOLE 2 MILLIGRAM(S): 15 TABLET ORAL at 12:21

## 2021-05-04 NOTE — DISCHARGE NOTE PROVIDER - DETAILS OF MALNUTRITION DIAGNOSIS/DIAGNOSES
This patient has been assessed with a concern for Malnutrition and was treated during this hospitalization for the following Nutrition diagnosis/diagnoses:     -  05/03/2021: Morbid obesity (BMI > 40)

## 2021-05-04 NOTE — PROGRESS NOTE ADULT - SUBJECTIVE AND OBJECTIVE BOX
CARDIOLOGY     PROGRESS  NOTE   ________________________________________________    CHIEF COMPLAINT:Patient is a 66y old  Female who presents with a chief complaint of Cellulitis (03 May 2021 17:39)  doing better, no chest pain.  	  REVIEW OF SYSTEMS:  CONSTITUTIONAL: No fever, weight loss, or fatigue  EYES: No eye pain, visual disturbances, or discharge  ENT:  No difficulty hearing, tinnitus, vertigo; No sinus or throat pain  NECK: No pain or stiffness  RESPIRATORY: No cough, wheezing, chills or hemoptysis; No Shortness of Breath  CARDIOVASCULAR: No chest pain, palpitations, passing out, dizziness, or leg swelling  GASTROINTESTINAL: No abdominal or epigastric pain. No nausea, vomiting, or hematemesis; No diarrhea or constipation. No melena or hematochezia.  GENITOURINARY: No dysuria, frequency, hematuria, or incontinence  NEUROLOGICAL: No headaches, memory loss, loss of strength, numbness, or tremors  SKIN: No itching, burning, rashes, or lesions   LYMPH Nodes: No enlarged glands  ENDOCRINE: No heat or cold intolerance; No hair loss  MUSCULOSKELETAL: No joint pain or swelling; No muscle, back, or extremity pain  PSYCHIATRIC: No depression, anxiety, mood swings, or difficulty sleeping  HEME/LYMPH: No easy bruising, or bleeding gums  ALLERGY AND IMMUNOLOGIC: No hives or eczema	    [ ] All others negative	  [ ] Unable to obtain    PHYSICAL EXAM:  T(C): 36.4 (05-04-21 @ 04:33), Max: 36.8 (05-03-21 @ 16:21)  HR: 54 (05-04-21 @ 05:45) (50 - 70)  BP: 106/53 (05-04-21 @ 04:33) (96/64 - 114/74)  RR: 20 (05-04-21 @ 04:33) (17 - 20)  SpO2: 95% (05-04-21 @ 05:45) (92% - 98%)  Wt(kg): --  I&O's Summary    02 May 2021 07:01  -  03 May 2021 07:00  --------------------------------------------------------  IN: 850 mL / OUT: 1500 mL / NET: -650 mL    03 May 2021 07:01  -  04 May 2021 06:55  --------------------------------------------------------  IN: 600 mL / OUT: 800 mL / NET: -200 mL        Appearance: Normal	  HEENT:   Normal oral mucosa, PERRL, EOMI	  Lymphatic: No lymphadenopathy  Cardiovascular: Normal S1 S2, No JVD, + murmurs, No edema  Respiratory: Lungs clear to auscultation	  Psychiatry: A & O x 3, Mood & affect appropriate  Gastrointestinal:  Soft, Non-tender, + BS	  Skin: No rashes, No ecchymoses, No cyanosis	  Neurologic: Non-focal  Extremities: Normal range of motion, No clubbing, cyanosis , +decrease redness  Vascular: Peripheral pulses palpable 2+ bilaterally    MEDICATIONS  (STANDING):  ARIPiprazole 2 milliGRAM(s) Oral daily  aspirin enteric coated 81 milliGRAM(s) Oral daily  atorvastatin 40 milliGRAM(s) Oral at bedtime  buMETAnide 0.5 milliGRAM(s) Oral daily  buPROPion XL . 300 milliGRAM(s) Oral daily  camphor 0.5%/menthol 0.5% Topical Lotion 1 Application(s) Topical every 8 hours  cephalexin 500 milliGRAM(s) Oral every 6 hours  dextrose 40% Gel 15 Gram(s) Oral once  dextrose 5%. 1000 milliLiter(s) (50 mL/Hr) IV Continuous <Continuous>  dextrose 5%. 1000 milliLiter(s) (100 mL/Hr) IV Continuous <Continuous>  dextrose 50% Injectable 25 Gram(s) IV Push once  dextrose 50% Injectable 12.5 Gram(s) IV Push once  dextrose 50% Injectable 25 Gram(s) IV Push once  enalapril 10 milliGRAM(s) Oral daily  enoxaparin Injectable 40 milliGRAM(s) SubCutaneous every 12 hours  escitalopram 20 milliGRAM(s) Oral daily  gabapentin 600 milliGRAM(s) Oral three times a day  glucagon  Injectable 1 milliGRAM(s) IntraMuscular once  insulin glargine Injectable (LANTUS) 25 Unit(s) SubCutaneous every morning  insulin lispro (ADMELOG) corrective regimen sliding scale   SubCutaneous three times a day before meals  insulin lispro (ADMELOG) corrective regimen sliding scale   SubCutaneous at bedtime  insulin lispro Injectable (ADMELOG) 10 Unit(s) SubCutaneous three times a day before meals  levothyroxine 125 MICROGram(s) Oral daily  mupirocin 2% Ointment 1 Application(s) Topical <User Schedule>  pantoprazole    Tablet 40 milliGRAM(s) Oral before breakfast  spironolactone 25 milliGRAM(s) Oral daily      TELEMETRY: 	    ECG:  	  RADIOLOGY:  OTHER: 	  	  LABS:	 	    CARDIAC MARKERS:                                10.7   6.20  )-----------( 281      ( 02 May 2021 07:15 )             35.3           proBNP:   Lipid Profile:   HgA1c:   TSH:   Patient with bilateral leg erythema  LLE exam appears more prominent  Patient with leukocytosis on admission  Exam of LLE appears consistent with possible streptococcal cellulitis  MRSA/MSSA PCR Negative    While there is still erythema at bilateral LE it appears more dusky  She also is now with resolution of leukocytosis, afebrile and improving pain in LE  Much of the erythema is likely secondary to chronic venous stasis  I would switch to Keflex 500 mg PO Q6H with plan to treat through 5/10/21      Assessment and plan  ---------------------------  66 year-old female with PMHx of DM2 on insulin/metformin but no longer on Trulicity as this was DC'd during a hospitalization, fibromyalgia, osteoarthritis, sleep apnea on CPAP, depression, venous insufficiency, endometrial CA s/p hysterectomy (2010), HTN, HLD, hypothyroidism, obesity class III, now presenting with recurrent cellulitis. Patient has previous hx of hospitalizations for lower extremity cellulitis that has historically required IV antibiotics to treat. Reports that since her prior hospitalization a couple months ago that she had been doing well, with some hyperpigmentation chronically of her LE but no redness or discomfort. Reports that yesterday she began to notice redness and swelling initially in her LLE but this rapidly spread to her RLE as well. Pain over anterior LLE shin area with this redness and swelling and she also began to notice chills. Otherwise no nausea/vomiting/coughing/myalgias/diarrhea. No swelling of her LE. Is doing well on new regimen of diuretics with bumex and spironolactone.  pt is well known to me with hx of htn CHF RV dysfunction with pulmonary htn ?sec tyop obesity and sleep apnea with recurrent LE cellulitis.  chf/ rv dysfunction continue Bumex Aldactone will adjust meds  ?venous insufficieny will consider RF ablation  dvt prophylaxis  abx  Bipap  id appreciated, continue abx  oob to chair  continue Bumex/  aldactone  elevate le  bradycardia, observe no av blocking agent, repeat tsh  ID appreciated  physical therapy ?rehab

## 2021-05-04 NOTE — DISCHARGE NOTE PROVIDER - HOSPITAL COURSE
66 year-old female with PMHx of DM2,  fibromyalgia, osteoarthritis, sleep apnea on CPAP, depression, venous insufficiency, endometrial CA s/p hysterectomy (2010), HTN, HLD, hypothyroidism, obesity class III, last admitted in 2/2021 for b/l LE cellulitis and received ancef/keflex x10 days. Now presenting with recurrent cellulitis. Seen by ID, received IV zosyn and now switched to PO keflex. Pt seen by podiatry fo left foot 3rd toe wound and Ingrown toenails bilateral. Blister on left 3rd toe deroofed and site cleansed, dressed with bacitracin and gauze. X-ray of left foot done but no fx or dislocation seen. Pt seen by PT, they recommends [facility].     Pt is medically stable and cleared by Dr. Cardenas to [facility] w/ close outpatient follow up.

## 2021-05-04 NOTE — DISCHARGE NOTE PROVIDER - NSDCMRMEDTOKEN_GEN_ALL_CORE_FT
ARIPIPRAZOLE 2 MG TABLET: TAKE 1 TABLET BY MOUTH EVERY DAY  aspirin 81 mg oral delayed release tablet: 1 tab(s) orally once a day  atorvastatin 40 mg oral tablet: 1 tab(s) orally once a day (at bedtime)  BASAGLAR 100 UNIT/ML KWIKPEN: 30 unit(s) subcutaneous once a day (at bedtime)  BUMETANIDE 0.5 MG TABLET: TAKE 1 TABLET BY MOUTH EVERY DAY  buPROPion 300 mg/24 hours (XL) oral tablet, extended release: 1 tab(s) orally once a day  CLONAZEPAM 0.5 MG TABLET: TAKE 1 TABLET (0.5 MG) BY ORAL ROUTE ONCE DAILY AT BEDTIME MDD  1 TABLET  ENALAPRIL MALEATE 10 MG TAB: TAKE 1 TABLET BY MOUTH EVERY DAY  escitalopram 20 mg oral tablet: 1 tab(s) orally once a day  gabapentin 600 mg oral tablet: 1 tab(s) orally 3 times a day  HYDROmorphone 4 mg oral tablet: 1 tab(s) orally every 6 hours, As needed, Severe Pain (7 - 10)  levothyroxine 125 mcg (0.125 mg) oral tablet: 1 tab(s) orally once a day  METFORMIN HCL 1,000 MG TABLET: TAKE 1 TABLET BY MOUTH TWICE A DAY  NOVOLOG 100 UNIT/ML FLEXPEN: 12 unit(s) injectable 3 times a day  PANTOPRAZOLE SOD DR 40 MG TAB: TAKE 1 TABLET BY MOUTH EVERY DAY  spironolactone 25 mg oral tablet: 1 tab(s) orally once a day   ARIPIPRAZOLE 2 MG TABLET: TAKE 1 TABLET BY MOUTH EVERY DAY  aspirin 81 mg oral delayed release tablet: 1 tab(s) orally once a day  atorvastatin 40 mg oral tablet: 1 tab(s) orally once a day (at bedtime)  BASAGLAR 100 UNIT/ML KWIKPEN: 25 unit(s) subcutaneous once a day (at bedtime)  BUMETANIDE 0.5 MG TABLET: TAKE 1 TABLET BY MOUTH EVERY DAY  buPROPion 300 mg/24 hours (XL) oral tablet, extended release: 1 tab(s) orally once a day  camphor-menthol 0.5%-0.5% topical lotion: 1 application topically every 8 hours  cephalexin 500 mg oral capsule: 1 cap(s) orally every 6 hours through 5/10  clonazePAM 0.5 mg oral tablet: 1 tab(s) orally once a day (at bedtime), As needed, insomnia/anxiety  ENALAPRIL MALEATE 10 MG TAB: TAKE 1 TABLET BY MOUTH EVERY DAY  escitalopram 20 mg oral tablet: 1 tab(s) orally once a day  gabapentin 600 mg oral tablet: 1 tab(s) orally 3 times a day  HYDROmorphone 4 mg oral tablet: 1 tab(s) orally every 6 hours, As needed, Severe Pain (7 - 10)  lactobacillus acidophilus oral tablet: 1 tab(s) orally once a day  levothyroxine 125 mcg (0.125 mg) oral tablet: 1 tab(s) orally once a day  METFORMIN HCL 1,000 MG TABLET: TAKE 1 TABLET BY MOUTH TWICE A DAY  mupirocin 2% topical ointment: Apply topically to affected area once a day X7 days   NOVOLOG 100 UNIT/ML FLEXPEN: 10 unit(s) injectable 3 times a day  PANTOPRAZOLE SOD DR 40 MG TAB: TAKE 1 TABLET BY MOUTH EVERY DAY  spironolactone 25 mg oral tablet: 1 tab(s) orally once a day   ARIPIPRAZOLE 2 MG TABLET: TAKE 1 TABLET BY MOUTH EVERY DAY  aspirin 81 mg oral delayed release tablet: 1 tab(s) orally once a day  atorvastatin 40 mg oral tablet: 1 tab(s) orally once a day (at bedtime)  BASAGLAR 100 UNIT/ML KWIKPEN: 25 unit(s) subcutaneous once a day in am   BUMETANIDE 0.5 MG TABLET: TAKE 1 TABLET BY MOUTH EVERY DAY  buPROPion 300 mg/24 hours (XL) oral tablet, extended release: 1 tab(s) orally once a day  camphor-menthol 0.5%-0.5% topical lotion: 1 application topically every 8 hours  cephalexin 500 mg oral capsule: 1 cap(s) orally every 6 hours through 5/10  clonazePAM 0.5 mg oral tablet: 1 tab(s) orally once a day (at bedtime), As needed, insomnia/anxiety  ENALAPRIL MALEATE 10 MG TAB: TAKE 1 TABLET BY MOUTH EVERY DAY  escitalopram 20 mg oral tablet: 1 tab(s) orally once a day  gabapentin 600 mg oral tablet: 1 tab(s) orally 3 times a day  HYDROmorphone 4 mg oral tablet: 1 tab(s) orally every 6 hours, As needed, Severe Pain (7 - 10)  lactobacillus acidophilus oral tablet: 1 tab(s) orally once a day  levothyroxine 125 mcg (0.125 mg) oral tablet: 1 tab(s) orally once a day  METFORMIN HCL 1,000 MG TABLET: TAKE 1 TABLET BY MOUTH TWICE A DAY  mupirocin 2% topical ointment: Apply topically to affected area once a day X7 days   NOVOLOG 100 UNIT/ML FLEXPEN: 10 unit(s) subcutaneous 3 times a day before meal   Hold if you are not eating   PANTOPRAZOLE SOD DR 40 MG TAB: TAKE 1 TABLET BY MOUTH EVERY DAY  spironolactone 25 mg oral tablet: 1 tab(s) orally once a day

## 2021-05-04 NOTE — PROGRESS NOTE ADULT - SUBJECTIVE AND OBJECTIVE BOX
Follow Up:  Cellulitis     Interval History:    REVIEW OF SYSTEMS  [  ] ROS unobtainable because:    [  ] All other systems negative except as noted below    Constitutional:  [ ] fever [ ] chills  [ ] weight loss  [ ] weakness  Skin:  [ ] rash [ ] phlebitis	  Eyes: [ ] icterus [ ] pain  [ ] discharge	  ENMT: [ ] sore throat  [ ] thrush [ ] ulcers [ ] exudates  Respiratory: [ ] dyspnea [ ] hemoptysis [ ] cough [ ] sputum	  Cardiovascular:  [ ] chest pain [ ] palpitations [ ] edema	  Gastrointestinal:  [ ] nausea [ ] vomiting [ ] diarrhea [ ] constipation [ ] pain	  Genitourinary:  [ ] dysuria [ ] frequency [ ] hematuria [ ] discharge [ ] flank pain  [ ] incontinence  Musculoskeletal:  [ ] myalgias [ ] arthralgias [ ] arthritis  [ ] back pain  Neurological:  [ ] headache [ ] seizures  [ ] confusion/altered mental status    Allergies  adhesives (Rash)  aerosols, perfumes, fabric softeners (Rash; Rhinitis; Rhinorrhea)  dust (Rhinitis; Rhinorrhea)  Lyrica (Rash)  pollen (Rhinitis; Rhinorrhea)  smoke (Rhinitis; Rhinorrhea)  Tin/costume jewelry (Rash)        ANTIMICROBIALS:  cephalexin 500 every 6 hours      OTHER MEDS:  MEDICATIONS  (STANDING):  ARIPiprazole 2 daily  aspirin enteric coated 81 daily  atorvastatin 40 at bedtime  buMETAnide 0.5 daily  buPROPion XL . 300 daily  clonazePAM  Tablet 0.5 at bedtime PRN  dextrose 40% Gel 15 once  dextrose 50% Injectable 25 once  dextrose 50% Injectable 12.5 once  dextrose 50% Injectable 25 once  enalapril 10 daily  enoxaparin Injectable 40 every 12 hours  escitalopram 20 daily  gabapentin 600 three times a day  glucagon  Injectable 1 once  HYDROmorphone   Tablet 4 every 6 hours PRN  insulin glargine Injectable (LANTUS) 25 every morning  insulin lispro (ADMELOG) corrective regimen sliding scale  three times a day before meals  insulin lispro (ADMELOG) corrective regimen sliding scale  at bedtime  insulin lispro Injectable (ADMELOG) 10 three times a day before meals  levothyroxine 125 daily  pantoprazole    Tablet 40 before breakfast  spironolactone 25 daily      Vital Signs Last 24 Hrs  T(C): 36.8 (04 May 2021 16:24), Max: 36.8 (04 May 2021 16:24)  T(F): 98.3 (04 May 2021 16:24), Max: 98.3 (04 May 2021 16:24)  HR: 52 (04 May 2021 16:24) (50 - 70)  BP: 110/56 (04 May 2021 16:24) (106/53 - 142/57)  BP(mean): --  RR: 20 (04 May 2021 16:24) (20 - 20)  SpO2: 97% (04 May 2021 16:24) (92% - 97%)    PHYSICAL EXAMINATION:  General: Alert and Awake, NAD  HEENT: PERRL, EOMI  Neck: Supple  Cardiac: RRR, No M/R/G  Resp: CTAB, No Wh/Rh/Ra  Abdomen: NBS, NT/ND, No HSM, No rigidity or guarding  MSK: No LE edema. No Calf tenderness  : No patel  Skin: No rashes or lesions. Skin is warm and dry to the touch.   Neuro: Alert and Awake. CN 2-12 Grossly intact. Moves all four extremities spontaneously.  Psych: Calm, Pleasant, Cooperative                          11.0   5.91  )-----------( 323      ( 04 May 2021 07:28 )             36.5       05-04    137  |  101  |  29<H>  ----------------------------<  122<H>  4.5   |  22  |  1.15    Ca    9.1      04 May 2021 07:28            MICROBIOLOGY:  v  .Blood Blood  05-01-21   No growth to date.  --  --                RADIOLOGY:    <The imaging below has been reviewed and visualized by me independently. Findings as detailed in report below> Follow Up:  Cellulitis     Interval History: afebrile. continued erythema of LE but with much improved pain compared to prior to admission.     REVIEW OF SYSTEMS  [  ] ROS unobtainable because:    [ x] All other systems negative except as noted below    Constitutional:  [ ] fever [ ] chills  [ ] weight loss  [ ] weakness  Skin:  [x ] rash [ ] phlebitis	  Eyes: [ ] icterus [ ] pain  [ ] discharge	  ENMT: [ ] sore throat  [ ] thrush [ ] ulcers [ ] exudates  Respiratory: [ ] dyspnea [ ] hemoptysis [ ] cough [ ] sputum	  Cardiovascular:  [ ] chest pain [ ] palpitations [ ] edema	  Gastrointestinal:  [ ] nausea [ ] vomiting [ ] diarrhea [ ] constipation [ ] pain	  Genitourinary:  [ ] dysuria [ ] frequency [ ] hematuria [ ] discharge [ ] flank pain  [ ] incontinence  Musculoskeletal:  [ ] myalgias [ ] arthralgias [ ] arthritis  [ ] back pain  Neurological:  [ ] headache [ ] seizures  [ ] confusion/altered mental status    Allergies  adhesives (Rash)  aerosols, perfumes, fabric softeners (Rash; Rhinitis; Rhinorrhea)  dust (Rhinitis; Rhinorrhea)  Lyrica (Rash)  pollen (Rhinitis; Rhinorrhea)  smoke (Rhinitis; Rhinorrhea)  Tin/costume jewelry (Rash)        ANTIMICROBIALS:  cephalexin 500 every 6 hours      OTHER MEDS:  MEDICATIONS  (STANDING):  ARIPiprazole 2 daily  aspirin enteric coated 81 daily  atorvastatin 40 at bedtime  buMETAnide 0.5 daily  buPROPion XL . 300 daily  clonazePAM  Tablet 0.5 at bedtime PRN  dextrose 40% Gel 15 once  dextrose 50% Injectable 25 once  dextrose 50% Injectable 12.5 once  dextrose 50% Injectable 25 once  enalapril 10 daily  enoxaparin Injectable 40 every 12 hours  escitalopram 20 daily  gabapentin 600 three times a day  glucagon  Injectable 1 once  HYDROmorphone   Tablet 4 every 6 hours PRN  insulin glargine Injectable (LANTUS) 25 every morning  insulin lispro (ADMELOG) corrective regimen sliding scale  three times a day before meals  insulin lispro (ADMELOG) corrective regimen sliding scale  at bedtime  insulin lispro Injectable (ADMELOG) 10 three times a day before meals  levothyroxine 125 daily  pantoprazole    Tablet 40 before breakfast  spironolactone 25 daily      Vital Signs Last 24 Hrs  T(C): 36.8 (04 May 2021 16:24), Max: 36.8 (04 May 2021 16:24)  T(F): 98.3 (04 May 2021 16:24), Max: 98.3 (04 May 2021 16:24)  HR: 52 (04 May 2021 16:24) (50 - 70)  BP: 110/56 (04 May 2021 16:24) (106/53 - 142/57)  BP(mean): --  RR: 20 (04 May 2021 16:24) (20 - 20)  SpO2: 97% (04 May 2021 16:24) (92% - 97%)    PHYSICAL EXAMINATION:  General: Alert and Awake, NAD  Cardiac: RRR, No M/R/G  Resp: CTAB, No Wh/Rh/Ra  Abdomen: NBS, NT/ND, No HSM, No rigidity or guarding  MSK: +Bilateral LE erythema with L > R (brightness of erythema improxing). 1-2+ bilateral LE edema. No Calf tenderness  Skin: As per MSk section. Skin is warm and dry to the touch.   Neuro: Alert and Awake. CN 2-12 Grossly intact. Moves all four extremities spontaneously.  Psych: Calm, Pleasant, Cooperative                          11.0   5.91  )-----------( 323      ( 04 May 2021 07:28 )             36.5       05-04    137  |  101  |  29<H>  ----------------------------<  122<H>  4.5   |  22  |  1.15    Ca    9.1      04 May 2021 07:28            MICROBIOLOGY:  v  .Blood Blood  05-01-21   No growth to date.  --  --                RADIOLOGY:    <The imaging below has been reviewed and visualized by me independently. Findings as detailed in report below>    EXAM:  DUPLEX SCAN EXT VEINS LOWER BI                        PROCEDURE DATE:  05/02/2021    No evidence of bilateral lower extremity deep venous thrombosis.

## 2021-05-04 NOTE — DISCHARGE NOTE PROVIDER - NSDCCPCAREPLAN_GEN_ALL_CORE_FT
PRINCIPAL DISCHARGE DIAGNOSIS  Diagnosis: Cellulitis of left lower extremity  Assessment and Plan of Treatment: - Seen by ID   - Received IV zosyn and now switched to PO keflex  - Please complete antibiotic as prescribed       PRINCIPAL DISCHARGE DIAGNOSIS  Diagnosis: Cellulitis of left lower extremity  Assessment and Plan of Treatment: - Seen by ID   - Received IV zosyn and now switched to PO keflex  - Please complete antibiotic as prescribed( Keflex 500 mg PO Q6H through 5/10/21)  Please follow up with your primary care physician after rehab      SECONDARY DISCHARGE DIAGNOSES  Diagnosis: Type 2 diabetes mellitus without complication, with long-term current use of insulin  Assessment and Plan of Treatment: HgA1C this admission 7.1  Make sure you get your HgA1c checked every three months.  If you take oral diabetes medications, check your blood glucose two times a day.  If you take insulin, check your blood glucose before meals and at bedtime.  It's important not to skip any meals.  Keep a log of your blood glucose results and always take it with you to your doctor appointments.  Keep a list of your current medications including injectables and over the counter medications and bring this medication list with you to all your doctor appointments.  If you have not seen your ophthalmologist this year call for appointment.  Check your feet daily for redness, sores, or openings. Do not self treat. If no improvement in two days call your primary care physician for an appointment.  Low blood sugar (hypoglycemia) is a blood sugar below 70mg/dl. Check your blood sugar if you feel signs/symptoms of hypoglycemia. If your blood sugar is below 70 take 15 grams of carbohydrates (ex 4 oz of apple juice, 3-4 glucose tablets, or 4-6 oz of regular soda) wait 15 minutes and repeat blood sugar to make sure it comes up above 70.  If your blood sugar is above 70 and you are due for a meal, have a meal.  If you are not due for a meal have a snack.  This snack helps keeps your blood sugar at a safe range.    Diagnosis: Hypothyroidism, unspecified type  Assessment and Plan of Treatment: Continue Synthroid as directed    Diagnosis: Fibromyalgia  Assessment and Plan of Treatment: Continue neurontin as directed   Please follow up with your primary care physician

## 2021-05-04 NOTE — PROGRESS NOTE ADULT - ASSESSMENT
66 year-old female with PMHx of DM2,  fibromyalgia, osteoarthritis, sleep apnea on CPAP, depression, venous insufficiency, endometrial CA s/p hysterectomy (2010), HTN, HLD, hypothyroidism, obesity class III, now presenting with recurrent cellulitis.   Last admitted in 2/2021 for b/l LE cellulitis and received ancef/keflex x10 days.    Patient with bilateral leg erythema  LLE exam appeared more prominent  Patient with leukocytosis on admission  Exam of LLE appears consistent with possible streptococcal cellulitis  MRSA/MSSA PCR Negative    While there is still erythema at bilateral LE it appears more dusky compared to admission with scaling  She also has resolution of leukocytosis, afebrile and improving pain in LE  Much of the erythema is likely secondary to chronic venous stasis  I would continue Keflex 500 mg PO Q6H with plan to treat through 5/10/21    Overall, Cellulitis, Leukocytosis    I will be away tomorrow. Please contact the Infectious Diseases Office to contact the covering Infectious Diseases Attending.     Kyle Paul M.D.  [facility] Division of Infectious Disease  8AM-5PM: Pager Number [phone]  After Hours (or if no response): Please contact the Infectious Diseases Office at [phone]

## 2021-05-05 LAB
GLUCOSE BLDC GLUCOMTR-MCNC: 135 MG/DL — HIGH (ref 70–99)
GLUCOSE BLDC GLUCOMTR-MCNC: 139 MG/DL — HIGH (ref 70–99)
GLUCOSE BLDC GLUCOMTR-MCNC: 162 MG/DL — HIGH (ref 70–99)
GLUCOSE BLDC GLUCOMTR-MCNC: 169 MG/DL — HIGH (ref 70–99)

## 2021-05-05 RX ADMIN — MEN-PHOR 1 APPLICATION(S): .5; .5 LOTION TOPICAL at 06:00

## 2021-05-05 RX ADMIN — HYDROMORPHONE HYDROCHLORIDE 4 MILLIGRAM(S): 2 INJECTION INTRAMUSCULAR; INTRAVENOUS; SUBCUTANEOUS at 21:15

## 2021-05-05 RX ADMIN — HYDROMORPHONE HYDROCHLORIDE 4 MILLIGRAM(S): 2 INJECTION INTRAMUSCULAR; INTRAVENOUS; SUBCUTANEOUS at 19:33

## 2021-05-05 RX ADMIN — Medication 1: at 08:31

## 2021-05-05 RX ADMIN — BUPROPION HYDROCHLORIDE 300 MILLIGRAM(S): 150 TABLET, EXTENDED RELEASE ORAL at 11:47

## 2021-05-05 RX ADMIN — GABAPENTIN 600 MILLIGRAM(S): 400 CAPSULE ORAL at 11:47

## 2021-05-05 RX ADMIN — HYDROMORPHONE HYDROCHLORIDE 4 MILLIGRAM(S): 2 INJECTION INTRAMUSCULAR; INTRAVENOUS; SUBCUTANEOUS at 09:37

## 2021-05-05 RX ADMIN — Medication 500 MILLIGRAM(S): at 23:01

## 2021-05-05 RX ADMIN — Medication 500 MILLIGRAM(S): at 17:52

## 2021-05-05 RX ADMIN — ENOXAPARIN SODIUM 40 MILLIGRAM(S): 100 INJECTION SUBCUTANEOUS at 05:59

## 2021-05-05 RX ADMIN — Medication 500 MILLIGRAM(S): at 05:57

## 2021-05-05 RX ADMIN — Medication 10 UNIT(S): at 12:45

## 2021-05-05 RX ADMIN — Medication 81 MILLIGRAM(S): at 11:47

## 2021-05-05 RX ADMIN — ARIPIPRAZOLE 2 MILLIGRAM(S): 15 TABLET ORAL at 11:47

## 2021-05-05 RX ADMIN — GABAPENTIN 600 MILLIGRAM(S): 400 CAPSULE ORAL at 21:08

## 2021-05-05 RX ADMIN — MUPIROCIN 1 APPLICATION(S): 20 OINTMENT TOPICAL at 11:48

## 2021-05-05 RX ADMIN — ESCITALOPRAM OXALATE 20 MILLIGRAM(S): 10 TABLET, FILM COATED ORAL at 11:47

## 2021-05-05 RX ADMIN — SPIRONOLACTONE 25 MILLIGRAM(S): 25 TABLET, FILM COATED ORAL at 09:36

## 2021-05-05 RX ADMIN — MEN-PHOR 1 APPLICATION(S): .5; .5 LOTION TOPICAL at 21:09

## 2021-05-05 RX ADMIN — Medication 125 MICROGRAM(S): at 05:58

## 2021-05-05 RX ADMIN — Medication 10 UNIT(S): at 08:32

## 2021-05-05 RX ADMIN — HYDROMORPHONE HYDROCHLORIDE 4 MILLIGRAM(S): 2 INJECTION INTRAMUSCULAR; INTRAVENOUS; SUBCUTANEOUS at 10:15

## 2021-05-05 RX ADMIN — MEN-PHOR 1 APPLICATION(S): .5; .5 LOTION TOPICAL at 11:50

## 2021-05-05 RX ADMIN — PANTOPRAZOLE SODIUM 40 MILLIGRAM(S): 20 TABLET, DELAYED RELEASE ORAL at 06:00

## 2021-05-05 RX ADMIN — ATORVASTATIN CALCIUM 40 MILLIGRAM(S): 80 TABLET, FILM COATED ORAL at 21:08

## 2021-05-05 RX ADMIN — Medication 10 UNIT(S): at 17:51

## 2021-05-05 RX ADMIN — BUMETANIDE 0.5 MILLIGRAM(S): 0.25 INJECTION INTRAMUSCULAR; INTRAVENOUS at 09:36

## 2021-05-05 RX ADMIN — ENOXAPARIN SODIUM 40 MILLIGRAM(S): 100 INJECTION SUBCUTANEOUS at 17:52

## 2021-05-05 RX ADMIN — GABAPENTIN 600 MILLIGRAM(S): 400 CAPSULE ORAL at 05:59

## 2021-05-05 RX ADMIN — INSULIN GLARGINE 25 UNIT(S): 100 INJECTION, SOLUTION SUBCUTANEOUS at 08:30

## 2021-05-05 RX ADMIN — Medication 500 MILLIGRAM(S): at 11:47

## 2021-05-05 NOTE — PROGRESS NOTE ADULT - SUBJECTIVE AND OBJECTIVE BOX
CARDIOLOGY     PROGRESS  NOTE   ________________________________________________    CHIEF COMPLAINT:Patient is a 66y old  Female who presents with a chief complaint of Cellulitis (04 May 2021 17:16)  no complain.  	  REVIEW OF SYSTEMS:  CONSTITUTIONAL: No fever, weight loss, or fatigue  EYES: No eye pain, visual disturbances, or discharge  ENT:  No difficulty hearing, tinnitus, vertigo; No sinus or throat pain  NECK: No pain or stiffness  RESPIRATORY: No cough, wheezing, chills or hemoptysis; No Shortness of Breath  CARDIOVASCULAR: No chest pain, palpitations, passing out, dizziness, or leg swelling  GASTROINTESTINAL: No abdominal or epigastric pain. No nausea, vomiting, or hematemesis; No diarrhea or constipation. No melena or hematochezia.  GENITOURINARY: No dysuria, frequency, hematuria, or incontinence  NEUROLOGICAL: No headaches, memory loss, loss of strength, numbness, or tremors  SKIN: No itching, burning, rashes, or lesions   LYMPH Nodes: No enlarged glands  ENDOCRINE: No heat or cold intolerance; No hair loss  MUSCULOSKELETAL: No joint pain or swelling; No muscle, back, or extremity pain  PSYCHIATRIC: No depression, anxiety, mood swings, or difficulty sleeping  HEME/LYMPH: No easy bruising, or bleeding gums  ALLERGY AND IMMUNOLOGIC: No hives or eczema	    [ ] All others negative	  [ ] Unable to obtain    PHYSICAL EXAM:  T(C): 36.6 (05-05-21 @ 05:56), Max: 37 (05-04-21 @ 23:39)  HR: 54 (05-05-21 @ 05:56) (50 - 55)  BP: 107/70 (05-05-21 @ 05:56) (105/69 - 142/57)  RR: 20 (05-05-21 @ 05:56) (15 - 20)  SpO2: 96% (05-05-21 @ 05:56) (94% - 97%)  Wt(kg): --  I&O's Summary    03 May 2021 07:01  -  04 May 2021 07:00  --------------------------------------------------------  IN: 720 mL / OUT: 800 mL / NET: -80 mL    04 May 2021 07:01  -  05 May 2021 06:48  --------------------------------------------------------  IN: 1200 mL / OUT: 0 mL / NET: 1200 mL        Appearance: Normal	  HEENT:   Normal oral mucosa, PERRL, EOMI	  Lymphatic: No lymphadenopathy  Cardiovascular: Normal S1 S2, No JVD, + murmurs, No edema  Respiratory: Lungs clear to auscultation	  Psychiatry: A & O x 3, Mood & affect appropriate  Gastrointestinal:  Soft, Non-tender, + BS	  Skin: No rashes, No ecchymoses, No cyanosis	  Neurologic: Non-focal  Extremities: Normal range of motion, No clubbing, cyanosis , +cellulitis  Vascular: Peripheral pulses palpable 2+ bilaterally    MEDICATIONS  (STANDING):  ARIPiprazole 2 milliGRAM(s) Oral daily  aspirin enteric coated 81 milliGRAM(s) Oral daily  atorvastatin 40 milliGRAM(s) Oral at bedtime  buMETAnide 0.5 milliGRAM(s) Oral daily  buPROPion XL . 300 milliGRAM(s) Oral daily  camphor 0.5%/menthol 0.5% Topical Lotion 1 Application(s) Topical every 8 hours  cephalexin 500 milliGRAM(s) Oral every 6 hours  dextrose 40% Gel 15 Gram(s) Oral once  dextrose 5%. 1000 milliLiter(s) (50 mL/Hr) IV Continuous <Continuous>  dextrose 5%. 1000 milliLiter(s) (100 mL/Hr) IV Continuous <Continuous>  dextrose 50% Injectable 25 Gram(s) IV Push once  dextrose 50% Injectable 12.5 Gram(s) IV Push once  dextrose 50% Injectable 25 Gram(s) IV Push once  enalapril 10 milliGRAM(s) Oral daily  enoxaparin Injectable 40 milliGRAM(s) SubCutaneous every 12 hours  escitalopram 20 milliGRAM(s) Oral daily  gabapentin 600 milliGRAM(s) Oral three times a day  glucagon  Injectable 1 milliGRAM(s) IntraMuscular once  insulin glargine Injectable (LANTUS) 25 Unit(s) SubCutaneous every morning  insulin lispro (ADMELOG) corrective regimen sliding scale   SubCutaneous three times a day before meals  insulin lispro (ADMELOG) corrective regimen sliding scale   SubCutaneous at bedtime  insulin lispro Injectable (ADMELOG) 10 Unit(s) SubCutaneous three times a day before meals  levothyroxine 125 MICROGram(s) Oral daily  mupirocin 2% Ointment 1 Application(s) Topical <User Schedule>  pantoprazole    Tablet 40 milliGRAM(s) Oral before breakfast  spironolactone 25 milliGRAM(s) Oral daily      TELEMETRY: 	    ECG:  	  RADIOLOGY:  OTHER: 	  	  LABS:	 	    CARDIAC MARKERS:                                11.0   5.91  )-----------( 323      ( 04 May 2021 07:28 )             36.5     05-04    137  |  101  |  29<H>  ----------------------------<  122<H>  4.5   |  22  |  1.15    Ca    9.1      04 May 2021 07:28      proBNP:   Lipid Profile:   HgA1c:   TSH: Thyroid Stimulating Hormone, Serum: 3.44 uIU/mL (05-04 @ 09:16)          Assessment and plan  ---------------------------  66 year-old female with PMHx of DM2 on insulin/metformin but no longer on Trulicity as this was DC'd during a hospitalization, fibromyalgia, osteoarthritis, sleep apnea on CPAP, depression, venous insufficiency, endometrial CA s/p hysterectomy (2010), HTN, HLD, hypothyroidism, obesity class III, now presenting with recurrent cellulitis. Patient has previous hx of hospitalizations for lower extremity cellulitis that has historically required IV antibiotics to treat. Reports that since her prior hospitalization a couple months ago that she had been doing well, with some hyperpigmentation chronically of her LE but no redness or discomfort. Reports that yesterday she began to notice redness and swelling initially in her LLE but this rapidly spread to her RLE as well. Pain over anterior LLE shin area with this redness and swelling and she also began to notice chills. Otherwise no nausea/vomiting/coughing/myalgias/diarrhea. No swelling of her LE. Is doing well on new regimen of diuretics with bumex and spironolactone.  pt is well known to me with hx of htn CHF RV dysfunction with pulmonary htn ?sec tyop obesity and sleep apnea with recurrent LE cellulitis.  chf/ rv dysfunction continue Bumex Aldactone will adjust meds  ?venous insufficieny will consider RF ablation  dvt prophylaxis  abx  Bipap  id appreciated, continue abx  oob to chair  continue Bumex/  aldactone  elevate le  bradycardia, observe no av blocking agent, repeat tsh  ID appreciated  physical therapy ?rehab, pt wants to go home  clear by id for dc on po abx  dc home today as safe by physical therapy

## 2021-05-06 ENCOUNTER — TRANSCRIPTION ENCOUNTER (OUTPATIENT)
Age: 66
End: 2021-05-06

## 2021-05-06 LAB
ANION GAP SERPL CALC-SCNC: 13 MMOL/L — SIGNIFICANT CHANGE UP (ref 5–17)
BUN SERPL-MCNC: 28 MG/DL — HIGH (ref 7–23)
CALCIUM SERPL-MCNC: 9.3 MG/DL — SIGNIFICANT CHANGE UP (ref 8.4–10.5)
CHLORIDE SERPL-SCNC: 101 MMOL/L — SIGNIFICANT CHANGE UP (ref 96–108)
CO2 SERPL-SCNC: 23 MMOL/L — SIGNIFICANT CHANGE UP (ref 22–31)
CREAT SERPL-MCNC: 1.22 MG/DL — SIGNIFICANT CHANGE UP (ref 0.5–1.3)
CULTURE RESULTS: SIGNIFICANT CHANGE UP
CULTURE RESULTS: SIGNIFICANT CHANGE UP
GLUCOSE BLDC GLUCOMTR-MCNC: 119 MG/DL — HIGH (ref 70–99)
GLUCOSE BLDC GLUCOMTR-MCNC: 147 MG/DL — HIGH (ref 70–99)
GLUCOSE BLDC GLUCOMTR-MCNC: 158 MG/DL — HIGH (ref 70–99)
GLUCOSE BLDC GLUCOMTR-MCNC: 164 MG/DL — HIGH (ref 70–99)
GLUCOSE SERPL-MCNC: 135 MG/DL — HIGH (ref 70–99)
HCT VFR BLD CALC: 35.4 % — SIGNIFICANT CHANGE UP (ref 34.5–45)
HGB BLD-MCNC: 10.7 G/DL — LOW (ref 11.5–15.5)
MCHC RBC-ENTMCNC: 24.2 PG — LOW (ref 27–34)
MCHC RBC-ENTMCNC: 30.2 GM/DL — LOW (ref 32–36)
MCV RBC AUTO: 80.1 FL — SIGNIFICANT CHANGE UP (ref 80–100)
NRBC # BLD: 0 /100 WBCS — SIGNIFICANT CHANGE UP (ref 0–0)
PLATELET # BLD AUTO: 324 K/UL — SIGNIFICANT CHANGE UP (ref 150–400)
POTASSIUM SERPL-MCNC: 4.5 MMOL/L — SIGNIFICANT CHANGE UP (ref 3.5–5.3)
POTASSIUM SERPL-SCNC: 4.5 MMOL/L — SIGNIFICANT CHANGE UP (ref 3.5–5.3)
RBC # BLD: 4.42 M/UL — SIGNIFICANT CHANGE UP (ref 3.8–5.2)
RBC # FLD: 17.1 % — HIGH (ref 10.3–14.5)
SODIUM SERPL-SCNC: 137 MMOL/L — SIGNIFICANT CHANGE UP (ref 135–145)
SPECIMEN SOURCE: SIGNIFICANT CHANGE UP
SPECIMEN SOURCE: SIGNIFICANT CHANGE UP
WBC # BLD: 5.19 K/UL — SIGNIFICANT CHANGE UP (ref 3.8–10.5)
WBC # FLD AUTO: 5.19 K/UL — SIGNIFICANT CHANGE UP (ref 3.8–10.5)

## 2021-05-06 RX ORDER — JNJ-78436735 50000000000 [PFU]/.5ML
0.5 SUSPENSION INTRAMUSCULAR ONCE
Refills: 0 | Status: COMPLETED | OUTPATIENT
Start: 2021-05-06 | End: 2021-05-06

## 2021-05-06 RX ADMIN — Medication 125 MICROGRAM(S): at 05:35

## 2021-05-06 RX ADMIN — INSULIN GLARGINE 25 UNIT(S): 100 INJECTION, SOLUTION SUBCUTANEOUS at 08:07

## 2021-05-06 RX ADMIN — HYDROMORPHONE HYDROCHLORIDE 4 MILLIGRAM(S): 2 INJECTION INTRAMUSCULAR; INTRAVENOUS; SUBCUTANEOUS at 08:03

## 2021-05-06 RX ADMIN — ATORVASTATIN CALCIUM 40 MILLIGRAM(S): 80 TABLET, FILM COATED ORAL at 21:52

## 2021-05-06 RX ADMIN — Medication 500 MILLIGRAM(S): at 23:01

## 2021-05-06 RX ADMIN — ENOXAPARIN SODIUM 40 MILLIGRAM(S): 100 INJECTION SUBCUTANEOUS at 17:33

## 2021-05-06 RX ADMIN — Medication 500 MILLIGRAM(S): at 11:16

## 2021-05-06 RX ADMIN — Medication 10 UNIT(S): at 08:08

## 2021-05-06 RX ADMIN — GABAPENTIN 600 MILLIGRAM(S): 400 CAPSULE ORAL at 21:52

## 2021-05-06 RX ADMIN — SPIRONOLACTONE 25 MILLIGRAM(S): 25 TABLET, FILM COATED ORAL at 05:36

## 2021-05-06 RX ADMIN — MEN-PHOR 1 APPLICATION(S): .5; .5 LOTION TOPICAL at 11:29

## 2021-05-06 RX ADMIN — Medication 500 MILLIGRAM(S): at 17:34

## 2021-05-06 RX ADMIN — HYDROMORPHONE HYDROCHLORIDE 4 MILLIGRAM(S): 2 INJECTION INTRAMUSCULAR; INTRAVENOUS; SUBCUTANEOUS at 11:29

## 2021-05-06 RX ADMIN — MEN-PHOR 1 APPLICATION(S): .5; .5 LOTION TOPICAL at 21:52

## 2021-05-06 RX ADMIN — ARIPIPRAZOLE 2 MILLIGRAM(S): 15 TABLET ORAL at 11:17

## 2021-05-06 RX ADMIN — GABAPENTIN 600 MILLIGRAM(S): 400 CAPSULE ORAL at 05:36

## 2021-05-06 RX ADMIN — Medication 500 MILLIGRAM(S): at 05:35

## 2021-05-06 RX ADMIN — Medication 10 UNIT(S): at 12:20

## 2021-05-06 RX ADMIN — MUPIROCIN 1 APPLICATION(S): 20 OINTMENT TOPICAL at 11:17

## 2021-05-06 RX ADMIN — ENOXAPARIN SODIUM 40 MILLIGRAM(S): 100 INJECTION SUBCUTANEOUS at 05:34

## 2021-05-06 RX ADMIN — BUMETANIDE 0.5 MILLIGRAM(S): 0.25 INJECTION INTRAMUSCULAR; INTRAVENOUS at 05:38

## 2021-05-06 RX ADMIN — JNJ-78436735 0.5 MILLILITER(S): 50000000000 SUSPENSION INTRAMUSCULAR at 11:14

## 2021-05-06 RX ADMIN — GABAPENTIN 600 MILLIGRAM(S): 400 CAPSULE ORAL at 12:20

## 2021-05-06 RX ADMIN — BUPROPION HYDROCHLORIDE 300 MILLIGRAM(S): 150 TABLET, EXTENDED RELEASE ORAL at 11:16

## 2021-05-06 RX ADMIN — Medication 81 MILLIGRAM(S): at 11:16

## 2021-05-06 RX ADMIN — PANTOPRAZOLE SODIUM 40 MILLIGRAM(S): 20 TABLET, DELAYED RELEASE ORAL at 05:41

## 2021-05-06 RX ADMIN — MEN-PHOR 1 APPLICATION(S): .5; .5 LOTION TOPICAL at 05:40

## 2021-05-06 RX ADMIN — Medication 10 UNIT(S): at 17:34

## 2021-05-06 RX ADMIN — ESCITALOPRAM OXALATE 20 MILLIGRAM(S): 10 TABLET, FILM COATED ORAL at 11:16

## 2021-05-06 RX ADMIN — Medication 1: at 08:07

## 2021-05-06 NOTE — DISCHARGE NOTE NURSING/CASE MANAGEMENT/SOCIAL WORK - NSDCVIVACCINE_GEN_ALL_CORE_FT
No Vaccines Administered. Severe acute respiratory syndrome coronavirus 2 (SARS-CoV-2) (Coronavirus disease [COVID-19]) vaccine , 2021/5/6 11:14 , Katey Burdick (DEBRA)

## 2021-05-06 NOTE — DISCHARGE NOTE NURSING/CASE MANAGEMENT/SOCIAL WORK - PATIENT PORTAL LINK FT
You can access the FollowMyHealth Patient Portal offered by [facility] by registering at the following website: [email or web address]. By joining [facility]’s FollowMyHealth portal, you will also be able to view your health information using other applications (apps) compatible with our system.

## 2021-05-06 NOTE — PROGRESS NOTE ADULT - SUBJECTIVE AND OBJECTIVE BOX
CARDIOLOGY     PROGRESS  NOTE   ________________________________________________    CHIEF COMPLAINT:Patient is a 66y old  Female who presents with a chief complaint of Cellulitis (05 May 2021 06:48)  no complain.  	  REVIEW OF SYSTEMS:  CONSTITUTIONAL: No fever, weight loss, or fatigue  EYES: No eye pain, visual disturbances, or discharge  ENT:  No difficulty hearing, tinnitus, vertigo; No sinus or throat pain  NECK: No pain or stiffness  RESPIRATORY: No cough, wheezing, chills or hemoptysis; No Shortness of Breath  CARDIOVASCULAR: No chest pain, palpitations, passing out, dizziness, or leg swelling  GASTROINTESTINAL: No abdominal or epigastric pain. No nausea, vomiting, or hematemesis; No diarrhea or constipation. No melena or hematochezia.  GENITOURINARY: No dysuria, frequency, hematuria, or incontinence  NEUROLOGICAL: No headaches, memory loss, loss of strength, numbness, or tremors  SKIN: No itching, burning, rashes, or lesions   LYMPH Nodes: No enlarged glands  ENDOCRINE: No heat or cold intolerance; No hair loss  MUSCULOSKELETAL: No joint pain or swelling; No muscle, back, or extremity pain  PSYCHIATRIC: No depression, anxiety, mood swings, or difficulty sleeping  HEME/LYMPH: No easy bruising, or bleeding gums  ALLERGY AND IMMUNOLOGIC: No hives or eczema	    [ ] All others negative	  [ ] Unable to obtain    PHYSICAL EXAM:  T(C): 36.4 (05-06-21 @ 05:32), Max: 36.7 (05-05-21 @ 15:36)  HR: 51 (05-06-21 @ 05:32) (51 - 81)  BP: 111/70 (05-06-21 @ 05:32) (111/70 - 127/82)  RR: 18 (05-06-21 @ 05:32) (18 - 19)  SpO2: 95% (05-06-21 @ 05:32) (94% - 98%)  Wt(kg): --  I&O's Summary    04 May 2021 07:01  -  05 May 2021 07:00  --------------------------------------------------------  IN: 1200 mL / OUT: 0 mL / NET: 1200 mL    05 May 2021 07:01  -  06 May 2021 06:56  --------------------------------------------------------  IN: 1320 mL / OUT: 0 mL / NET: 1320 mL        Appearance: Normal	  HEENT:   Normal oral mucosa, PERRL, EOMI	  Lymphatic: No lymphadenopathy  Cardiovascular: Normal S1 S2, No JVD, + murmurs, No edema  Respiratory: Lungs clear to auscultation	  Psychiatry: A & O x 3, Mood & affect appropriate  Gastrointestinal:  Soft, Non-tender, + BS	  Skin: No rashes, No ecchymoses, No cyanosis	  Neurologic: Non-focal  Extremities: Normal range of motion, No clubbing, cyanosis, decreasse erythema  Vascular: Peripheral pulses palpable 2+ bilaterally    MEDICATIONS  (STANDING):  ARIPiprazole 2 milliGRAM(s) Oral daily  aspirin enteric coated 81 milliGRAM(s) Oral daily  atorvastatin 40 milliGRAM(s) Oral at bedtime  buMETAnide 0.5 milliGRAM(s) Oral daily  buPROPion XL . 300 milliGRAM(s) Oral daily  camphor 0.5%/menthol 0.5% Topical Lotion 1 Application(s) Topical every 8 hours  cephalexin 500 milliGRAM(s) Oral every 6 hours  dextrose 40% Gel 15 Gram(s) Oral once  dextrose 5%. 1000 milliLiter(s) (50 mL/Hr) IV Continuous <Continuous>  dextrose 5%. 1000 milliLiter(s) (100 mL/Hr) IV Continuous <Continuous>  dextrose 50% Injectable 25 Gram(s) IV Push once  dextrose 50% Injectable 12.5 Gram(s) IV Push once  dextrose 50% Injectable 25 Gram(s) IV Push once  enalapril 10 milliGRAM(s) Oral daily  enoxaparin Injectable 40 milliGRAM(s) SubCutaneous every 12 hours  escitalopram 20 milliGRAM(s) Oral daily  gabapentin 600 milliGRAM(s) Oral three times a day  glucagon  Injectable 1 milliGRAM(s) IntraMuscular once  insulin glargine Injectable (LANTUS) 25 Unit(s) SubCutaneous every morning  insulin lispro (ADMELOG) corrective regimen sliding scale   SubCutaneous three times a day before meals  insulin lispro (ADMELOG) corrective regimen sliding scale   SubCutaneous at bedtime  insulin lispro Injectable (ADMELOG) 10 Unit(s) SubCutaneous three times a day before meals  levothyroxine 125 MICROGram(s) Oral daily  mupirocin 2% Ointment 1 Application(s) Topical <User Schedule>  pantoprazole    Tablet 40 milliGRAM(s) Oral before breakfast  spironolactone 25 milliGRAM(s) Oral daily      TELEMETRY: 	    ECG:  	  RADIOLOGY:  OTHER: 	  	  LABS:	 	    CARDIAC MARKERS:                                11.0   5.91  )-----------( 323      ( 04 May 2021 07:28 )             36.5     05-04    137  |  101  |  29<H>  ----------------------------<  122<H>  4.5   |  22  |  1.15    Ca    9.1      04 May 2021 07:28      proBNP:   Lipid Profile:   HgA1c:   TSH: Thyroid Stimulating Hormone, Serum: 3.44 uIU/mL (05-04 @ 09:16)          Assessment and plan  ---------------------------  66 year-old female with PMHx of DM2 on insulin/metformin but no longer on Trulicity as this was DC'd during a hospitalization, fibromyalgia, osteoarthritis, sleep apnea on CPAP, depression, venous insufficiency, endometrial CA s/p hysterectomy (2010), HTN, HLD, hypothyroidism, obesity class III, now presenting with recurrent cellulitis. Patient has previous hx of hospitalizations for lower extremity cellulitis that has historically required IV antibiotics to treat. Reports that since her prior hospitalization a couple months ago that she had been doing well, with some hyperpigmentation chronically of her LE but no redness or discomfort. Reports that yesterday she began to notice redness and swelling initially in her LLE but this rapidly spread to her RLE as well. Pain over anterior LLE shin area with this redness and swelling and she also began to notice chills. Otherwise no nausea/vomiting/coughing/myalgias/diarrhea. No swelling of her LE. Is doing well on new regimen of diuretics with bumex and spironolactone.  pt is well known to me with hx of htn CHF RV dysfunction with pulmonary htn ?sec tyop obesity and sleep apnea with recurrent LE cellulitis.  chf/ rv dysfunction continue Bumex Aldactone will adjust meds  ?venous insufficieny will consider RF ablation  dvt prophylaxis  abx  Bipap  id appreciated, continue abx  oob to chair  continue Bumex/  aldactone  elevate le  bradycardia, observe no av blocking agent, repeat tsh, asymptomatic  tsh normal  ID appreciated  physical therapy ?rehab, pt wants to go home  clear by id for dc on po abx  pt wants to go to rehab, awaiting placement

## 2021-05-06 NOTE — DISCHARGE NOTE NURSING/CASE MANAGEMENT/SOCIAL WORK - NSDCPELOVENOXCOMP_GEN_ALL_CORE
Enoxaparin/Lovenox is used to treat and prevent blood clots. Use a different body area each time you give yourself a shot. Keep track of where you give each shot to make sure you rotate body areas. Use a new needle and syringe each time you inject your medicine. Never skip a dose of Enoxaparin/Lovenox. You must use this medicine on a fixed schedule.  NEVER TAKE A DOUBLE DOSE. Call your doctor or pharmacist if you miss a dose. Take Enoxaparin/Lovenox at the same time each morning and/or evening. no difficulty in swallowing

## 2021-05-06 NOTE — DISCHARGE NOTE NURSING/CASE MANAGEMENT/SOCIAL WORK - NSDCPNINST_GEN_ALL_CORE
rehab as per facility diet medications activity as per rehab       call for follow up appointment  with primary care md    any severe pain nausea  vomiting fevers  increased swelling  reddness any  problems call md call 911 [facility] EMERGENCY ROOM

## 2021-05-07 VITALS — OXYGEN SATURATION: 97 % | HEART RATE: 65 BPM

## 2021-05-07 DIAGNOSIS — E11.65 TYPE 2 DIABETES MELLITUS WITH HYPERGLYCEMIA: ICD-10-CM

## 2021-05-07 DIAGNOSIS — E78.5 HYPERLIPIDEMIA, UNSPECIFIED: ICD-10-CM

## 2021-05-07 LAB
GLUCOSE BLDC GLUCOMTR-MCNC: 109 MG/DL — HIGH (ref 70–99)
GLUCOSE BLDC GLUCOMTR-MCNC: 135 MG/DL — HIGH (ref 70–99)
GLUCOSE BLDC GLUCOMTR-MCNC: 136 MG/DL — HIGH (ref 70–99)

## 2021-05-07 PROCEDURE — 85730 THROMBOPLASTIN TIME PARTIAL: CPT

## 2021-05-07 PROCEDURE — 99222 1ST HOSP IP/OBS MODERATE 55: CPT

## 2021-05-07 PROCEDURE — 85027 COMPLETE CBC AUTOMATED: CPT

## 2021-05-07 PROCEDURE — 87640 STAPH A DNA AMP PROBE: CPT

## 2021-05-07 PROCEDURE — 84132 ASSAY OF SERUM POTASSIUM: CPT

## 2021-05-07 PROCEDURE — 93970 EXTREMITY STUDY: CPT

## 2021-05-07 PROCEDURE — U0003: CPT

## 2021-05-07 PROCEDURE — 85018 HEMOGLOBIN: CPT

## 2021-05-07 PROCEDURE — 97116 GAIT TRAINING THERAPY: CPT

## 2021-05-07 PROCEDURE — 82803 BLOOD GASES ANY COMBINATION: CPT

## 2021-05-07 PROCEDURE — 82962 GLUCOSE BLOOD TEST: CPT

## 2021-05-07 PROCEDURE — 0031A: CPT

## 2021-05-07 PROCEDURE — 99285 EMERGENCY DEPT VISIT HI MDM: CPT | Mod: 25

## 2021-05-07 PROCEDURE — 84295 ASSAY OF SERUM SODIUM: CPT

## 2021-05-07 PROCEDURE — 97162 PT EVAL MOD COMPLEX 30 MIN: CPT

## 2021-05-07 PROCEDURE — 85014 HEMATOCRIT: CPT

## 2021-05-07 PROCEDURE — 73630 X-RAY EXAM OF FOOT: CPT

## 2021-05-07 PROCEDURE — 83605 ASSAY OF LACTIC ACID: CPT

## 2021-05-07 PROCEDURE — 82435 ASSAY OF BLOOD CHLORIDE: CPT

## 2021-05-07 PROCEDURE — 85025 COMPLETE CBC W/AUTO DIFF WBC: CPT

## 2021-05-07 PROCEDURE — 83036 HEMOGLOBIN GLYCOSYLATED A1C: CPT

## 2021-05-07 PROCEDURE — 82330 ASSAY OF CALCIUM: CPT

## 2021-05-07 PROCEDURE — 87040 BLOOD CULTURE FOR BACTERIA: CPT

## 2021-05-07 PROCEDURE — 87641 MR-STAPH DNA AMP PROBE: CPT

## 2021-05-07 PROCEDURE — 82947 ASSAY GLUCOSE BLOOD QUANT: CPT

## 2021-05-07 PROCEDURE — 80053 COMPREHEN METABOLIC PANEL: CPT

## 2021-05-07 PROCEDURE — 97110 THERAPEUTIC EXERCISES: CPT

## 2021-05-07 PROCEDURE — 80048 BASIC METABOLIC PNL TOTAL CA: CPT

## 2021-05-07 PROCEDURE — 94660 CPAP INITIATION&MGMT: CPT

## 2021-05-07 PROCEDURE — 71045 X-RAY EXAM CHEST 1 VIEW: CPT

## 2021-05-07 PROCEDURE — U0005: CPT

## 2021-05-07 PROCEDURE — 84145 PROCALCITONIN (PCT): CPT

## 2021-05-07 PROCEDURE — 85610 PROTHROMBIN TIME: CPT

## 2021-05-07 PROCEDURE — 93005 ELECTROCARDIOGRAM TRACING: CPT

## 2021-05-07 PROCEDURE — 86769 SARS-COV-2 COVID-19 ANTIBODY: CPT

## 2021-05-07 PROCEDURE — 84443 ASSAY THYROID STIM HORMONE: CPT

## 2021-05-07 RX ORDER — MUPIROCIN 20 MG/G
1 OINTMENT TOPICAL
Qty: 0 | Refills: 0 | DISCHARGE
Start: 2021-05-07

## 2021-05-07 RX ORDER — CEPHALEXIN 500 MG
1 CAPSULE ORAL
Qty: 0 | Refills: 0 | DISCHARGE
Start: 2021-05-07

## 2021-05-07 RX ORDER — INSULIN ASPART 100 [IU]/ML
10 INJECTION, SOLUTION SUBCUTANEOUS
Qty: 0 | Refills: 1 | DISCHARGE

## 2021-05-07 RX ORDER — INSULIN GLARGINE 100 [IU]/ML
30 INJECTION, SOLUTION SUBCUTANEOUS
Qty: 0 | Refills: 0 | DISCHARGE

## 2021-05-07 RX ORDER — INSULIN ASPART 100 [IU]/ML
12 INJECTION, SOLUTION SUBCUTANEOUS
Qty: 0 | Refills: 1 | DISCHARGE

## 2021-05-07 RX ORDER — CLONAZEPAM 1 MG
1 TABLET ORAL
Qty: 0 | Refills: 0 | DISCHARGE
Start: 2021-05-07

## 2021-05-07 RX ORDER — MEN-PHOR .5; .5 G/G; G/G
1 LOTION TOPICAL
Qty: 0 | Refills: 0 | DISCHARGE
Start: 2021-05-07

## 2021-05-07 RX ORDER — INSULIN GLARGINE 100 [IU]/ML
25 INJECTION, SOLUTION SUBCUTANEOUS
Qty: 0 | Refills: 0 | DISCHARGE

## 2021-05-07 RX ORDER — CLONAZEPAM 1 MG
0 TABLET ORAL
Qty: 0 | Refills: 0 | DISCHARGE

## 2021-05-07 RX ADMIN — MUPIROCIN 1 APPLICATION(S): 20 OINTMENT TOPICAL at 14:43

## 2021-05-07 RX ADMIN — Medication 0.5 MILLIGRAM(S): at 03:10

## 2021-05-07 RX ADMIN — INSULIN GLARGINE 25 UNIT(S): 100 INJECTION, SOLUTION SUBCUTANEOUS at 08:28

## 2021-05-07 RX ADMIN — MEN-PHOR 1 APPLICATION(S): .5; .5 LOTION TOPICAL at 14:41

## 2021-05-07 RX ADMIN — HYDROMORPHONE HYDROCHLORIDE 4 MILLIGRAM(S): 2 INJECTION INTRAMUSCULAR; INTRAVENOUS; SUBCUTANEOUS at 15:56

## 2021-05-07 RX ADMIN — Medication 500 MILLIGRAM(S): at 05:13

## 2021-05-07 RX ADMIN — ESCITALOPRAM OXALATE 20 MILLIGRAM(S): 10 TABLET, FILM COATED ORAL at 12:52

## 2021-05-07 RX ADMIN — HYDROMORPHONE HYDROCHLORIDE 4 MILLIGRAM(S): 2 INJECTION INTRAMUSCULAR; INTRAVENOUS; SUBCUTANEOUS at 01:10

## 2021-05-07 RX ADMIN — Medication 10 UNIT(S): at 12:51

## 2021-05-07 RX ADMIN — BUPROPION HYDROCHLORIDE 300 MILLIGRAM(S): 150 TABLET, EXTENDED RELEASE ORAL at 12:52

## 2021-05-07 RX ADMIN — Medication 81 MILLIGRAM(S): at 12:52

## 2021-05-07 RX ADMIN — MEN-PHOR 1 APPLICATION(S): .5; .5 LOTION TOPICAL at 05:14

## 2021-05-07 RX ADMIN — Medication 0: at 17:44

## 2021-05-07 RX ADMIN — Medication 500 MILLIGRAM(S): at 17:46

## 2021-05-07 RX ADMIN — ARIPIPRAZOLE 2 MILLIGRAM(S): 15 TABLET ORAL at 12:53

## 2021-05-07 RX ADMIN — Medication 500 MILLIGRAM(S): at 12:52

## 2021-05-07 RX ADMIN — Medication 125 MICROGRAM(S): at 05:13

## 2021-05-07 RX ADMIN — ENOXAPARIN SODIUM 40 MILLIGRAM(S): 100 INJECTION SUBCUTANEOUS at 05:13

## 2021-05-07 RX ADMIN — GABAPENTIN 600 MILLIGRAM(S): 400 CAPSULE ORAL at 14:42

## 2021-05-07 RX ADMIN — HYDROMORPHONE HYDROCHLORIDE 4 MILLIGRAM(S): 2 INJECTION INTRAMUSCULAR; INTRAVENOUS; SUBCUTANEOUS at 00:40

## 2021-05-07 RX ADMIN — GABAPENTIN 600 MILLIGRAM(S): 400 CAPSULE ORAL at 05:13

## 2021-05-07 RX ADMIN — SPIRONOLACTONE 25 MILLIGRAM(S): 25 TABLET, FILM COATED ORAL at 08:30

## 2021-05-07 RX ADMIN — Medication 10 UNIT(S): at 08:29

## 2021-05-07 RX ADMIN — PANTOPRAZOLE SODIUM 40 MILLIGRAM(S): 20 TABLET, DELAYED RELEASE ORAL at 05:13

## 2021-05-07 RX ADMIN — Medication 10 UNIT(S): at 17:45

## 2021-05-07 RX ADMIN — HYDROMORPHONE HYDROCHLORIDE 4 MILLIGRAM(S): 2 INJECTION INTRAMUSCULAR; INTRAVENOUS; SUBCUTANEOUS at 09:33

## 2021-05-07 RX ADMIN — BUMETANIDE 0.5 MILLIGRAM(S): 0.25 INJECTION INTRAMUSCULAR; INTRAVENOUS at 08:30

## 2021-05-07 RX ADMIN — ENOXAPARIN SODIUM 40 MILLIGRAM(S): 100 INJECTION SUBCUTANEOUS at 17:45

## 2021-05-07 NOTE — PROGRESS NOTE ADULT - PROVIDER SPECIALTY LIST ADULT
Cardiology
Internal Medicine
Cardiology
Infectious Disease
Internal Medicine
Cardiology
Cardiology

## 2021-05-07 NOTE — CHART NOTE - NSCHARTNOTEFT_GEN_A_CORE
Spoke with Endocrine Dr. Deleon regarding insulin regimen recommendation on discharge   - Discharge on current regimen basal 25 units and premeal 10 units     Adelina DOWELLC  #[id number]

## 2021-05-07 NOTE — PROGRESS NOTE ADULT - REASON FOR ADMISSION
Cellulitis

## 2021-05-07 NOTE — CONSULT NOTE ADULT - SUBJECTIVE AND OBJECTIVE BOX
CHIEF COMPLAINT:Patient is a 66y old  Female who presents with a chief complaint of Cellulitis (01 May 2021 02:59)      HPI:  66 year-old female with PMHx of DM2 on insulin/metformin but no longer on Trulicity as this was DC'd during a hospitalization, fibromyalgia, osteoarthritis, sleep apnea on CPAP, depression, venous insufficiency, endometrial CA s/p hysterectomy (2010), HTN, HLD, hypothyroidism, obesity class III, now presenting with recurrent cellulitis. Patient has previous hx of hospitalizations for lower extremity cellulitis that has historically required IV antibiotics to treat. Reports that since her prior hospitalization a couple months ago that she had been doing well, with some hyperpigmentation chronically of her LE but no redness or discomfort. Reports that yesterday she began to notice redness and swelling initially in her LLE but this rapidly spread to her RLE as well. Pain over anterior LLE shin area with this redness and swelling and she also began to notice chills. Otherwise no nausea/vomiting/coughing/myalgias/diarrhea. No swelling of her LE. Is doing well on new regimen of diuretics with bumex and spironolactone.  pt is well known to me with hx of htn CHF RV dysfunction with pulmonary htn ?sec tyop obesity and sleep apnea with recurrent LE cellulitis.      PAST MEDICAL & SURGICAL HISTORY:  Personal History of Arthritis    Personal History of Female Genital Cancer    Personal History of Hypertension    Hypothyroidism    Diabetes Mellitus    Hyperlipidemia    High Triglycerides    Osteoarthritis of Knee  right. requires cane    Obstructive Sleep Apnea    Constipation    Diarrhea    Depression    Environmental Allergies    Morbidly Obese    Fibromyalgia    GERD with Apnea    Restless Legs Syndrome (RLS)    Umbilical Hernia    S/P Tonsillectomy and Adenoidectomy    S/P FESS (Functional Endoscopic Sinus Surgery)    Carpal Tunnel Syndrome  release left    Endometrial Ca s/p RODRIGO, BSO    Incarcerated Ventral Hernia        MEDICATIONS  (STANDING):  ARIPiprazole 2 milliGRAM(s) Oral daily  aspirin enteric coated 81 milliGRAM(s) Oral daily  atorvastatin 40 milliGRAM(s) Oral at bedtime  buMETAnide 0.5 milliGRAM(s) Oral daily  buPROPion XL . 300 milliGRAM(s) Oral daily  dextrose 40% Gel 15 Gram(s) Oral once  dextrose 5%. 1000 milliLiter(s) (50 mL/Hr) IV Continuous <Continuous>  dextrose 5%. 1000 milliLiter(s) (100 mL/Hr) IV Continuous <Continuous>  dextrose 50% Injectable 25 Gram(s) IV Push once  dextrose 50% Injectable 12.5 Gram(s) IV Push once  dextrose 50% Injectable 25 Gram(s) IV Push once  enalapril 10 milliGRAM(s) Oral daily  enoxaparin Injectable 40 milliGRAM(s) SubCutaneous every 12 hours  escitalopram 20 milliGRAM(s) Oral daily  gabapentin 600 milliGRAM(s) Oral three times a day  glucagon  Injectable 1 milliGRAM(s) IntraMuscular once  insulin glargine Injectable (LANTUS) 25 Unit(s) SubCutaneous every morning  insulin lispro (ADMELOG) corrective regimen sliding scale   SubCutaneous three times a day before meals  insulin lispro (ADMELOG) corrective regimen sliding scale   SubCutaneous at bedtime  insulin lispro Injectable (ADMELOG) 10 Unit(s) SubCutaneous three times a day before meals  levothyroxine 125 MICROGram(s) Oral daily  pantoprazole    Tablet 40 milliGRAM(s) Oral before breakfast  piperacillin/tazobactam IVPB.. 3.375 Gram(s) IV Intermittent every 8 hours  spironolactone 25 milliGRAM(s) Oral daily    MEDICATIONS  (PRN):  clonazePAM  Tablet 0.5 milliGRAM(s) Oral at bedtime PRN insomnia/anxiety  HYDROmorphone   Tablet 4 milliGRAM(s) Oral every 6 hours PRN Severe Pain (7 - 10)      FAMILY HISTORY:  No pertinent family history in first degree relatives        SOCIAL HISTORY:    [ ] Non-smoker  [ ] Smoker  [ ] Alcohol    Allergies    adhesives (Rash)  aerosols, perfumes, fabric softeners (Rash; Rhinitis; Rhinorrhea)  dust (Rhinitis; Rhinorrhea)  Lyrica (Rash)  pollen (Rhinitis; Rhinorrhea)  smoke (Rhinitis; Rhinorrhea)  Tin/costume jewelry (Rash)    Intolerances    	    REVIEW OF SYSTEMS:  CONSTITUTIONAL: No fever, weight loss, or fatigue  EYES: No eye pain, visual disturbances, or discharge  ENT:  No difficulty hearing, tinnitus, vertigo; No sinus or throat pain  NECK: No pain or stiffness  RESPIRATORY: No cough, wheezing, chills or hemoptysis; No Shortness of Breath  CARDIOVASCULAR: No chest pain, palpitations, passing out, dizziness, or leg swelling  GASTROINTESTINAL: No abdominal or epigastric pain. No nausea, vomiting, or hematemesis; No diarrhea or constipation. No melena or hematochezia.  GENITOURINARY: No dysuria, frequency, hematuria, or incontinence  NEUROLOGICAL: No headaches, memory loss, loss of strength, numbness, or tremors  SKIN: No itching, burning, rashes, or lesions   LYMPH Nodes: No enlarged glands  ENDOCRINE: No heat or cold intolerance; No hair loss  MUSCULOSKELETAL: No joint pain or swelling; No muscle, back, or extremity pain  PSYCHIATRIC: No depression, anxiety, mood swings, or difficulty sleeping  HEME/LYMPH: No easy bruising, or bleeding gums  ALLERGY AND IMMUNOLOGIC: No hives or eczema	    [ ] All others negative	  [ ] Unable to obtain    PHYSICAL EXAM:  T(C): 37.3 (05-01-21 @ 06:20), Max: 37.7 (04-30-21 @ 22:11)  HR: 71 (05-01-21 @ 06:20) (66 - 83)  BP: 93/58 (05-01-21 @ 06:20) (93/58 - 157/97)  RR: 18 (05-01-21 @ 06:20) (16 - 23)  SpO2: 97% (05-01-21 @ 06:20) (97% - 100%)  Wt(kg): --  I&O's Summary      Appearance: Normal	  HEENT:   Normal oral mucosa, PERRL, EOMI	  Lymphatic: No lymphadenopathy  Cardiovascular: Normal S1 S2, No JVD, No murmurs, No edema  Respiratory: Lungs clear to auscultation	  Psychiatry: A & O x 3, Mood & affect appropriate  Gastrointestinal:  Soft, Non-tender, + BS	  Skin: No rashes, No ecchymoses, No cyanosis	  Neurologic: Non-focal  Extremities: Normal range of motion, No clubbing, cyanosis or edema  Vascular: Peripheral pulses palpable 2+ bilaterally    TELEMETRY: 	    ECG:  	  RADIOLOGY:  OTHER: 	  	  LABS:	 	    CARDIAC MARKERS:                              10.9   8.29  )-----------( 280      ( 01 May 2021 05:48 )             35.4     05-01    137  |  100  |  20  ----------------------------<  169<H>  4.0   |  23  |  1.01    Ca    9.0      01 May 2021 05:48    TPro  7.6  /  Alb  3.8  /  TBili  0.6  /  DBili  x   /  AST  10  /  ALT  9<L>  /  AlkPhos  113  04-30    proBNP:   Lipid Profile:   HgA1c:   TSH:   PT/INR - ( 30 Apr 2021 22:28 )   PT: 14.1 sec;   INR: 1.18 ratio         PTT - ( 30 Apr 2021 22:28 )  PTT:31.6 sec    PREVIOUS DIAGNOSTIC TESTING:    < from: 12 Lead ECG (02.04.21 @ 17:47) >  Diagnosis Line NORMAL SINUS RHYTHM  MINIMAL VOLTAGE CRITERIA FOR LVH, MAY BE NORMAL VARIANT  SEPTAL INFARCT , AGE UNDETERMINED  INFERIOR INFARCT , AGE UNDETERMINED  ABNORMAL ECG  WHEN COMPARED WITH ECG OF 04-FEB-2021 02:10,  NO SIGNIFICANT CHANGE WAS FOUND  < from: Transthoracic Echocardiogram (07.26.20 @ 10:36) >  Technically difficult study.  1. Mitral annular calcification, otherwise normal mitral  valve. Minimal mitral regurgitation.  2. Normal left ventricular internal dimensions and wall  thicknesses.  3. Endocardium not well visualized; grossly preserved  overall left ventricular systolic function.  Unable to  exclude segmental wall motion abnormalities.  4. Right ventricular enlargement with decreased right  ventricular systolic function.      < from: Xray Chest 1 View- PORTABLE-Urgent (Xray Chest 1 View- PORTABLE-Urgent .) (04.30.21 @ 22:39) >  Cardiac silhouette is within normal limits.  Lungs are clear.  No pleural effusions or pneumothorax.  No acute osseous abnormality.      IMPRESSION:    Clear lungs.    < from: CT Angio Chest w/ IV Cont (07.25.20 @ 17:37) >  1.  Limited exam. The segmental and subsegmental pulmonary arteries are not evaluated secondary to poor opacification.    2.  Clear lungs. No aortic aneurysm or dissection.    < end of copied text >              
Patient is a 66y old  Female who presents with a chief complaint of Cellulitis (03 May 2021 16:39)      HPI:  66 year-old female with PMHx of DM2 on insulin/metformin but no longer on Trulicity as this was DC'd during a hospitalization, fibromyalgia, osteoarthritis, sleep apnea on CPAP, depression, venous insufficiency, endometrial CA s/p hysterectomy (2010), HTN, HLD, hypothyroidism, obesity class III, now presenting with recurrent cellulitis. Patient has previous hx of hospitalizations for lower extremity cellulitis that has historically required IV antibiotics to treat. Reports that since her prior hospitalization a couple months ago that she had been doing well, with some hyperpigmentation chronically of her LE but no redness or discomfort. Reports that yesterday she began to notice redness and swelling initially in her LLE but this rapidly spread to her RLE as well. Pain over anterior LLE shin area with this redness and swelling and she also began to notice chills. Otherwise no nausea/vomiting/coughing/myalgias/diarrhea. No swelling of her LE. Is doing well on new regimen of diuretics with bumex and spironolactone only (no longer on metolazone or furosemide).    Podiatry consulted for painful elongated toenails and left 3rd toe wound with blister.      PAST MEDICAL & SURGICAL HISTORY:  Personal History of Arthritis    Personal History of Female Genital Cancer    Personal History of Hypertension    Hypothyroidism    Diabetes Mellitus    Hyperlipidemia    High Triglycerides    Osteoarthritis of Knee  right. requires cane    Obstructive Sleep Apnea    Constipation    Diarrhea    Depression    Environmental Allergies    Morbidly Obese    Fibromyalgia    GERD with Apnea    Restless Legs Syndrome (RLS)    Umbilical Hernia    S/P Tonsillectomy and Adenoidectomy    S/P FESS (Functional Endoscopic Sinus Surgery)    Carpal Tunnel Syndrome  release left    Endometrial Ca s/p RODRIGO, BSO    Incarcerated Ventral Hernia        MEDICATIONS  (STANDING):  ARIPiprazole 2 milliGRAM(s) Oral daily  aspirin enteric coated 81 milliGRAM(s) Oral daily  atorvastatin 40 milliGRAM(s) Oral at bedtime  buMETAnide 0.5 milliGRAM(s) Oral daily  buPROPion XL . 300 milliGRAM(s) Oral daily  camphor 0.5%/menthol 0.5% Topical Lotion 1 Application(s) Topical every 8 hours  ceFAZolin   IVPB 2000 milliGRAM(s) IV Intermittent every 8 hours  dextrose 40% Gel 15 Gram(s) Oral once  dextrose 5%. 1000 milliLiter(s) (100 mL/Hr) IV Continuous <Continuous>  dextrose 5%. 1000 milliLiter(s) (50 mL/Hr) IV Continuous <Continuous>  dextrose 50% Injectable 25 Gram(s) IV Push once  dextrose 50% Injectable 12.5 Gram(s) IV Push once  dextrose 50% Injectable 25 Gram(s) IV Push once  enalapril 10 milliGRAM(s) Oral daily  enoxaparin Injectable 40 milliGRAM(s) SubCutaneous every 12 hours  escitalopram 20 milliGRAM(s) Oral daily  gabapentin 600 milliGRAM(s) Oral three times a day  glucagon  Injectable 1 milliGRAM(s) IntraMuscular once  insulin glargine Injectable (LANTUS) 25 Unit(s) SubCutaneous every morning  insulin lispro (ADMELOG) corrective regimen sliding scale   SubCutaneous three times a day before meals  insulin lispro (ADMELOG) corrective regimen sliding scale   SubCutaneous at bedtime  insulin lispro Injectable (ADMELOG) 10 Unit(s) SubCutaneous three times a day before meals  levothyroxine 125 MICROGram(s) Oral daily  pantoprazole    Tablet 40 milliGRAM(s) Oral before breakfast  spironolactone 25 milliGRAM(s) Oral daily    MEDICATIONS  (PRN):  clonazePAM  Tablet 0.5 milliGRAM(s) Oral at bedtime PRN insomnia/anxiety  HYDROmorphone   Tablet 4 milliGRAM(s) Oral every 6 hours PRN Severe Pain (7 - 10)      Allergies    adhesives (Rash)  aerosols, perfumes, fabric softeners (Rash; Rhinitis; Rhinorrhea)  dust (Rhinitis; Rhinorrhea)  Lyrica (Rash)  pollen (Rhinitis; Rhinorrhea)  smoke (Rhinitis; Rhinorrhea)  Tin/costume jewelry (Rash)    Intolerances        VITALS:    Vital Signs Last 24 Hrs  T(C): 36.8 (03 May 2021 16:21), Max: 36.9 (03 May 2021 00:28)  T(F): 98.3 (03 May 2021 16:21), Max: 98.4 (03 May 2021 00:28)  HR: 67 (03 May 2021 16:21) (50 - 67)  BP: 108/52 (03 May 2021 16:21) (96/64 - 123/86)  BP(mean): --  RR: 20 (03 May 2021 16:21) (17 - 20)  SpO2: 93% (03 May 2021 16:21) (93% - 98%)    LABS:                          10.7   6.20  )-----------( 281      ( 02 May 2021 07:15 )             35.3               CAPILLARY BLOOD GLUCOSE      POCT Blood Glucose.: 139 mg/dL (03 May 2021 16:55)  POCT Blood Glucose.: 149 mg/dL (03 May 2021 12:06)  POCT Blood Glucose.: 146 mg/dL (03 May 2021 08:26)  POCT Blood Glucose.: 145 mg/dL (02 May 2021 22:24)          LOWER EXTREMITY PHYSICAL EXAM:    Vasular: DP/PT _1/4, B/L, CFT <_2 seconds B/L, Temperature gradient wnl_, B/L. chronic stasis hyperpigmentation b/l lower extremity  Neuro: Epicritic sensation decreased to the level of toes_, B/L.  Skin:Positive dystrophic, hypertrophic ingrowing toenails with subungual debris x10, No foot ulcerations or clinical signs of infection, left 3rd toe hemorrhagic blister with underlying wound partial thickness with no signs of infection 0.5x0.5x0.1 cm secondary to trauma     
Patient is a 66y old  Female who presents with a chief complaint of Cellulitis (01 May 2021 09:46)    HPI:  66 year-old female with PMHx of DM2 on insulin/metformin but no longer on Trulicity as this was DC'd during a hospitalization, fibromyalgia, osteoarthritis, sleep apnea on CPAP, depression, venous insufficiency, endometrial CA s/p hysterectomy (2010), HTN, HLD, hypothyroidism, obesity class III, now presenting with recurrent cellulitis. Patient has previous hx of hospitalizations for lower extremity cellulitis that has historically required IV antibiotics to treat. Reports that since her prior hospitalization a couple months ago that she had been doing well, with some hyperpigmentation chronically of her LE but no redness or discomfort. Reports that yesterday she began to notice redness and swelling initially in her LLE but this rapidly spread to her RLE as well. Pain over anterior LLE shin area with this redness and swelling and she also began to notice chills. Otherwise no nausea/vomiting/coughing/myalgias/diarrhea. No swelling of her LE. Is doing well on new regimen of diuretics with bumex and spironolactone only (no longer on metolazone or furosemide).    Seen by YISEL Yo, in Feb 2021. Treated for bilateral cellulitis with ancef and keflex.   She was seen by dentist and took 2 days of amoxicillin prior to admit.        prior hospital charts reviewed [x ]  primary team notes reviewed [x  ]  other consultant notes reviewed [  ]    PAST MEDICAL & SURGICAL HISTORY:  Personal History of Arthritis    Personal History of Female Genital Cancer    Personal History of Hypertension    Hypothyroidism    Diabetes Mellitus    Hyperlipidemia    High Triglycerides    Osteoarthritis of Knee  right. requires cane    Obstructive Sleep Apnea    Constipation    Diarrhea    Depression    Environmental Allergies    Morbidly Obese    Fibromyalgia    GERD with Apnea    Restless Legs Syndrome (RLS)    Umbilical Hernia    S/P Tonsillectomy and Adenoidectomy    S/P FESS (Functional Endoscopic Sinus Surgery)    Carpal Tunnel Syndrome  release left    Endometrial Ca s/p RODRIGO, BSO    Incarcerated Ventral Hernia      Allergies  adhesives (Rash)  aerosols, perfumes, fabric softeners (Rash; Rhinitis; Rhinorrhea)  dust (Rhinitis; Rhinorrhea)  Lyrica (Rash)  pollen (Rhinitis; Rhinorrhea)  smoke (Rhinitis; Rhinorrhea)  Tin/costume jewelry (Rash)    ANTIMICROBIALS (past 90 days)  MEDICATIONS  (STANDING):  ceFAZolin   IVPB   100 mL/Hr IV Intermittent (05-01-21 @ 00:12)    piperacillin/tazobactam IVPB.   200 mL/Hr IV Intermittent (05-01-21 @ 05:17)    piperacillin/tazobactam IVPB..   25 mL/Hr IV Intermittent (05-01-21 @ 11:39)      ANTIMICROBIALS:    piperacillin/tazobactam IVPB.. 3.375 every 8 hours    OTHER MEDS: MEDICATIONS  (STANDING):  ARIPiprazole 2 daily  aspirin enteric coated 81 daily  atorvastatin 40 at bedtime  buMETAnide 0.5 daily  buPROPion XL . 300 daily  clonazePAM  Tablet 0.5 at bedtime PRN  dextrose 40% Gel 15 once  dextrose 50% Injectable 25 once  dextrose 50% Injectable 12.5 once  dextrose 50% Injectable 25 once  enalapril 10 daily  enoxaparin Injectable 40 every 12 hours  escitalopram 20 daily  gabapentin 600 three times a day  glucagon  Injectable 1 once  HYDROmorphone   Tablet 4 every 6 hours PRN  insulin glargine Injectable (LANTUS) 25 every morning  insulin lispro (ADMELOG) corrective regimen sliding scale  three times a day before meals  insulin lispro (ADMELOG) corrective regimen sliding scale  at bedtime  insulin lispro Injectable (ADMELOG) 10 three times a day before meals  levothyroxine 125 daily  pantoprazole    Tablet 40 before breakfast  spironolactone 25 daily    SOCIAL HISTORY:   never smoker, no drug, ETOH use. Lives alone. Has pet cats.       FAMILY HISTORY:  No pertinent family history in first degree relatives      REVIEW OF SYSTEMS  [  ] ROS unobtainable because:    [ x ] All other systems negative except as noted below:	    Constitutional:  [ ] fever [x ] chills  [ ] weight loss  [ ] weakness  Skin:  [ ] rash [ ] phlebitis	  Eyes: [ ] icterus [ ] pain  [ ] discharge	  ENMT: [ ] sore throat  [ ] thrush [ ] ulcers [ ] exudates  Respiratory: [ ] dyspnea [ ] hemoptysis [ ] cough [ ] sputum	  Cardiovascular:  [ ] chest pain [ ] palpitations [ ] edema	  Gastrointestinal:  [ ] nausea [ ] vomiting [ ] diarrhea [ ] constipation [ ] pain	  Genitourinary:  [ ] dysuria [ ] frequency [ ] hematuria [ ] discharge [ ] flank pain  [ ] incontinence  Musculoskeletal:  [ ] myalgias [ ] arthralgias [ ] arthritis  [ ] back pain  Neurological:  [ ] headache [ ] seizures  [ ] confusion/altered mental status  Psychiatric:  [ ] anxiety [ ] depression	  Hematology/Lymphatics:  [ ] lymphadenopathy  Endocrine:  [ ] adrenal [ ] thyroid  Allergic/Immunologic:	 [ ] transplant [ ] seasonal    Vital Signs Last 24 Hrs  T(F): 99 (05-01-21 @ 12:41), Max: 99.8 (04-30-21 @ 22:11)  Vital Signs Last 24 Hrs  HR: 71 (05-01-21 @ 12:41) (66 - 83)  BP: 136/57 (05-01-21 @ 12:41) (93/58 - 157/97)  RR: 18 (05-01-21 @ 12:41)  SpO2: 97% (05-01-21 @ 12:41) (96% - 100%)  Wt(kg): --    EXAM:  Constitutional: Not in acute distress  Eyes: pupils bilaterally reactive to light. No icterus.  Oral cavity: Clear, no lesions  Neck: No neck vein distension noted  RS: Chest clear to auscultation bilaterally. No wheeze/rhonchi/crepitations.  CVS: S1, S2 heard. Regular rate and rhythm. No murmurs/rubs/gallops.  Abdomen: Soft. No guarding/rigidity/tenderness.  : No acute abnormalities  Extremities: Warm. non pitting b/l LE edema  Skin: erythema above ankle extending up to mid shin and circumferential, L>R  Vascular: No evidence of phlebitis  Neuro: Alert, oriented to time/place/person                          10.9   8.29  )-----------( 280      ( 01 May 2021 05:48 )             35.4     05-01    137  |  100  |  20  ----------------------------<  169<H>  4.0   |  23  |  1.01    Ca    9.0      01 May 2021 05:48    TPro  7.6  /  Alb  3.8  /  TBili  0.6  /  DBili  x   /  AST  10  /  ALT  9<L>  /  AlkPhos  113  04-30    MICROBIOLOGY:  COVID-19 PCR: NotDetec (04-30-21 @ 23:07)    RADIOLOGY:  imaging below personally reviewed    < from: Xray Chest 1 View- PORTABLE-Urgent (Xray Chest 1 View- PORTABLE-Urgent .) (04.30.21 @ 22:39) >  IMPRESSION:    Clear lungs.      
    HPI:  65 y/o F w/ hx of Type 2 DM x 30 years complicated by neuropathy. At home on Basaglar 30 units qhs and Novolog 12 units with meals with metformin 1000mg BID with adherence. She was on Trulicity but taken off after prior hospitalization. Glucose at home running  with rare hypoglycemia without pattern 1-2x/month. Parents with Type 2 DM. +polyuria on diuretics. occasional nausea. Tries to monitor carbs.  Also hx of fibromyalgia, osteoarthritis, sleep apnea on CPAP, depression, venous insufficiency, endometrial CA s/p hysterectomy (2010), HTN, HLD, hypothyroidism, obesity class III, now presenting with recurrent cellulitis. Patient has previous hx of hospitalizations for lower extremity cellulitis that has historically required IV antibiotics to treat. Reports that since her prior hospitalization a couple months ago that she had been doing well, with some hyperpigmentation chronically of her LE but no redness or discomfort. Reports that yesterday she began to notice redness and swelling initially in her LLE but this rapidly spread to her RLE as well. Pain over anterior LLE shin area with this redness and swelling and she also began to notice chills. Otherwise no nausea/vomiting/coughing/myalgias/diarrhea. No swelling of her LE. Is doing well on new regimen of diuretics with bumex and spironolactone only (no longer on metolazone or furosemide).     (01 May 2021 02:59)      PAST MEDICAL & SURGICAL HISTORY:  Personal History of Arthritis    Personal History of Female Genital Cancer    Personal History of Hypertension    Hypothyroidism    Diabetes Mellitus    Hyperlipidemia    High Triglycerides    Osteoarthritis of Knee  right. requires cane    Obstructive Sleep Apnea    Constipation    Diarrhea    Depression    Environmental Allergies    Morbidly Obese    Fibromyalgia    GERD with Apnea    Restless Legs Syndrome (RLS)    Umbilical Hernia    S/P Tonsillectomy and Adenoidectomy    S/P FESS (Functional Endoscopic Sinus Surgery)    Carpal Tunnel Syndrome  release left    Endometrial Ca s/p RODRIGO, BSO    Incarcerated Ventral Hernia        FAMILY HISTORY:  Parents- Type 2 DM      Social History: No tobacco or alcohol use    Outpatient Medications:  · 	HYDROmorphone 4 mg oral tablet: Last Dose Taken:  , 1 tab(s) orally every 6 hours, As needed, Severe Pain (7 - 10)  · 	spironolactone 25 mg oral tablet: Last Dose Taken:  , 1 tab(s) orally once a day  · 	levothyroxine 125 mcg (0.125 mg) oral tablet: Last Dose Taken:  , 1 tab(s) orally once a day  · 	aspirin 81 mg oral delayed release tablet: Last Dose Taken:  , 1 tab(s) orally once a day  · 	atorvastatin 40 mg oral tablet: Last Dose Taken:  , 1 tab(s) orally once a day (at bedtime)  · 	escitalopram 20 mg oral tablet: Last Dose Taken:  , 1 tab(s) orally once a day  · 	buPROPion 300 mg/24 hours (XL) oral tablet, extended release: Last Dose Taken:  , 1 tab(s) orally once a day  · 	gabapentin 600 mg oral tablet: Last Dose Taken:  , 1 tab(s) orally 3 times a day  · 	ARIPIPRAZOLE 2 MG TABLET: Last Dose Taken:  , TAKE 1 TABLET BY MOUTH EVERY DAY  · 	PANTOPRAZOLE SOD DR 40 MG TAB: Last Dose Taken:  , TAKE 1 TABLET BY MOUTH EVERY DAY  · 	CLONAZEPAM 0.5 MG TABLET: Last Dose Taken:  , TAKE 1 TABLET (0.5 MG) BY ORAL ROUTE ONCE DAILY AT BEDTIME MDD  1 TABLET  · 	BUMETANIDE 0.5 MG TABLET: Last Dose Taken:  , TAKE 1 TABLET BY MOUTH EVERY DAY  · 	METFORMIN HCL 1,000 MG TABLET: Last Dose Taken:  , TAKE 1 TABLET BY MOUTH TWICE A DAY  · 	ENALAPRIL MALEATE 10 MG TAB: Last Dose Taken:  , TAKE 1 TABLET BY MOUTH EVERY DAY  · 	BASAGLAR 100 UNIT/ML KWIKPEN: 30 unit(s) subcutaneous once a day (at bedtime)  · 	NOVOLOG 100 UNIT/ML FLEXPEN: 12 unit(s) injectable 3 times a day    MEDICATIONS  (STANDING):  ARIPiprazole 2 milliGRAM(s) Oral daily  aspirin enteric coated 81 milliGRAM(s) Oral daily  atorvastatin 40 milliGRAM(s) Oral at bedtime  buMETAnide 0.5 milliGRAM(s) Oral daily  buPROPion XL . 300 milliGRAM(s) Oral daily  camphor 0.5%/menthol 0.5% Topical Lotion 1 Application(s) Topical every 8 hours  cephalexin 500 milliGRAM(s) Oral every 6 hours  dextrose 40% Gel 15 Gram(s) Oral once  dextrose 5%. 1000 milliLiter(s) (50 mL/Hr) IV Continuous <Continuous>  dextrose 5%. 1000 milliLiter(s) (100 mL/Hr) IV Continuous <Continuous>  dextrose 50% Injectable 25 Gram(s) IV Push once  dextrose 50% Injectable 12.5 Gram(s) IV Push once  dextrose 50% Injectable 25 Gram(s) IV Push once  enalapril 10 milliGRAM(s) Oral daily  enoxaparin Injectable 40 milliGRAM(s) SubCutaneous every 12 hours  escitalopram 20 milliGRAM(s) Oral daily  gabapentin 600 milliGRAM(s) Oral three times a day  glucagon  Injectable 1 milliGRAM(s) IntraMuscular once  insulin glargine Injectable (LANTUS) 25 Unit(s) SubCutaneous every morning  insulin lispro (ADMELOG) corrective regimen sliding scale   SubCutaneous three times a day before meals  insulin lispro (ADMELOG) corrective regimen sliding scale   SubCutaneous at bedtime  insulin lispro Injectable (ADMELOG) 10 Unit(s) SubCutaneous three times a day before meals  levothyroxine 125 MICROGram(s) Oral daily  mupirocin 2% Ointment 1 Application(s) Topical <User Schedule>  pantoprazole    Tablet 40 milliGRAM(s) Oral before breakfast  spironolactone 25 milliGRAM(s) Oral daily    MEDICATIONS  (PRN):  clonazePAM  Tablet 0.5 milliGRAM(s) Oral at bedtime PRN insomnia/anxiety  HYDROmorphone   Tablet 4 milliGRAM(s) Oral every 6 hours PRN Severe Pain (7 - 10)      Allergies    adhesives (Rash)  aerosols, perfumes, fabric softeners (Rash; Rhinitis; Rhinorrhea)  dust (Rhinitis; Rhinorrhea)  Lyrica (Rash)  pollen (Rhinitis; Rhinorrhea)  smoke (Rhinitis; Rhinorrhea)  Tin/costume jewelry (Rash)    Intolerances      Review of Systems:  Constitutional: No fever, No change in weight  Eyes: +cataract  Neuro: No headache, +neuropathy  HEENT: No throat pain  Cardiovascular: No chest pain  Respiratory: No SOB  GI: +nausea without vomiting  : + polyuria  Skin: no rash  Psych: no depression  Endocrine: No polydipsia, No heat or cold intolerance, rest as noted in HPI  Hem/lymph: no swelling    All other review of systems negative      PHYSICAL EXAM:  VITALS: T(C): 36.4 (05-07-21 @ 11:55)  T(F): 97.6 (05-07-21 @ 11:55), Max: 98 (05-06-21 @ 16:16)  HR: 65 (05-07-21 @ 15:42) (46 - 69)  BP: 130/72 (05-07-21 @ 11:55) (103/57 - 130/72)  RR:  (18 - 18)  SpO2:  (96% - 99%)  Wt(kg): --  GENERAL: NAD at this time, obese  EYES: No proptosis, EOMI  HEENT:  Atraumatic, Normocephalic,   THYROID: Normal size, no palpable nodules  RESPIRATORY: Clear to auscultation bilaterally, full excursion, non-labored  CARDIOVASCULAR: Regular rhythm; No murmurs; +b/l LE peripheral edema  GI: Soft, nontender, non distended, normal bowel sounds  SKIN: Dry, intact, +b/l LE edema  MUSCULOSKELETAL: normal strength  NEURO: follows commands,   PSYCH: Alert and oriented x 3, normal affect, normal mood  CUSHING'S SIGNS: no striae      POCT Blood Glucose.: 135 mg/dL (05-07-21 @ 12:48)  POCT Blood Glucose.: 136 mg/dL (05-07-21 @ 08:16)  POCT Blood Glucose.: 164 mg/dL (05-06-21 @ 22:01)  POCT Blood Glucose.: 119 mg/dL (05-06-21 @ 17:24)  POCT Blood Glucose.: 147 mg/dL (05-06-21 @ 12:06)  POCT Blood Glucose.: 158 mg/dL (05-06-21 @ 08:05)  POCT Blood Glucose.: 169 mg/dL (05-05-21 @ 21:37)  POCT Blood Glucose.: 135 mg/dL (05-05-21 @ 17:18)  POCT Blood Glucose.: 139 mg/dL (05-05-21 @ 12:25)  POCT Blood Glucose.: 162 mg/dL (05-05-21 @ 08:08)  POCT Blood Glucose.: 145 mg/dL (05-04-21 @ 21:42)  POCT Blood Glucose.: 118 mg/dL (05-04-21 @ 17:32)                              10.7   5.19  )-----------( 324      ( 06 May 2021 07:14 )             35.4       05-06    137  |  101  |  28<H>  ----------------------------<  135<H>  4.5   |  23  |  1.22    EGFR if : 53<L>  EGFR if non : 46<L>    Ca    9.3      05-06      Thyroid Function Tests:  05-04 @ 09:16 TSH 3.44 FreeT4 -- T3 -- Anti TPO -- Anti Thyroglobulin Ab -- TSI --            Radiology:

## 2021-05-07 NOTE — PROGRESS NOTE ADULT - NUTRITIONAL ASSESSMENT
This patient has been assessed with a concern for Malnutrition and has been determined to have a diagnosis/diagnoses of Morbid obesity (BMI > 40).    This patient is being managed with:   Diet DASH/TLC-  Sodium & Cholesterol Restricted  Consistent Carbohydrate {No Snacks} (CSTCHO)  Entered: May  1 2021  4:48AM    

## 2021-05-07 NOTE — PROGRESS NOTE ADULT - SUBJECTIVE AND OBJECTIVE BOX
CARDIOLOGY     PROGRESS  NOTE   ________________________________________________    CHIEF COMPLAINT:Patient is a 66y old  Female who presents with a chief complaint of Cellulitis (06 May 2021 06:56)  no complain.  	  REVIEW OF SYSTEMS:  CONSTITUTIONAL: No fever, weight loss, or fatigue  EYES: No eye pain, visual disturbances, or discharge  ENT:  No difficulty hearing, tinnitus, vertigo; No sinus or throat pain  NECK: No pain or stiffness  RESPIRATORY: No cough, wheezing, chills or hemoptysis; No Shortness of Breath  CARDIOVASCULAR: No chest pain, palpitations, passing out, dizziness, or leg swelling  GASTROINTESTINAL: No abdominal or epigastric pain. No nausea, vomiting, or hematemesis; No diarrhea or constipation. No melena or hematochezia.  GENITOURINARY: No dysuria, frequency, hematuria, or incontinence  NEUROLOGICAL: No headaches, memory loss, loss of strength, numbness, or tremors  SKIN: No itching, burning, rashes, or lesions   LYMPH Nodes: No enlarged glands  ENDOCRINE: No heat or cold intolerance; No hair loss  MUSCULOSKELETAL: No joint pain or swelling; No muscle, back, or extremity pain  PSYCHIATRIC: No depression, anxiety, mood swings, or difficulty sleeping  HEME/LYMPH: No easy bruising, or bleeding gums  ALLERGY AND IMMUNOLOGIC: No hives or eczema	    [ ] All others negative	  [ ] Unable to obtain    PHYSICAL EXAM:  T(C): 36.4 (05-07-21 @ 05:08), Max: 36.7 (05-06-21 @ 16:16)  HR: 55 (05-07-21 @ 05:34) (46 - 76)  BP: 108/64 (05-07-21 @ 05:08) (103/57 - 124/73)  RR: 18 (05-07-21 @ 05:08) (17 - 18)  SpO2: 97% (05-07-21 @ 05:34) (94% - 99%)  Wt(kg): --  I&O's Summary    05 May 2021 07:01  -  06 May 2021 07:00  --------------------------------------------------------  IN: 1320 mL / OUT: 0 mL / NET: 1320 mL    06 May 2021 07:01  -  07 May 2021 06:39  --------------------------------------------------------  IN: 960 mL / OUT: 0 mL / NET: 960 mL        Appearance: Normal	  HEENT:   Normal oral mucosa, PERRL, EOMI	  Lymphatic: No lymphadenopathy  Cardiovascular: Normal S1 S2, No JVD, + murmurs, + edema  Respiratory: Lungs clear to auscultation	  Psychiatry: A & O x 3, Mood & affect appropriate  Gastrointestinal:  Soft, Non-tender, + BS	  Skin: No rashes, No ecchymoses, No cyanosis	  Neurologic: Non-focal  Extremities: Normal range of motion, No clubbing, cyanosis , + erythema/ edema  Vascular: Peripheral pulses palpable 2+ bilaterally    MEDICATIONS  (STANDING):  ARIPiprazole 2 milliGRAM(s) Oral daily  aspirin enteric coated 81 milliGRAM(s) Oral daily  atorvastatin 40 milliGRAM(s) Oral at bedtime  buMETAnide 0.5 milliGRAM(s) Oral daily  buPROPion XL . 300 milliGRAM(s) Oral daily  camphor 0.5%/menthol 0.5% Topical Lotion 1 Application(s) Topical every 8 hours  cephalexin 500 milliGRAM(s) Oral every 6 hours  dextrose 40% Gel 15 Gram(s) Oral once  dextrose 5%. 1000 milliLiter(s) (50 mL/Hr) IV Continuous <Continuous>  dextrose 5%. 1000 milliLiter(s) (100 mL/Hr) IV Continuous <Continuous>  dextrose 50% Injectable 25 Gram(s) IV Push once  dextrose 50% Injectable 12.5 Gram(s) IV Push once  dextrose 50% Injectable 25 Gram(s) IV Push once  enalapril 10 milliGRAM(s) Oral daily  enoxaparin Injectable 40 milliGRAM(s) SubCutaneous every 12 hours  escitalopram 20 milliGRAM(s) Oral daily  gabapentin 600 milliGRAM(s) Oral three times a day  glucagon  Injectable 1 milliGRAM(s) IntraMuscular once  insulin glargine Injectable (LANTUS) 25 Unit(s) SubCutaneous every morning  insulin lispro (ADMELOG) corrective regimen sliding scale   SubCutaneous three times a day before meals  insulin lispro (ADMELOG) corrective regimen sliding scale   SubCutaneous at bedtime  insulin lispro Injectable (ADMELOG) 10 Unit(s) SubCutaneous three times a day before meals  levothyroxine 125 MICROGram(s) Oral daily  mupirocin 2% Ointment 1 Application(s) Topical <User Schedule>  pantoprazole    Tablet 40 milliGRAM(s) Oral before breakfast  spironolactone 25 milliGRAM(s) Oral daily      TELEMETRY: 	    ECG:  	  RADIOLOGY:  OTHER: 	  	  LABS:	 	    CARDIAC MARKERS:                                10.7   5.19  )-----------( 324      ( 06 May 2021 07:14 )             35.4     05-06    137  |  101  |  28<H>  ----------------------------<  135<H>  4.5   |  23  |  1.22    Ca    9.3      06 May 2021 07:16      proBNP:   Lipid Profile:   HgA1c:   TSH: Thyroid Stimulating Hormone, Serum: 3.44 uIU/mL (05-04 @ 09:16)          Assessment and plan  ---------------------------  66 year-old female with PMHx of DM2 on insulin/metformin but no longer on Trulicity as this was DC'd during a hospitalization, fibromyalgia, osteoarthritis, sleep apnea on CPAP, depression, venous insufficiency, endometrial CA s/p hysterectomy (2010), HTN, HLD, hypothyroidism, obesity class III, now presenting with recurrent cellulitis. Patient has previous hx of hospitalizations for lower extremity cellulitis that has historically required IV antibiotics to treat. Reports that since her prior hospitalization a couple months ago that she had been doing well, with some hyperpigmentation chronically of her LE but no redness or discomfort. Reports that yesterday she began to notice redness and swelling initially in her LLE but this rapidly spread to her RLE as well. Pain over anterior LLE shin area with this redness and swelling and she also began to notice chills. Otherwise no nausea/vomiting/coughing/myalgias/diarrhea. No swelling of her LE. Is doing well on new regimen of diuretics with bumex and spironolactone.  pt is well known to me with hx of htn CHF RV dysfunction with pulmonary htn ?sec tyop obesity and sleep apnea with recurrent LE cellulitis.  chf/ rv dysfunction continue Bumex Aldactone will adjust meds  ?venous insufficieny will consider RF ablation  dvt prophylaxis  abx  Bipap  id appreciated, continue abx  oob to chair  continue Bumex/  aldactone  elevate le  bradycardia, observe no av blocking agent, repeat tsh, asymptomatic  tsh normal  ID appreciated  physical therapy ?rehab, pt wants to go home  clear by id for dc on po abx  pt wants to go to rehab, awaiting placement  placed for covid vaccine

## 2021-05-07 NOTE — CONSULT NOTE ADULT - CONSULT REASON
Uncontrolled Type 2 DM w/ hyperglycemia
cellulitis
painful elongated toenails and left 3rd toe wound
chf/ pulmonary htn

## 2021-05-07 NOTE — PROGRESS NOTE ADULT - NSICDXPILOT_GEN_ALL_CORE
[place]

## 2021-05-07 NOTE — CONSULT NOTE ADULT - PROBLEM SELECTOR RECOMMENDATION 9
Diabetes Education and Nutrition Eval  Glucose currently at goal on current regimen.  Can d/c to rehab on:  Lantus 25 units qdaily and lispro 10 units with meals  Low correction scale qac + bedtime  Hold metformin and Trulicity for now until outpatient follow-up  Goal glucose 100-180  Outpt. optho, podiatry, micro/cr

## 2021-10-20 NOTE — PHYSICAL THERAPY INITIAL EVALUATION ADULT - IMPAIRMENTS FOUND, PT EVAL
[address]   [address]                     EMS Patient Care Report       
_______________________________________________________________________________
 
Name:       PALMA BOWMAN                Room #:                     REG   
MELVI#:      [id number]                       Account #:      [id number]  
Admission:  10/20/21    Attend Phys:                          
Discharge:              Date of Birth:  [birth year]  
                                                          Report #: [id number]
                                                                    [id number]
_______________________________________________________________________________
THIS REPORT FOR:   //name//                          
 
Report Transmitted: 10/20/2021 20:25
EMS Care Summary
[place], [place]/[facility]
Incident [id number] @ 10/20/2021 20:12
 
Incident Location
[address] [address]
 
Patient
PALMA BOWMAN
Male, 34 Years
 1986
 
Patient Address
[address] [place] [address]
 
Patient History
Enlarged prostate,
 
Patient Allergies
No known allergies,
 
Patient Medications
Adderall, Lithium, Xanax,
 
Chief Complaint
anxious
 
Disposition
Transported No Lights/[place]
 
Dispatch Reason
Chest Pain (Non-Traumatic)
 
Transported To
[facility]
 
Narrative
35 y/o male with chest pain
 
Upon arrival the pt came running out of the front door of the residence.  He 
was at the ambulance before we got completely stopped.  He appears extremely 
 
 
 
[address]   [address]                     EMS Patient Care Report       
_______________________________________________________________________________
 
Name:       PALMA BOWMAN                Room #:                     REG ELKE OGDEN#:      [id number]                       Account #:      [id number]  
Admission:  10/20/21    Attend Phys:                          
Discharge:              Date of Birth:  [birth year]  
                                                          Report #: [id number]
                                                                    [id number]
_______________________________________________________________________________
anxious and manic.  The pt is awake, conscious A&O x 4 with a GCS of 15.  He 
has a patent airway, breathing is normal, and has a strong fast reg radial 
pulse.  the pt states he was lying on the couch after smoking some week when he 
began to feel anxious and felt like his body was going to explode.  The pt 
admits to smoking weed tonight before lying down on the couch.  The pt states 
that he has not used any cocaine or meth, no acid, mushrooms or anything that 
would make his BP or heart race.  Pt has no cardiac Hx.  The pt got into the 
ambulance on his own and layed down on the cot in a position of comfort, was 
secured to the cot, and VS established. a 12 lead ECG was obtained and the pt 
showed boarder line SVT.  As the pt calmed down his heart rate slowed a little 
to ST in the 130's.  A 12 lead was done and did not show an obvious STEMI.  A 
18 g lock was placed in his LAC.  The pt was transported to [place] per the pt 
request with out incident or changes to the pt during transport. The pt was 
coached to slow his breathing and to relax during transport.  EMS took the pt 
to room 3 and gave report and returned to service. 
 
Initial Vitals
@20:29P: 142,CO: 0,SpO2: 100,
@20:18P: 149,CO: 3,
@20:29P: 139,
@20:23P: 146,BP: 144/94,CO: 2,
@20:30P: 133,SpO2: 99,
@20:17P: 151,R: 18,BP: 164/105,Pain: 0/10,GCS: 15,SpO2: 100,Revised Trauma: 
12,MI Suspected: false 
 
Assessments
@20:24MENTAL:Place Oriented,Time Oriented,Person Oriented,Event 
Oriented,SKIN:HEENT:Head/Face: No Abnormalities,Neck/Airway: No 
Abnormalities,LUNG SOUNDS:General: No Abnormalities,ABDOMEN:General: No 
Abnormalities,PELVIS//GI:No Abnormalities,EXTREMITIES:Capillary Refill: Right 
Upper: < 2 Sec,Capillary Refill: Left Upper: < 2 Sec,Left Arm: No 
Abnormalities,Right Arm: No Abnormalities,Left Leg: No Abnormalities,Right Leg: 
No Abnormalities,PULSE:Radial: 2+ Normal,NEURO:No Abnormalities, 
 
Impression
Anxiety reaction/Emotional upset
 
Procedures
@20:17 ALS Assessment Response: ImprovedSucceeded
@20:29 12-Lead ECG Response: UnchangedSucceeded
@20:18 12-Lead ECG Response: UnchangedSucceeded
@20:30 IV Therapy - Saline Lock 7cc (18 ga) Site: Antecubital-Left Response: 
UnchangedSucceeded 
 
 
Timeline
 
 
 
[address]   [address]                     EMS Patient Care Report       
_______________________________________________________________________________
 
Name:       PALMA BOWMAN                Room #:                     REG ELKE OGDEN#:      [id number]                       Account #:      [id number]  
Admission:  10/20/21    Attend Phys:                          
Discharge:              Date of Birth:  [birth year]  
                                                          Report #: [id number]
                                                                    [id number]
_______________________________________________________________________________
20:10,Call Received
20:10,Dispatch Notified
20:12,Dispatched
20:12,En Route
20:16,On Scene
20:17,At Patient
20:17,ALS Assessment,Response: ImprovedSucceeded,
20:17,BP: 164/105 M,PULSE: 151,RR: 18 R,SPO2: 100 Ox,ETCO2:  ,BG: ,PAIN: 0,GCS: 
15, 
20:18,12-Lead ECG,Response: UnchangedSucceeded,
20:18,BP: / M,PULSE: 149,RR:  R,SPO2:  Ox,ETCO2:  ,BG: ,PAIN: ,GCS: ,
20:22,Depart Scene
20:23,BP: 144/94 M,PULSE: 146,RR:  R,SPO2:  Ox,ETCO2:  ,BG: ,PAIN: ,GCS: ,
20:29,12-Lead ECG,Response: UnchangedSucceeded,
20:29,BP: / M,PULSE: 142,RR:  R,SPO2: 100 Ox,ETCO2:  ,BG: ,PAIN: ,GCS: ,
20:29,BP: / M,PULSE: 139,RR:  R,SPO2:  Ox,ETCO2:  ,BG: ,PAIN: ,GCS: ,
20:30,IV Therapy - Saline Lock 7cc 18 ga Site: Antecubital-Left,Response: 
UnchangedSucceeded, 
20:30,BP: / M,PULSE: 133,RR:  R,SPO2: 99 Ox,ETCO2:  ,BG: ,PAIN: ,GCS: ,
20:34,At Destination
20:58,Call Closed
 
Disclaimer
v1.1     Copyright  [employer]
This EMS Care Summary contains data elements from the applicable legal record 
(which may be displayed differently). It is designed to provide pertinent 
information for the following purposes: continuity of care, clinical quality, 
and state data reporting. The complete legal record is available to ED staff 
and administrators of the receiving hospital in [employer]'s Patient Tracker. All data 
is provided "as is." aerobic capacity/endurance/gait, locomotion, and balance/muscle strength

## 2022-07-06 NOTE — H&P ADULT - NSICDXPASTSURGICALHX_GEN_ALL_CORE_FT
fall, weakness fall, weakness fall, weakness fall, weakness fall, weakness PAST SURGICAL HISTORY:  Carpal Tunnel Syndrome release left    Endometrial Ca s/p RODRIGO, BSO     Incarcerated Ventral Hernia     S/P FESS (Functional Endoscopic Sinus Surgery)     S/P Tonsillectomy and Adenoidectomy

## 2023-05-21 ENCOUNTER — INPATIENT (INPATIENT)
Facility: HOSPITAL | Age: 68
LOS: 7 days | Discharge: NURSING, REHAB OR LONG-TERM CARE | End: 2023-05-29
Attending: INTERNAL MEDICINE | Admitting: INTERNAL MEDICINE
Payer: MEDICARE

## 2023-05-21 VITALS
OXYGEN SATURATION: 95 % | SYSTOLIC BLOOD PRESSURE: 126 MMHG | RESPIRATION RATE: 20 BRPM | DIASTOLIC BLOOD PRESSURE: 37 MMHG | HEART RATE: 73 BPM | TEMPERATURE: 100 F

## 2023-05-21 LAB
ALBUMIN SERPL ELPH-MCNC: 3.6 G/DL — SIGNIFICANT CHANGE UP (ref 3.3–5)
ALP SERPL-CCNC: 85 U/L — SIGNIFICANT CHANGE UP (ref 40–120)
ALT FLD-CCNC: 8 U/L — SIGNIFICANT CHANGE UP (ref 4–33)
ANION GAP SERPL CALC-SCNC: 11 MMOL/L — SIGNIFICANT CHANGE UP (ref 7–14)
APPEARANCE UR: CLEAR — SIGNIFICANT CHANGE UP
APTT BLD: 34 SEC — SIGNIFICANT CHANGE UP (ref 27–36.3)
AST SERPL-CCNC: 19 U/L — SIGNIFICANT CHANGE UP (ref 4–32)
BASOPHILS # BLD AUTO: 0.03 K/UL — SIGNIFICANT CHANGE UP (ref 0–0.2)
BASOPHILS NFR BLD AUTO: 0.4 % — SIGNIFICANT CHANGE UP (ref 0–2)
BILIRUB SERPL-MCNC: 0.5 MG/DL — SIGNIFICANT CHANGE UP (ref 0.2–1.2)
BILIRUB UR-MCNC: NEGATIVE — SIGNIFICANT CHANGE UP
BLOOD GAS VENOUS COMPREHENSIVE RESULT: SIGNIFICANT CHANGE UP
BUN SERPL-MCNC: 17 MG/DL — SIGNIFICANT CHANGE UP (ref 7–23)
CALCIUM SERPL-MCNC: 8.9 MG/DL — SIGNIFICANT CHANGE UP (ref 8.4–10.5)
CHLORIDE SERPL-SCNC: 98 MMOL/L — SIGNIFICANT CHANGE UP (ref 98–107)
CO2 SERPL-SCNC: 24 MMOL/L — SIGNIFICANT CHANGE UP (ref 22–31)
COLOR SPEC: YELLOW — SIGNIFICANT CHANGE UP
CREAT SERPL-MCNC: 1.18 MG/DL — SIGNIFICANT CHANGE UP (ref 0.5–1.3)
DIFF PNL FLD: NEGATIVE — SIGNIFICANT CHANGE UP
EGFR: 50 ML/MIN/1.73M2 — LOW
EOSINOPHIL # BLD AUTO: 0.07 K/UL — SIGNIFICANT CHANGE UP (ref 0–0.5)
EOSINOPHIL NFR BLD AUTO: 0.9 % — SIGNIFICANT CHANGE UP (ref 0–6)
GLUCOSE SERPL-MCNC: 165 MG/DL — HIGH (ref 70–99)
GLUCOSE UR QL: NEGATIVE — SIGNIFICANT CHANGE UP
HCT VFR BLD CALC: 34.2 % — LOW (ref 34.5–45)
HGB BLD-MCNC: 11.2 G/DL — LOW (ref 11.5–15.5)
IANC: 5.18 K/UL — SIGNIFICANT CHANGE UP (ref 1.8–7.4)
IMM GRANULOCYTES NFR BLD AUTO: 0.4 % — SIGNIFICANT CHANGE UP (ref 0–0.9)
INR BLD: 1.63 RATIO — HIGH (ref 0.88–1.16)
KETONES UR-MCNC: NEGATIVE — SIGNIFICANT CHANGE UP
LEUKOCYTE ESTERASE UR-ACNC: ABNORMAL
LYMPHOCYTES # BLD AUTO: 1.53 K/UL — SIGNIFICANT CHANGE UP (ref 1–3.3)
LYMPHOCYTES # BLD AUTO: 18.8 % — SIGNIFICANT CHANGE UP (ref 13–44)
MCHC RBC-ENTMCNC: 26.8 PG — LOW (ref 27–34)
MCHC RBC-ENTMCNC: 32.7 GM/DL — SIGNIFICANT CHANGE UP (ref 32–36)
MCV RBC AUTO: 81.8 FL — SIGNIFICANT CHANGE UP (ref 80–100)
MONOCYTES # BLD AUTO: 1.3 K/UL — HIGH (ref 0–0.9)
MONOCYTES NFR BLD AUTO: 16 % — HIGH (ref 2–14)
NEUTROPHILS # BLD AUTO: 5.18 K/UL — SIGNIFICANT CHANGE UP (ref 1.8–7.4)
NEUTROPHILS NFR BLD AUTO: 63.5 % — SIGNIFICANT CHANGE UP (ref 43–77)
NITRITE UR-MCNC: NEGATIVE — SIGNIFICANT CHANGE UP
NRBC # BLD: 0 /100 WBCS — SIGNIFICANT CHANGE UP (ref 0–0)
NRBC # FLD: 0 K/UL — SIGNIFICANT CHANGE UP (ref 0–0)
PH UR: 6 — SIGNIFICANT CHANGE UP (ref 5–8)
PLATELET # BLD AUTO: 260 K/UL — SIGNIFICANT CHANGE UP (ref 150–400)
POTASSIUM SERPL-MCNC: 3.9 MMOL/L — SIGNIFICANT CHANGE UP (ref 3.5–5.3)
POTASSIUM SERPL-SCNC: 3.9 MMOL/L — SIGNIFICANT CHANGE UP (ref 3.5–5.3)
PROT SERPL-MCNC: 6.9 G/DL — SIGNIFICANT CHANGE UP (ref 6–8.3)
PROT UR-MCNC: ABNORMAL
PROTHROM AB SERPL-ACNC: 19 SEC — HIGH (ref 10.5–13.4)
RBC # BLD: 4.18 M/UL — SIGNIFICANT CHANGE UP (ref 3.8–5.2)
RBC # FLD: 16.9 % — HIGH (ref 10.3–14.5)
SODIUM SERPL-SCNC: 133 MMOL/L — LOW (ref 135–145)
SP GR SPEC: 1.02 — SIGNIFICANT CHANGE UP (ref 1.01–1.05)
UROBILINOGEN FLD QL: SIGNIFICANT CHANGE UP
WBC # BLD: 8.14 K/UL — SIGNIFICANT CHANGE UP (ref 3.8–10.5)
WBC # FLD AUTO: 8.14 K/UL — SIGNIFICANT CHANGE UP (ref 3.8–10.5)

## 2023-05-21 PROCEDURE — 71045 X-RAY EXAM CHEST 1 VIEW: CPT | Mod: 26

## 2023-05-21 PROCEDURE — 99285 EMERGENCY DEPT VISIT HI MDM: CPT | Mod: GC

## 2023-05-21 RX ORDER — SODIUM CHLORIDE 9 MG/ML
500 INJECTION INTRAMUSCULAR; INTRAVENOUS; SUBCUTANEOUS ONCE
Refills: 0 | Status: COMPLETED | OUTPATIENT
Start: 2023-05-21 | End: 2023-05-21

## 2023-05-21 RX ORDER — ACETAMINOPHEN 500 MG
1000 TABLET ORAL ONCE
Refills: 0 | Status: COMPLETED | OUTPATIENT
Start: 2023-05-21 | End: 2023-05-21

## 2023-05-21 RX ADMIN — SODIUM CHLORIDE 500 MILLILITER(S): 9 INJECTION INTRAMUSCULAR; INTRAVENOUS; SUBCUTANEOUS at 23:09

## 2023-05-21 RX ADMIN — Medication 400 MILLIGRAM(S): at 23:08

## 2023-05-21 NOTE — ED PROVIDER NOTE - ATTENDING CONTRIBUTION TO CARE
Telephone Encounter by Yesica Llanes at 01/25/17 08:02 AM     Author:  Yesica Llanes Service:  (none) Author Type:  Patient      Filed:  01/25/17 08:02 AM Encounter Date:  1/24/2017 Status:  Signed     :  Yesica Llanes (Patient )       From: Negar Cordova  To: Justus Osei MD  Sent: 1/24/2017 11:34 AM CST  Subject: Medication Renewal Request    Original authorizing provider: MD eNgar Atkins would like a refill of the following medications:  escitalopram (LEXAPRO) 20 MG tablet [Justus Osei MD]    Preferred pharmacy: [address]    [facility]       Revision History        Date/Time User Provider Type Action    > 01/25/17 08:02 AM Yesica Llanes Patient  Sign    Attribution information within the note text is not available.             I performed a face-to-face evaluation of the patient and performed a history and physical examination. I agree with the history and physical examination. If this was a PA visit, I personally saw the patient with the PA and performed a substantive portion of the visit including all aspects of the medical decision making.    DM, fibromyalgia, OA, recent admit for bilat. LE cellulitis. P/w general weakness and foul-smelling urine. Febrile. Will check UA/Cx, BCx, RVP, CXR. Likely urosepsis. Give Abx, Tylenol, IVF.

## 2023-05-21 NOTE — ED ADULT NURSE NOTE - NSFALLFACTORS_ED_ALL_ED
[facility] Follow Up Note    Lindsey Bermudez   85 y.o. female    Date of Visit: 4/3/2017    Chief Complaint   Patient presents with     Follow-up     and labs for Kidney Dr     Ayla  This is an 85-year-old lady with hypertension, chronic anemia and chronic kidney disease.  I last saw her for facial injuries after she slipped on the ice a couple of months ago.  She did recover from this without any residual problems.  She has an appointment later this week with her nephrologist and needed some blood work done in advance and she was unable to have blood work done last week.  For the most part she has been stable.  She denies any chest pain or shortness of breath.  She does complain of some ongoing fatigue which may be age-related or related to her chronic kidney failure.  Appetite has been okay and she sleeps reasonably well.  No other new complaints or issues at this time.  Her medications remain the same.    ROS A comprehensive review of systems was performed and was otherwise negative    Medications, allergies, and problem list were reviewed and updated    Exam  General Appearance:   On examination her blood pressure was 128/60.  Weight is 207 pounds and height 63.5 inches.  BMI is 36.09.    Lungs are clear.    Heart is in a sinus rhythm with a rate of 78 and no ectopy.    No peripheral edema.    She does still have a small subcutaneous mass underneath the skin above the right eye.  This is probably a residual hematoma.    The patient is alert and oriented ×3.      Assessment/Plan  1. Essential hypertension with goal blood pressure less than 140/90     2. Anemia  HM1(CBC and Differential)    HM1 (CBC with Diff)   3. CKD (chronic kidney disease) stage 4, GFR 15-29 ml/min  Comprehensive Metabolic Panel     Hypertension.  Blood pressure is well controlled.  Continue current meds.    Anemia.  We will check a CBC today.    Chronic renal failure.  We will check a CMP today.  All of the  tests will be forwarded to her nephrologist by the end of the week.    I would recommend continuing her current medications.  I would like to see her back in 6 months.  Total time of this office visit was 25 minutes with greater than 50% of the time spent in care coordination and patient counseling.  The following high BMI interventions were performed this visit: weight monitoring    Kurtis Fontaine MD      Current Outpatient Prescriptions on File Prior to Visit   Medication Sig     acetaminophen (TYLENOL) 325 MG tablet Take 325-650 mg by mouth every 6 (six) hours as needed for pain.     calcium carbonate-vitamin D2 500 mg(1,250mg) -200 unit tablet Take 1 tablet by mouth 2 (two) times a day.      clobetasol 0.05 % shampoo Apply 1 application topically daily as needed. Scalp when washing hair     EMOLLIENT COMBINATION NO.68 (KERLINE ORIGINAL TOP) Apply 1 application topically as needed.     hydrocortisone 1 % ointment Apply 1 application topically 2 (two) times a day as needed. Apply and rub in a thin film to affected areas as needed, for rash/itching     levothyroxine (SYNTHROID, LEVOTHROID) 100 MCG tablet TAKE 1 TABLET BY MOUTH EVERY DAY     [DISCONTINUED] levothyroxine (SYNTHROID, LEVOTHROID) 100 MCG tablet Take 100 mcg by mouth Daily at 6:00 am.     losartan (COZAAR) 25 MG tablet TAKE ONE TABLET BY MOUTH EVERY DAY     magnesium oxide 250 mg Tab TK 1 T PO QD     oxybutynin (DITROPAN XL) 5 MG ER tablet TK 1 T PO QD     simvastatin (ZOCOR) 20 MG tablet Take 1 tablet (20 mg total) by mouth bedtime.     No current facility-administered medications on file prior to visit.      Allergies   Allergen Reactions     Adhesive Tape-Silicones      Blisters with some bandage like products     Social History   Substance Use Topics     Smoking status: Never Smoker     Smokeless tobacco: Never Used     Alcohol use Yes      Comment: rare            Impaired gait/Weakness

## 2023-05-21 NOTE — ED PROVIDER NOTE - CARE PLAN
1 Principal Discharge DX:	Sepsis  Secondary Diagnosis:	Acute UTI   Principal Discharge DX:	Sepsis  Secondary Diagnosis:	Acute UTI  Secondary Diagnosis:	Cellulitis

## 2023-05-21 NOTE — ED PROVIDER NOTE - OBJECTIVE STATEMENT
68-year-old  with PMHx of DM2,  fibromyalgia, osteoarthritis, sleep apnea on CPAP, depression, venous insufficiency, endometrial CA s/p hysterectomy (2010), HTN, HLD, hypothyroidism, obesity class III, last admitted in 4/2021 for b/l LE cellulitis brought from [facility], sent for generalized weakness x 1 week. Patient was reported to be SOB with EMS. patient states she feels weaker then usual, no CP/SOB/Abd pain. + foul smelling urine - dysuria - polyuria. no change in bowel habbits 68-year-old  with PMHx of DM2,  fibromyalgia, osteoarthritis, sleep apnea on CPAP, depression, venous insufficiency, endometrial CA s/p hysterectomy (2010), HTN, HLD, hypothyroidism, obesity class III, last admitted in 4/2021 for b/l LE cellulitis brought from [facility], sent for generalized weakness x 1 week. Patient was reported to be SOB with EMS. patient states she feels weaker then usual, no CP/SOB/Abd pain. + foul smelling urine - dysuria - polyuria. no change in bowel habits. patient has decreased response time, endorses slurred speech x 2 days. 68-year-old  with PMHx of DM2,  fibromyalgia, osteoarthritis, sleep apnea on CPAP, depression, venous insufficiency, endometrial CA s/p hysterectomy (2010), HTN, HLD, hypothyroidism, obesity class III, last admitted in 4/2021 for b/l LE cellulitis brought from [facility], sent for generalized weakness x 1 week. Patient was reported to be SOB with EMS. patient states she feels weaker then usual, no CP/SOB/Abd pain. + foul smelling urine - dysuria - polyuria. no change in bowel habits. patient has decreased response time, endorses slurred speech x 2 days. patient states legs are more erythematous then baseline.

## 2023-05-21 NOTE — ED ADULT NURSE NOTE - CHIEF COMPLAINT QUOTE
generalized weakness and pain    pt from [facility].. sent for generalized weakness for 1 week.  worsening leg pain.  had episode of sob when standing with ems.  denies cp, sob.  pt aa&ox3. pmhx- htn, diabetes, hyperlipidemia, fibromyalgia

## 2023-05-21 NOTE — ED PROVIDER NOTE - PHYSICAL EXAMINATION
PHYSICAL EXAM:  CONSTITUTIONAL: morbidly obese, awake, alert, oriented to person, place, time/situation and in no apparent distress.  HEAD: Atraumatic  EYES: Clear bilaterally, pupils equal, round and reactive to light.  ENMT: Airway patent, Nasal mucosa clear. Mouth with normal mucosa. Uvula is midline.   CARDIAC: Normal rate, regular rhythm. +S1/S2. No murmurs, rubs or gallops.  RESPIRATORY: Breathing unlabored. Breath sounds clear and equal bilaterally.  ABDOMEN:  Soft, nontender, nondistended. No rebound tenderness or guarding.  NEUROLOGICAL: Alert and oriented, no focal deficits, no motor or sensory deficits. CN2-12 intact. Sensation intact x4 extremities. no dysarthria.   SKIN: Skin warm and dry. No lesions, legs erythematous 2/2 chronic venous insufficiency. PHYSICAL EXAM:  CONSTITUTIONAL: morbidly obese, awake, alert, oriented to person, place, time/situation and in no apparent distress.  HEAD: Atraumatic  EYES: Clear bilaterally, pupils equal, round and reactive to light.  ENMT: Airway patent, Nasal mucosa clear. Mouth with normal mucosa. Uvula is midline.   CARDIAC: Normal rate, regular rhythm. +S1/S2. No murmurs, rubs or gallops.  RESPIRATORY: Breathing unlabored. Breath sounds clear and equal bilaterally.  ABDOMEN:  Soft, nontender, nondistended. No rebound tenderness or guarding.  NEUROLOGICAL: Alert and oriented, no focal deficits, no motor or sensory deficits. CN2-12 intact. Sensation intact x4 extremities. no dysarthria.   SKIN: Skin warm and dry. No lesions, legs erythematous and edematous, chronic venous insufficiency.

## 2023-05-21 NOTE — ED ADULT NURSE NOTE - NSFALLRISKINTERV_ED_ALL_ED
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
## 2023-05-21 NOTE — ED ADULT TRIAGE NOTE - CHIEF COMPLAINT QUOTE
generalized weakness and pain    pt from [facility].. sent for generalized weakness for 1 week.  worsening leg pain.  had episode of sob when standing with ems.  denies cp, sob.  pt aa&ox3. pmhx- htn, diabetes, hyperlipidemia, fibromyalgia

## 2023-05-21 NOTE — ED PROVIDER NOTE - CLINICAL SUMMARY MEDICAL DECISION MAKING FREE TEXT BOX
Nadya: DM, fibromyalgia, OA, recent admit for bilat. LE cellulitis. P/w general weakness and foul-smelling urine. Febrile. Will check UA/Cx, BCx, RVP, CXR. Likely urosepsis. Give Abx, Tylenol, IVF.

## 2023-05-21 NOTE — ED ADULT NURSE NOTE - OBJECTIVE STATEMENT
Patient A&Ox4 ambulatory with walker at baseline coming in from [facility] living c/o generalized weakness, headache and nausea x1 week. Also c/o increase b/l lower leg swelling and redness. Neuro exam intact. Respirations even and unlabored, spO2 98% on room air however placed on 2L for comfort. Cleaned and changed into hospital gown. Pt very warm to touch. Abdomen round, very distended and nontender on touch. Rectally febrile. Blanchable redness observed to sacral/buttock area. Redness, swelling and warmth noted to B/l lower legs. Pt also endorsing foul smelling urine. Pt denies cp, sob, v/d, abdominal pain. 18G IV placed to left wrist and 20G IV placed to right hand, labs collected and sent to lab. Pt straight cath with sterile technique per orders for urine sample. Awaiting further orders. Stretcher in lowest position, wheels locked, appropriate side rails in place, call bell in reach.

## 2023-05-21 NOTE — ED PROVIDER NOTE - PROGRESS NOTE DETAILS
Alexandra Chew MD (PGY-1 EM): attempted to call assisted living facility for collateral information, no one answered phone, no voicemail available Alexandra Chew MD (PGY-1 EM): CTH negative. patient is more alert, responsive, response time now wnl.

## 2023-05-22 DIAGNOSIS — F32.9 MAJOR DEPRESSIVE DISORDER, SINGLE EPISODE, UNSPECIFIED: ICD-10-CM

## 2023-05-22 DIAGNOSIS — R32 UNSPECIFIED URINARY INCONTINENCE: ICD-10-CM

## 2023-05-22 DIAGNOSIS — G47.33 OBSTRUCTIVE SLEEP APNEA (ADULT) (PEDIATRIC): ICD-10-CM

## 2023-05-22 DIAGNOSIS — L03.119 CELLULITIS OF UNSPECIFIED PART OF LIMB: ICD-10-CM

## 2023-05-22 DIAGNOSIS — Z29.9 ENCOUNTER FOR PROPHYLACTIC MEASURES, UNSPECIFIED: ICD-10-CM

## 2023-05-22 DIAGNOSIS — E11.9 TYPE 2 DIABETES MELLITUS WITHOUT COMPLICATIONS: ICD-10-CM

## 2023-05-22 DIAGNOSIS — E03.9 HYPOTHYROIDISM, UNSPECIFIED: ICD-10-CM

## 2023-05-22 DIAGNOSIS — A41.9 SEPSIS, UNSPECIFIED ORGANISM: ICD-10-CM

## 2023-05-22 DIAGNOSIS — M79.7 FIBROMYALGIA: ICD-10-CM

## 2023-05-22 DIAGNOSIS — I10 ESSENTIAL (PRIMARY) HYPERTENSION: ICD-10-CM

## 2023-05-22 DIAGNOSIS — Z98.890 OTHER SPECIFIED POSTPROCEDURAL STATES: ICD-10-CM

## 2023-05-22 LAB
FLUAV AG NPH QL: SIGNIFICANT CHANGE UP
FLUBV AG NPH QL: SIGNIFICANT CHANGE UP
RSV RNA NPH QL NAA+NON-PROBE: SIGNIFICANT CHANGE UP
SARS-COV-2 RNA SPEC QL NAA+PROBE: SIGNIFICANT CHANGE UP

## 2023-05-22 PROCEDURE — 99223 1ST HOSP IP/OBS HIGH 75: CPT

## 2023-05-22 PROCEDURE — 93970 EXTREMITY STUDY: CPT | Mod: 26

## 2023-05-22 PROCEDURE — 70450 CT HEAD/BRAIN W/O DYE: CPT | Mod: 26,MA

## 2023-05-22 RX ORDER — DEXTROSE 50 % IN WATER 50 %
25 SYRINGE (ML) INTRAVENOUS ONCE
Refills: 0 | Status: DISCONTINUED | OUTPATIENT
Start: 2023-05-22 | End: 2023-05-22

## 2023-05-22 RX ORDER — LACTOBACILLUS ACIDOPHILUS 100MM CELL
1 CAPSULE ORAL
Qty: 0 | Refills: 0 | DISCHARGE

## 2023-05-22 RX ORDER — SODIUM CHLORIDE 9 MG/ML
1000 INJECTION, SOLUTION INTRAVENOUS
Refills: 0 | Status: DISCONTINUED | OUTPATIENT
Start: 2023-05-22 | End: 2023-05-22

## 2023-05-22 RX ORDER — GLUCAGON INJECTION, SOLUTION 0.5 MG/.1ML
1 INJECTION, SOLUTION SUBCUTANEOUS ONCE
Refills: 0 | Status: DISCONTINUED | OUTPATIENT
Start: 2023-05-22 | End: 2023-05-22

## 2023-05-22 RX ORDER — LORATADINE 10 MG/1
10 TABLET ORAL DAILY
Refills: 0 | Status: DISCONTINUED | OUTPATIENT
Start: 2023-05-22 | End: 2023-05-29

## 2023-05-22 RX ORDER — GLUCAGON INJECTION, SOLUTION 0.5 MG/.1ML
1 INJECTION, SOLUTION SUBCUTANEOUS ONCE
Refills: 0 | Status: DISCONTINUED | OUTPATIENT
Start: 2023-05-22 | End: 2023-05-29

## 2023-05-22 RX ORDER — DEXTROSE 50 % IN WATER 50 %
15 SYRINGE (ML) INTRAVENOUS ONCE
Refills: 0 | Status: DISCONTINUED | OUTPATIENT
Start: 2023-05-22 | End: 2023-05-22

## 2023-05-22 RX ORDER — DEXTROSE 50 % IN WATER 50 %
25 SYRINGE (ML) INTRAVENOUS ONCE
Refills: 0 | Status: DISCONTINUED | OUTPATIENT
Start: 2023-05-22 | End: 2023-05-29

## 2023-05-22 RX ORDER — INSULIN LISPRO 100/ML
VIAL (ML) SUBCUTANEOUS
Refills: 0 | Status: DISCONTINUED | OUTPATIENT
Start: 2023-05-22 | End: 2023-05-29

## 2023-05-22 RX ORDER — INSULIN GLARGINE 100 [IU]/ML
30 INJECTION, SOLUTION SUBCUTANEOUS
Refills: 0 | DISCHARGE

## 2023-05-22 RX ORDER — GABAPENTIN 400 MG/1
600 CAPSULE ORAL ONCE
Refills: 0 | Status: COMPLETED | OUTPATIENT
Start: 2023-05-22 | End: 2023-05-22

## 2023-05-22 RX ORDER — ACETAMINOPHEN 500 MG
975 TABLET ORAL ONCE
Refills: 0 | Status: COMPLETED | OUTPATIENT
Start: 2023-05-22 | End: 2023-05-22

## 2023-05-22 RX ORDER — SPIRONOLACTONE 25 MG/1
25 TABLET, FILM COATED ORAL DAILY
Refills: 0 | Status: DISCONTINUED | OUTPATIENT
Start: 2023-05-22 | End: 2023-05-29

## 2023-05-22 RX ORDER — GABAPENTIN 400 MG/1
2 CAPSULE ORAL
Refills: 0 | DISCHARGE

## 2023-05-22 RX ORDER — PANTOPRAZOLE SODIUM 20 MG/1
0 TABLET, DELAYED RELEASE ORAL
Qty: 0 | Refills: 1 | DISCHARGE

## 2023-05-22 RX ORDER — POLYETHYLENE GLYCOL 3350 17 G/17G
17 POWDER, FOR SOLUTION ORAL DAILY
Refills: 0 | Status: DISCONTINUED | OUTPATIENT
Start: 2023-05-22 | End: 2023-05-29

## 2023-05-22 RX ORDER — INSULIN LISPRO 100/ML
VIAL (ML) SUBCUTANEOUS
Refills: 0 | Status: DISCONTINUED | OUTPATIENT
Start: 2023-05-22 | End: 2023-05-22

## 2023-05-22 RX ORDER — INSULIN ASPART 100 [IU]/ML
10 INJECTION, SOLUTION SUBCUTANEOUS
Qty: 0 | Refills: 1 | DISCHARGE

## 2023-05-22 RX ORDER — APIXABAN 2.5 MG/1
1 TABLET, FILM COATED ORAL
Refills: 0 | DISCHARGE

## 2023-05-22 RX ORDER — SODIUM CHLORIDE 9 MG/ML
1000 INJECTION, SOLUTION INTRAVENOUS
Refills: 0 | Status: DISCONTINUED | OUTPATIENT
Start: 2023-05-22 | End: 2023-05-29

## 2023-05-22 RX ORDER — HYDROMORPHONE HYDROCHLORIDE 2 MG/ML
2 INJECTION INTRAMUSCULAR; INTRAVENOUS; SUBCUTANEOUS EVERY 6 HOURS
Refills: 0 | Status: DISCONTINUED | OUTPATIENT
Start: 2023-05-22 | End: 2023-05-23

## 2023-05-22 RX ORDER — LANOLIN ALCOHOL/MO/W.PET/CERES
3 CREAM (GRAM) TOPICAL AT BEDTIME
Refills: 0 | Status: DISCONTINUED | OUTPATIENT
Start: 2023-05-22 | End: 2023-05-29

## 2023-05-22 RX ORDER — METFORMIN HYDROCHLORIDE 850 MG/1
0 TABLET ORAL
Qty: 0 | Refills: 0 | DISCHARGE

## 2023-05-22 RX ORDER — SENNA PLUS 8.6 MG/1
2 TABLET ORAL AT BEDTIME
Refills: 0 | Status: DISCONTINUED | OUTPATIENT
Start: 2023-05-22 | End: 2023-05-29

## 2023-05-22 RX ORDER — ACETAMINOPHEN 500 MG
650 TABLET ORAL EVERY 6 HOURS
Refills: 0 | Status: DISCONTINUED | OUTPATIENT
Start: 2023-05-22 | End: 2023-05-27

## 2023-05-22 RX ORDER — ESCITALOPRAM OXALATE 10 MG/1
20 TABLET, FILM COATED ORAL DAILY
Refills: 0 | Status: DISCONTINUED | OUTPATIENT
Start: 2023-05-22 | End: 2023-05-29

## 2023-05-22 RX ORDER — INSULIN LISPRO 100/ML
8 VIAL (ML) SUBCUTANEOUS
Refills: 0 | Status: DISCONTINUED | OUTPATIENT
Start: 2023-05-22 | End: 2023-05-29

## 2023-05-22 RX ORDER — BUMETANIDE 0.25 MG/ML
0.5 INJECTION INTRAMUSCULAR; INTRAVENOUS DAILY
Refills: 0 | Status: DISCONTINUED | OUTPATIENT
Start: 2023-05-22 | End: 2023-05-29

## 2023-05-22 RX ORDER — PANTOPRAZOLE SODIUM 20 MG/1
40 TABLET, DELAYED RELEASE ORAL
Refills: 0 | Status: DISCONTINUED | OUTPATIENT
Start: 2023-05-22 | End: 2023-05-29

## 2023-05-22 RX ORDER — CEFTRIAXONE 500 MG/1
1000 INJECTION, POWDER, FOR SOLUTION INTRAMUSCULAR; INTRAVENOUS EVERY 24 HOURS
Refills: 0 | Status: DISCONTINUED | OUTPATIENT
Start: 2023-05-22 | End: 2023-05-27

## 2023-05-22 RX ORDER — DEXTROSE 50 % IN WATER 50 %
12.5 SYRINGE (ML) INTRAVENOUS ONCE
Refills: 0 | Status: DISCONTINUED | OUTPATIENT
Start: 2023-05-22 | End: 2023-05-22

## 2023-05-22 RX ORDER — BUMETANIDE 0.25 MG/ML
0 INJECTION INTRAMUSCULAR; INTRAVENOUS
Qty: 0 | Refills: 0 | DISCHARGE

## 2023-05-22 RX ORDER — LEVOTHYROXINE SODIUM 125 MCG
125 TABLET ORAL DAILY
Refills: 0 | Status: DISCONTINUED | OUTPATIENT
Start: 2023-05-22 | End: 2023-05-29

## 2023-05-22 RX ORDER — GABAPENTIN 400 MG/1
600 CAPSULE ORAL
Refills: 0 | Status: DISCONTINUED | OUTPATIENT
Start: 2023-05-22 | End: 2023-05-29

## 2023-05-22 RX ORDER — INSULIN LISPRO 100/ML
VIAL (ML) SUBCUTANEOUS AT BEDTIME
Refills: 0 | Status: DISCONTINUED | OUTPATIENT
Start: 2023-05-22 | End: 2023-05-29

## 2023-05-22 RX ORDER — ATORVASTATIN CALCIUM 80 MG/1
40 TABLET, FILM COATED ORAL AT BEDTIME
Refills: 0 | Status: DISCONTINUED | OUTPATIENT
Start: 2023-05-22 | End: 2023-05-29

## 2023-05-22 RX ORDER — DEXTROSE 50 % IN WATER 50 %
15 SYRINGE (ML) INTRAVENOUS ONCE
Refills: 0 | Status: DISCONTINUED | OUTPATIENT
Start: 2023-05-22 | End: 2023-05-29

## 2023-05-22 RX ORDER — DEXTROSE 50 % IN WATER 50 %
12.5 SYRINGE (ML) INTRAVENOUS ONCE
Refills: 0 | Status: DISCONTINUED | OUTPATIENT
Start: 2023-05-22 | End: 2023-05-29

## 2023-05-22 RX ORDER — ONDANSETRON 8 MG/1
4 TABLET, FILM COATED ORAL EVERY 8 HOURS
Refills: 0 | Status: DISCONTINUED | OUTPATIENT
Start: 2023-05-22 | End: 2023-05-29

## 2023-05-22 RX ORDER — APIXABAN 2.5 MG/1
5 TABLET, FILM COATED ORAL EVERY 12 HOURS
Refills: 0 | Status: DISCONTINUED | OUTPATIENT
Start: 2023-05-22 | End: 2023-05-29

## 2023-05-22 RX ORDER — LORATADINE 10 MG/1
1 TABLET ORAL
Refills: 0 | DISCHARGE

## 2023-05-22 RX ORDER — ARIPIPRAZOLE 15 MG/1
0 TABLET ORAL
Qty: 0 | Refills: 0 | DISCHARGE

## 2023-05-22 RX ORDER — INSULIN GLARGINE 100 [IU]/ML
25 INJECTION, SOLUTION SUBCUTANEOUS
Qty: 0 | Refills: 0 | DISCHARGE

## 2023-05-22 RX ORDER — INSULIN GLARGINE 100 [IU]/ML
24 INJECTION, SOLUTION SUBCUTANEOUS AT BEDTIME
Refills: 0 | Status: DISCONTINUED | OUTPATIENT
Start: 2023-05-22 | End: 2023-05-29

## 2023-05-22 RX ORDER — BUPROPION HYDROCHLORIDE 150 MG/1
300 TABLET, EXTENDED RELEASE ORAL DAILY
Refills: 0 | Status: DISCONTINUED | OUTPATIENT
Start: 2023-05-22 | End: 2023-05-29

## 2023-05-22 RX ORDER — VANCOMYCIN HCL 1 G
1000 VIAL (EA) INTRAVENOUS EVERY 12 HOURS
Refills: 0 | Status: DISCONTINUED | OUTPATIENT
Start: 2023-05-22 | End: 2023-05-24

## 2023-05-22 RX ADMIN — Medication 975 MILLIGRAM(S): at 06:20

## 2023-05-22 RX ADMIN — Medication 1: at 17:52

## 2023-05-22 RX ADMIN — APIXABAN 5 MILLIGRAM(S): 2.5 TABLET, FILM COATED ORAL at 17:34

## 2023-05-22 RX ADMIN — GABAPENTIN 600 MILLIGRAM(S): 400 CAPSULE ORAL at 06:20

## 2023-05-22 RX ADMIN — ESCITALOPRAM OXALATE 20 MILLIGRAM(S): 10 TABLET, FILM COATED ORAL at 12:59

## 2023-05-22 RX ADMIN — BUPROPION HYDROCHLORIDE 300 MILLIGRAM(S): 150 TABLET, EXTENDED RELEASE ORAL at 14:43

## 2023-05-22 RX ADMIN — SENNA PLUS 2 TABLET(S): 8.6 TABLET ORAL at 22:42

## 2023-05-22 RX ADMIN — Medication 650 MILLIGRAM(S): at 16:17

## 2023-05-22 RX ADMIN — GABAPENTIN 600 MILLIGRAM(S): 400 CAPSULE ORAL at 17:34

## 2023-05-22 RX ADMIN — CEFTRIAXONE 100 MILLIGRAM(S): 500 INJECTION, POWDER, FOR SOLUTION INTRAMUSCULAR; INTRAVENOUS at 13:36

## 2023-05-22 RX ADMIN — INSULIN GLARGINE 24 UNIT(S): 100 INJECTION, SOLUTION SUBCUTANEOUS at 22:42

## 2023-05-22 RX ADMIN — Medication 1: at 12:59

## 2023-05-22 RX ADMIN — HYDROMORPHONE HYDROCHLORIDE 2 MILLIGRAM(S): 2 INJECTION INTRAMUSCULAR; INTRAVENOUS; SUBCUTANEOUS at 18:00

## 2023-05-22 RX ADMIN — Medication 8 UNIT(S): at 17:52

## 2023-05-22 RX ADMIN — Medication 250 MILLIGRAM(S): at 17:35

## 2023-05-22 RX ADMIN — ATORVASTATIN CALCIUM 40 MILLIGRAM(S): 80 TABLET, FILM COATED ORAL at 22:42

## 2023-05-22 RX ADMIN — HYDROMORPHONE HYDROCHLORIDE 2 MILLIGRAM(S): 2 INJECTION INTRAMUSCULAR; INTRAVENOUS; SUBCUTANEOUS at 11:32

## 2023-05-22 RX ADMIN — HYDROMORPHONE HYDROCHLORIDE 2 MILLIGRAM(S): 2 INJECTION INTRAMUSCULAR; INTRAVENOUS; SUBCUTANEOUS at 12:00

## 2023-05-22 RX ADMIN — Medication 100 MILLIGRAM(S): at 00:19

## 2023-05-22 RX ADMIN — HYDROMORPHONE HYDROCHLORIDE 2 MILLIGRAM(S): 2 INJECTION INTRAMUSCULAR; INTRAVENOUS; SUBCUTANEOUS at 17:34

## 2023-05-22 RX ADMIN — Medication 650 MILLIGRAM(S): at 17:00

## 2023-05-22 NOTE — ED ADULT NURSE REASSESSMENT NOTE - NS ED NURSE REASSESS COMMENT FT1
Break RN: Pt is A&Ox4, resting in stretcher with no complaints at this time. Respirations even and unlabored, chest rise equal b/l. NSR on cardiac monitor. VS as noted in flow sheets. Pt denies chest pain, SOB, fever, cough, chills, abdominal pain, N/V/D or any urinary symptoms at this time. NAD noted. Safety maintained throughout. Will continue to monitor.
Patient A&Ox4, respirations even and unlabored. Endorsing b/l leg pain, medicated per orders. Patient cleaned and changed for comfort. Vitals remain stable. Patient aware of plan for admission to hospital. Awaiting dispo. Stretcher in lowest position, wheels locked, appropriate side rails in place, call bell in reach.

## 2023-05-22 NOTE — H&P ADULT - PROBLEM SELECTOR PLAN 9
- reports feeling too weak to get up to go to the bathroom at times; potentially in setting of infection  - offer primafit if needed

## 2023-05-22 NOTE — H&P ADULT - PROBLEM SELECTOR PLAN 2
- f/s qachs, goal 140-180  - home regimen: Basaglar 30U qhs, Novolog 10U qac TID – restart home regimen at 80% dose and give additional sliding scale insulin pre-meals

## 2023-05-22 NOTE — H&P ADULT - PROBLEM SELECTOR PLAN 7
- s/p ablation 9/2021  - reported Afib/flutter  - also reports having ILR still in place, follows with cardiology  - c/w home rate control - metoprolol  - c/w home Eliquis 5mg po BID

## 2023-05-22 NOTE — H&P ADULT - PROBLEM SELECTOR PLAN 5
- resume home meds – sees pain management outpt, takes hydromorphone 4mg po q6h PRN  - also with reportedly worsening headaches, which she says improve with antihistamines – resume her home loratadine

## 2023-05-22 NOTE — H&P ADULT - NSHPREVIEWOFSYSTEMS_GEN_ALL_CORE
CONSTITUTIONAL: No weight loss; +fatigue, +fever, +chills  EYES: No eye pain, visual disturbances, or discharge  ENMT:  No difficulty hearing, tinnitus, vertigo; No sinus or throat pain  NECK: No pain or stiffness  BREASTS: No pain, masses, or nipple discharge  RESPIRATORY: No cough, wheezing, chills or hemoptysis; No shortness of breath  CARDIOVASCULAR: No chest pain, palpitations, dizziness, or leg swelling  GASTROINTESTINAL: No abdominal or epigastric pain. No nausea, vomiting, or hematemesis; No diarrhea or constipation. No melena or hematochezia.  GENITOURINARY: No dysuria,  hematuria, +urinary frequency, +urinary incontinence  NEUROLOGICAL: No headaches, memory loss, loss of strength, numbness, or tremors  SKIN: No itching, burning; b/l LE redness and pain  LYMPH NODES: No enlarged glands  ENDOCRINE: No heat or cold intolerance; No hair loss  MUSCULOSKELETAL: No joint pain or swelling; No muscle, back, or extremity pain  PSYCHIATRIC: No depression, anxiety, mood swings, or difficulty sleeping  HEME/LYMPH: No easy bruising, or bleeding gums  ALLERGY AND IMMUNOLOGIC: No hives or eczema

## 2023-05-22 NOTE — H&P ADULT - TIME BILLING
Time-based billing (NON-critical care).     75 minutes spent on total encounter; more than 50% of the visit was spent counseling and / or coordinating care by the attending physician.  The necessity of the time spent during the encounter on this date of service was due to:     review of laboratory data, radiology results, consultants' recommendations, documentation in [place], discussion with patient/ACP and interdisciplinary staff (such as , social workers, etc). Interventions were performed as documented above.

## 2023-05-22 NOTE — H&P ADULT - NSHPPHYSICALEXAM_GEN_ALL_CORE
PHYSICAL EXAM:  Vital Signs Last 24 Hrs  T(C): 36.4 (22 May 2023 11:53), Max: 38.8 (21 May 2023 22:45)  T(F): 97.6 (22 May 2023 11:53), Max: 101.9 (21 May 2023 22:45)  HR: 57 (22 May 2023 11:53) (57 - 73)  BP: 133/54 (22 May 2023 11:53) (116/49 - 133/75)  BP(mean): --  RR: 18 (22 May 2023 11:53) (16 - 20)  SpO2: 97% (22 May 2023 11:53) (95% - 100%)    Parameters below as of 22 May 2023 11:53  Patient On (Oxygen Delivery Method): room air      CONSTITUTIONAL: NAD, well-developed, well-groomed, obese  EYES: PERRLA; conjunctiva and sclera clear  ENMT: Moist oral mucosa, no pharyngeal injection or exudates; normal dentition  NECK: Supple, no palpable masses; no thyromegaly  RESPIRATORY: Normal respiratory effort; lungs are clear to auscultation bilaterally  CARDIOVASCULAR: Regular rate and rhythm, normal S1 and S2, no murmur/rub/gallop; No lower extremity edema; Peripheral pulses are 2+ bilaterally  ABDOMEN: Nontender to palpation, normoactive bowel sounds, no rebound/guarding; No hepatosplenomegaly  MUSCULOSKELETAL:  Normal gait; no clubbing or cyanosis of digits; no joint swelling or tenderness to palpation  PSYCH: A+O to person, place, and time; affect appropriate  NEUROLOGY: CN 2-12 are intact and symmetric; no gross sensory deficits   SKIN: b/l ankle erythema, warmth, tenderness to palpation

## 2023-05-22 NOTE — H&P ADULT - NSHPLABSRESULTS_GEN_ALL_CORE
LABS:                        11.2   8.14  )-----------( 260      ( 21 May 2023 22:30 )             34.2     05-    133<L>  |  98  |  17  ----------------------------<  165<H>  3.9   |  24  |  1.18    Ca    8.9      21 May 2023 22:30    TPro  6.9  /  Alb  3.6  /  TBili  0.5  /  DBili  x   /  AST  19  /  ALT  8   /  AlkPhos  85  05-21    PT/INR - ( 21 May 2023 22:30 )   PT: 19.0 sec;   INR: 1.63 ratio         PTT - ( 21 May 2023 22:30 )  PTT:34.0 sec      Urinalysis Basic - ( 21 May 2023 22:30 )    Color: Yellow / Appearance: Clear / S.024 / pH: x  Gluc: x / Ketone: Negative  / Bili: Negative / Urobili: <2 mg/dL   Blood: x / Protein: 30 mg/dL / Nitrite: Negative   Leuk Esterase: Moderate / RBC: 3 /HPF / WBC 13 /HPF   Sq Epi: x / Non Sq Epi: x / Bacteria: Negative          COMMUNICATION:  Care Discussed with Consultants/Other Providers and Details of Discussion: d/w medicine NP  Discussions with Patient/Family:  d/w patient

## 2023-05-22 NOTE — CHART NOTE - NSCHARTNOTEFT_GEN_A_CORE
Reference #: [id number]       PDI	First Name	Last Name	Birth Date	Gender	Street Address	[facility]	Zip Code  OBI Alvarez	1955	Female	[id number]	[place]	[address]    Prescription Information      PDI Filter:    PDI	My Rx	Current Rx	Drug Type	Rx Written	Rx Dispensed	Drug	Quantity	Days Supply	Prescriber Name	Prescriber TELLY #	Payment Method	Dispenser  A	N	Y	O	05/19/2023	05/20/2023	hydromorphone 4 mg tablet	20	5	Anila Sinclair	[id number]	Medicare	[id number]  A	N	N	O	03/29/2023	03/29/2023	hydromorphone 4 mg tablet	120	30	Marely Aranda	[id number]	Medicare	[id number]  A	N	N	O	01/25/2023	01/29/2023	hydromorphone 4 mg tablet	90	30	Anila Sinclair	[id number]	Medicare	[id number]  A	N	N	O	11/17/2022	11/18/2022	hydromorphone 4 mg tablet	120	30	Anila Sinclair	[id number]	Medicare	[id number]  A	N	N	O	08/03/2022	08/06/2022	hydromorphone 4 mg tablet	120	30	Marely Aranda	[id number]	Medicare	[id number]  A	N	N	O	06/29/2022	06/30/2022	hydromorphone 4 mg tablet	120	30	Deniz Reynolds	[id number]	Medicare	[id number]  A	N	N	O	05/25/2022	05/26/2022	hydromorphone 4 mg tablet	120	30	Calli Monzon	[id number]	Medicare	[id number]    * - Details of Drug Type : O = Opioid, B = Benzodiazepine, S = Stimulant

## 2023-05-22 NOTE — H&P ADULT - ASSESSMENT
Patient is a 67yo F with PMH significant for T2DM, Afib s/p ablation on Eliquis, fibromyalgia, OA, RENARD on CPAP, depression, chronic venous insufficiency, endometrial cancer s/p hysterectomy 2010, HTN, HLD, hypothyroidism, brought from assisted living for generalized weakness of 3 weeks duration, now with associated worsening of bilateral LE pain for 1 week duration, admitted for cellulitis and difficulty with ambulation.

## 2023-05-22 NOTE — H&P ADULT - NSICDXPASTMEDICALHX_GEN_ALL_CORE_FT
PAST MEDICAL HISTORY:  Atrial fibrillation     Constipation     Depression     Diabetes Mellitus     Diarrhea     Environmental Allergies     Fibromyalgia     GERD with Apnea     High Triglycerides     Hyperlipidemia     Hypothyroidism     Morbidly Obese     Obstructive Sleep Apnea     Osteoarthritis of Knee right. requires cane    Personal History of Arthritis     Personal History of Female Genital Cancer     Personal History of Hypertension     Restless Legs Syndrome (RLS)     Umbilical Hernia

## 2023-05-22 NOTE — H&P ADULT - PROBLEM SELECTOR PLAN 1
#Generalized weakness in setting of cellulitis superimposed on chronic venous insufficiency, not meeting sepsis criteria  - b/l ankle erythema, warmth, and tenderness to palpation  - No other sources of infection identified – CXR negative, no GI symptoms, COVID/Flu/RSV negative, UA with mod LE and 13 WBCs but without nitrite or bacteria  - f/u UCx, BCx  - empiric coverage with ceftriaxone, vancomycin for likely 5 day course  - check MRSA PCR  - check ESR, CRP, calculate LRINEC score  - initially with some concern for slurred speech which resolved when more awake – CT head negative; low suspicion for TIA  - check b/l LE doppler, though DVT unlikely as patient is on Eliquis

## 2023-05-22 NOTE — H&P ADULT - HISTORY OF PRESENT ILLNESS
Patient is a 69yo F with PMH significant for T2DM, Afib s/p ablation on Eliquis, fibromyalgia, OA, RENARD on CPAP, depression, chronic venous insufficiency, endometrial cancer s/p hysterectomy 2010, HTN, HLD, hypothyroidism, brought from assisted living for generalized weakness of 3 weeks duration, now with associated worsening of bilateral LE pain for 1 week duration. She reported fever and chills of 2-3 days duration. She also stated she had been having difficulty ambulating for the past few days, when she ordinarily uses a walker to ambulate without concerns.     She lives at the assisted living facility because she “could not manage the house.” She was initially noted by staff in the ED to have slurred speech but stated that was because she had been frazzled by the commotion associated with bringing her to the hospital late at night.     She has been hospitalized at least 3 times in the past for LE cellulitis.    Vital signs significant for: Febrile 38.8C, 100% on 2L NC now 96% on RA  Labs significant for: Hb 11.2, INR 1.63, Na 133, Cr 1.18 (1.23 in 5/2021), UA: mod LE, 13 WBCs, neg bacteria, neg nitrite; f/s 140s-170s  Imaging significant for:   - CT head: no acute intracranial hemorrhage or mass effect  - CXR (personally reviewed) – without consolidation, infiltration, or pulmonary vascular congestion

## 2023-05-23 LAB
A1C WITH ESTIMATED AVERAGE GLUCOSE RESULT: 6.5 % — HIGH (ref 4–5.6)
ALBUMIN SERPL ELPH-MCNC: 3.5 G/DL — SIGNIFICANT CHANGE UP (ref 3.3–5)
ALP SERPL-CCNC: 79 U/L — SIGNIFICANT CHANGE UP (ref 40–120)
ALT FLD-CCNC: 11 U/L — SIGNIFICANT CHANGE UP (ref 4–33)
ANION GAP SERPL CALC-SCNC: 12 MMOL/L — SIGNIFICANT CHANGE UP (ref 7–14)
AST SERPL-CCNC: 14 U/L — SIGNIFICANT CHANGE UP (ref 4–32)
BASOPHILS # BLD AUTO: 0.02 K/UL — SIGNIFICANT CHANGE UP (ref 0–0.2)
BASOPHILS NFR BLD AUTO: 0.3 % — SIGNIFICANT CHANGE UP (ref 0–2)
BILIRUB SERPL-MCNC: 0.2 MG/DL — SIGNIFICANT CHANGE UP (ref 0.2–1.2)
BUN SERPL-MCNC: 13 MG/DL — SIGNIFICANT CHANGE UP (ref 7–23)
CALCIUM SERPL-MCNC: 9.5 MG/DL — SIGNIFICANT CHANGE UP (ref 8.4–10.5)
CHLORIDE SERPL-SCNC: 101 MMOL/L — SIGNIFICANT CHANGE UP (ref 98–107)
CO2 SERPL-SCNC: 24 MMOL/L — SIGNIFICANT CHANGE UP (ref 22–31)
CREAT SERPL-MCNC: 1.11 MG/DL — SIGNIFICANT CHANGE UP (ref 0.5–1.3)
CRP SERPL-MCNC: 148.7 MG/L — HIGH
EGFR: 54 ML/MIN/1.73M2 — LOW
EOSINOPHIL # BLD AUTO: 0.42 K/UL — SIGNIFICANT CHANGE UP (ref 0–0.5)
EOSINOPHIL NFR BLD AUTO: 7.2 % — HIGH (ref 0–6)
ERYTHROCYTE [SEDIMENTATION RATE] IN BLOOD: 70 MM/HR — HIGH (ref 4–25)
ESTIMATED AVERAGE GLUCOSE: 140 — SIGNIFICANT CHANGE UP
GLUCOSE BLDC GLUCOMTR-MCNC: 132 MG/DL — HIGH (ref 70–99)
GLUCOSE BLDC GLUCOMTR-MCNC: 158 MG/DL — HIGH (ref 70–99)
GLUCOSE BLDC GLUCOMTR-MCNC: 195 MG/DL — HIGH (ref 70–99)
GLUCOSE SERPL-MCNC: 141 MG/DL — HIGH (ref 70–99)
HCT VFR BLD CALC: 35.9 % — SIGNIFICANT CHANGE UP (ref 34.5–45)
HGB BLD-MCNC: 11.2 G/DL — LOW (ref 11.5–15.5)
IANC: 2.81 K/UL — SIGNIFICANT CHANGE UP (ref 1.8–7.4)
IMM GRANULOCYTES NFR BLD AUTO: 0.3 % — SIGNIFICANT CHANGE UP (ref 0–0.9)
LYMPHOCYTES # BLD AUTO: 1.84 K/UL — SIGNIFICANT CHANGE UP (ref 1–3.3)
LYMPHOCYTES # BLD AUTO: 31.7 % — SIGNIFICANT CHANGE UP (ref 13–44)
MAGNESIUM SERPL-MCNC: 1.9 MG/DL — SIGNIFICANT CHANGE UP (ref 1.6–2.6)
MCHC RBC-ENTMCNC: 25.7 PG — LOW (ref 27–34)
MCHC RBC-ENTMCNC: 31.2 GM/DL — LOW (ref 32–36)
MCV RBC AUTO: 82.3 FL — SIGNIFICANT CHANGE UP (ref 80–100)
MONOCYTES # BLD AUTO: 0.69 K/UL — SIGNIFICANT CHANGE UP (ref 0–0.9)
MONOCYTES NFR BLD AUTO: 11.9 % — SIGNIFICANT CHANGE UP (ref 2–14)
MRSA PCR RESULT.: SIGNIFICANT CHANGE UP
NEUTROPHILS # BLD AUTO: 2.81 K/UL — SIGNIFICANT CHANGE UP (ref 1.8–7.4)
NEUTROPHILS NFR BLD AUTO: 48.6 % — SIGNIFICANT CHANGE UP (ref 43–77)
NRBC # BLD: 0 /100 WBCS — SIGNIFICANT CHANGE UP (ref 0–0)
NRBC # FLD: 0 K/UL — SIGNIFICANT CHANGE UP (ref 0–0)
PHOSPHATE SERPL-MCNC: 3.4 MG/DL — SIGNIFICANT CHANGE UP (ref 2.5–4.5)
PLATELET # BLD AUTO: 259 K/UL — SIGNIFICANT CHANGE UP (ref 150–400)
POTASSIUM SERPL-MCNC: 4.1 MMOL/L — SIGNIFICANT CHANGE UP (ref 3.5–5.3)
POTASSIUM SERPL-SCNC: 4.1 MMOL/L — SIGNIFICANT CHANGE UP (ref 3.5–5.3)
PROT SERPL-MCNC: 7 G/DL — SIGNIFICANT CHANGE UP (ref 6–8.3)
RBC # BLD: 4.36 M/UL — SIGNIFICANT CHANGE UP (ref 3.8–5.2)
RBC # FLD: 16.8 % — HIGH (ref 10.3–14.5)
S AUREUS DNA NOSE QL NAA+PROBE: SIGNIFICANT CHANGE UP
SODIUM SERPL-SCNC: 137 MMOL/L — SIGNIFICANT CHANGE UP (ref 135–145)
TSH SERPL-MCNC: 1.82 UIU/ML — SIGNIFICANT CHANGE UP (ref 0.27–4.2)
WBC # BLD: 5.8 K/UL — SIGNIFICANT CHANGE UP (ref 3.8–10.5)
WBC # FLD AUTO: 5.8 K/UL — SIGNIFICANT CHANGE UP (ref 3.8–10.5)

## 2023-05-23 PROCEDURE — 99222 1ST HOSP IP/OBS MODERATE 55: CPT

## 2023-05-23 RX ORDER — HYDROMORPHONE HYDROCHLORIDE 2 MG/ML
4 INJECTION INTRAMUSCULAR; INTRAVENOUS; SUBCUTANEOUS EVERY 6 HOURS
Refills: 0 | Status: DISCONTINUED | OUTPATIENT
Start: 2023-05-23 | End: 2023-05-27

## 2023-05-23 RX ADMIN — Medication 125 MICROGRAM(S): at 06:04

## 2023-05-23 RX ADMIN — GABAPENTIN 600 MILLIGRAM(S): 400 CAPSULE ORAL at 06:03

## 2023-05-23 RX ADMIN — SENNA PLUS 2 TABLET(S): 8.6 TABLET ORAL at 21:45

## 2023-05-23 RX ADMIN — BUPROPION HYDROCHLORIDE 300 MILLIGRAM(S): 150 TABLET, EXTENDED RELEASE ORAL at 12:17

## 2023-05-23 RX ADMIN — APIXABAN 5 MILLIGRAM(S): 2.5 TABLET, FILM COATED ORAL at 17:30

## 2023-05-23 RX ADMIN — CEFTRIAXONE 100 MILLIGRAM(S): 500 INJECTION, POWDER, FOR SOLUTION INTRAMUSCULAR; INTRAVENOUS at 12:21

## 2023-05-23 RX ADMIN — Medication 8 UNIT(S): at 12:28

## 2023-05-23 RX ADMIN — ESCITALOPRAM OXALATE 20 MILLIGRAM(S): 10 TABLET, FILM COATED ORAL at 12:10

## 2023-05-23 RX ADMIN — Medication 650 MILLIGRAM(S): at 06:05

## 2023-05-23 RX ADMIN — PANTOPRAZOLE SODIUM 40 MILLIGRAM(S): 20 TABLET, DELAYED RELEASE ORAL at 06:03

## 2023-05-23 RX ADMIN — Medication 250 MILLIGRAM(S): at 06:05

## 2023-05-23 RX ADMIN — HYDROMORPHONE HYDROCHLORIDE 4 MILLIGRAM(S): 2 INJECTION INTRAMUSCULAR; INTRAVENOUS; SUBCUTANEOUS at 12:40

## 2023-05-23 RX ADMIN — HYDROMORPHONE HYDROCHLORIDE 2 MILLIGRAM(S): 2 INJECTION INTRAMUSCULAR; INTRAVENOUS; SUBCUTANEOUS at 01:14

## 2023-05-23 RX ADMIN — GABAPENTIN 600 MILLIGRAM(S): 400 CAPSULE ORAL at 17:30

## 2023-05-23 RX ADMIN — Medication 650 MILLIGRAM(S): at 21:20

## 2023-05-23 RX ADMIN — Medication 8 UNIT(S): at 17:30

## 2023-05-23 RX ADMIN — HYDROMORPHONE HYDROCHLORIDE 4 MILLIGRAM(S): 2 INJECTION INTRAMUSCULAR; INTRAVENOUS; SUBCUTANEOUS at 12:10

## 2023-05-23 RX ADMIN — BUMETANIDE 0.5 MILLIGRAM(S): 0.25 INJECTION INTRAMUSCULAR; INTRAVENOUS at 06:03

## 2023-05-23 RX ADMIN — HYDROMORPHONE HYDROCHLORIDE 4 MILLIGRAM(S): 2 INJECTION INTRAMUSCULAR; INTRAVENOUS; SUBCUTANEOUS at 18:41

## 2023-05-23 RX ADMIN — ATORVASTATIN CALCIUM 40 MILLIGRAM(S): 80 TABLET, FILM COATED ORAL at 21:45

## 2023-05-23 RX ADMIN — Medication 650 MILLIGRAM(S): at 07:05

## 2023-05-23 RX ADMIN — HYDROMORPHONE HYDROCHLORIDE 2 MILLIGRAM(S): 2 INJECTION INTRAMUSCULAR; INTRAVENOUS; SUBCUTANEOUS at 02:14

## 2023-05-23 RX ADMIN — Medication 650 MILLIGRAM(S): at 20:21

## 2023-05-23 RX ADMIN — Medication 1: at 12:28

## 2023-05-23 RX ADMIN — APIXABAN 5 MILLIGRAM(S): 2.5 TABLET, FILM COATED ORAL at 06:03

## 2023-05-23 RX ADMIN — INSULIN GLARGINE 24 UNIT(S): 100 INJECTION, SOLUTION SUBCUTANEOUS at 22:25

## 2023-05-23 RX ADMIN — Medication 8 UNIT(S): at 08:45

## 2023-05-23 RX ADMIN — Medication 250 MILLIGRAM(S): at 18:00

## 2023-05-23 RX ADMIN — Medication 1: at 08:45

## 2023-05-23 RX ADMIN — HYDROMORPHONE HYDROCHLORIDE 2 MILLIGRAM(S): 2 INJECTION INTRAMUSCULAR; INTRAVENOUS; SUBCUTANEOUS at 07:14

## 2023-05-23 RX ADMIN — SPIRONOLACTONE 25 MILLIGRAM(S): 25 TABLET, FILM COATED ORAL at 06:41

## 2023-05-23 RX ADMIN — HYDROMORPHONE HYDROCHLORIDE 4 MILLIGRAM(S): 2 INJECTION INTRAMUSCULAR; INTRAVENOUS; SUBCUTANEOUS at 19:00

## 2023-05-23 NOTE — CONSULT NOTE ADULT - SUBJECTIVE AND OBJECTIVE BOX
HPI:  Patient is a 67yo F with PMH significant for T2DM, Afib s/p ablation on Eliquis, fibromyalgia, OA, RENARD on CPAP, depression, chronic venous insufficiency, endometrial cancer s/p hysterectomy 2010, HTN, HLD, hypothyroidism, brought from assisted living for generalized weakness of 3 weeks duration, now with associated worsening of bilateral LE pain for 1 week duration. She reported fever and chills of 2-3 days duration. She also stated she had been having difficulty ambulating for the past few days, when she ordinarily uses a walker to ambulate without concerns.     She lives at the assisted living facility because she “could not manage the house.” She was initially noted by staff in the ED to have slurred speech but stated that was because she had been frazzled by the commotion associated with bringing her to the hospital late at night.     She has been hospitalized at least 3 times in the past for LE cellulitis.    Vital signs significant for: Febrile 38.8C, 100% on 2L NC now 96% on RA  Labs significant for: Hb 11.2, INR 1.63, Na 133, Cr 1.18 (1.23 in 2021), UA: mod LE, 13 WBCs, neg bacteria, neg nitrite; f/s 140s-170s  Imaging significant for:   - CT head: no acute intracranial hemorrhage or mass effect  - CXR (personally reviewed) – without consolidation, infiltration, or pulmonary vascular congestion   (22 May 2023 11:52)    ID- chief complaint is fatigue  notes urinary incontinence increased over last month   believes treated with an antibiotic at assisted living facility for UTI recently  has arthritis in knees and shoulders and fibromyalgia       PAST MEDICAL & SURGICAL HISTORY:  Personal History of Arthritis      Personal History of Female Genital Cancer      Personal History of Hypertension      Hypothyroidism      Diabetes Mellitus      Hyperlipidemia      High Triglycerides      Osteoarthritis of Knee  right. requires cane      Obstructive Sleep Apnea      Constipation      Diarrhea      Depression      Environmental Allergies      Morbidly Obese      Fibromyalgia      GERD with Apnea      Restless Legs Syndrome (RLS)      Umbilical Hernia      Atrial fibrillation      S/P Tonsillectomy and Adenoidectomy      S/P FESS (Functional Endoscopic Sinus Surgery)      Carpal Tunnel Syndrome  release left      Endometrial Ca s/p RODRIGO, BSO      Incarcerated Ventral Hernia          Allergies    smoke (Rhinitis; Rhinorrhea)  adhesives (Rash)  pollen (Rhinitis; Rhinorrhea)  dust (Rhinitis; Rhinorrhea)  Tin/costume jewelry (Rash)  aerosols, perfumes, fabric softeners (Rash; Rhinitis; Rhinorrhea)  Lyrica (Rash)    Intolerances        ANTIMICROBIALS:  cefTRIAXone   IVPB 1000 every 24 hours  vancomycin  IVPB 1000 every 12 hours      OTHER MEDS:  acetaminophen     Tablet .. 650 milliGRAM(s) Oral every 6 hours PRN  aluminum hydroxide/magnesium hydroxide/simethicone Suspension 30 milliLiter(s) Oral every 4 hours PRN  apixaban 5 milliGRAM(s) Oral every 12 hours  atorvastatin 40 milliGRAM(s) Oral at bedtime  buMETAnide 0.5 milliGRAM(s) Oral daily  buPROPion XL (24-Hour) . 300 milliGRAM(s) Oral daily  dextrose 5%. 1000 milliLiter(s) IV Continuous <Continuous>  dextrose 5%. 1000 milliLiter(s) IV Continuous <Continuous>  dextrose 50% Injectable 25 Gram(s) IV Push once  dextrose 50% Injectable 12.5 Gram(s) IV Push once  dextrose 50% Injectable 25 Gram(s) IV Push once  dextrose Oral Gel 15 Gram(s) Oral once PRN  escitalopram 20 milliGRAM(s) Oral daily  gabapentin 600 milliGRAM(s) Oral two times a day  glucagon  Injectable 1 milliGRAM(s) IntraMuscular once  HYDROmorphone   Tablet 4 milliGRAM(s) Oral every 6 hours PRN  insulin glargine Injectable (LANTUS) 24 Unit(s) SubCutaneous at bedtime  insulin lispro (ADMELOG) corrective regimen sliding scale   SubCutaneous three times a day before meals  insulin lispro (ADMELOG) corrective regimen sliding scale   SubCutaneous at bedtime  insulin lispro Injectable (ADMELOG) 8 Unit(s) SubCutaneous three times a day before meals  levothyroxine 125 MICROGram(s) Oral daily  loratadine 10 milliGRAM(s) Oral daily PRN  melatonin 3 milliGRAM(s) Oral at bedtime PRN  ondansetron Injectable 4 milliGRAM(s) IV Push every 8 hours PRN  pantoprazole    Tablet 40 milliGRAM(s) Oral before breakfast  polyethylene glycol 3350 17 Gram(s) Oral daily  senna 2 Tablet(s) Oral at bedtime  spironolactone 25 milliGRAM(s) Oral daily      SOCIAL HISTORY:    FAMILY HISTORY:  No pertinent family history in first degree relatives        REVIEW OF SYSTEMS  [  ] ROS unobtainable because:    [ x ] All other systems negative except as noted below:	    Constitutional:  [ ] fever [ ] chills  [ ] weight loss  [x ] weakness  Skin:  [ ] rash [ ] phlebitis	  Eyes: [ ] icterus [ ] pain  [ ] discharge	  ENMT: [ ] sore throat  [ ] thrush [ ] ulcers [ ] exudates  Respiratory: [ ] dyspnea [ ] hemoptysis [ ] cough [ ] sputum	  Cardiovascular:  [ ] chest pain [ ] palpitations [ ] edema	  Gastrointestinal:  [ ] nausea [ ] vomiting [ ] diarrhea [x ] constipation [ ] pain	  Genitourinary:  [ ] dysuria [ ] frequency [ ] hematuria [ ] discharge [ ] flank pain  [x ] incontinence  Musculoskeletal:  [ ] myalgias [ ] arthralgias [x ] arthritis  [ ] back pain  Neurological:  [ ] headache [ ] seizures  [ ] confusion/altered mental status  Psychiatric:  [ ] anxiety [ ] depression	  Hematology/Lymphatics:  [ ] lymphadenopathy  Endocrine:  [ ] adrenal [ ] thyroid  Allergic/Immunologic:	 [ ] transplant [ ] seasonal    PHYSICAL EXAM:  Constitutional: Not in acute distress  Eyes: No icterus.  Oral cavity: Clear, no lesions  Neck: Supple  RS: no respiratory distress RA  CVS: S1, S2   Abdomen: Soft. No guarding/rigidity/tenderness.  : no patel  Extremities: erythema bilateral lower legs right > left scaly skin right slightly warmer than left   Skin: No rash  Neuro: Alert, oriented to time/place/person  Cranial nerves 2-12 grossly normal. No focal abnormalities    Drug Dosing Weight  Height (cm): 177.8 (22 May 2023 06:38)  Weight (kg): 182.7 (22 May 2023 06:38)  BMI (kg/m2): 57.8 (22 May 2023 06:38)  BSA (m2): 2.81 (22 May 2023 06:38)    Vital Signs Last 24 Hrs  T(F): 98.2 (23 @ 12:39), Max: 101.9 (23 @ 22:45)    Vital Signs Last 24 Hrs  HR: 56 (23 @ 15:35) (52 - 72)  BP: 130/53 (23 @ 12:39) (108/53 - 130/53)  RR: 18 (23 @ 12:39)  SpO2: 97% (23 @ 15:35) (96% - 100%)  Wt(kg): --                          11.2   5.80  )-----------( 259      ( 23 May 2023 06:26 )             35.9           137  |  101  |  13  ----------------------------<  141<H>  4.1   |  24  |  1.11    Ca    9.5      23 May 2023 06:26  Phos  3.4       Mg     1.90         TPro  7.0  /  Alb  3.5  /  TBili  0.2  /  DBili  x   /  AST  14  /  ALT  11  /  AlkPhos  79        Urinalysis Basic - ( 21 May 2023 22:30 )    Color: Yellow / Appearance: Clear / S.024 / pH: x  Gluc: x / Ketone: Negative  / Bili: Negative / Urobili: <2 mg/dL   Blood: x / Protein: 30 mg/dL / Nitrite: Negative   Leuk Esterase: Moderate / RBC: 3 /HPF / WBC 13 /HPF   Sq Epi: x / Non Sq Epi: x / Bacteria: Negative        MICROBIOLOGY:  .Blood Blood-Peripheral  23   No growth to date.  --  --      .Blood Blood-Peripheral  23   No growth to date.  --  --        RADIOLOGY:    ra< from: CT Head No Cont (23 @ 03:41) >  IMPRESSION:  No acute intracranial hemorrhage or mass effect.    --- End of Report ---    < end of copied text >  < from: Xray Chest 1 View-PORTABLE IMMEDIATE (23 @ 23:40) >  IMPRESSION:  No focal lung consolidation.    --- End of Report ---    < end of copied text >

## 2023-05-23 NOTE — CONSULT NOTE ADULT - ASSESSMENT
69 yo woman with diabetes, fibromyalgia, osteoarthritis, RENARD, chronic venous insufficiency presenting from assisted living facility with fever, fatigue  suspect cellulitis / superinfection chronic venous stasis right > left  klebsiella in urine - patient incontinent UA only 13 WBC   fever     cellulitis right lower leg  possible UTI   chronic stasis dermatitis legs   fibromyalgia    Suggest;    follow blood cultures  follow final urine culture   agree with vanco 1 gm iv q 12 h   follow trough prior 3rd dose   c/w ceftriaxone for now     will follow

## 2023-05-23 NOTE — PHYSICAL THERAPY INITIAL EVALUATION ADULT - ADDITIONAL COMMENTS
Pt reports she lives in an [facility], uses an electric wheel chair, uses  walker for short distances, no history of falls, and currently is doing PT at the [facility].

## 2023-05-23 NOTE — PHYSICAL THERAPY INITIAL EVALUATION ADULT - PERTINENT HX OF CURRENT PROBLEM, REHAB EVAL
Patient is a 67yo F with PMH significant for T2DM, Afib s/p ablation on Eliquis, fibromyalgia, OA, RENARD on CPAP, depression, chronic venous insufficiency, endometrial cancer s/p hysterectomy 2010, HTN, HLD, hypothyroidism, brought from assisted living for generalized weakness of 3 weeks duration, now with associated worsening of bilateral LE pain for 1 week duration.

## 2023-05-24 LAB
-  AMIKACIN: SIGNIFICANT CHANGE UP
-  AMOXICILLIN/CLAVULANIC ACID: SIGNIFICANT CHANGE UP
-  AMPICILLIN/SULBACTAM: SIGNIFICANT CHANGE UP
-  AMPICILLIN: SIGNIFICANT CHANGE UP
-  AZTREONAM: SIGNIFICANT CHANGE UP
-  CEFAZOLIN: SIGNIFICANT CHANGE UP
-  CEFEPIME: SIGNIFICANT CHANGE UP
-  CEFOXITIN: SIGNIFICANT CHANGE UP
-  CEFTRIAXONE: SIGNIFICANT CHANGE UP
-  CEFUROXIME: SIGNIFICANT CHANGE UP
-  CIPROFLOXACIN: SIGNIFICANT CHANGE UP
-  ERTAPENEM: SIGNIFICANT CHANGE UP
-  GENTAMICIN: SIGNIFICANT CHANGE UP
-  IMIPENEM: SIGNIFICANT CHANGE UP
-  LEVOFLOXACIN: SIGNIFICANT CHANGE UP
-  MEROPENEM: SIGNIFICANT CHANGE UP
-  NITROFURANTOIN: SIGNIFICANT CHANGE UP
-  PIPERACILLIN/TAZOBACTAM: SIGNIFICANT CHANGE UP
-  TOBRAMYCIN: SIGNIFICANT CHANGE UP
-  TRIMETHOPRIM/SULFAMETHOXAZOLE: SIGNIFICANT CHANGE UP
ANION GAP SERPL CALC-SCNC: 13 MMOL/L — SIGNIFICANT CHANGE UP (ref 7–14)
BUN SERPL-MCNC: 16 MG/DL — SIGNIFICANT CHANGE UP (ref 7–23)
CALCIUM SERPL-MCNC: 9.4 MG/DL — SIGNIFICANT CHANGE UP (ref 8.4–10.5)
CHLORIDE SERPL-SCNC: 102 MMOL/L — SIGNIFICANT CHANGE UP (ref 98–107)
CO2 SERPL-SCNC: 23 MMOL/L — SIGNIFICANT CHANGE UP (ref 22–31)
CREAT SERPL-MCNC: 1.09 MG/DL — SIGNIFICANT CHANGE UP (ref 0.5–1.3)
CULTURE RESULTS: SIGNIFICANT CHANGE UP
EGFR: 55 ML/MIN/1.73M2 — LOW
GLUCOSE BLDC GLUCOMTR-MCNC: 137 MG/DL — HIGH (ref 70–99)
GLUCOSE BLDC GLUCOMTR-MCNC: 138 MG/DL — HIGH (ref 70–99)
GLUCOSE BLDC GLUCOMTR-MCNC: 186 MG/DL — HIGH (ref 70–99)
GLUCOSE BLDC GLUCOMTR-MCNC: 194 MG/DL — HIGH (ref 70–99)
GLUCOSE SERPL-MCNC: 147 MG/DL — HIGH (ref 70–99)
HCT VFR BLD CALC: 35 % — SIGNIFICANT CHANGE UP (ref 34.5–45)
HGB BLD-MCNC: 10.8 G/DL — LOW (ref 11.5–15.5)
MAGNESIUM SERPL-MCNC: 1.8 MG/DL — SIGNIFICANT CHANGE UP (ref 1.6–2.6)
MCHC RBC-ENTMCNC: 25.3 PG — LOW (ref 27–34)
MCHC RBC-ENTMCNC: 30.9 GM/DL — LOW (ref 32–36)
MCV RBC AUTO: 82 FL — SIGNIFICANT CHANGE UP (ref 80–100)
METHOD TYPE: SIGNIFICANT CHANGE UP
NRBC # BLD: 0 /100 WBCS — SIGNIFICANT CHANGE UP (ref 0–0)
NRBC # FLD: 0 K/UL — SIGNIFICANT CHANGE UP (ref 0–0)
ORGANISM # SPEC MICROSCOPIC CNT: SIGNIFICANT CHANGE UP
ORGANISM # SPEC MICROSCOPIC CNT: SIGNIFICANT CHANGE UP
PHOSPHATE SERPL-MCNC: 4.5 MG/DL — SIGNIFICANT CHANGE UP (ref 2.5–4.5)
PLATELET # BLD AUTO: 298 K/UL — SIGNIFICANT CHANGE UP (ref 150–400)
POTASSIUM SERPL-MCNC: 4 MMOL/L — SIGNIFICANT CHANGE UP (ref 3.5–5.3)
POTASSIUM SERPL-SCNC: 4 MMOL/L — SIGNIFICANT CHANGE UP (ref 3.5–5.3)
RBC # BLD: 4.27 M/UL — SIGNIFICANT CHANGE UP (ref 3.8–5.2)
RBC # FLD: 16.8 % — HIGH (ref 10.3–14.5)
SODIUM SERPL-SCNC: 138 MMOL/L — SIGNIFICANT CHANGE UP (ref 135–145)
SPECIMEN SOURCE: SIGNIFICANT CHANGE UP
VANCOMYCIN TROUGH SERPL-MCNC: 7.9 UG/ML — LOW (ref 10–20)
WBC # BLD: 6.07 K/UL — SIGNIFICANT CHANGE UP (ref 3.8–10.5)
WBC # FLD AUTO: 6.07 K/UL — SIGNIFICANT CHANGE UP (ref 3.8–10.5)

## 2023-05-24 PROCEDURE — 99232 SBSQ HOSP IP/OBS MODERATE 35: CPT

## 2023-05-24 RX ORDER — ACETAMINOPHEN 500 MG
975 TABLET ORAL ONCE
Refills: 0 | Status: COMPLETED | OUTPATIENT
Start: 2023-05-24 | End: 2023-05-24

## 2023-05-24 RX ORDER — VANCOMYCIN HCL 1 G
1250 VIAL (EA) INTRAVENOUS EVERY 12 HOURS
Refills: 0 | Status: DISCONTINUED | OUTPATIENT
Start: 2023-05-24 | End: 2023-05-27

## 2023-05-24 RX ADMIN — Medication 166.67 MILLIGRAM(S): at 18:29

## 2023-05-24 RX ADMIN — CEFTRIAXONE 100 MILLIGRAM(S): 500 INJECTION, POWDER, FOR SOLUTION INTRAMUSCULAR; INTRAVENOUS at 12:39

## 2023-05-24 RX ADMIN — INSULIN GLARGINE 24 UNIT(S): 100 INJECTION, SOLUTION SUBCUTANEOUS at 23:08

## 2023-05-24 RX ADMIN — HYDROMORPHONE HYDROCHLORIDE 4 MILLIGRAM(S): 2 INJECTION INTRAMUSCULAR; INTRAVENOUS; SUBCUTANEOUS at 19:52

## 2023-05-24 RX ADMIN — PANTOPRAZOLE SODIUM 40 MILLIGRAM(S): 20 TABLET, DELAYED RELEASE ORAL at 05:04

## 2023-05-24 RX ADMIN — HYDROMORPHONE HYDROCHLORIDE 4 MILLIGRAM(S): 2 INJECTION INTRAMUSCULAR; INTRAVENOUS; SUBCUTANEOUS at 12:35

## 2023-05-24 RX ADMIN — Medication 1: at 08:59

## 2023-05-24 RX ADMIN — ESCITALOPRAM OXALATE 20 MILLIGRAM(S): 10 TABLET, FILM COATED ORAL at 12:38

## 2023-05-24 RX ADMIN — APIXABAN 5 MILLIGRAM(S): 2.5 TABLET, FILM COATED ORAL at 05:05

## 2023-05-24 RX ADMIN — GABAPENTIN 600 MILLIGRAM(S): 400 CAPSULE ORAL at 05:04

## 2023-05-24 RX ADMIN — Medication 8 UNIT(S): at 09:00

## 2023-05-24 RX ADMIN — Medication 166.67 MILLIGRAM(S): at 06:36

## 2023-05-24 RX ADMIN — HYDROMORPHONE HYDROCHLORIDE 4 MILLIGRAM(S): 2 INJECTION INTRAMUSCULAR; INTRAVENOUS; SUBCUTANEOUS at 04:48

## 2023-05-24 RX ADMIN — APIXABAN 5 MILLIGRAM(S): 2.5 TABLET, FILM COATED ORAL at 17:46

## 2023-05-24 RX ADMIN — HYDROMORPHONE HYDROCHLORIDE 4 MILLIGRAM(S): 2 INJECTION INTRAMUSCULAR; INTRAVENOUS; SUBCUTANEOUS at 05:30

## 2023-05-24 RX ADMIN — BUMETANIDE 0.5 MILLIGRAM(S): 0.25 INJECTION INTRAMUSCULAR; INTRAVENOUS at 05:04

## 2023-05-24 RX ADMIN — SENNA PLUS 2 TABLET(S): 8.6 TABLET ORAL at 22:03

## 2023-05-24 RX ADMIN — Medication 8 UNIT(S): at 12:47

## 2023-05-24 RX ADMIN — GABAPENTIN 600 MILLIGRAM(S): 400 CAPSULE ORAL at 17:46

## 2023-05-24 RX ADMIN — Medication 1: at 12:46

## 2023-05-24 RX ADMIN — Medication 8 UNIT(S): at 17:46

## 2023-05-24 RX ADMIN — HYDROMORPHONE HYDROCHLORIDE 4 MILLIGRAM(S): 2 INJECTION INTRAMUSCULAR; INTRAVENOUS; SUBCUTANEOUS at 20:50

## 2023-05-24 RX ADMIN — SPIRONOLACTONE 25 MILLIGRAM(S): 25 TABLET, FILM COATED ORAL at 05:04

## 2023-05-24 RX ADMIN — ATORVASTATIN CALCIUM 40 MILLIGRAM(S): 80 TABLET, FILM COATED ORAL at 22:02

## 2023-05-24 RX ADMIN — Medication 975 MILLIGRAM(S): at 17:56

## 2023-05-24 RX ADMIN — BUPROPION HYDROCHLORIDE 300 MILLIGRAM(S): 150 TABLET, EXTENDED RELEASE ORAL at 12:38

## 2023-05-24 RX ADMIN — Medication 3 MILLIGRAM(S): at 22:02

## 2023-05-24 RX ADMIN — Medication 125 MICROGRAM(S): at 05:05

## 2023-05-25 LAB
ANION GAP SERPL CALC-SCNC: 13 MMOL/L — SIGNIFICANT CHANGE UP (ref 7–14)
BUN SERPL-MCNC: 17 MG/DL — SIGNIFICANT CHANGE UP (ref 7–23)
CALCIUM SERPL-MCNC: 9.2 MG/DL — SIGNIFICANT CHANGE UP (ref 8.4–10.5)
CHLORIDE SERPL-SCNC: 102 MMOL/L — SIGNIFICANT CHANGE UP (ref 98–107)
CO2 SERPL-SCNC: 23 MMOL/L — SIGNIFICANT CHANGE UP (ref 22–31)
CREAT SERPL-MCNC: 1.19 MG/DL — SIGNIFICANT CHANGE UP (ref 0.5–1.3)
EGFR: 50 ML/MIN/1.73M2 — LOW
GLUCOSE BLDC GLUCOMTR-MCNC: 147 MG/DL — HIGH (ref 70–99)
GLUCOSE BLDC GLUCOMTR-MCNC: 152 MG/DL — HIGH (ref 70–99)
GLUCOSE BLDC GLUCOMTR-MCNC: 157 MG/DL — HIGH (ref 70–99)
GLUCOSE BLDC GLUCOMTR-MCNC: 160 MG/DL — HIGH (ref 70–99)
GLUCOSE BLDC GLUCOMTR-MCNC: 170 MG/DL — HIGH (ref 70–99)
GLUCOSE SERPL-MCNC: 152 MG/DL — HIGH (ref 70–99)
HCT VFR BLD CALC: 36.4 % — SIGNIFICANT CHANGE UP (ref 34.5–45)
HGB BLD-MCNC: 11.3 G/DL — LOW (ref 11.5–15.5)
MAGNESIUM SERPL-MCNC: 1.8 MG/DL — SIGNIFICANT CHANGE UP (ref 1.6–2.6)
MCHC RBC-ENTMCNC: 26.2 PG — LOW (ref 27–34)
MCHC RBC-ENTMCNC: 31 GM/DL — LOW (ref 32–36)
MCV RBC AUTO: 84.3 FL — SIGNIFICANT CHANGE UP (ref 80–100)
NRBC # BLD: 0 /100 WBCS — SIGNIFICANT CHANGE UP (ref 0–0)
NRBC # FLD: 0 K/UL — SIGNIFICANT CHANGE UP (ref 0–0)
PHOSPHATE SERPL-MCNC: 4.7 MG/DL — HIGH (ref 2.5–4.5)
PLATELET # BLD AUTO: 348 K/UL — SIGNIFICANT CHANGE UP (ref 150–400)
POTASSIUM SERPL-MCNC: 4.1 MMOL/L — SIGNIFICANT CHANGE UP (ref 3.5–5.3)
POTASSIUM SERPL-SCNC: 4.1 MMOL/L — SIGNIFICANT CHANGE UP (ref 3.5–5.3)
RBC # BLD: 4.32 M/UL — SIGNIFICANT CHANGE UP (ref 3.8–5.2)
RBC # FLD: 16.6 % — HIGH (ref 10.3–14.5)
SODIUM SERPL-SCNC: 138 MMOL/L — SIGNIFICANT CHANGE UP (ref 135–145)
VANCOMYCIN TROUGH SERPL-MCNC: 15.7 UG/ML — SIGNIFICANT CHANGE UP (ref 10–20)
WBC # BLD: 6.84 K/UL — SIGNIFICANT CHANGE UP (ref 3.8–10.5)
WBC # FLD AUTO: 6.84 K/UL — SIGNIFICANT CHANGE UP (ref 3.8–10.5)

## 2023-05-25 PROCEDURE — 99232 SBSQ HOSP IP/OBS MODERATE 35: CPT

## 2023-05-25 RX ADMIN — HYDROMORPHONE HYDROCHLORIDE 4 MILLIGRAM(S): 2 INJECTION INTRAMUSCULAR; INTRAVENOUS; SUBCUTANEOUS at 20:30

## 2023-05-25 RX ADMIN — Medication 166.67 MILLIGRAM(S): at 17:25

## 2023-05-25 RX ADMIN — GABAPENTIN 600 MILLIGRAM(S): 400 CAPSULE ORAL at 05:32

## 2023-05-25 RX ADMIN — Medication 8 UNIT(S): at 09:25

## 2023-05-25 RX ADMIN — ATORVASTATIN CALCIUM 40 MILLIGRAM(S): 80 TABLET, FILM COATED ORAL at 21:39

## 2023-05-25 RX ADMIN — ESCITALOPRAM OXALATE 20 MILLIGRAM(S): 10 TABLET, FILM COATED ORAL at 12:02

## 2023-05-25 RX ADMIN — BUPROPION HYDROCHLORIDE 300 MILLIGRAM(S): 150 TABLET, EXTENDED RELEASE ORAL at 12:02

## 2023-05-25 RX ADMIN — Medication 125 MICROGRAM(S): at 05:33

## 2023-05-25 RX ADMIN — Medication 1: at 09:24

## 2023-05-25 RX ADMIN — Medication 8 UNIT(S): at 17:36

## 2023-05-25 RX ADMIN — SENNA PLUS 2 TABLET(S): 8.6 TABLET ORAL at 21:39

## 2023-05-25 RX ADMIN — HYDROMORPHONE HYDROCHLORIDE 4 MILLIGRAM(S): 2 INJECTION INTRAMUSCULAR; INTRAVENOUS; SUBCUTANEOUS at 20:39

## 2023-05-25 RX ADMIN — Medication 8 UNIT(S): at 12:25

## 2023-05-25 RX ADMIN — CEFTRIAXONE 100 MILLIGRAM(S): 500 INJECTION, POWDER, FOR SOLUTION INTRAMUSCULAR; INTRAVENOUS at 13:19

## 2023-05-25 RX ADMIN — INSULIN GLARGINE 24 UNIT(S): 100 INJECTION, SOLUTION SUBCUTANEOUS at 22:13

## 2023-05-25 RX ADMIN — Medication 650 MILLIGRAM(S): at 22:29

## 2023-05-25 RX ADMIN — POLYETHYLENE GLYCOL 3350 17 GRAM(S): 17 POWDER, FOR SOLUTION ORAL at 12:02

## 2023-05-25 RX ADMIN — Medication 650 MILLIGRAM(S): at 22:45

## 2023-05-25 RX ADMIN — APIXABAN 5 MILLIGRAM(S): 2.5 TABLET, FILM COATED ORAL at 05:32

## 2023-05-25 RX ADMIN — HYDROMORPHONE HYDROCHLORIDE 4 MILLIGRAM(S): 2 INJECTION INTRAMUSCULAR; INTRAVENOUS; SUBCUTANEOUS at 12:22

## 2023-05-25 RX ADMIN — Medication 166.67 MILLIGRAM(S): at 05:36

## 2023-05-25 RX ADMIN — HYDROMORPHONE HYDROCHLORIDE 4 MILLIGRAM(S): 2 INJECTION INTRAMUSCULAR; INTRAVENOUS; SUBCUTANEOUS at 04:13

## 2023-05-25 RX ADMIN — APIXABAN 5 MILLIGRAM(S): 2.5 TABLET, FILM COATED ORAL at 17:26

## 2023-05-25 RX ADMIN — PANTOPRAZOLE SODIUM 40 MILLIGRAM(S): 20 TABLET, DELAYED RELEASE ORAL at 05:32

## 2023-05-25 RX ADMIN — SPIRONOLACTONE 25 MILLIGRAM(S): 25 TABLET, FILM COATED ORAL at 05:32

## 2023-05-25 RX ADMIN — HYDROMORPHONE HYDROCHLORIDE 4 MILLIGRAM(S): 2 INJECTION INTRAMUSCULAR; INTRAVENOUS; SUBCUTANEOUS at 12:54

## 2023-05-25 RX ADMIN — Medication 1: at 17:36

## 2023-05-25 RX ADMIN — BUMETANIDE 0.5 MILLIGRAM(S): 0.25 INJECTION INTRAMUSCULAR; INTRAVENOUS at 05:32

## 2023-05-25 RX ADMIN — GABAPENTIN 600 MILLIGRAM(S): 400 CAPSULE ORAL at 17:26

## 2023-05-25 NOTE — CONSULT NOTE ADULT - SUBJECTIVE AND OBJECTIVE BOX
EP Attending  HISTORY OF PRESENT ILLNESS: HPI:  Patient is a 69yo F with PMH significant for T2DM, Afib s/p ablation on Eliquis, fibromyalgia, OA, RENARD on CPAP, depression, chronic venous insufficiency, endometrial cancer s/p hysterectomy 2010, HTN, HLD, hypothyroidism, brought from assisted living for generalized weakness of 3 weeks duration, now with associated worsening of bilateral LE pain for 1 week duration. She reported fever and chills of 2-3 days duration. She also stated she had been having difficulty ambulating for the past few days, when she ordinarily uses a walker to ambulate without concerns.   She lives at the assisted living facility because she “could not manage the house.” She was initially noted by staff in the ED to have slurred speech but stated that was because she had been frazzled by the commotion associated with bringing her to the hospital late at night.   She has been hospitalized at least 3 times in the past for LE cellulitis.  Vital signs significant for: Febrile 38.8C, 100% on 2L NC now 96% on RA  Labs significant for: Hb 11.2, INR 1.63, Na 133, Cr 1.18 (1.23 in 5/2021), UA: mod LE, 13 WBCs, neg bacteria, neg nitrite; f/s 140s-170s  Imaging significant for:   - CT head: no acute intracranial hemorrhage or mass effect  - CXR (personally reviewed) – without consolidation, infiltration, or pulmonary vascular congestion   (22 May 2023 11:52)    Ms Alvarez states all of her CV/EP care is now thru [facility], with Dr Steven and Dr Skinner.  She underwent what sounds like AFlutter ablation (under sedation) then AFib ablation (under general anesthesia) in 2021, after an unsuccessful trial of Sotalol for a few months, where she was experiencing tachy-domingo syndrome.  She is not experiencing palpitations. She's had post-ablation relative bradycardia but has not required permanent pacing. She has a loop recorder being managed by Dr Skinner.  She is taking apixaban 5mg BID, and is not on any AV mickey blockers.  No faintign or lightheadedness. No angina or palpitations. A 10 pt ROS is otherwise negative.  She does not drink smoke or use drugs. She is compliant with her RENARD treatment (CPAP machine). She takes diuretics for hypertension (stable), and takes insulin for diabetes but is not adequately managed with A1c>10%.    PAST MEDICAL & SURGICAL HISTORY:  Personal History of Arthritis  Personal History of Female Genital Cancer  Personal History of Hypertension  Hypothyroidism  Diabetes Mellitus  Hyperlipidemia  High Triglycerides  Osteoarthritis of Knee  right. requires cane  Obstructive Sleep Apnea  Constipation  Diarrhea  Depression  Environmental Allergies  Morbidly Obese  Fibromyalgia  GERD with Apnea  Restless Legs Syndrome (RLS)  Umbilical Hernia  Atrial fibrillation  S/P Tonsillectomy and Adenoidectomy  S/P FESS (Functional Endoscopic Sinus Surgery)  Carpal Tunnel Syndrome  release left  Endometrial Ca s/p RODRIGO, BSO  Incarcerated Ventral Hernia    MEDICATIONS  (STANDING):  apixaban 5 milliGRAM(s) Oral every 12 hours  atorvastatin 40 milliGRAM(s) Oral at bedtime  buMETAnide 0.5 milliGRAM(s) Oral daily  buPROPion XL (24-Hour) . 300 milliGRAM(s) Oral daily  cefTRIAXone   IVPB 1000 milliGRAM(s) IV Intermittent every 24 hours  dextrose 5%. 1000 milliLiter(s) (50 mL/Hr) IV Continuous <Continuous>  dextrose 5%. 1000 milliLiter(s) (100 mL/Hr) IV Continuous <Continuous>  dextrose 50% Injectable 25 Gram(s) IV Push once  dextrose 50% Injectable 12.5 Gram(s) IV Push once  dextrose 50% Injectable 25 Gram(s) IV Push once  escitalopram 20 milliGRAM(s) Oral daily  gabapentin 600 milliGRAM(s) Oral two times a day  glucagon  Injectable 1 milliGRAM(s) IntraMuscular once  insulin glargine Injectable (LANTUS) 24 Unit(s) SubCutaneous at bedtime  insulin lispro (ADMELOG) corrective regimen sliding scale   SubCutaneous three times a day before meals  insulin lispro (ADMELOG) corrective regimen sliding scale   SubCutaneous at bedtime  insulin lispro Injectable (ADMELOG) 8 Unit(s) SubCutaneous three times a day before meals  levothyroxine 125 MICROGram(s) Oral daily  pantoprazole    Tablet 40 milliGRAM(s) Oral before breakfast  polyethylene glycol 3350 17 Gram(s) Oral daily  senna 2 Tablet(s) Oral at bedtime  spironolactone 25 milliGRAM(s) Oral daily  vancomycin  IVPB 1250 milliGRAM(s) IV Intermittent every 12 hours    Allergies    smoke (Rhinitis; Rhinorrhea)  adhesives (Rash)  pollen (Rhinitis; Rhinorrhea)  dust (Rhinitis; Rhinorrhea)  Tin/costume jewelry (Rash)  aerosols, perfumes, fabric softeners (Rash; Rhinitis; Rhinorrhea)  Lyrica (Rash)    Intolerances        FAMILY HISTORY:  No pertinent family history in first degree relatives      Non-contributary for premature coronary disease or sudden cardiac death    SOCIAL HISTORY:    [x ] Non-smoker  [ ] Smoker  [ ] Alcohol      PHYSICAL EXAM:  T(C): 36.4 (05-25-23 @ 05:30), Max: 36.4 (05-24-23 @ 22:04)  HR: 58 (05-25-23 @ 11:46) (51 - 58)  BP: 124/50 (05-25-23 @ 05:30) (109/51 - 124/50)  RR: 18 (05-25-23 @ 05:30) (18 - 18)  SpO2: 97% (05-25-23 @ 11:46) (95% - 97%)  Wt(kg): --    Appearance: appears stated age, nondiaphoretic, obesity with BMI>50 with abdominal and pelvic/flank soft tissue excess.  HEENT:   Normal oral mucosa, PERRL, EOMI	  Lymphatic: No lymphadenopathy , no edema  Cardiovascular: Normal S1 S2- HR 50-, No JVD, No murmurs , Peripheral pulses palpable 2+ bilaterally  Respiratory: Lungs clear to auscultation, normal effort 	  Gastrointestinal:  Soft, Non-tender, + BS	  Skin: No rashes, No ecchymoses, No cyanosis, warm to touch  Musculoskeletal: Normal range of motion, normal strength  Psychiatry:  Mood & affect appropriate    LABS:	 	                          11.3   6.84  )-----------( 348      ( 25 May 2023 04:10 )             36.4     05-25    138  |  102  |  17  ----------------------------<  152<H>  4.1   |  23  |  1.19    Ca    9.2      25 May 2023 04:10  Phos  4.7     05-25  Mg     1.80     05-25      ASSESSMENT/PLAN: Ms Alvarez is a very pleasant 68y Female with history of paroxysmal AFib ablation.    s/p ablations in 2021 (Dr Skinner), and later on implantation of a loop recorder for bradycardia and recurrent AF surveillance.  She is not on any AV mickey blockers due to resting bradycardia, and none should be started.  Continue apixaban 5mg BID for strke prevention.  Continue diuretics for hypertension.  Continue CPAP for RENARD.  Has followup with Dr Steven and Dr Skinner - [facility] Cardio + EP.  No anticipated inpatient EP procedures. Does not require permanent pacing for stable/asymptomatic sinus bradycardia.      Nixon Alvarado M.D.  Cardiac Electrophysiology    office [phone]  pager [phone]

## 2023-05-26 LAB
ANION GAP SERPL CALC-SCNC: 15 MMOL/L — HIGH (ref 7–14)
BUN SERPL-MCNC: 17 MG/DL — SIGNIFICANT CHANGE UP (ref 7–23)
CALCIUM SERPL-MCNC: 9.3 MG/DL — SIGNIFICANT CHANGE UP (ref 8.4–10.5)
CHLORIDE SERPL-SCNC: 102 MMOL/L — SIGNIFICANT CHANGE UP (ref 98–107)
CO2 SERPL-SCNC: 23 MMOL/L — SIGNIFICANT CHANGE UP (ref 22–31)
CREAT SERPL-MCNC: 1.1 MG/DL — SIGNIFICANT CHANGE UP (ref 0.5–1.3)
EGFR: 55 ML/MIN/1.73M2 — LOW
GLUCOSE BLDC GLUCOMTR-MCNC: 131 MG/DL — HIGH (ref 70–99)
GLUCOSE BLDC GLUCOMTR-MCNC: 138 MG/DL — HIGH (ref 70–99)
GLUCOSE BLDC GLUCOMTR-MCNC: 142 MG/DL — HIGH (ref 70–99)
GLUCOSE BLDC GLUCOMTR-MCNC: 170 MG/DL — HIGH (ref 70–99)
GLUCOSE SERPL-MCNC: 144 MG/DL — HIGH (ref 70–99)
HCT VFR BLD CALC: 36.7 % — SIGNIFICANT CHANGE UP (ref 34.5–45)
HGB BLD-MCNC: 11.2 G/DL — LOW (ref 11.5–15.5)
MAGNESIUM SERPL-MCNC: 1.9 MG/DL — SIGNIFICANT CHANGE UP (ref 1.6–2.6)
MCHC RBC-ENTMCNC: 26.4 PG — LOW (ref 27–34)
MCHC RBC-ENTMCNC: 30.5 GM/DL — LOW (ref 32–36)
MCV RBC AUTO: 86.4 FL — SIGNIFICANT CHANGE UP (ref 80–100)
NRBC # BLD: 0 /100 WBCS — SIGNIFICANT CHANGE UP (ref 0–0)
NRBC # FLD: 0 K/UL — SIGNIFICANT CHANGE UP (ref 0–0)
PHOSPHATE SERPL-MCNC: 4.1 MG/DL — SIGNIFICANT CHANGE UP (ref 2.5–4.5)
PLATELET # BLD AUTO: 334 K/UL — SIGNIFICANT CHANGE UP (ref 150–400)
POTASSIUM SERPL-MCNC: 4.2 MMOL/L — SIGNIFICANT CHANGE UP (ref 3.5–5.3)
POTASSIUM SERPL-SCNC: 4.2 MMOL/L — SIGNIFICANT CHANGE UP (ref 3.5–5.3)
RBC # BLD: 4.25 M/UL — SIGNIFICANT CHANGE UP (ref 3.8–5.2)
RBC # FLD: 16.9 % — HIGH (ref 10.3–14.5)
SARS-COV-2 RNA SPEC QL NAA+PROBE: SIGNIFICANT CHANGE UP
SODIUM SERPL-SCNC: 140 MMOL/L — SIGNIFICANT CHANGE UP (ref 135–145)
VANCOMYCIN TROUGH SERPL-MCNC: 17.2 UG/ML — SIGNIFICANT CHANGE UP (ref 10–20)
WBC # BLD: 6.7 K/UL — SIGNIFICANT CHANGE UP (ref 3.8–10.5)
WBC # FLD AUTO: 6.7 K/UL — SIGNIFICANT CHANGE UP (ref 3.8–10.5)

## 2023-05-26 PROCEDURE — 99232 SBSQ HOSP IP/OBS MODERATE 35: CPT

## 2023-05-26 RX ADMIN — INSULIN GLARGINE 24 UNIT(S): 100 INJECTION, SOLUTION SUBCUTANEOUS at 22:19

## 2023-05-26 RX ADMIN — Medication 8 UNIT(S): at 17:38

## 2023-05-26 RX ADMIN — Medication 8 UNIT(S): at 12:43

## 2023-05-26 RX ADMIN — HYDROMORPHONE HYDROCHLORIDE 4 MILLIGRAM(S): 2 INJECTION INTRAMUSCULAR; INTRAVENOUS; SUBCUTANEOUS at 18:10

## 2023-05-26 RX ADMIN — PANTOPRAZOLE SODIUM 40 MILLIGRAM(S): 20 TABLET, DELAYED RELEASE ORAL at 06:30

## 2023-05-26 RX ADMIN — Medication 8 UNIT(S): at 08:43

## 2023-05-26 RX ADMIN — APIXABAN 5 MILLIGRAM(S): 2.5 TABLET, FILM COATED ORAL at 17:38

## 2023-05-26 RX ADMIN — BUPROPION HYDROCHLORIDE 300 MILLIGRAM(S): 150 TABLET, EXTENDED RELEASE ORAL at 12:44

## 2023-05-26 RX ADMIN — APIXABAN 5 MILLIGRAM(S): 2.5 TABLET, FILM COATED ORAL at 06:29

## 2023-05-26 RX ADMIN — SPIRONOLACTONE 25 MILLIGRAM(S): 25 TABLET, FILM COATED ORAL at 06:30

## 2023-05-26 RX ADMIN — SENNA PLUS 2 TABLET(S): 8.6 TABLET ORAL at 22:09

## 2023-05-26 RX ADMIN — CEFTRIAXONE 100 MILLIGRAM(S): 500 INJECTION, POWDER, FOR SOLUTION INTRAMUSCULAR; INTRAVENOUS at 13:36

## 2023-05-26 RX ADMIN — GABAPENTIN 600 MILLIGRAM(S): 400 CAPSULE ORAL at 17:37

## 2023-05-26 RX ADMIN — GABAPENTIN 600 MILLIGRAM(S): 400 CAPSULE ORAL at 06:29

## 2023-05-26 RX ADMIN — HYDROMORPHONE HYDROCHLORIDE 4 MILLIGRAM(S): 2 INJECTION INTRAMUSCULAR; INTRAVENOUS; SUBCUTANEOUS at 17:37

## 2023-05-26 RX ADMIN — Medication 166.67 MILLIGRAM(S): at 09:58

## 2023-05-26 RX ADMIN — ATORVASTATIN CALCIUM 40 MILLIGRAM(S): 80 TABLET, FILM COATED ORAL at 22:09

## 2023-05-26 RX ADMIN — Medication 125 MICROGRAM(S): at 06:30

## 2023-05-26 RX ADMIN — Medication 1: at 12:46

## 2023-05-26 RX ADMIN — HYDROMORPHONE HYDROCHLORIDE 4 MILLIGRAM(S): 2 INJECTION INTRAMUSCULAR; INTRAVENOUS; SUBCUTANEOUS at 06:28

## 2023-05-26 RX ADMIN — ESCITALOPRAM OXALATE 20 MILLIGRAM(S): 10 TABLET, FILM COATED ORAL at 12:43

## 2023-05-26 RX ADMIN — POLYETHYLENE GLYCOL 3350 17 GRAM(S): 17 POWDER, FOR SOLUTION ORAL at 12:45

## 2023-05-26 RX ADMIN — BUMETANIDE 0.5 MILLIGRAM(S): 0.25 INJECTION INTRAMUSCULAR; INTRAVENOUS at 07:35

## 2023-05-26 RX ADMIN — Medication 166.67 MILLIGRAM(S): at 22:50

## 2023-05-27 ENCOUNTER — TRANSCRIPTION ENCOUNTER (OUTPATIENT)
Age: 68
End: 2023-05-27

## 2023-05-27 LAB
ANION GAP SERPL CALC-SCNC: 10 MMOL/L — SIGNIFICANT CHANGE UP (ref 7–14)
BUN SERPL-MCNC: 15 MG/DL — SIGNIFICANT CHANGE UP (ref 7–23)
CALCIUM SERPL-MCNC: 9.3 MG/DL — SIGNIFICANT CHANGE UP (ref 8.4–10.5)
CHLORIDE SERPL-SCNC: 102 MMOL/L — SIGNIFICANT CHANGE UP (ref 98–107)
CO2 SERPL-SCNC: 26 MMOL/L — SIGNIFICANT CHANGE UP (ref 22–31)
CREAT SERPL-MCNC: 1.05 MG/DL — SIGNIFICANT CHANGE UP (ref 0.5–1.3)
CULTURE RESULTS: SIGNIFICANT CHANGE UP
CULTURE RESULTS: SIGNIFICANT CHANGE UP
EGFR: 58 ML/MIN/1.73M2 — LOW
GLUCOSE BLDC GLUCOMTR-MCNC: 129 MG/DL — HIGH (ref 70–99)
GLUCOSE BLDC GLUCOMTR-MCNC: 146 MG/DL — HIGH (ref 70–99)
GLUCOSE BLDC GLUCOMTR-MCNC: 158 MG/DL — HIGH (ref 70–99)
GLUCOSE BLDC GLUCOMTR-MCNC: 161 MG/DL — HIGH (ref 70–99)
GLUCOSE SERPL-MCNC: 158 MG/DL — HIGH (ref 70–99)
HCT VFR BLD CALC: 36.1 % — SIGNIFICANT CHANGE UP (ref 34.5–45)
HGB BLD-MCNC: 11.1 G/DL — LOW (ref 11.5–15.5)
MAGNESIUM SERPL-MCNC: 2 MG/DL — SIGNIFICANT CHANGE UP (ref 1.6–2.6)
MCHC RBC-ENTMCNC: 25.6 PG — LOW (ref 27–34)
MCHC RBC-ENTMCNC: 30.7 GM/DL — LOW (ref 32–36)
MCV RBC AUTO: 83.2 FL — SIGNIFICANT CHANGE UP (ref 80–100)
NRBC # BLD: 0 /100 WBCS — SIGNIFICANT CHANGE UP (ref 0–0)
NRBC # FLD: 0 K/UL — SIGNIFICANT CHANGE UP (ref 0–0)
PHOSPHATE SERPL-MCNC: 4.2 MG/DL — SIGNIFICANT CHANGE UP (ref 2.5–4.5)
PLATELET # BLD AUTO: 342 K/UL — SIGNIFICANT CHANGE UP (ref 150–400)
POTASSIUM SERPL-MCNC: 4.1 MMOL/L — SIGNIFICANT CHANGE UP (ref 3.5–5.3)
POTASSIUM SERPL-SCNC: 4.1 MMOL/L — SIGNIFICANT CHANGE UP (ref 3.5–5.3)
RBC # BLD: 4.34 M/UL — SIGNIFICANT CHANGE UP (ref 3.8–5.2)
RBC # FLD: 16.7 % — HIGH (ref 10.3–14.5)
SODIUM SERPL-SCNC: 138 MMOL/L — SIGNIFICANT CHANGE UP (ref 135–145)
SPECIMEN SOURCE: SIGNIFICANT CHANGE UP
SPECIMEN SOURCE: SIGNIFICANT CHANGE UP
WBC # BLD: 6.51 K/UL — SIGNIFICANT CHANGE UP (ref 3.8–10.5)
WBC # FLD AUTO: 6.51 K/UL — SIGNIFICANT CHANGE UP (ref 3.8–10.5)

## 2023-05-27 RX ORDER — ACETAMINOPHEN 500 MG
650 TABLET ORAL EVERY 6 HOURS
Refills: 0 | Status: COMPLETED | OUTPATIENT
Start: 2023-05-27 | End: 2023-05-29

## 2023-05-27 RX ORDER — CEPHALEXIN 500 MG
500 CAPSULE ORAL EVERY 8 HOURS
Refills: 0 | Status: DISCONTINUED | OUTPATIENT
Start: 2023-05-27 | End: 2023-05-29

## 2023-05-27 RX ORDER — LIDOCAINE 4 G/100G
1 CREAM TOPICAL EVERY 24 HOURS
Refills: 0 | Status: DISCONTINUED | OUTPATIENT
Start: 2023-05-27 | End: 2023-05-29

## 2023-05-27 RX ORDER — HYDROMORPHONE HYDROCHLORIDE 2 MG/ML
4 INJECTION INTRAMUSCULAR; INTRAVENOUS; SUBCUTANEOUS EVERY 4 HOURS
Refills: 0 | Status: DISCONTINUED | OUTPATIENT
Start: 2023-05-27 | End: 2023-05-29

## 2023-05-27 RX ORDER — ACETAMINOPHEN 500 MG
650 TABLET ORAL EVERY 6 HOURS
Refills: 0 | Status: DISCONTINUED | OUTPATIENT
Start: 2023-05-29 | End: 2023-05-29

## 2023-05-27 RX ADMIN — PANTOPRAZOLE SODIUM 40 MILLIGRAM(S): 20 TABLET, DELAYED RELEASE ORAL at 05:28

## 2023-05-27 RX ADMIN — GABAPENTIN 600 MILLIGRAM(S): 400 CAPSULE ORAL at 17:24

## 2023-05-27 RX ADMIN — ESCITALOPRAM OXALATE 20 MILLIGRAM(S): 10 TABLET, FILM COATED ORAL at 12:27

## 2023-05-27 RX ADMIN — Medication 650 MILLIGRAM(S): at 12:26

## 2023-05-27 RX ADMIN — Medication 125 MICROGRAM(S): at 05:27

## 2023-05-27 RX ADMIN — Medication 650 MILLIGRAM(S): at 17:25

## 2023-05-27 RX ADMIN — BUMETANIDE 0.5 MILLIGRAM(S): 0.25 INJECTION INTRAMUSCULAR; INTRAVENOUS at 05:27

## 2023-05-27 RX ADMIN — ATORVASTATIN CALCIUM 40 MILLIGRAM(S): 80 TABLET, FILM COATED ORAL at 22:50

## 2023-05-27 RX ADMIN — LIDOCAINE 1 PATCH: 4 CREAM TOPICAL at 12:25

## 2023-05-27 RX ADMIN — HYDROMORPHONE HYDROCHLORIDE 4 MILLIGRAM(S): 2 INJECTION INTRAMUSCULAR; INTRAVENOUS; SUBCUTANEOUS at 04:30

## 2023-05-27 RX ADMIN — Medication 500 MILLIGRAM(S): at 12:25

## 2023-05-27 RX ADMIN — SENNA PLUS 2 TABLET(S): 8.6 TABLET ORAL at 22:49

## 2023-05-27 RX ADMIN — Medication 8 UNIT(S): at 17:24

## 2023-05-27 RX ADMIN — BUPROPION HYDROCHLORIDE 300 MILLIGRAM(S): 150 TABLET, EXTENDED RELEASE ORAL at 12:26

## 2023-05-27 RX ADMIN — HYDROMORPHONE HYDROCHLORIDE 4 MILLIGRAM(S): 2 INJECTION INTRAMUSCULAR; INTRAVENOUS; SUBCUTANEOUS at 16:53

## 2023-05-27 RX ADMIN — Medication 8 UNIT(S): at 12:28

## 2023-05-27 RX ADMIN — GABAPENTIN 600 MILLIGRAM(S): 400 CAPSULE ORAL at 05:27

## 2023-05-27 RX ADMIN — SPIRONOLACTONE 25 MILLIGRAM(S): 25 TABLET, FILM COATED ORAL at 05:27

## 2023-05-27 RX ADMIN — Medication 650 MILLIGRAM(S): at 23:03

## 2023-05-27 RX ADMIN — POLYETHYLENE GLYCOL 3350 17 GRAM(S): 17 POWDER, FOR SOLUTION ORAL at 12:26

## 2023-05-27 RX ADMIN — Medication 500 MILLIGRAM(S): at 23:03

## 2023-05-27 RX ADMIN — APIXABAN 5 MILLIGRAM(S): 2.5 TABLET, FILM COATED ORAL at 05:27

## 2023-05-27 RX ADMIN — Medication 1: at 12:27

## 2023-05-27 RX ADMIN — LIDOCAINE 1 PATCH: 4 CREAM TOPICAL at 19:30

## 2023-05-27 RX ADMIN — HYDROMORPHONE HYDROCHLORIDE 4 MILLIGRAM(S): 2 INJECTION INTRAMUSCULAR; INTRAVENOUS; SUBCUTANEOUS at 05:00

## 2023-05-27 RX ADMIN — INSULIN GLARGINE 24 UNIT(S): 100 INJECTION, SOLUTION SUBCUTANEOUS at 22:50

## 2023-05-27 RX ADMIN — APIXABAN 5 MILLIGRAM(S): 2.5 TABLET, FILM COATED ORAL at 17:25

## 2023-05-27 RX ADMIN — HYDROMORPHONE HYDROCHLORIDE 4 MILLIGRAM(S): 2 INJECTION INTRAMUSCULAR; INTRAVENOUS; SUBCUTANEOUS at 16:16

## 2023-05-27 RX ADMIN — Medication 8 UNIT(S): at 08:47

## 2023-05-27 NOTE — DISCHARGE NOTE PROVIDER - NSDCHHATTENDCERT_GEN_ALL_CORE
Detail Level: Simple
My signature below certifies that the above stated patient is homebound and upon completion of the Face-To-Face encounter, has the need for intermittent skilled nursing, physical therapy and/or speech or occupational therapy services in their home for their current diagnosis as outlined in their initial plan of care. These services will continue to be monitored by myself or another physician.
Detail Level: Zone
Sunscreen Recommendations: SPF 30-45, broad spectrum
Detail Level: Detailed

## 2023-05-27 NOTE — DISCHARGE NOTE PROVIDER - HOSPITAL COURSE
69yo F with PMH significant for T2DM, Afib s/p ablation on Eliquis, fibromyalgia, OA, RENARD on CPAP, depression, chronic venous insufficiency, endometrial cancer s/p hysterectomy 2010, HTN, HLD, hypothyroidism, brought from assisted living for generalized weakness of 3 weeks duration, now with associated worsening of bilateral LE pain for 1 week duration, admitted for cellulitis and difficulty with ambulation.       Problem/Plan - 1:  ·  Problem: Lower extremity cellulitis.   ·  Plan: #Generalized weakness in setting of cellulitis superimposed on chronic venous insufficiency, not meeting sepsis criteria  - b/l ankle erythema, warmth, and tenderness to palpation  - No other sources of infection identified – CXR negative, no GI symptoms, COVID/Flu/RSV negative, UA with mod LE and 13 WBCs but without nitrite or bacteria  - f/u UCx, BCx  - empiric coverage with ceftriaxone, vancomycin per ID. Changing to PO keflex to complete course per ID.   - High  ESR, CRP  - initially with some concern for slurred speech which resolved when more awake – CT head negative; low suspicion for TIA  -  b/l LE doppler negative for DVT .  - ID help appreciated.      Problem/Plan - 2:  ·  Problem: Insulin-treated type 2 diabetes mellitus.   ·  Plan: - Sugars in good range.   f/s qachs, goal 140-180  - home regimen: Basaglar 30U qhs, Novolog 10U qac TID – restart home regimen at 80% dose and give additional sliding scale insulin pre-meals.     Problem/Plan - 3:  ·  Problem: Hypertension.   ·  Plan: - Goal normotension, resume home antihypertensives.     Problem/Plan - 4:  ·  Problem: Hypothyroidism.   ·  Plan: - Resume home LT4 at 125mcg po qam  - TSH wnl     Problem/Plan - 5:  ·  Problem: Fibromyalgia.   ·  Plan: - resume home meds – sees pain management outpt, takes hydromorphone 4mg po q6h PRN  - also with reportedly worsening headaches, which she says improve with antihistamines – resume her home loratadine.     Problem/Plan - 6:  ·  Problem: Major depression.   ·  Plan: - c/w home escitalopram.     Problem/Plan - 7:  ·  Problem: Status post catheter ablation of atrial fibrillation.   ·  Plan: - s/p ablation 9/2021  - reported Afib/flutter  - also reports having ILR still in place, follows with cardiology  - c/w home rate control - metoprolol  - c/w home Eliquis 5mg po BID.  -EP help appreciated.      Problem/Plan - 8:  ·  Problem: RENARD on CPAP.   ·  Plan: - Offer qhs CPAP.     Problem/Plan - 9:  ·  Problem: Urinary incontinence.   ·  Plan: - reports feeling too weak to get up to go to the bathroom at times; potentially in setting of infection  - offer prima fit if needed.     Problem/Plan - 10:  ·  Problem: Prophylactic measure.   ·  Plan; - DVT PPx: Eliquis.    Dispo: Return to [facility] with Home PT    Patient seen and evaluated, no acute somatic complaints. Reviewed discharge medications with patient; All new medications requiring new prescriptions were sent to the pharmacy of patient's choice. Reviewed need for prescription for previous home medications and new prescriptions sent if requested. Medically cleared/stable for discharge as per Dr. Bardales on 5/27/23 with close outpatient follow up within 1-2 weeks of discharge. Patient understands and agrees with plan of care. 67yo F with PMH significant for T2DM, Afib s/p ablation on Eliquis, fibromyalgia, OA, RENARD on CPAP, depression, chronic venous insufficiency, endometrial cancer s/p hysterectomy 2010, HTN, HLD, hypothyroidism, brought from assisted living for generalized weakness of 3 weeks duration, now with associated worsening of bilateral LE pain for 1 week duration, admitted for cellulitis and difficulty with ambulation.       Problem/Plan - 1:  ·  Problem: Lower extremity cellulitis.   ·  Plan: #Generalized weakness in setting of cellulitis superimposed on chronic venous insufficiency, not meeting sepsis criteria  - b/l ankle erythema, warmth, and tenderness to palpation  - No other sources of infection identified – CXR negative, no GI symptoms, COVID/Flu/RSV negative, UA with mod LE and 13 WBCs but without nitrite or bacteria  - f/u UCx, BCx  - empiric coverage with ceftriaxone, vancomycin per ID. Changing to PO keflex to complete course per ID.   - High  ESR, CRP  - initially with some concern for slurred speech which resolved when more awake – CT head negative; low suspicion for TIA  -  b/l LE doppler negative for DVT .  - ID help appreciated.      Problem/Plan - 2:  ·  Problem: Insulin-treated type 2 diabetes mellitus.   ·  Plan: - Sugars in good range.   f/s qachs, goal 140-180  - home regimen: Basaglar 30U qhs, Novolog 10U qac TID – restart home regimen at 80% dose and give additional sliding scale insulin pre-meals.     Problem/Plan - 3:  ·  Problem: Hypertension.   ·  Plan: - Goal normotension, resume home antihypertensives.     Problem/Plan - 4:  ·  Problem: Hypothyroidism.   ·  Plan: - Resume home LT4 at 125mcg po qam  - TSH wnl     Problem/Plan - 5:  ·  Problem: Fibromyalgia.   ·  Plan: - resume home meds – sees pain management outpt, takes hydromorphone 4mg po q6h PRN  - also with reportedly worsening headaches, which she says improve with antihistamines – resume her home loratadine.     Problem/Plan - 6:  ·  Problem: Major depression.   ·  Plan: - c/w home escitalopram.     Problem/Plan - 7:  ·  Problem: Status post catheter ablation of atrial fibrillation.   ·  Plan: - s/p ablation 9/2021  - reported Afib/flutter  - also reports having ILR still in place, follows with cardiology  - c/w home rate control - metoprolol  - c/w home Eliquis 5mg po BID.  -EP help appreciated.      Problem/Plan - 8:  ·  Problem: RENARD on CPAP.   ·  Plan: - Offer qhs CPAP.     Problem/Plan - 9:  ·  Problem: Urinary incontinence.   ·  Plan: - reports feeling too weak to get up to go to the bathroom at times; potentially in setting of infection  - offer prima fit if needed.     Problem/Plan - 10:  ·  Problem: Prophylactic measure.   ·  Plan; - DVT PPx: Eliquis.    #CAREY  - SCr increased 1 to 1.3  - s/p IVF 1/2NS bolus  - repeat bmp with improved SCr    Dispo: Return to [facility] with Home PT    Patient seen and evaluated, no acute somatic complaints. Reviewed discharge medications with patient; All new medications requiring new prescriptions were sent to the pharmacy of patient's choice. Reviewed need for prescription for previous home medications and new prescriptions sent if requested. Medically cleared/stable for discharge as per Dr. Bardales on 5/27/23 with close outpatient follow up within 1-2 weeks of discharge. Patient understands and agrees with plan of care. 69yo F with PMH significant for T2DM, Afib s/p ablation on Eliquis, fibromyalgia, OA, RENARD on CPAP, depression, chronic venous insufficiency, endometrial cancer s/p hysterectomy 2010, HTN, HLD, hypothyroidism, brought from assisted living for generalized weakness of 3 weeks duration, now with associated worsening of bilateral LE pain for 1 week duration, admitted for cellulitis and difficulty with ambulation.       Problem/Plan - 1:  ·  Problem: Lower extremity cellulitis.   ·  Plan: #Generalized weakness in setting of cellulitis superimposed on chronic venous insufficiency, not meeting sepsis criteria  - b/l ankle erythema, warmth, and tenderness to palpation  - No other sources of infection identified – CXR negative, no GI symptoms, COVID/Flu/RSV negative, UA with mod LE and 13 WBCs but without nitrite or bacteria  - f/u UCx, BCx  - empiric coverage with ceftriaxone, vancomycin per ID. Changing to PO keflex to complete course per ID.   - High  ESR, CRP  - initially with some concern for slurred speech which resolved when more awake – CT head negative; low suspicion for TIA  -  b/l LE doppler negative for DVT .  - ID help appreciated.      Problem/Plan - 2:  ·  Problem: Insulin-treated type 2 diabetes mellitus.   ·  Plan: - Sugars in good range.   f/s qachs, goal 140-180  - home regimen: Basaglar 30U qhs, Novolog 10U qac TID – restart home regimen at 80% dose and give additional sliding scale insulin pre-meals.     Problem/Plan - 3:  ·  Problem: Hypertension.   ·  Plan: - Goal normotension, resume home antihypertensives.     Problem/Plan - 4:  ·  Problem: Hypothyroidism.   ·  Plan: - Resume home LT4 at 125mcg po qam  - TSH wnl     Problem/Plan - 5:  ·  Problem: Fibromyalgia.   ·  Plan: - resume home meds – sees pain management outpt, takes hydromorphone 4mg po q6h PRN  - also with reportedly worsening headaches, which she says improve with antihistamines – resume her home loratadine.     Problem/Plan - 6:  ·  Problem: Major depression.   ·  Plan: - c/w home escitalopram.     Problem/Plan - 7:  ·  Problem: Status post catheter ablation of atrial fibrillation.   ·  Plan: - s/p ablation 9/2021  - reported Afib/flutter  - also reports having ILR still in place, follows with cardiology  - c/w home rate control - metoprolol  - c/w home Eliquis 5mg po BID.  -EP help appreciated.      Problem/Plan - 8:  ·  Problem: RENARD on CPAP.   ·  Plan: - Offer qhs CPAP.     Problem/Plan - 9:  ·  Problem: Urinary incontinence.   ·  Plan: - reports feeling too weak to get up to go to the bathroom at times; potentially in setting of infection  - offer prima fit if needed.     Problem/Plan - 10:  ·  Problem: Prophylactic measure.   ·  Plan; - DVT PPx: Eliquis.    #CAREY  - SCr increased 1 to 1.3  - s/p IVF 1/2NS bolus  - repeat bmp with improved SCr back to 1.15  - spironolactone held 5/29, may resume 5/30  - repeat bmp outpatient in 1 week    Dispo: Return to [facility] with Home PT    Patient seen and evaluated, no acute somatic complaints. Reviewed discharge medications with patient; All new medications requiring new prescriptions were sent to the pharmacy of patient's choice. Reviewed need for prescription for previous home medications and new prescriptions sent if requested. Medically cleared/stable for discharge as per Dr. Bardales on 5/27/23 with close outpatient follow up within 1-2 weeks of discharge. Patient understands and agrees with plan of care. 69yo F with PMH significant for T2DM, Afib s/p ablation on Eliquis, fibromyalgia, OA, RENARD on CPAP, depression, chronic venous insufficiency, endometrial cancer s/p hysterectomy 2010, HTN, HLD, hypothyroidism, brought from assisted living for generalized weakness of 3 weeks duration, now with associated worsening of bilateral LE pain for 1 week duration, admitted for cellulitis and difficulty with ambulation.       Problem/Plan - 1:  ·  Problem: Lower extremity cellulitis.   ·  Plan: #Generalized weakness in setting of cellulitis superimposed on chronic venous insufficiency, not meeting sepsis criteria  - b/l ankle erythema, warmth, and tenderness to palpation  - No other sources of infection identified – CXR negative, no GI symptoms, COVID/Flu/RSV negative, UA with mod LE and 13 WBCs but without nitrite or bacteria  - f/u UCx, BCx  - empiric coverage with ceftriaxone, vancomycin per ID. Changing to PO keflex to complete course per ID.   - High  ESR, CRP  - initially with some concern for slurred speech which resolved when more awake – CT head negative; low suspicion for TIA  -  b/l LE doppler negative for DVT .  - ID help appreciated.      Problem/Plan - 2:  ·  Problem: Insulin-treated type 2 diabetes mellitus.   ·  Plan: - Sugars in good range.   f/s qachs, goal 140-180  - home regimen: Basaglar 30U qhs, Novolog 10U qac TID – restart home regimen at 80% dose and give additional sliding scale insulin pre-meals.     Problem/Plan - 3:  ·  Problem: Hypertension.   ·  Plan: - Goal normotension, resume home antihypertensives.     Problem/Plan - 4:  ·  Problem: Hypothyroidism.   ·  Plan: - Resume home LT4 at 125mcg po qam  - TSH wnl     Problem/Plan - 5:  ·  Problem: Fibromyalgia.   ·  Plan: - resume home meds – sees pain management outpt, takes hydromorphone 4mg po q6h PRN  - also with reportedly worsening headaches, which she says improve with antihistamines – resume her home loratadine.     Problem/Plan - 6:  ·  Problem: Major depression.   ·  Plan: - c/w home escitalopram.     Problem/Plan - 7:  ·  Problem: Status post catheter ablation of atrial fibrillation.   ·  Plan: - s/p ablation 9/2021  - reported Afib/flutter  - also reports having ILR still in place, follows with cardiology  - c/w home rate control - metoprolol  - c/w home Eliquis 5mg po BID.  -EP help appreciated.      Problem/Plan - 8:  ·  Problem: RENARD on CPAP.   ·  Plan: - Offer qhs CPAP.     Problem/Plan - 9:  ·  Problem: Urinary incontinence.   ·  Plan: - reports feeling too weak to get up to go to the bathroom at times; potentially in setting of infection  - offer prima fit if needed.     Problem/Plan - 10:  ·  Problem: Prophylactic measure.   ·  Plan; - DVT PPx: Eliquis.    #CAREY  - SCr increased 1 to 1.3  - s/p IVF 1/2NS bolus  - repeat bmp with improved SCr back to 1.15  - spironolactone held 5/29, may resume 5/30  - repeat bmp outpatient in 1 week  Seen and examined earlier today with family in room. Said feel better than when I came in . I want to go back now.  Rpt creatinine better so dc to [place]   Dispo: Return to [facility] with Home PT    Patient seen and evaluated, no acute somatic complaints. Reviewed discharge medications with patient; All new medications requiring new prescriptions were sent to the pharmacy of patient's choice. Reviewed need for prescription for previous home medications and new prescriptions sent if requested. Medically cleared/stable for discharge as per Dr. Bardales on 5/27/23 with close outpatient follow up within 1-2 weeks of discharge. Patient understands and agrees with plan of care.

## 2023-05-27 NOTE — DISCHARGE NOTE PROVIDER - NSDCCPCAREPLAN_GEN_ALL_CORE_FT
PRINCIPAL DISCHARGE DIAGNOSIS  Diagnosis: Sepsis  Assessment and Plan of Treatment: You had an infection of the skin of your lower legs and a urine infection. Infectious disease saw you and gave you antibiotics. Sonogram ruled out any blood clots in your legs. Follow up with your pcp in 1-2 weeks for further care      SECONDARY DISCHARGE DIAGNOSES  Diagnosis: Fibromyalgia  Assessment and Plan of Treatment: Continue seeing pain management    Diagnosis: RENARD on CPAP  Assessment and Plan of Treatment: Continue your CPAP    Diagnosis: Insulin-treated type 2 diabetes mellitus  Assessment and Plan of Treatment: .Continue a consistent carbohydrate diet (Meaning eating the same amount of carbohydrates at the same time each day). Monitor blood glucose levels four times a day before meals and at bedtime. Record blood sugars and bring to outpatient providers appointment in order to be reviewed by your doctor for management modifications. If your sugars are more than 400 or less than 70 you should contact your Primary Care Physician immediately. Monitor for signs/symptoms of low blood glucose, such as, dizziness, altered mental status, or cool/clammy skin. In addition, monitor for signs/symptoms of high blood glucose, such as, feeling hot, dry, fatigued, or with increased thirst/urination.  Exercise daily for at least 30 minutes and weight loss.  Follow up with your primary care physician and endocrinologist for routine Hemoglobin A1C checks and management.  Follow up with your ophthalmologist for routine yearly vision exams. Make regular podiatry appointments in order to have feet checked for wounds.    Diagnosis: Status post catheter ablation of atrial fibrillation  Assessment and Plan of Treatment: Follow up with your cardiologist and electrophysiologist     PRINCIPAL DISCHARGE DIAGNOSIS  Diagnosis: Sepsis  Assessment and Plan of Treatment: You had an infection of the skin of your lower legs and a urine infection. Infectious disease saw you and gave you antibiotics. Sonogram ruled out any blood clots in your legs. Follow up with your pcp in 1-2 weeks for further care      SECONDARY DISCHARGE DIAGNOSES  Diagnosis: Fibromyalgia  Assessment and Plan of Treatment: Continue seeing pain management    Diagnosis: RENARD on CPAP  Assessment and Plan of Treatment: Continue your CPAP    Diagnosis: Insulin-treated type 2 diabetes mellitus  Assessment and Plan of Treatment: .Continue a consistent carbohydrate diet (Meaning eating the same amount of carbohydrates at the same time each day). Monitor blood glucose levels four times a day before meals and at bedtime. Record blood sugars and bring to outpatient providers appointment in order to be reviewed by your doctor for management modifications. If your sugars are more than 400 or less than 70 you should contact your Primary Care Physician immediately. Monitor for signs/symptoms of low blood glucose, such as, dizziness, altered mental status, or cool/clammy skin. In addition, monitor for signs/symptoms of high blood glucose, such as, feeling hot, dry, fatigued, or with increased thirst/urination.  Exercise daily for at least 30 minutes and weight loss.  Follow up with your primary care physician and endocrinologist for routine Hemoglobin A1C checks and management.  Follow up with your ophthalmologist for routine yearly vision exams. Make regular podiatry appointments in order to have feet checked for wounds.    Diagnosis: Status post catheter ablation of atrial fibrillation  Assessment and Plan of Treatment: Follow up with your cardiologist and electrophysiologist    Diagnosis: CAREY (acute kidney injury)  Assessment and Plan of Treatment: You had temporary worsened kidney functioning which is now back to normal after iv fluids. spironolactone was held today. you may resume this on 5/30. Repeat a bmp in 1 week.

## 2023-05-27 NOTE — DISCHARGE NOTE PROVIDER - NSDCMRMEDTOKEN_GEN_ALL_CORE_FT
atorvastatin 40 mg oral tablet: 1 tab(s) orally once a day (at bedtime)  Basaglar KwikPen 100 units/mL subcutaneous solution: 30 unit(s) subcutaneous once a day (at bedtime)  BUMETANIDE 0.5 MG TABLET: TAKE 1 TABLET BY MOUTH EVERY DAY  buPROPion 300 mg/24 hours (XL) oral tablet, extended release: 1 tab(s) orally once a day  clonazePAM 0.5 mg oral tablet: 1 tab(s) orally once a day (at bedtime), As needed, insomnia/anxiety  Eliquis 5 mg oral tablet: 1 orally 2 times a day  escitalopram 20 mg oral tablet: 1 tab(s) orally once a day  gabapentin 300 mg oral capsule: 2 cap(s) orally 2 times a day  HYDROmorphone 4 mg oral tablet: 1 tab(s) orally every 6 hours, As needed, Severe Pain (7 - 10)  levothyroxine 125 mcg (0.125 mg) oral tablet: 1 tab(s) orally once a day  loratadine 10 mg oral tablet: 1 orally once a day as needed for  allergy symptoms  METFORMIN HCL 1,000 MG TABLET: TAKE 1 TABLET BY MOUTH TWICE A DAY  NOVOLOG 100 UNIT/ML FLEXPEN: 10 unit(s) subcutaneous 3 times a day before meal   Hold if you are not eating   PANTOPRAZOLE SOD DR 40 MG TAB: TAKE 1 TABLET BY MOUTH EVERY DAY  spironolactone 25 mg oral tablet: 1 tab(s) orally once a day   atorvastatin 40 mg oral tablet: 1 tab(s) orally once a day (at bedtime)  Basaglar KwikPen 100 units/mL subcutaneous solution: 30 unit(s) subcutaneous once a day (at bedtime)  BUMETANIDE 0.5 MG TABLET: TAKE 1 TABLET BY MOUTH EVERY DAY  buPROPion 300 mg/24 hours (XL) oral tablet, extended release: 1 tab(s) orally once a day  clonazePAM 0.5 mg oral tablet: 1 tab(s) orally once a day (at bedtime), As needed, insomnia/anxiety  Eliquis 5 mg oral tablet: 1 orally 2 times a day  escitalopram 20 mg oral tablet: 1 tab(s) orally once a day  gabapentin 300 mg oral capsule: 2 cap(s) orally 2 times a day  HYDROmorphone 4 mg oral tablet: 1 tab(s) orally every 6 hours, As needed, Severe Pain (7 - 10)  levothyroxine 125 mcg (0.125 mg) oral tablet: 1 tab(s) orally once a day  loratadine 10 mg oral tablet: 1 orally once a day as needed for  allergy symptoms  melatonin 3 mg oral tablet: 1 tab(s) orally once a day (at bedtime) As needed Insomnia  METFORMIN HCL 1,000 MG TABLET: TAKE 1 TABLET BY MOUTH TWICE A DAY  NOVOLOG 100 UNIT/ML FLEXPEN: 10 unit(s) subcutaneous 3 times a day before meal   Hold if you are not eating   PANTOPRAZOLE SOD DR 40 MG TAB: TAKE 1 TABLET BY MOUTH EVERY DAY  polyethylene glycol 3350 oral powder for reconstitution: 17 gram(s) orally once a day  senna leaf extract oral tablet: 2 tab(s) orally once a day (at bedtime)  spironolactone 25 mg oral tablet: 1 tab(s) orally once a day   atorvastatin 40 mg oral tablet: 1 tab(s) orally once a day (at bedtime)  Basaglar KwikPen 100 units/mL subcutaneous solution: 30 unit(s) subcutaneous once a day (at bedtime)  BUMETANIDE 0.5 MG TABLET: TAKE 1 TABLET BY MOUTH EVERY DAY  buPROPion 300 mg/24 hours (XL) oral tablet, extended release: 1 tab(s) orally once a day  cephalexin 500 mg oral capsule: 1 cap(s) orally every 8 hours  Eliquis 5 mg oral tablet: 1 orally 2 times a day  escitalopram 20 mg oral tablet: 1 tab(s) orally once a day  gabapentin 300 mg oral capsule: 2 cap(s) orally 2 times a day  HYDROmorphone 4 mg oral tablet: 1 tab(s) orally every 4 hours as needed for moderate to severe pain MDD: 6 tabs  levothyroxine 125 mcg (0.125 mg) oral tablet: 1 tab(s) orally once a day  loratadine 10 mg oral tablet: 1 orally once a day as needed for  allergy symptoms  melatonin 3 mg oral tablet: 1 tab(s) orally once a day (at bedtime) As needed Insomnia  METFORMIN HCL 1,000 MG TABLET: TAKE 1 TABLET BY MOUTH TWICE A DAY  NOVOLOG 100 UNIT/ML FLEXPEN: 10 unit(s) subcutaneous 3 times a day before meal   Hold if you are not eating   PANTOPRAZOLE SOD DR 40 MG TAB: TAKE 1 TABLET BY MOUTH EVERY DAY  polyethylene glycol 3350 oral powder for reconstitution: 17 gram(s) orally once a day  senna leaf extract oral tablet: 2 tab(s) orally once a day (at bedtime)  spironolactone 25 mg oral tablet: 1 tab(s) orally once a day

## 2023-05-28 LAB
GLUCOSE BLDC GLUCOMTR-MCNC: 143 MG/DL — HIGH (ref 70–99)
GLUCOSE BLDC GLUCOMTR-MCNC: 145 MG/DL — HIGH (ref 70–99)
GLUCOSE BLDC GLUCOMTR-MCNC: 167 MG/DL — HIGH (ref 70–99)
GLUCOSE BLDC GLUCOMTR-MCNC: 173 MG/DL — HIGH (ref 70–99)

## 2023-05-28 RX ORDER — SENNA PLUS 8.6 MG/1
2 TABLET ORAL
Qty: 0 | Refills: 0 | DISCHARGE
Start: 2023-05-28

## 2023-05-28 RX ORDER — LANOLIN ALCOHOL/MO/W.PET/CERES
1 CREAM (GRAM) TOPICAL
Qty: 0 | Refills: 0 | DISCHARGE
Start: 2023-05-28

## 2023-05-28 RX ORDER — POLYETHYLENE GLYCOL 3350 17 G/17G
17 POWDER, FOR SOLUTION ORAL
Qty: 0 | Refills: 0 | DISCHARGE
Start: 2023-05-28

## 2023-05-28 RX ADMIN — PANTOPRAZOLE SODIUM 40 MILLIGRAM(S): 20 TABLET, DELAYED RELEASE ORAL at 05:18

## 2023-05-28 RX ADMIN — APIXABAN 5 MILLIGRAM(S): 2.5 TABLET, FILM COATED ORAL at 17:58

## 2023-05-28 RX ADMIN — GABAPENTIN 600 MILLIGRAM(S): 400 CAPSULE ORAL at 17:58

## 2023-05-28 RX ADMIN — APIXABAN 5 MILLIGRAM(S): 2.5 TABLET, FILM COATED ORAL at 05:20

## 2023-05-28 RX ADMIN — Medication 8 UNIT(S): at 09:14

## 2023-05-28 RX ADMIN — Medication 125 MICROGRAM(S): at 05:19

## 2023-05-28 RX ADMIN — HYDROMORPHONE HYDROCHLORIDE 4 MILLIGRAM(S): 2 INJECTION INTRAMUSCULAR; INTRAVENOUS; SUBCUTANEOUS at 11:24

## 2023-05-28 RX ADMIN — SPIRONOLACTONE 25 MILLIGRAM(S): 25 TABLET, FILM COATED ORAL at 05:19

## 2023-05-28 RX ADMIN — Medication 1: at 17:57

## 2023-05-28 RX ADMIN — Medication 650 MILLIGRAM(S): at 18:02

## 2023-05-28 RX ADMIN — Medication 500 MILLIGRAM(S): at 05:18

## 2023-05-28 RX ADMIN — BUMETANIDE 0.5 MILLIGRAM(S): 0.25 INJECTION INTRAMUSCULAR; INTRAVENOUS at 05:19

## 2023-05-28 RX ADMIN — Medication 8 UNIT(S): at 12:31

## 2023-05-28 RX ADMIN — HYDROMORPHONE HYDROCHLORIDE 4 MILLIGRAM(S): 2 INJECTION INTRAMUSCULAR; INTRAVENOUS; SUBCUTANEOUS at 21:34

## 2023-05-28 RX ADMIN — SENNA PLUS 2 TABLET(S): 8.6 TABLET ORAL at 21:31

## 2023-05-28 RX ADMIN — BUPROPION HYDROCHLORIDE 300 MILLIGRAM(S): 150 TABLET, EXTENDED RELEASE ORAL at 11:25

## 2023-05-28 RX ADMIN — INSULIN GLARGINE 24 UNIT(S): 100 INJECTION, SOLUTION SUBCUTANEOUS at 22:46

## 2023-05-28 RX ADMIN — ESCITALOPRAM OXALATE 20 MILLIGRAM(S): 10 TABLET, FILM COATED ORAL at 11:25

## 2023-05-28 RX ADMIN — HYDROMORPHONE HYDROCHLORIDE 4 MILLIGRAM(S): 2 INJECTION INTRAMUSCULAR; INTRAVENOUS; SUBCUTANEOUS at 12:24

## 2023-05-28 RX ADMIN — Medication 650 MILLIGRAM(S): at 05:56

## 2023-05-28 RX ADMIN — Medication 650 MILLIGRAM(S): at 05:18

## 2023-05-28 RX ADMIN — HYDROMORPHONE HYDROCHLORIDE 4 MILLIGRAM(S): 2 INJECTION INTRAMUSCULAR; INTRAVENOUS; SUBCUTANEOUS at 22:34

## 2023-05-28 RX ADMIN — Medication 500 MILLIGRAM(S): at 21:33

## 2023-05-28 RX ADMIN — Medication 1: at 12:31

## 2023-05-28 RX ADMIN — Medication 500 MILLIGRAM(S): at 12:31

## 2023-05-28 RX ADMIN — LIDOCAINE 1 PATCH: 4 CREAM TOPICAL at 19:27

## 2023-05-28 RX ADMIN — LIDOCAINE 1 PATCH: 4 CREAM TOPICAL at 11:26

## 2023-05-28 RX ADMIN — Medication 650 MILLIGRAM(S): at 11:24

## 2023-05-28 RX ADMIN — Medication 8 UNIT(S): at 17:57

## 2023-05-28 RX ADMIN — LIDOCAINE 1 PATCH: 4 CREAM TOPICAL at 00:10

## 2023-05-28 RX ADMIN — GABAPENTIN 600 MILLIGRAM(S): 400 CAPSULE ORAL at 05:19

## 2023-05-28 RX ADMIN — Medication 650 MILLIGRAM(S): at 00:10

## 2023-05-28 RX ADMIN — ATORVASTATIN CALCIUM 40 MILLIGRAM(S): 80 TABLET, FILM COATED ORAL at 21:31

## 2023-05-28 NOTE — PROGRESS NOTE ADULT - ASSESSMENT
67yo F with PMH significant for T2DM, Afib s/p ablation on Eliquis, fibromyalgia, OA, RENARD on CPAP, depression, chronic venous insufficiency, endometrial cancer s/p hysterectomy 2010, HTN, HLD, hypothyroidism, brought from assisted living for generalized weakness of 3 weeks duration, now with associated worsening of bilateral LE pain for 1 week duration, admitted for cellulitis and difficulty with ambulation.       Problem/Plan - 1:  ·  Problem: Lower extremity cellulitis.   ·  Plan: #Generalized weakness in setting of cellulitis superimposed on chronic venous insufficiency, not meeting sepsis criteria  - b/l ankle erythema, warmth, and tenderness to palpation  - No other sources of infection identified – CXR negative, no GI symptoms, COVID/Flu/RSV negative, UA with mod LE and 13 WBCs but without nitrite or bacteria  - f/u UCx, BCx  - empiric coverage with ceftriaxone, vancomycin per ID. Changing to PO per ID.   - High  ESR, CRP  - initially with some concern for slurred speech which resolved when more awake – CT head negative; low suspicion for TIA  -  b/l LE doppler negative for DVT .  - ID help appreciated.      Problem/Plan - 2:  ·  Problem: Insulin-treated type 2 diabetes mellitus.   ·  Plan: - Sugars in good range.   f/s qachs, goal 140-180  - home regimen: Basaglar 30U qhs, Novolog 10U qac TID – restart home regimen at 80% dose and give additional sliding scale insulin pre-meals.     Problem/Plan - 3:  ·  Problem: Hypertension.   ·  Plan: - Goal normotension, resume home antihypertensives.     Problem/Plan - 4:  ·  Problem: Hypothyroidism.   ·  Plan: - Resume home LT4 at 125mcg po qam  - Check TSH.     Problem/Plan - 5:  ·  Problem: Fibromyalgia.   ·  Plan: - resume home meds – sees pain management outpt, takes hydromorphone 4mg po q6h PRN  - also with reportedly worsening headaches, which she says improve with antihistamines – resume her home loratadine.     Problem/Plan - 6:  ·  Problem: Major depression.   ·  Plan: - c/w home escitalopram.     Problem/Plan - 7:  ·  Problem: Status post catheter ablation of atrial fibrillation.   ·  Plan: - s/p ablation 9/2021  - reported Afib/flutter  - also reports having ILR still in place, follows with cardiology  - c/w home rate control - metoprolol  - c/w home Eliquis 5mg po BID.  -EP help appreciated.      Problem/Plan - 8:  ·  Problem: RENARD on CPAP.   ·  Plan: - Offer qhs CPAP.     Problem/Plan - 9:  ·  Problem: Urinary incontinence.   ·  Plan: - reports feeling too weak to get up to go to the bathroom at times; potentially in setting of infection  - offer prima fit if needed.     Problem/Plan - 10:  ·  Problem: Prophylactic measure.   ·  Plan; - DVT PPx: Eliquis.    Dispo; DC planning on PO Abxs. Medically stable . D/W ACP Aric and patient in detail.       
69 yo woman with diabetes, fibromyalgia, osteoarthritis, RENARD, chronic venous insufficiency presenting from assisted living facility with fever, fatigue  suspect cellulitis / superinfection chronic venous stasis right > left  klebsiella in urine - patient incontinent UA only 13 WBC   fever     cellulitis right lower leg  vanco dose therapeutic improving   possible UTI klebsiella sensitive to ceftriaxone   chronic stasis dermatitis legs   fibromyalgia    Suggest;    can switch to keflex 500 mg po q 8 h in AM --> 5/31
67yo F with PMH significant for T2DM, Afib s/p ablation on Eliquis, fibromyalgia, OA, RENARD on CPAP, depression, chronic venous insufficiency, endometrial cancer s/p hysterectomy 2010, HTN, HLD, hypothyroidism, brought from assisted living for generalized weakness of 3 weeks duration, now with associated worsening of bilateral LE pain for 1 week duration, admitted for cellulitis and difficulty with ambulation.       Problem/Plan - 1:  ·  Problem: Lower extremity cellulitis.   ·  Plan: #Generalized weakness in setting of cellulitis superimposed on chronic venous insufficiency, not meeting sepsis criteria  - b/l ankle erythema, warmth, and tenderness to palpation  - No other sources of infection identified – CXR negative, no GI symptoms, COVID/Flu/RSV negative, UA with mod LE and 13 WBCs but without nitrite or bacteria  - f/u UCx, BCx  - empiric coverage with ceftriaxone, vancomycin per ID. Changing to PO per ID.   - High  ESR, CRP  - initially with some concern for slurred speech which resolved when more awake – CT head negative; low suspicion for TIA  -  b/l LE doppler negative for DVT .  - ID help appreciated.      Problem/Plan - 2:  ·  Problem: Insulin-treated type 2 diabetes mellitus.   ·  Plan: - Sugars in good range.   f/s qachs, goal 140-180  - home regimen: Basaglar 30U qhs, Novolog 10U qac TID – restart home regimen at 80% dose and give additional sliding scale insulin pre-meals.     Problem/Plan - 3:  ·  Problem: Hypertension.   ·  Plan: - Goal normotension, resume home antihypertensives.     Problem/Plan - 4:  ·  Problem: Hypothyroidism.   ·  Plan: - Resume home LT4 at 125mcg po qam  - Check TSH.     Problem/Plan - 5:  ·  Problem: Fibromyalgia.   ·  Plan: - resume home meds – sees pain management outpt, takes hydromorphone 4mg po q6h PRN  - also with reportedly worsening headaches, which she says improve with antihistamines – resume her home loratadine.     Problem/Plan - 6:  ·  Problem: Major depression.   ·  Plan: - c/w home escitalopram.     Problem/Plan - 7:  ·  Problem: Status post catheter ablation of atrial fibrillation.   ·  Plan: - s/p ablation 9/2021  - reported Afib/flutter  - also reports having ILR still in place, follows with cardiology  - c/w home rate control - metoprolol  - c/w home Eliquis 5mg po BID.  -EP help appreciated.      Problem/Plan - 8:  ·  Problem: RENARD on CPAP.   ·  Plan: - Offer qhs CPAP.     Problem/Plan - 9:  ·  Problem: Urinary incontinence.   ·  Plan: - reports feeling too weak to get up to go to the bathroom at times; potentially in setting of infection  - offer prima fit if needed.     Problem/Plan - 10:  ·  Problem: Prophylactic measure.   ·  Plan; - DVT PPx: Eliquis.    Dispo; DC planning on PO Abxs. 
67yo F with PMH significant for T2DM, Afib s/p ablation on Eliquis, fibromyalgia, OA, RENARD on CPAP, depression, chronic venous insufficiency, endometrial cancer s/p hysterectomy 2010, HTN, HLD, hypothyroidism, brought from assisted living for generalized weakness of 3 weeks duration, now with associated worsening of bilateral LE pain for 1 week duration, admitted for cellulitis and difficulty with ambulation.       Problem/Plan - 1:  ·  Problem: Lower extremity cellulitis.   ·  Plan: #Generalized weakness in setting of cellulitis superimposed on chronic venous insufficiency, not meeting sepsis criteria  - b/l ankle erythema, warmth, and tenderness to palpation  - No other sources of infection identified – CXR negative, no GI symptoms, COVID/Flu/RSV negative, UA with mod LE and 13 WBCs but without nitrite or bacteria  - f/u UCx, BCx  - empiric coverage with ceftriaxone, vancomycin per ID. Changing to PO per ID.   - High  ESR, CRP  - initially with some concern for slurred speech which resolved when more awake – CT head negative; low suspicion for TIA  -  b/l LE doppler negative for DVT .  - ID help appreciated.      Problem/Plan - 2:  ·  Problem: Insulin-treated type 2 diabetes mellitus.   ·  Plan: - Sugars in good range.   f/s qachs, goal 140-180  - home regimen: Basaglar 30U qhs, Novolog 10U qac TID – restart home regimen at 80% dose and give additional sliding scale insulin pre-meals.     Problem/Plan - 3:  ·  Problem: Hypertension.   ·  Plan: - Goal normotension, resume home antihypertensives.     Problem/Plan - 4:  ·  Problem: Hypothyroidism.   ·  Plan: - Resume home LT4 at 125mcg po qam  - Check TSH.     Problem/Plan - 5:  ·  Problem: Fibromyalgia.   ·  Plan: - resume home meds – sees pain management outpt, takes hydromorphone 4mg po q6h PRN  - also with reportedly worsening headaches, which she says improve with antihistamines – resume her home loratadine.     Problem/Plan - 6:  ·  Problem: Major depression.   ·  Plan: - c/w home escitalopram.     Problem/Plan - 7:  ·  Problem: Status post catheter ablation of atrial fibrillation.   ·  Plan: - s/p ablation 9/2021  - reported Afib/flutter  - also reports having ILR still in place, follows with cardiology  - c/w home rate control - metoprolol  - c/w home Eliquis 5mg po BID.  -EP help appreciated.      Problem/Plan - 8:  ·  Problem: RENARD on CPAP.   ·  Plan: - Offer qhs CPAP.     Problem/Plan - 9:  ·  Problem: Urinary incontinence.   ·  Plan: - reports feeling too weak to get up to go to the bathroom at times; potentially in setting of infection  - offer prima fit if needed.     Problem/Plan - 10:  ·  Problem: Prophylactic measure.   ·  Plan; - DVT PPx: Eliquis.    Dispo; DC planning on PO Abxs. Medically stable . D/W ACP Aric and patient in detail.     
69 yo woman with diabetes, fibromyalgia, osteoarthritis, RENARD, chronic venous insufficiency presenting from assisted living facility with fever, fatigue  suspect cellulitis / superinfection chronic venous stasis right > left  klebsiella in urine - patient incontinent UA only 13 WBC   fever     cellulitis right lower leg  vanco dose increased today   possible UTI klebsiella sensitive to ceftriaxone   chronic stasis dermatitis legs   fibromyalgia    Suggest;    follow blood cultures  agree with vanco 1.25 gm iv q 12 h   follow trough prior 3rd dose   c/w ceftriaxone for now     will follow 
69yo F with PMH significant for T2DM, Afib s/p ablation on Eliquis, fibromyalgia, OA, RENARD on CPAP, depression, chronic venous insufficiency, endometrial cancer s/p hysterectomy 2010, HTN, HLD, hypothyroidism, brought from assisted living for generalized weakness of 3 weeks duration, now with associated worsening of bilateral LE pain for 1 week duration, admitted for cellulitis and difficulty with ambulation.       Problem/Plan - 1:  ·  Problem: Lower extremity cellulitis.   ·  Plan: #Generalized weakness in setting of cellulitis superimposed on chronic venous insufficiency, not meeting sepsis criteria  - b/l ankle erythema, warmth, and tenderness to palpation  - No other sources of infection identified – CXR negative, no GI symptoms, COVID/Flu/RSV negative, UA with mod LE and 13 WBCs but without nitrite or bacteria  - f/u UCx, BCx  - empiric coverage with ceftriaxone, vancomycin for likely 5 day course  - check MRSA PCR  - check ESR, CRP, calculate LRINEC score  - initially with some concern for slurred speech which resolved when more awake – CT head negative; low suspicion for TIA  -  b/l LE doppler negative for DVT .  - ID consulted.      Problem/Plan - 2:  ·  Problem: Insulin-treated type 2 diabetes mellitus.   ·  Plan: - Sugars in good range.   f/s qachs, goal 140-180  - home regimen: Basaglar 30U qhs, Novolog 10U qac TID – restart home regimen at 80% dose and give additional sliding scale insulin pre-meals.     Problem/Plan - 3:  ·  Problem: Hypertension.   ·  Plan: - Goal normotension, resume home antihypertensives.     Problem/Plan - 4:  ·  Problem: Hypothyroidism.   ·  Plan: - Resume home LT4 at 125mcg po qam  - Check TSH.     Problem/Plan - 5:  ·  Problem: Fibromyalgia.   ·  Plan: - resume home meds – sees pain management outpt, takes hydromorphone 4mg po q6h PRN  - also with reportedly worsening headaches, which she says improve with antihistamines – resume her home loratadine.     Problem/Plan - 6:  ·  Problem: Major depression.   ·  Plan: - c/w home escitalopram.     Problem/Plan - 7:  ·  Problem: Status post catheter ablation of atrial fibrillation.   ·  Plan: - s/p ablation 9/2021  - reported Afib/flutter  - also reports having ILR still in place, follows with cardiology  - c/w home rate control - metoprolol  - c/w home Eliquis 5mg po BID.     Problem/Plan - 8:  ·  Problem: RENARD on CPAP.   ·  Plan: - Offer qhs CPAP.     Problem/Plan - 9:  ·  Problem: Urinary incontinence.   ·  Plan: - reports feeling too weak to get up to go to the bathroom at times; potentially in setting of infection  - offer prima fit if needed.     Problem/Plan - 10:  ·  Problem: Prophylactic measure.   ·  Plan; - DVT PPx: Eliquis.    
67 yo woman with diabetes, fibromyalgia, osteoarthritis, RENARD, chronic venous insufficiency presenting from assisted living facility with fever, fatigue  suspect cellulitis / superinfection chronic venous stasis right > left  klebsiella in urine - patient incontinent UA only 13 WBC   fever     cellulitis right lower leg  vanco dose therapeutic   possible UTI klebsiella sensitive to ceftriaxone   chronic stasis dermatitis legs   fibromyalgia    Suggest;    c/w with vanco 1.25 gm iv q 12 h   follow trough prior 3rd dose   c/w ceftriaxone for now       likely switch to oral antibiotic tomorrow 
67yo F with PMH significant for T2DM, Afib s/p ablation on Eliquis, fibromyalgia, OA, RENARD on CPAP, depression, chronic venous insufficiency, endometrial cancer s/p hysterectomy 2010, HTN, HLD, hypothyroidism, brought from assisted living for generalized weakness of 3 weeks duration, now with associated worsening of bilateral LE pain for 1 week duration, admitted for cellulitis and difficulty with ambulation.       Problem/Plan - 1:  ·  Problem: Lower extremity cellulitis.   ·  Plan: #Generalized weakness in setting of cellulitis superimposed on chronic venous insufficiency, not meeting sepsis criteria  - b/l ankle erythema, warmth, and tenderness to palpation  - No other sources of infection identified – CXR negative, no GI symptoms, COVID/Flu/RSV negative, UA with mod LE and 13 WBCs but without nitrite or bacteria  - f/u UCx, BCx  - empiric coverage with ceftriaxone, vancomycin for likely 5 day course  - High  ESR, CRP  - initially with some concern for slurred speech which resolved when more awake – CT head negative; low suspicion for TIA  -  b/l LE doppler negative for DVT .  - ID help appreciated.      Problem/Plan - 2:  ·  Problem: Insulin-treated type 2 diabetes mellitus.   ·  Plan: - Sugars in good range.   f/s qachs, goal 140-180  - home regimen: Basaglar 30U qhs, Novolog 10U qac TID – restart home regimen at 80% dose and give additional sliding scale insulin pre-meals.     Problem/Plan - 3:  ·  Problem: Hypertension.   ·  Plan: - Goal normotension, resume home antihypertensives.     Problem/Plan - 4:  ·  Problem: Hypothyroidism.   ·  Plan: - Resume home LT4 at 125mcg po qam  - Check TSH.     Problem/Plan - 5:  ·  Problem: Fibromyalgia.   ·  Plan: - resume home meds – sees pain management outpt, takes hydromorphone 4mg po q6h PRN  - also with reportedly worsening headaches, which she says improve with antihistamines – resume her home loratadine.     Problem/Plan - 6:  ·  Problem: Major depression.   ·  Plan: - c/w home escitalopram.     Problem/Plan - 7:  ·  Problem: Status post catheter ablation of atrial fibrillation.   ·  Plan: - s/p ablation 9/2021  - reported Afib/flutter  - also reports having ILR still in place, follows with cardiology  - c/w home rate control - metoprolol  - c/w home Eliquis 5mg po BID.  -EP consulted.      Problem/Plan - 8:  ·  Problem: RENARD on CPAP.   ·  Plan: - Offer qhs CPAP.     Problem/Plan - 9:  ·  Problem: Urinary incontinence.   ·  Plan: - reports feeling too weak to get up to go to the bathroom at times; potentially in setting of infection  - offer prima fit if needed.     Problem/Plan - 10:  ·  Problem: Prophylactic measure.   ·  Plan; - DVT PPx: Eliquis.    
67yo F with PMH significant for T2DM, Afib s/p ablation on Eliquis, fibromyalgia, OA, RENARD on CPAP, depression, chronic venous insufficiency, endometrial cancer s/p hysterectomy 2010, HTN, HLD, hypothyroidism, brought from assisted living for generalized weakness of 3 weeks duration, now with associated worsening of bilateral LE pain for 1 week duration, admitted for cellulitis and difficulty with ambulation.       Problem/Plan - 1:  ·  Problem: Lower extremity cellulitis.   ·  Plan: #Generalized weakness in setting of cellulitis superimposed on chronic venous insufficiency, not meeting sepsis criteria  - b/l ankle erythema, warmth, and tenderness to palpation  - No other sources of infection identified – CXR negative, no GI symptoms, COVID/Flu/RSV negative, UA with mod LE and 13 WBCs but without nitrite or bacteria  - f/u UCx, BCx  - empiric coverage with ceftriaxone, vancomycin per ID.   - High  ESR, CRP  - initially with some concern for slurred speech which resolved when more awake – CT head negative; low suspicion for TIA  -  b/l LE doppler negative for DVT .  - ID help appreciated.      Problem/Plan - 2:  ·  Problem: Insulin-treated type 2 diabetes mellitus.   ·  Plan: - Sugars in good range.   f/s qachs, goal 140-180  - home regimen: Basaglar 30U qhs, Novolog 10U qac TID – restart home regimen at 80% dose and give additional sliding scale insulin pre-meals.     Problem/Plan - 3:  ·  Problem: Hypertension.   ·  Plan: - Goal normotension, resume home antihypertensives.     Problem/Plan - 4:  ·  Problem: Hypothyroidism.   ·  Plan: - Resume home LT4 at 125mcg po qam  - Check TSH.     Problem/Plan - 5:  ·  Problem: Fibromyalgia.   ·  Plan: - resume home meds – sees pain management outpt, takes hydromorphone 4mg po q6h PRN  - also with reportedly worsening headaches, which she says improve with antihistamines – resume her home loratadine.     Problem/Plan - 6:  ·  Problem: Major depression.   ·  Plan: - c/w home escitalopram.     Problem/Plan - 7:  ·  Problem: Status post catheter ablation of atrial fibrillation.   ·  Plan: - s/p ablation 9/2021  - reported Afib/flutter  - also reports having ILR still in place, follows with cardiology  - c/w home rate control - metoprolol  - c/w home Eliquis 5mg po BID.  -EP help appreciated.      Problem/Plan - 8:  ·  Problem: RENARD on CPAP.   ·  Plan: - Offer qhs CPAP.     Problem/Plan - 9:  ·  Problem: Urinary incontinence.   ·  Plan: - reports feeling too weak to get up to go to the bathroom at times; potentially in setting of infection  - offer prima fit if needed.     Problem/Plan - 10:  ·  Problem: Prophylactic measure.   ·  Plan; - DVT PPx: Eliquis.

## 2023-05-28 NOTE — PROGRESS NOTE ADULT - SUBJECTIVE AND OBJECTIVE BOX
Date of Service  : 05-25-23     INTERVAL HPI/OVERNIGHT EVENTS: Seen and examined with sister in room.   Vital Signs Last 24 Hrs  T(C): 36.6 (25 May 2023 15:19), Max: 36.6 (25 May 2023 15:19)  T(F): 97.8 (25 May 2023 15:19), Max: 97.8 (25 May 2023 15:19)  HR: 60 (25 May 2023 16:00) (51 - 60)  BP: 114/52 (25 May 2023 15:19) (109/51 - 124/50)  BP(mean): --  RR: 18 (25 May 2023 15:19) (18 - 18)  SpO2: 96% (25 May 2023 16:00) (95% - 98%)    Parameters below as of 25 May 2023 15:19  Patient On (Oxygen Delivery Method): room air      I&O's Summary    MEDICATIONS  (STANDING):  apixaban 5 milliGRAM(s) Oral every 12 hours  atorvastatin 40 milliGRAM(s) Oral at bedtime  buMETAnide 0.5 milliGRAM(s) Oral daily  buPROPion XL (24-Hour) . 300 milliGRAM(s) Oral daily  cefTRIAXone   IVPB 1000 milliGRAM(s) IV Intermittent every 24 hours  dextrose 5%. 1000 milliLiter(s) (50 mL/Hr) IV Continuous <Continuous>  dextrose 5%. 1000 milliLiter(s) (100 mL/Hr) IV Continuous <Continuous>  dextrose 50% Injectable 25 Gram(s) IV Push once  dextrose 50% Injectable 12.5 Gram(s) IV Push once  dextrose 50% Injectable 25 Gram(s) IV Push once  escitalopram 20 milliGRAM(s) Oral daily  gabapentin 600 milliGRAM(s) Oral two times a day  glucagon  Injectable 1 milliGRAM(s) IntraMuscular once  insulin glargine Injectable (LANTUS) 24 Unit(s) SubCutaneous at bedtime  insulin lispro (ADMELOG) corrective regimen sliding scale   SubCutaneous three times a day before meals  insulin lispro (ADMELOG) corrective regimen sliding scale   SubCutaneous at bedtime  insulin lispro Injectable (ADMELOG) 8 Unit(s) SubCutaneous three times a day before meals  levothyroxine 125 MICROGram(s) Oral daily  pantoprazole    Tablet 40 milliGRAM(s) Oral before breakfast  polyethylene glycol 3350 17 Gram(s) Oral daily  senna 2 Tablet(s) Oral at bedtime  spironolactone 25 milliGRAM(s) Oral daily  vancomycin  IVPB 1250 milliGRAM(s) IV Intermittent every 12 hours    MEDICATIONS  (PRN):  acetaminophen     Tablet .. 650 milliGRAM(s) Oral every 6 hours PRN Temp greater or equal to 38C (100.4F), Mild Pain (1 - 3)  aluminum hydroxide/magnesium hydroxide/simethicone Suspension 30 milliLiter(s) Oral every 4 hours PRN Dyspepsia  dextrose Oral Gel 15 Gram(s) Oral once PRN Blood Glucose LESS THAN 70 milliGRAM(s)/deciliter  HYDROmorphone   Tablet 4 milliGRAM(s) Oral every 6 hours PRN moderate to severe pain  loratadine 10 milliGRAM(s) Oral daily PRN for allergy symptoms  melatonin 3 milliGRAM(s) Oral at bedtime PRN Insomnia  ondansetron Injectable 4 milliGRAM(s) IV Push every 8 hours PRN Nausea and/or Vomiting    LABS:                        11.3   6.84  )-----------( 348      ( 25 May 2023 04:10 )             36.4     05-25    138  |  102  |  17  ----------------------------<  152<H>  4.1   |  23  |  1.19    Ca    9.2      25 May 2023 04:10  Phos  4.7     05-25  Mg     1.80     05-25          CAPILLARY BLOOD GLUCOSE      POCT Blood Glucose.: 170 mg/dL (25 May 2023 17:17)  POCT Blood Glucose.: 147 mg/dL (25 May 2023 12:07)  POCT Blood Glucose.: 157 mg/dL (25 May 2023 09:02)  POCT Blood Glucose.: 138 mg/dL (24 May 2023 22:48)          REVIEW OF SYSTEMS:  CONSTITUTIONAL: No fever, weight loss, or fatigue  EYES: No eye pain, visual disturbances, or discharge  ENMT:  No difficulty hearing, tinnitus, vertigo; No sinus or throat pain  NECK: No pain or stiffness  RESPIRATORY: No cough, wheezing, chills or hemoptysis; No shortness of breath  CARDIOVASCULAR: No chest pain, palpitations, dizziness, or leg swelling  GASTROINTESTINAL: No abdominal or epigastric pain. No nausea, vomiting, or hematemesis; No diarrhea or constipation. No melena or hematochezia.  GENITOURINARY: No dysuria, frequency, hematuria, or incontinence  NEUROLOGICAL: No headaches, memory loss, loss of strength, numbness, or tremors      Consultant(s) Notes Reviewed:  [x ] YES  [ ] NO    PHYSICAL EXAM:  GENERAL: Not in any distress ,  HEAD:  Atraumatic, Normocephalic  EYES: EOMI, PERRLA, conjunctiva and sclera clear  ENMT: No tonsillar erythema, exudates, or enlargement; Moist mucous membranes, Good dentition, No lesions  NECK: Supple, No JVD, Normal thyroid  NERVOUS SYSTEM:  Alert & Oriented X3, No focal deficit   CHEST/LUNG: Good air entry bilateral with no  rales, rhonchi, wheezing, or rubs  HEART: Regular rate and rhythm; No murmurs, rubs, or gallops  ABDOMEN: Soft, Nontender, Nondistended; Bowel sounds present  EXTREMITIES:  Less swelling and erythema.     Care Discussed with Consultants/Other Providers [ x] YES  [ ] NO
Date of Service  : 05-26-23     INTERVAL HPI/OVERNIGHT EVENTS: I feel better.   Vital Signs Last 24 Hrs  T(C): 36.3 (26 May 2023 12:43), Max: 36.5 (25 May 2023 23:07)  T(F): 97.4 (26 May 2023 12:43), Max: 97.7 (25 May 2023 23:07)  HR: 58 (26 May 2023 12:43) (52 - 58)  BP: 127/52 (26 May 2023 12:43) (108/52 - 127/52)  BP(mean): --  RR: 18 (26 May 2023 12:43) (17 - 18)  SpO2: 97% (26 May 2023 12:43) (96% - 98%)    Parameters below as of 26 May 2023 12:43  Patient On (Oxygen Delivery Method): room air      I&O's Summary    MEDICATIONS  (STANDING):  apixaban 5 milliGRAM(s) Oral every 12 hours  atorvastatin 40 milliGRAM(s) Oral at bedtime  buMETAnide 0.5 milliGRAM(s) Oral daily  buPROPion XL (24-Hour) . 300 milliGRAM(s) Oral daily  cefTRIAXone   IVPB 1000 milliGRAM(s) IV Intermittent every 24 hours  dextrose 5%. 1000 milliLiter(s) (50 mL/Hr) IV Continuous <Continuous>  dextrose 5%. 1000 milliLiter(s) (100 mL/Hr) IV Continuous <Continuous>  dextrose 50% Injectable 25 Gram(s) IV Push once  dextrose 50% Injectable 12.5 Gram(s) IV Push once  dextrose 50% Injectable 25 Gram(s) IV Push once  escitalopram 20 milliGRAM(s) Oral daily  gabapentin 600 milliGRAM(s) Oral two times a day  glucagon  Injectable 1 milliGRAM(s) IntraMuscular once  insulin glargine Injectable (LANTUS) 24 Unit(s) SubCutaneous at bedtime  insulin lispro (ADMELOG) corrective regimen sliding scale   SubCutaneous three times a day before meals  insulin lispro (ADMELOG) corrective regimen sliding scale   SubCutaneous at bedtime  insulin lispro Injectable (ADMELOG) 8 Unit(s) SubCutaneous three times a day before meals  levothyroxine 125 MICROGram(s) Oral daily  pantoprazole    Tablet 40 milliGRAM(s) Oral before breakfast  polyethylene glycol 3350 17 Gram(s) Oral daily  senna 2 Tablet(s) Oral at bedtime  spironolactone 25 milliGRAM(s) Oral daily  vancomycin  IVPB 1250 milliGRAM(s) IV Intermittent every 12 hours    MEDICATIONS  (PRN):  acetaminophen     Tablet .. 650 milliGRAM(s) Oral every 6 hours PRN Temp greater or equal to 38C (100.4F), Mild Pain (1 - 3)  aluminum hydroxide/magnesium hydroxide/simethicone Suspension 30 milliLiter(s) Oral every 4 hours PRN Dyspepsia  dextrose Oral Gel 15 Gram(s) Oral once PRN Blood Glucose LESS THAN 70 milliGRAM(s)/deciliter  HYDROmorphone   Tablet 4 milliGRAM(s) Oral every 6 hours PRN moderate to severe pain  loratadine 10 milliGRAM(s) Oral daily PRN for allergy symptoms  melatonin 3 milliGRAM(s) Oral at bedtime PRN Insomnia  ondansetron Injectable 4 milliGRAM(s) IV Push every 8 hours PRN Nausea and/or Vomiting    LABS:                        11.2   6.70  )-----------( 334      ( 26 May 2023 05:54 )             36.7     05-26    140  |  102  |  17  ----------------------------<  144<H>  4.2   |  23  |  1.10    Ca    9.3      26 May 2023 05:54  Phos  4.1     05-26  Mg     1.90     05-26          CAPILLARY BLOOD GLUCOSE      POCT Blood Glucose.: 131 mg/dL (26 May 2023 16:59)  POCT Blood Glucose.: 170 mg/dL (26 May 2023 12:33)  POCT Blood Glucose.: 138 mg/dL (26 May 2023 08:32)  POCT Blood Glucose.: 152 mg/dL (25 May 2023 22:42)  POCT Blood Glucose.: 160 mg/dL (25 May 2023 21:54)  POCT Blood Glucose.: 170 mg/dL (25 May 2023 17:17)          REVIEW OF SYSTEMS:  CONSTITUTIONAL: No fever, weight loss, or fatigue  EYES: No eye pain, visual disturbances, or discharge  ENMT:  No difficulty hearing, tinnitus, vertigo; No sinus or throat pain  NECK: No pain or stiffness  RESPIRATORY: No cough, wheezing, chills or hemoptysis; No shortness of breath  CARDIOVASCULAR: No chest pain, palpitations, dizziness, or leg swelling  GASTROINTESTINAL: No abdominal or epigastric pain. No nausea, vomiting, or hematemesis; No diarrhea or constipation. No melena or hematochezia.  GENITOURINARY: No dysuria, frequency, hematuria, or incontinence  NEUROLOGICAL: No headaches, memory loss, loss of strength, numbness, or tremors      RADIOLOGY & ADDITIONAL TESTS:    Consultant(s) Notes Reviewed:  [x ] YES  [ ] NO    PHYSICAL EXAM:  GENERAL: NAD, well-groomed, well-developed,not in any distress ,  HEAD:  Atraumatic, Normocephalic  EYES: EOMI, PERRLA, conjunctiva and sclera clear  ENMT: No tonsillar erythema, exudates, or enlargement; Moist mucous membranes, Good dentition, No lesions  NECK: Supple, No JVD, Normal thyroid  NERVOUS SYSTEM:  Alert & Oriented X3, No focal deficit   CHEST/LUNG: Good air entry bilateral with no  rales, rhonchi, wheezing, or rubs  HEART: Regular rate and rhythm; No murmurs, rubs, or gallops  ABDOMEN: Soft, Nontender, Nondistended; Bowel sounds present  EXTREMITIES:  Swelling and erythema less.     Care Discussed with Consultants/Other Providers [ x] YES  [ ] NO
Date of Service  : 05-27-23 @ 10:37    INTERVAL HPI/OVERNIGHT EVENTS:  Vital Signs Last 24 Hrs  T(C): 36.8 (27 May 2023 04:30), Max: 36.8 (27 May 2023 04:30)  T(F): 98.2 (27 May 2023 04:30), Max: 98.2 (27 May 2023 04:30)  HR: 56 (27 May 2023 07:18) (55 - 58)  BP: 128/58 (27 May 2023 04:30) (111/58 - 128/58)  BP(mean): --  RR: 18 (27 May 2023 04:30) (18 - 18)  SpO2: 95% (27 May 2023 07:18) (95% - 98%)    Parameters below as of 27 May 2023 04:30  Patient On (Oxygen Delivery Method): room air      I&O's Summary    MEDICATIONS  (STANDING):  acetaminophen     Tablet .. 650 milliGRAM(s) Oral every 6 hours  apixaban 5 milliGRAM(s) Oral every 12 hours  atorvastatin 40 milliGRAM(s) Oral at bedtime  buMETAnide 0.5 milliGRAM(s) Oral daily  buPROPion XL (24-Hour) . 300 milliGRAM(s) Oral daily  cephalexin 500 milliGRAM(s) Oral every 8 hours  dextrose 5%. 1000 milliLiter(s) (100 mL/Hr) IV Continuous <Continuous>  dextrose 5%. 1000 milliLiter(s) (50 mL/Hr) IV Continuous <Continuous>  dextrose 50% Injectable 12.5 Gram(s) IV Push once  dextrose 50% Injectable 25 Gram(s) IV Push once  dextrose 50% Injectable 25 Gram(s) IV Push once  escitalopram 20 milliGRAM(s) Oral daily  gabapentin 600 milliGRAM(s) Oral two times a day  glucagon  Injectable 1 milliGRAM(s) IntraMuscular once  insulin glargine Injectable (LANTUS) 24 Unit(s) SubCutaneous at bedtime  insulin lispro (ADMELOG) corrective regimen sliding scale   SubCutaneous at bedtime  insulin lispro (ADMELOG) corrective regimen sliding scale   SubCutaneous three times a day before meals  insulin lispro Injectable (ADMELOG) 8 Unit(s) SubCutaneous three times a day before meals  levothyroxine 125 MICROGram(s) Oral daily  lidocaine   4% Patch 1 Patch Transdermal every 24 hours  pantoprazole    Tablet 40 milliGRAM(s) Oral before breakfast  polyethylene glycol 3350 17 Gram(s) Oral daily  senna 2 Tablet(s) Oral at bedtime  spironolactone 25 milliGRAM(s) Oral daily    MEDICATIONS  (PRN):  aluminum hydroxide/magnesium hydroxide/simethicone Suspension 30 milliLiter(s) Oral every 4 hours PRN Dyspepsia  dextrose Oral Gel 15 Gram(s) Oral once PRN Blood Glucose LESS THAN 70 milliGRAM(s)/deciliter  HYDROmorphone   Tablet 4 milliGRAM(s) Oral every 4 hours PRN moderate to severe pain  loratadine 10 milliGRAM(s) Oral daily PRN for allergy symptoms  melatonin 3 milliGRAM(s) Oral at bedtime PRN Insomnia  ondansetron Injectable 4 milliGRAM(s) IV Push every 8 hours PRN Nausea and/or Vomiting    LABS:                        11.1   6.51  )-----------( 342      ( 27 May 2023 05:30 )             36.1     05-27    138  |  102  |  15  ----------------------------<  158<H>  4.1   |  26  |  1.05    Ca    9.3      27 May 2023 05:30  Phos  4.2     05-27  Mg     2.00     05-27          CAPILLARY BLOOD GLUCOSE      POCT Blood Glucose.: 146 mg/dL (27 May 2023 08:23)  POCT Blood Glucose.: 142 mg/dL (26 May 2023 22:11)  POCT Blood Glucose.: 131 mg/dL (26 May 2023 16:59)  POCT Blood Glucose.: 170 mg/dL (26 May 2023 12:33)          REVIEW OF SYSTEMS:  CONSTITUTIONAL: No fever, weight loss, or fatigue  EYES: No eye pain, visual disturbances, or discharge  ENMT:  No difficulty hearing, tinnitus, vertigo; No sinus or throat pain  NECK: No pain or stiffness  RESPIRATORY: No cough, wheezing, chills or hemoptysis; No shortness of breath  CARDIOVASCULAR: No chest pain, palpitations, dizziness, or leg swelling  GASTROINTESTINAL: No abdominal or epigastric pain. No nausea, vomiting, or hematemesis; No diarrhea or constipation. No melena or hematochezia.  GENITOURINARY: No dysuria, frequency, hematuria, or incontinence  NEUROLOGICAL: No headaches, memory loss, loss of strength, numbness, or tremors      Consultant(s) Notes Reviewed:  [x ] YES  [ ] NO    PHYSICAL EXAM:  GENERAL: NAD, well-groomed, well-developed,not in any distress ,  HEAD:  Atraumatic, Normocephalic  EYES: EOMI, PERRLA, conjunctiva and sclera clear  ENMT: No tonsillar erythema, exudates, or enlargement; Moist mucous membranes, Good dentition, No lesions  NECK: Supple, No JVD, Normal thyroid  NERVOUS SYSTEM:  Alert & Oriented X3, No focal deficit   CHEST/LUNG: Good air entry bilateral with no  rales, rhonchi, wheezing, or rubs  HEART: Regular rate and rhythm; No murmurs, rubs, or gallops  ABDOMEN: Soft, Nontender, Nondistended; Bowel sounds present  EXTREMITIES:  Resolving swelling and erythema    Care Discussed with Consultants/Other Providers [ x] YES  [ ] NO
Date of Service  : 23     INTERVAL HPI/OVERNIGHT EVENTS: My legs hurting and red.   Vital Signs Last 24 Hrs  T(C): 36.8 (23 May 2023 12:39), Max: 36.8 (23 May 2023 12:39)  T(F): 98.2 (23 May 2023 12:39), Max: 98.2 (23 May 2023 12:39)  HR: 72 (23 May 2023 12:39) (52 - 72)  BP: 130/53 (23 May 2023 12:39) (108/53 - 130/53)  BP(mean): --  RR: 18 (23 May 2023 12:39) (18 - 18)  SpO2: 97% (23 May 2023 12:39) (96% - 100%)    Parameters below as of 23 May 2023 12:39  Patient On (Oxygen Delivery Method): room air      I&O's Summary    MEDICATIONS  (STANDING):  apixaban 5 milliGRAM(s) Oral every 12 hours  atorvastatin 40 milliGRAM(s) Oral at bedtime  buMETAnide 0.5 milliGRAM(s) Oral daily  buPROPion XL (24-Hour) . 300 milliGRAM(s) Oral daily  cefTRIAXone   IVPB 1000 milliGRAM(s) IV Intermittent every 24 hours  dextrose 5%. 1000 milliLiter(s) (50 mL/Hr) IV Continuous <Continuous>  dextrose 5%. 1000 milliLiter(s) (100 mL/Hr) IV Continuous <Continuous>  dextrose 50% Injectable 25 Gram(s) IV Push once  dextrose 50% Injectable 12.5 Gram(s) IV Push once  dextrose 50% Injectable 25 Gram(s) IV Push once  escitalopram 20 milliGRAM(s) Oral daily  gabapentin 600 milliGRAM(s) Oral two times a day  glucagon  Injectable 1 milliGRAM(s) IntraMuscular once  insulin glargine Injectable (LANTUS) 24 Unit(s) SubCutaneous at bedtime  insulin lispro (ADMELOG) corrective regimen sliding scale   SubCutaneous three times a day before meals  insulin lispro (ADMELOG) corrective regimen sliding scale   SubCutaneous at bedtime  insulin lispro Injectable (ADMELOG) 8 Unit(s) SubCutaneous three times a day before meals  levothyroxine 125 MICROGram(s) Oral daily  pantoprazole    Tablet 40 milliGRAM(s) Oral before breakfast  polyethylene glycol 3350 17 Gram(s) Oral daily  senna 2 Tablet(s) Oral at bedtime  spironolactone 25 milliGRAM(s) Oral daily  vancomycin  IVPB 1000 milliGRAM(s) IV Intermittent every 12 hours    MEDICATIONS  (PRN):  acetaminophen     Tablet .. 650 milliGRAM(s) Oral every 6 hours PRN Temp greater or equal to 38C (100.4F), Mild Pain (1 - 3)  aluminum hydroxide/magnesium hydroxide/simethicone Suspension 30 milliLiter(s) Oral every 4 hours PRN Dyspepsia  dextrose Oral Gel 15 Gram(s) Oral once PRN Blood Glucose LESS THAN 70 milliGRAM(s)/deciliter  HYDROmorphone   Tablet 4 milliGRAM(s) Oral every 6 hours PRN moderate to severe pain  loratadine 10 milliGRAM(s) Oral daily PRN for allergy symptoms  melatonin 3 milliGRAM(s) Oral at bedtime PRN Insomnia  ondansetron Injectable 4 milliGRAM(s) IV Push every 8 hours PRN Nausea and/or Vomiting    LABS:                        11.2   5.80  )-----------( 259      ( 23 May 2023 06:26 )             35.9     05-23    137  |  101  |  13  ----------------------------<  141<H>  4.1   |  24  |  1.11    Ca    9.5      23 May 2023 06:26  Phos  3.4     05-  Mg     1.90     05-23    TPro  7.0  /  Alb  3.5  /  TBili  0.2  /  DBili  x   /  AST  14  /  ALT  11  /  AlkPhos  79  05-23    PT/INR - ( 21 May 2023 22:30 )   PT: 19.0 sec;   INR: 1.63 ratio         PTT - ( 21 May 2023 22:30 )  PTT:34.0 sec  Urinalysis Basic - ( 21 May 2023 22:30 )    Color: Yellow / Appearance: Clear / S.024 / pH: x  Gluc: x / Ketone: Negative  / Bili: Negative / Urobili: <2 mg/dL   Blood: x / Protein: 30 mg/dL / Nitrite: Negative   Leuk Esterase: Moderate / RBC: 3 /HPF / WBC 13 /HPF   Sq Epi: x / Non Sq Epi: x / Bacteria: Negative      CAPILLARY BLOOD GLUCOSE      POCT Blood Glucose.: 195 mg/dL (23 May 2023 12:25)  POCT Blood Glucose.: 165 mg/dL (23 May 2023 08:21)  POCT Blood Glucose.: 153 mg/dL (22 May 2023 22:20)  POCT Blood Glucose.: 194 mg/dL (22 May 2023 17:41)        Urinalysis Basic - ( 21 May 2023 22:30 )    Color: Yellow / Appearance: Clear / S.024 / pH: x  Gluc: x / Ketone: Negative  / Bili: Negative / Urobili: <2 mg/dL   Blood: x / Protein: 30 mg/dL / Nitrite: Negative   Leuk Esterase: Moderate / RBC: 3 /HPF / WBC 13 /HPF   Sq Epi: x / Non Sq Epi: x / Bacteria: Negative      REVIEW OF SYSTEMS:  CONSTITUTIONAL: No fever, weight loss, or fatigue  EYES: No eye pain, visual disturbances, or discharge  ENMT:  No difficulty hearing, tinnitus, vertigo; No sinus or throat pain  NECK: No pain or stiffness  RESPIRATORY: No cough, wheezing, chills or hemoptysis; No shortness of breath  CARDIOVASCULAR: No chest pain, palpitations, dizziness, or leg swelling  GASTROINTESTINAL: No abdominal or epigastric pain. No nausea, vomiting, or hematemesis; No diarrhea or constipation. No melena or hematochezia.  GENITOURINARY: No dysuria, frequency, hematuria, or incontinence  NEUROLOGICAL: No headaches, memory loss, loss of strength, numbness, or tremors      Consultant(s) Notes Reviewed:  [x ] YES  [ ] NO    PHYSICAL EXAM:  GENERAL: NAD, well-groomed, well-developed,not in any distress ,  HEAD:  Atraumatic, Normocephalic  EYES: EOMI, PERRLA, conjunctiva and sclera clear  ENMT: No tonsillar erythema, exudates, or enlargement; Moist mucous membranes, Good dentition, No lesions  NECK: Supple, No JVD, Normal thyroid  NERVOUS SYSTEM:  Alert & Oriented X3, No focal deficit   CHEST/LUNG: Good air entry bilateral with no  rales, rhonchi, wheezing, or rubs  HEART: Regular rate and rhythm; No murmurs, rubs, or gallops  ABDOMEN: Soft, Nontender, Nondistended; Bowel sounds present  EXTREMITIES: B/L legs erythematous and warm with  tenderness > RLE     Care Discussed with Consultants/Other Providers [ x] YES  [ ] NO
  Follow Up:  fever    Interval History/ROS:  afebrile       Allergies  smoke (Rhinitis; Rhinorrhea)  adhesives (Rash)  pollen (Rhinitis; Rhinorrhea)  dust (Rhinitis; Rhinorrhea)  Tin/costume jewelry (Rash)  aerosols, perfumes, fabric softeners (Rash; Rhinitis; Rhinorrhea)  Lyrica (Rash)        ANTIMICROBIALS:  cefTRIAXone   IVPB 1000 every 24 hours  vancomycin  IVPB 1250 every 12 hours        OTHER MEDS:  acetaminophen     Tablet .. 650 milliGRAM(s) Oral every 6 hours PRN  aluminum hydroxide/magnesium hydroxide/simethicone Suspension 30 milliLiter(s) Oral every 4 hours PRN  apixaban 5 milliGRAM(s) Oral every 12 hours  atorvastatin 40 milliGRAM(s) Oral at bedtime  buMETAnide 0.5 milliGRAM(s) Oral daily  buPROPion XL (24-Hour) . 300 milliGRAM(s) Oral daily  dextrose 5%. 1000 milliLiter(s) IV Continuous <Continuous>  dextrose 5%. 1000 milliLiter(s) IV Continuous <Continuous>  dextrose 50% Injectable 25 Gram(s) IV Push once  dextrose 50% Injectable 12.5 Gram(s) IV Push once  dextrose 50% Injectable 25 Gram(s) IV Push once  dextrose Oral Gel 15 Gram(s) Oral once PRN  escitalopram 20 milliGRAM(s) Oral daily  gabapentin 600 milliGRAM(s) Oral two times a day  glucagon  Injectable 1 milliGRAM(s) IntraMuscular once  HYDROmorphone   Tablet 4 milliGRAM(s) Oral every 6 hours PRN  insulin glargine Injectable (LANTUS) 24 Unit(s) SubCutaneous at bedtime  insulin lispro (ADMELOG) corrective regimen sliding scale   SubCutaneous three times a day before meals  insulin lispro (ADMELOG) corrective regimen sliding scale   SubCutaneous at bedtime  insulin lispro Injectable (ADMELOG) 8 Unit(s) SubCutaneous three times a day before meals  levothyroxine 125 MICROGram(s) Oral daily  loratadine 10 milliGRAM(s) Oral daily PRN  melatonin 3 milliGRAM(s) Oral at bedtime PRN  ondansetron Injectable 4 milliGRAM(s) IV Push every 8 hours PRN  pantoprazole    Tablet 40 milliGRAM(s) Oral before breakfast  polyethylene glycol 3350 17 Gram(s) Oral daily  senna 2 Tablet(s) Oral at bedtime  spironolactone 25 milliGRAM(s) Oral daily      Vital Signs Last 24 Hrs  T(F): 97.8 (05-25-23 @ 15:19), Max: 97.8 (05-25-23 @ 15:19)  HR: 60 (05-25-23 @ 16:00)  BP: 114/52 (05-25-23 @ 15:19)  RR: 18 (05-25-23 @ 15:19)  SpO2: 96% (05-25-23 @ 16:00) (95% - 98%)        PHYSICAL EXAM:  Constitutional: Not in acute distress  Eyes: No icterus.  Oral cavity: Clear, no lesions  Neck: Supple  RS: no respiratory distress  CVS: S1, S2   Abdomen: Soft. No guarding/rigidity/tenderness.  : no patel  Extremities: stasis changes both lower legs warm to touch  erythematous   Neuro: Alert, oriented to time/place/person  Cranial nerves 2-12 grossly normal. No focal abnormalities                                     11.3   6.84  )-----------( 348      ( 25 May 2023 04:10 )             36.4 05-25    138  |  102  |  17  ----------------------------<  152  4.1   |  23  |  1.19  Ca    9.2      25 May 2023 04:10Phos  4.7     05-25Mg     1.80     05-25            MICROBIOLOGY:    vanVancomycin Level, Trough -Pre 3rd Dose, order if dosed q24/36/48h (05.25.23 @ 04:10)   Vancomycin Level, Trough: 15.7:    Culture - Blood (05.21.23 @ 22:45)   Specimen Source: .Blood Blood-Peripheral  Culture Results:   No growth to date.Culture - Urine (05.21.23 @ 22:30)   - Amikacin: S <=16  - Amoxicillin/Clavulanic Acid: S <=8/4  - Ampicillin: R >16 These ampicillin results predict results for amoxicillin  - Ampicillin/Sulbactam: S 8/4 Enterobacter, Klebsiella aerogenes, Citrobacter, and Serratia may develop resistance during prolonged therapy (3-4 days)  - Aztreonam: S <=4  - Cefazolin: S <=2 For uncomplicated UTI with K. pneumoniae, E. coli, or P. mirablis: MEHRAN <=16 is sensitive and MEHRAN >=32 is resistant. This also predicts results for oral agents cefaclor, cefdinir, cefpodoxime, cefprozil, cefuroxime axetil, cephalexin and locarbef for uncomplicated UTI. Note that some isolates may be susceptible to these agents while testing resistant to cefazolin.  - Cefepime: S <=2  - Cefoxitin: S <=8  - Ceftriaxone: S <=1 Enterobacter, Klebsiella aerogenes, Citrobacter, and Serratia may develop resistance during prolonged therapy  - Cefuroxime: S <=4  - Ciprofloxacin: S <=0.25  - Ertapenem: S <=0.5  - Gentamicin: S <=2  - Imipenem: S <=1  - Levofloxacin: S <=0.5  - Meropenem: S <=1  - Nitrofurantoin: I 64 Should not be used to treat pyelonephritis  - Piperacillin/Tazobactam: S <=8  - Tobramycin: S <=2  - Trimethoprim/Sulfamethoxazole: S <=0.5/9.5  Specimen Source: Clean Catch Clean Catch (Midstream)  Culture Results:   >100,000 CFU/ml Klebsiella pneumoniae  Organism Identification: Klebsiella pneumoniae  Organism: Klebsiella pneumoniae  Method Type: [facility]              RADIOLOGY:    
  Follow Up:  fever    Interval History/ROS:  feels legs about the same      no dysuria notes has become incontinent over last few weeks     Allergies  smoke (Rhinitis; Rhinorrhea)  adhesives (Rash)  pollen (Rhinitis; Rhinorrhea)  dust (Rhinitis; Rhinorrhea)  Tin/costume jewelry (Rash)  aerosols, perfumes, fabric softeners (Rash; Rhinitis; Rhinorrhea)  Lyrica (Rash)        ANTIMICROBIALS:  cefTRIAXone   IVPB 1000 every 24 hours  vancomycin  IVPB 1250 every 12 hours      OTHER MEDS:  acetaminophen     Tablet .. 650 milliGRAM(s) Oral every 6 hours PRN  aluminum hydroxide/magnesium hydroxide/simethicone Suspension 30 milliLiter(s) Oral every 4 hours PRN  apixaban 5 milliGRAM(s) Oral every 12 hours  atorvastatin 40 milliGRAM(s) Oral at bedtime  buMETAnide 0.5 milliGRAM(s) Oral daily  buPROPion XL (24-Hour) . 300 milliGRAM(s) Oral daily  dextrose 5%. 1000 milliLiter(s) IV Continuous <Continuous>  dextrose 5%. 1000 milliLiter(s) IV Continuous <Continuous>  dextrose 50% Injectable 25 Gram(s) IV Push once  dextrose 50% Injectable 12.5 Gram(s) IV Push once  dextrose 50% Injectable 25 Gram(s) IV Push once  dextrose Oral Gel 15 Gram(s) Oral once PRN  escitalopram 20 milliGRAM(s) Oral daily  gabapentin 600 milliGRAM(s) Oral two times a day  glucagon  Injectable 1 milliGRAM(s) IntraMuscular once  HYDROmorphone   Tablet 4 milliGRAM(s) Oral every 6 hours PRN  insulin glargine Injectable (LANTUS) 24 Unit(s) SubCutaneous at bedtime  insulin lispro (ADMELOG) corrective regimen sliding scale   SubCutaneous three times a day before meals  insulin lispro (ADMELOG) corrective regimen sliding scale   SubCutaneous at bedtime  insulin lispro Injectable (ADMELOG) 8 Unit(s) SubCutaneous three times a day before meals  levothyroxine 125 MICROGram(s) Oral daily  loratadine 10 milliGRAM(s) Oral daily PRN  melatonin 3 milliGRAM(s) Oral at bedtime PRN  ondansetron Injectable 4 milliGRAM(s) IV Push every 8 hours PRN  pantoprazole    Tablet 40 milliGRAM(s) Oral before breakfast  polyethylene glycol 3350 17 Gram(s) Oral daily  senna 2 Tablet(s) Oral at bedtime  spironolactone 25 milliGRAM(s) Oral daily      Vital Signs Last 24 Hrs  T(C): 36.3 (24 May 2023 05:00), Max: 36.3 (24 May 2023 05:00)  T(F): 97.3 (24 May 2023 05:00), Max: 97.3 (24 May 2023 05:00)  HR: 57 (24 May 2023 16:49) (50 - 57)  BP: 123/63 (24 May 2023 05:00) (123/63 - 124/50)  BP(mean): --  RR: 18 (24 May 2023 05:00) (18 - 18)  SpO2: 97% (24 May 2023 16:49) (96% - 98%)    Parameters below as of 24 May 2023 05:00  Patient On (Oxygen Delivery Method): BiPAP/CPAP        PHYSICAL EXAM:  Constitutional: Not in acute distress  Eyes: No icterus.  Oral cavity: Clear, no lesions  Neck: Supple  RS: no respiratory distress  CVS: S1, S2   Abdomen: Soft. No guarding/rigidity/tenderness.  : no patel  Extremities: stasis changes both lower legs warm to touch more erythematous than yesterday   Neuro: Alert, oriented to time/place/person  Cranial nerves 2-12 grossly normal. No focal abnormalities                          10.8   6.07  )-----------( 298      ( 24 May 2023 04:20 )             35.0       05-24    138  |  102  |  16  ----------------------------<  147<H>  4.0   |  23  |  1.09    Ca    9.4      24 May 2023 04:20  Phos  4.5     05-24  Mg     1.80     05-24    TPro  7.0  /  Alb  3.5  /  TBili  0.2  /  DBili  x   /  AST  14  /  ALT  11  /  AlkPhos  79  05-23          MICROBIOLOGY:  Vancomycin Level, Trough: 7.9 ug/mL (05-24-23 @ 04:20)  v  .Blood Blood-Peripheral  05-21-23   No growth to date.  --  --      .Blood Blood-Peripheral  05-21-23   No growth to date.  --  Klebsiella pneumoniae                RADIOLOGY:    
  Follow Up:  fever    Interval History/ROS:  afebrile  feels legs are improving now        Allergies  smoke (Rhinitis; Rhinorrhea)  adhesives (Rash)  pollen (Rhinitis; Rhinorrhea)  dust (Rhinitis; Rhinorrhea)  Tin/costume jewelry (Rash)  aerosols, perfumes, fabric softeners (Rash; Rhinitis; Rhinorrhea)  Lyrica (Rash)        ANTIMICROBIALS:  cefTRIAXone   IVPB 1000 every 24 hours  vancomycin  IVPB 1250 every 12 hours        OTHER MEDS:  acetaminophen     Tablet .. 650 milliGRAM(s) Oral every 6 hours PRN  aluminum hydroxide/magnesium hydroxide/simethicone Suspension 30 milliLiter(s) Oral every 4 hours PRN  apixaban 5 milliGRAM(s) Oral every 12 hours  atorvastatin 40 milliGRAM(s) Oral at bedtime  buMETAnide 0.5 milliGRAM(s) Oral daily  buPROPion XL (24-Hour) . 300 milliGRAM(s) Oral daily  dextrose 5%. 1000 milliLiter(s) IV Continuous <Continuous>  dextrose 5%. 1000 milliLiter(s) IV Continuous <Continuous>  dextrose 50% Injectable 25 Gram(s) IV Push once  dextrose 50% Injectable 12.5 Gram(s) IV Push once  dextrose 50% Injectable 25 Gram(s) IV Push once  dextrose Oral Gel 15 Gram(s) Oral once PRN  escitalopram 20 milliGRAM(s) Oral daily  gabapentin 600 milliGRAM(s) Oral two times a day  glucagon  Injectable 1 milliGRAM(s) IntraMuscular once  HYDROmorphone   Tablet 4 milliGRAM(s) Oral every 6 hours PRN  insulin glargine Injectable (LANTUS) 24 Unit(s) SubCutaneous at bedtime  insulin lispro (ADMELOG) corrective regimen sliding scale   SubCutaneous three times a day before meals  insulin lispro (ADMELOG) corrective regimen sliding scale   SubCutaneous at bedtime  insulin lispro Injectable (ADMELOG) 8 Unit(s) SubCutaneous three times a day before meals  levothyroxine 125 MICROGram(s) Oral daily  loratadine 10 milliGRAM(s) Oral daily PRN  melatonin 3 milliGRAM(s) Oral at bedtime PRN  ondansetron Injectable 4 milliGRAM(s) IV Push every 8 hours PRN  pantoprazole    Tablet 40 milliGRAM(s) Oral before breakfast  polyethylene glycol 3350 17 Gram(s) Oral daily  senna 2 Tablet(s) Oral at bedtime  spironolactone 25 milliGRAM(s) Oral daily      Vital Signs Last 24 Hrs  T(F): 97.4 (05-26-23 @ 12:43), Max: 97.7 (05-25-23 @ 23:07)  HR: 58 (05-26-23 @ 12:43)  BP: 127/52 (05-26-23 @ 12:43)  RR: 18 (05-26-23 @ 12:43)  SpO2: 97% (05-26-23 @ 12:43) (96% - 98%)      PHYSICAL EXAM:  Constitutional: Not in acute distress  Eyes: No icterus.  Oral cavity: Clear, no lesions  Neck: Supple  RS: no respiratory distress  CVS: S1, S2   Abdomen: Soft. No guarding/rigidity/tenderness.  : no patel  Extremities: stasis changes both lower legs less warm to touch   Neuro: Alert, oriented to time/place/person  Cranial nerves 2-12 grossly normal. No focal abnormalities                          11.2   6.70  )-----------( 334      ( 26 May 2023 05:54 )             36.7 05-26    140  |  102  |  17  ----------------------------<  144  4.2   |  23  |  1.10  Ca    9.3      26 May 2023 05:54Phos  4.1     05-26Mg     1.90     05-26            MICROBIOLOGY:    vancoVancomycin Level, Trough (05.26.23 @ 07:19)   Vancomycin Level, Trough: 17.2:    Culture - Blood (05.21.23 @ 22:45)   Specimen Source: .Blood Blood-Peripheral  Culture Results:   No growth to date.Culture - Urine (05.21.23 @ 22:30)   - Amikacin: S <=16  - Amoxicillin/Clavulanic Acid: S <=8/4  - Ampicillin: R >16 These ampicillin results predict results for amoxicillin  - Ampicillin/Sulbactam: S 8/4 Enterobacter, Klebsiella aerogenes, Citrobacter, and Serratia may develop resistance during prolonged therapy (3-4 days)  - Aztreonam: S <=4  - Cefazolin: S <=2 For uncomplicated UTI with K. pneumoniae, E. coli, or P. mirablis: MEHRAN <=16 is sensitive and MEHRAN >=32 is resistant. This also predicts results for oral agents cefaclor, cefdinir, cefpodoxime, cefprozil, cefuroxime axetil, cephalexin and locarbef for uncomplicated UTI. Note that some isolates may be susceptible to these agents while testing resistant to cefazolin.  - Cefepime: S <=2  - Cefoxitin: S <=8  - Ceftriaxone: S <=1 Enterobacter, Klebsiella aerogenes, Citrobacter, and Serratia may develop resistance during prolonged therapy  - Cefuroxime: S <=4  - Ciprofloxacin: S <=0.25  - Ertapenem: S <=0.5  - Gentamicin: S <=2  - Imipenem: S <=1  - Levofloxacin: S <=0.5  - Meropenem: S <=1  - Nitrofurantoin: I 64 Should not be used to treat pyelonephritis  - Piperacillin/Tazobactam: S <=8  - Tobramycin: S <=2  - Trimethoprim/Sulfamethoxazole: S <=0.5/9.5  Specimen Source: Clean Catch Clean Catch (Midstream)  Culture Results:   >100,000 CFU/ml Klebsiella pneumoniae  Organism Identification: Klebsiella pneumoniae  Organism: Klebsiella pneumoniae  Method Type: MEHRAN              RADIOLOGY:    
Date of Service  : 05-24-23     INTERVAL HPI/OVERNIGHT EVENTS: I feel better.   Vital Signs Last 24 Hrs  T(C): 36.3 (24 May 2023 05:00), Max: 36.3 (24 May 2023 05:00)  T(F): 97.3 (24 May 2023 05:00), Max: 97.3 (24 May 2023 05:00)  HR: 52 (24 May 2023 11:28) (50 - 56)  BP: 123/63 (24 May 2023 05:00) (123/63 - 124/50)  BP(mean): --  RR: 18 (24 May 2023 05:00) (18 - 18)  SpO2: 96% (24 May 2023 11:28) (96% - 98%)    Parameters below as of 24 May 2023 05:00  Patient On (Oxygen Delivery Method): BiPAP/CPAP      I&O's Summary    MEDICATIONS  (STANDING):  apixaban 5 milliGRAM(s) Oral every 12 hours  atorvastatin 40 milliGRAM(s) Oral at bedtime  buMETAnide 0.5 milliGRAM(s) Oral daily  buPROPion XL (24-Hour) . 300 milliGRAM(s) Oral daily  cefTRIAXone   IVPB 1000 milliGRAM(s) IV Intermittent every 24 hours  dextrose 5%. 1000 milliLiter(s) (50 mL/Hr) IV Continuous <Continuous>  dextrose 5%. 1000 milliLiter(s) (100 mL/Hr) IV Continuous <Continuous>  dextrose 50% Injectable 25 Gram(s) IV Push once  dextrose 50% Injectable 12.5 Gram(s) IV Push once  dextrose 50% Injectable 25 Gram(s) IV Push once  escitalopram 20 milliGRAM(s) Oral daily  gabapentin 600 milliGRAM(s) Oral two times a day  glucagon  Injectable 1 milliGRAM(s) IntraMuscular once  insulin glargine Injectable (LANTUS) 24 Unit(s) SubCutaneous at bedtime  insulin lispro (ADMELOG) corrective regimen sliding scale   SubCutaneous three times a day before meals  insulin lispro (ADMELOG) corrective regimen sliding scale   SubCutaneous at bedtime  insulin lispro Injectable (ADMELOG) 8 Unit(s) SubCutaneous three times a day before meals  levothyroxine 125 MICROGram(s) Oral daily  pantoprazole    Tablet 40 milliGRAM(s) Oral before breakfast  polyethylene glycol 3350 17 Gram(s) Oral daily  senna 2 Tablet(s) Oral at bedtime  spironolactone 25 milliGRAM(s) Oral daily  vancomycin  IVPB 1250 milliGRAM(s) IV Intermittent every 12 hours    MEDICATIONS  (PRN):  acetaminophen     Tablet .. 650 milliGRAM(s) Oral every 6 hours PRN Temp greater or equal to 38C (100.4F), Mild Pain (1 - 3)  aluminum hydroxide/magnesium hydroxide/simethicone Suspension 30 milliLiter(s) Oral every 4 hours PRN Dyspepsia  dextrose Oral Gel 15 Gram(s) Oral once PRN Blood Glucose LESS THAN 70 milliGRAM(s)/deciliter  HYDROmorphone   Tablet 4 milliGRAM(s) Oral every 6 hours PRN moderate to severe pain  loratadine 10 milliGRAM(s) Oral daily PRN for allergy symptoms  melatonin 3 milliGRAM(s) Oral at bedtime PRN Insomnia  ondansetron Injectable 4 milliGRAM(s) IV Push every 8 hours PRN Nausea and/or Vomiting    LABS:                        10.8   6.07  )-----------( 298      ( 24 May 2023 04:20 )             35.0     05-24    138  |  102  |  16  ----------------------------<  147<H>  4.0   |  23  |  1.09    Ca    9.4      24 May 2023 04:20  Phos  4.5     05-24  Mg     1.80     05-24    TPro  7.0  /  Alb  3.5  /  TBili  0.2  /  DBili  x   /  AST  14  /  ALT  11  /  AlkPhos  79  05-23        CAPILLARY BLOOD GLUCOSE      POCT Blood Glucose.: 186 mg/dL (24 May 2023 12:08)  POCT Blood Glucose.: 194 mg/dL (24 May 2023 08:35)  POCT Blood Glucose.: 158 mg/dL (23 May 2023 22:21)  POCT Blood Glucose.: 132 mg/dL (23 May 2023 17:27)          REVIEW OF SYSTEMS:  CONSTITUTIONAL: No fever, weight loss, or fatigue  EYES: No eye pain, visual disturbances, or discharge  ENMT:  No difficulty hearing, tinnitus, vertigo; No sinus or throat pain  NECK: No pain or stiffness  RESPIRATORY: No cough, wheezing, chills or hemoptysis; No shortness of breath  CARDIOVASCULAR: No chest pain, palpitations, dizziness, or leg swelling  GASTROINTESTINAL: No abdominal or epigastric pain. No nausea, vomiting, or hematemesis; No diarrhea or constipation. No melena or hematochezia.  GENITOURINARY: No dysuria, frequency, hematuria, or incontinence  NEUROLOGICAL: No headaches, memory loss, loss of strength, numbness, or tremors      RADIOLOGY & ADDITIONAL TESTS:    Consultant(s) Notes Reviewed:  [x ] YES  [ ] NO    PHYSICAL EXAM:  GENERAL: Not in any distress ,Obese   HEAD:  Atraumatic, Normocephalic  EYES: EOMI, PERRLA, conjunctiva and sclera clear  ENMT: No tonsillar erythema, exudates, or enlargement; Moist mucous membranes, Good dentition, No lesions  NECK: Supple, No JVD, Normal thyroid  NERVOUS SYSTEM:  Alert & Oriented X3, No focal deficit   CHEST/LUNG: Good air entry bilateral with no  rales, rhonchi, wheezing, or rubs  HEART: Regular rate and rhythm; No murmurs, rubs, or gallops  ABDOMEN: Soft, Nontender, Nondistended; Bowel sounds present  EXTREMITIES:  Erythema >RLE       Care Discussed with Consultants/Other Providers [ x] YES  [ ] NO
Date of Service  : 05-28-23     INTERVAL HPI/OVERNIGHT EVENTS: I feel fine.   Vital Signs Last 24 Hrs  T(C): 37.1 (28 May 2023 21:15), Max: 37.1 (28 May 2023 21:15)  T(F): 98.7 (28 May 2023 21:15), Max: 98.7 (28 May 2023 21:15)  HR: 51 (28 May 2023 21:15) (51 - 66)  BP: 119/56 (28 May 2023 21:15) (119/56 - 128/60)  BP(mean): --  RR: 18 (28 May 2023 21:15) (18 - 18)  SpO2: 98% (28 May 2023 21:15) (96% - 99%)    Parameters below as of 28 May 2023 21:15  Patient On (Oxygen Delivery Method): room air      I&O's Summary    28 May 2023 07:01  -  28 May 2023 23:22  --------------------------------------------------------  IN: 450 mL / OUT: 1200 mL / NET: -750 mL      MEDICATIONS  (STANDING):  acetaminophen     Tablet .. 650 milliGRAM(s) Oral every 6 hours  apixaban 5 milliGRAM(s) Oral every 12 hours  atorvastatin 40 milliGRAM(s) Oral at bedtime  buMETAnide 0.5 milliGRAM(s) Oral daily  buPROPion XL (24-Hour) . 300 milliGRAM(s) Oral daily  cephalexin 500 milliGRAM(s) Oral every 8 hours  dextrose 5%. 1000 milliLiter(s) (50 mL/Hr) IV Continuous <Continuous>  dextrose 5%. 1000 milliLiter(s) (100 mL/Hr) IV Continuous <Continuous>  dextrose 50% Injectable 25 Gram(s) IV Push once  dextrose 50% Injectable 25 Gram(s) IV Push once  dextrose 50% Injectable 12.5 Gram(s) IV Push once  escitalopram 20 milliGRAM(s) Oral daily  gabapentin 600 milliGRAM(s) Oral two times a day  glucagon  Injectable 1 milliGRAM(s) IntraMuscular once  insulin glargine Injectable (LANTUS) 24 Unit(s) SubCutaneous at bedtime  insulin lispro (ADMELOG) corrective regimen sliding scale   SubCutaneous three times a day before meals  insulin lispro (ADMELOG) corrective regimen sliding scale   SubCutaneous at bedtime  insulin lispro Injectable (ADMELOG) 8 Unit(s) SubCutaneous three times a day before meals  levothyroxine 125 MICROGram(s) Oral daily  lidocaine   4% Patch 1 Patch Transdermal every 24 hours  pantoprazole    Tablet 40 milliGRAM(s) Oral before breakfast  polyethylene glycol 3350 17 Gram(s) Oral daily  senna 2 Tablet(s) Oral at bedtime  spironolactone 25 milliGRAM(s) Oral daily    MEDICATIONS  (PRN):  aluminum hydroxide/magnesium hydroxide/simethicone Suspension 30 milliLiter(s) Oral every 4 hours PRN Dyspepsia  dextrose Oral Gel 15 Gram(s) Oral once PRN Blood Glucose LESS THAN 70 milliGRAM(s)/deciliter  HYDROmorphone   Tablet 4 milliGRAM(s) Oral every 4 hours PRN moderate to severe pain  loratadine 10 milliGRAM(s) Oral daily PRN for allergy symptoms  melatonin 3 milliGRAM(s) Oral at bedtime PRN Insomnia  ondansetron Injectable 4 milliGRAM(s) IV Push every 8 hours PRN Nausea and/or Vomiting    LABS:                        11.1   6.51  )-----------( 342      ( 27 May 2023 05:30 )             36.1     05-27    138  |  102  |  15  ----------------------------<  158<H>  4.1   |  26  |  1.05    Ca    9.3      27 May 2023 05:30  Phos  4.2     05-27  Mg     2.00     05-27          CAPILLARY BLOOD GLUCOSE      POCT Blood Glucose.: 143 mg/dL (28 May 2023 22:20)  POCT Blood Glucose.: 167 mg/dL (28 May 2023 17:15)  POCT Blood Glucose.: 173 mg/dL (28 May 2023 12:09)  POCT Blood Glucose.: 145 mg/dL (28 May 2023 08:31)          REVIEW OF SYSTEMS:  CONSTITUTIONAL: No fever, weight loss, or fatigue  EYES: No eye pain, visual disturbances, or discharge  ENMT:  No difficulty hearing, tinnitus, vertigo; No sinus or throat pain  NECK: No pain or stiffness  RESPIRATORY: No cough, wheezing, chills or hemoptysis; No shortness of breath  CARDIOVASCULAR: No chest pain, palpitations, dizziness, or leg swelling  GASTROINTESTINAL: No abdominal or epigastric pain. No nausea, vomiting, or hematemesis; No diarrhea or constipation. No melena or hematochezia.  GENITOURINARY: No dysuria, frequency, hematuria, or incontinence    Consultant(s) Notes Reviewed:  [x ] YES  [ ] NO    PHYSICAL EXAM:  GENERAL: NAD, Obese   HEAD:  Atraumatic, Normocephalic  EYES: EOMI, PERRLA, conjunctiva and sclera clear  ENMT: No tonsillar erythema, exudates, or enlargement; Moist mucous membranes, Good dentition, No lesions  NECK: Supple, No JVD, Normal thyroid  NERVOUS SYSTEM:  Alert & Oriented X3, No focal deficit   CHEST/LUNG: Good air entry bilateral with no  rales, rhonchi, wheezing, or rubs  HEART: Regular rate and rhythm; No murmurs, rubs, or gallops  ABDOMEN: Soft, Nontender, Nondistended; Bowel sounds present  EXTREMITIES:  Less erythema and swelling       Care Discussed with Consultants/Other Providers [ x] YES  [ ] NO

## 2023-05-28 NOTE — PROGRESS NOTE ADULT - PROVIDER SPECIALTY LIST ADULT
Infectious Disease
Infectious Disease
Internal Medicine
Infectious Disease
Internal Medicine

## 2023-05-29 ENCOUNTER — TRANSCRIPTION ENCOUNTER (OUTPATIENT)
Age: 68
End: 2023-05-29

## 2023-05-29 VITALS
RESPIRATION RATE: 18 BRPM | OXYGEN SATURATION: 99 % | DIASTOLIC BLOOD PRESSURE: 66 MMHG | HEART RATE: 67 BPM | SYSTOLIC BLOOD PRESSURE: 104 MMHG | TEMPERATURE: 98 F

## 2023-05-29 LAB
ANION GAP SERPL CALC-SCNC: 11 MMOL/L — SIGNIFICANT CHANGE UP (ref 7–14)
ANION GAP SERPL CALC-SCNC: 11 MMOL/L — SIGNIFICANT CHANGE UP (ref 7–14)
BUN SERPL-MCNC: 17 MG/DL — SIGNIFICANT CHANGE UP (ref 7–23)
BUN SERPL-MCNC: 18 MG/DL — SIGNIFICANT CHANGE UP (ref 7–23)
CALCIUM SERPL-MCNC: 8.8 MG/DL — SIGNIFICANT CHANGE UP (ref 8.4–10.5)
CALCIUM SERPL-MCNC: 9.2 MG/DL — SIGNIFICANT CHANGE UP (ref 8.4–10.5)
CHLORIDE SERPL-SCNC: 101 MMOL/L — SIGNIFICANT CHANGE UP (ref 98–107)
CHLORIDE SERPL-SCNC: 101 MMOL/L — SIGNIFICANT CHANGE UP (ref 98–107)
CO2 SERPL-SCNC: 25 MMOL/L — SIGNIFICANT CHANGE UP (ref 22–31)
CO2 SERPL-SCNC: 26 MMOL/L — SIGNIFICANT CHANGE UP (ref 22–31)
CREAT SERPL-MCNC: 1.15 MG/DL — SIGNIFICANT CHANGE UP (ref 0.5–1.3)
CREAT SERPL-MCNC: 1.31 MG/DL — HIGH (ref 0.5–1.3)
EGFR: 44 ML/MIN/1.73M2 — LOW
EGFR: 52 ML/MIN/1.73M2 — LOW
GLUCOSE BLDC GLUCOMTR-MCNC: 145 MG/DL — HIGH (ref 70–99)
GLUCOSE BLDC GLUCOMTR-MCNC: 150 MG/DL — HIGH (ref 70–99)
GLUCOSE BLDC GLUCOMTR-MCNC: 175 MG/DL — HIGH (ref 70–99)
GLUCOSE SERPL-MCNC: 156 MG/DL — HIGH (ref 70–99)
GLUCOSE SERPL-MCNC: 176 MG/DL — HIGH (ref 70–99)
HCT VFR BLD CALC: 36.9 % — SIGNIFICANT CHANGE UP (ref 34.5–45)
HGB BLD-MCNC: 11.4 G/DL — LOW (ref 11.5–15.5)
MAGNESIUM SERPL-MCNC: 2 MG/DL — SIGNIFICANT CHANGE UP (ref 1.6–2.6)
MCHC RBC-ENTMCNC: 25.6 PG — LOW (ref 27–34)
MCHC RBC-ENTMCNC: 30.9 GM/DL — LOW (ref 32–36)
MCV RBC AUTO: 82.9 FL — SIGNIFICANT CHANGE UP (ref 80–100)
NRBC # BLD: 0 /100 WBCS — SIGNIFICANT CHANGE UP (ref 0–0)
NRBC # FLD: 0 K/UL — SIGNIFICANT CHANGE UP (ref 0–0)
PHOSPHATE SERPL-MCNC: 3.9 MG/DL — SIGNIFICANT CHANGE UP (ref 2.5–4.5)
PLATELET # BLD AUTO: 378 K/UL — SIGNIFICANT CHANGE UP (ref 150–400)
POTASSIUM SERPL-MCNC: 4.1 MMOL/L — SIGNIFICANT CHANGE UP (ref 3.5–5.3)
POTASSIUM SERPL-MCNC: 4.2 MMOL/L — SIGNIFICANT CHANGE UP (ref 3.5–5.3)
POTASSIUM SERPL-SCNC: 4.1 MMOL/L — SIGNIFICANT CHANGE UP (ref 3.5–5.3)
POTASSIUM SERPL-SCNC: 4.2 MMOL/L — SIGNIFICANT CHANGE UP (ref 3.5–5.3)
RBC # BLD: 4.45 M/UL — SIGNIFICANT CHANGE UP (ref 3.8–5.2)
RBC # FLD: 16.7 % — HIGH (ref 10.3–14.5)
SARS-COV-2 RNA SPEC QL NAA+PROBE: SIGNIFICANT CHANGE UP
SODIUM SERPL-SCNC: 137 MMOL/L — SIGNIFICANT CHANGE UP (ref 135–145)
SODIUM SERPL-SCNC: 138 MMOL/L — SIGNIFICANT CHANGE UP (ref 135–145)
WBC # BLD: 6.5 K/UL — SIGNIFICANT CHANGE UP (ref 3.8–10.5)
WBC # FLD AUTO: 6.5 K/UL — SIGNIFICANT CHANGE UP (ref 3.8–10.5)

## 2023-05-29 RX ORDER — SODIUM CHLORIDE 9 MG/ML
250 INJECTION, SOLUTION INTRAVENOUS
Refills: 0 | Status: COMPLETED | OUTPATIENT
Start: 2023-05-29 | End: 2023-05-29

## 2023-05-29 RX ORDER — CEPHALEXIN 500 MG
1 CAPSULE ORAL
Qty: 9 | Refills: 0
Start: 2023-05-29 | End: 2023-05-31

## 2023-05-29 RX ORDER — HYDROMORPHONE HYDROCHLORIDE 2 MG/ML
1 INJECTION INTRAMUSCULAR; INTRAVENOUS; SUBCUTANEOUS
Qty: 18 | Refills: 0
Start: 2023-05-29 | End: 2023-05-31

## 2023-05-29 RX ORDER — SODIUM CHLORIDE 9 MG/ML
1000 INJECTION, SOLUTION INTRAVENOUS
Refills: 0 | Status: DISCONTINUED | OUTPATIENT
Start: 2023-05-29 | End: 2023-05-29

## 2023-05-29 RX ADMIN — Medication 500 MILLIGRAM(S): at 05:49

## 2023-05-29 RX ADMIN — HYDROMORPHONE HYDROCHLORIDE 4 MILLIGRAM(S): 2 INJECTION INTRAMUSCULAR; INTRAVENOUS; SUBCUTANEOUS at 05:14

## 2023-05-29 RX ADMIN — Medication 125 MICROGRAM(S): at 05:49

## 2023-05-29 RX ADMIN — GABAPENTIN 600 MILLIGRAM(S): 400 CAPSULE ORAL at 17:19

## 2023-05-29 RX ADMIN — BUPROPION HYDROCHLORIDE 300 MILLIGRAM(S): 150 TABLET, EXTENDED RELEASE ORAL at 12:51

## 2023-05-29 RX ADMIN — POLYETHYLENE GLYCOL 3350 17 GRAM(S): 17 POWDER, FOR SOLUTION ORAL at 12:51

## 2023-05-29 RX ADMIN — Medication 500 MILLIGRAM(S): at 12:52

## 2023-05-29 RX ADMIN — APIXABAN 5 MILLIGRAM(S): 2.5 TABLET, FILM COATED ORAL at 17:19

## 2023-05-29 RX ADMIN — Medication 650 MILLIGRAM(S): at 05:49

## 2023-05-29 RX ADMIN — GABAPENTIN 600 MILLIGRAM(S): 400 CAPSULE ORAL at 05:50

## 2023-05-29 RX ADMIN — PANTOPRAZOLE SODIUM 40 MILLIGRAM(S): 20 TABLET, DELAYED RELEASE ORAL at 05:50

## 2023-05-29 RX ADMIN — Medication 1: at 12:49

## 2023-05-29 RX ADMIN — APIXABAN 5 MILLIGRAM(S): 2.5 TABLET, FILM COATED ORAL at 05:50

## 2023-05-29 RX ADMIN — Medication 8 UNIT(S): at 08:53

## 2023-05-29 RX ADMIN — Medication 8 UNIT(S): at 12:50

## 2023-05-29 RX ADMIN — Medication 8 UNIT(S): at 17:20

## 2023-05-29 RX ADMIN — HYDROMORPHONE HYDROCHLORIDE 4 MILLIGRAM(S): 2 INJECTION INTRAMUSCULAR; INTRAVENOUS; SUBCUTANEOUS at 04:14

## 2023-05-29 RX ADMIN — Medication 650 MILLIGRAM(S): at 00:15

## 2023-05-29 RX ADMIN — BUMETANIDE 0.5 MILLIGRAM(S): 0.25 INJECTION INTRAMUSCULAR; INTRAVENOUS at 05:51

## 2023-05-29 RX ADMIN — ESCITALOPRAM OXALATE 20 MILLIGRAM(S): 10 TABLET, FILM COATED ORAL at 12:51

## 2023-05-29 RX ADMIN — LIDOCAINE 1 PATCH: 4 CREAM TOPICAL at 00:22

## 2023-05-29 RX ADMIN — SPIRONOLACTONE 25 MILLIGRAM(S): 25 TABLET, FILM COATED ORAL at 05:50

## 2023-05-29 RX ADMIN — LIDOCAINE 1 PATCH: 4 CREAM TOPICAL at 12:51

## 2023-05-29 RX ADMIN — HYDROMORPHONE HYDROCHLORIDE 4 MILLIGRAM(S): 2 INJECTION INTRAMUSCULAR; INTRAVENOUS; SUBCUTANEOUS at 11:05

## 2023-05-29 RX ADMIN — HYDROMORPHONE HYDROCHLORIDE 4 MILLIGRAM(S): 2 INJECTION INTRAMUSCULAR; INTRAVENOUS; SUBCUTANEOUS at 10:31

## 2023-05-29 RX ADMIN — SODIUM CHLORIDE 125 MILLILITER(S): 9 INJECTION, SOLUTION INTRAVENOUS at 12:50

## 2023-05-29 NOTE — DISCHARGE NOTE NURSING/CASE MANAGEMENT/SOCIAL WORK - NSDCFUADDAPPT_GEN_ALL_CORE_FT
Pain management    [facility] Cardiology/electrophysiology  Dr. Tenisha Skinner

## 2023-05-29 NOTE — DISCHARGE NOTE NURSING/CASE MANAGEMENT/SOCIAL WORK - NSDCVIVACCINE_GEN_ALL_CORE_FT
COVID-19 vaccine, vector-nr, rS-Ad26, PF, 0.5 mL (Anika); 06-May-2021 11:14; Katey Burdick (DEBRA); Anika; [id number] (Exp. Date: 21-Jun-2021); IntraMuscular; Deltoid Left.; 0.5 milliLiter(s);

## 2023-05-29 NOTE — DISCHARGE NOTE NURSING/CASE MANAGEMENT/SOCIAL WORK - PATIENT PORTAL LINK FT
You can access the FollowMyHealth Patient Portal offered by [facility] by registering at the following website: [email or web address]. By joining [facility]’s FollowMyHealth portal, you will also be able to view your health information using other applications (apps) compatible with our system.

## 2023-05-29 NOTE — DISCHARGE NOTE NURSING/CASE MANAGEMENT/SOCIAL WORK - NSDCPEFALRISK_GEN_ALL_CORE
For information on Fall & Injury Prevention, visit: [email or web address] OR  [email or web address] OR  https://www.cdc.gov/steadi/patient.html

## 2023-06-10 NOTE — ED ADULT TRIAGE NOTE - SPO2 (%)
Left side dental pain, has appt the 29th for a tooth extraction but mom states she cant take the pain.
98

## 2024-01-02 NOTE — ED PROVIDER NOTE - PHYSICAL EXAMINATION
Quality 226: Preventive Care And Screening: Tobacco Use: Screening And Cessation Intervention: Patient screened for tobacco use and is an ex/non-smoker
Quality 128: Preventive Care And Screening: Body Mass Index (Bmi) Screening And Follow-Up Plan: BMI is documented within normal parameters and no follow-up plan is required.
Quality 130: Documentation Of Current Medications In The Medical Record: Current Medications Documented
Quality 431: Preventive Care And Screening: Unhealthy Alcohol Use - Screening: Patient identified as an unhealthy alcohol user when screened for unhealthy alcohol use using a systematic screening method and received brief counseling
Detail Level: Detailed
NAD, VSS, Afebrile, Lungs clear. + Left 1st phalanx; 2 small puncture wound with tender, swelling and cellulitis streaking to left elbow. No active discharge. Full ROMs of fingers with intact N/V.

## 2024-01-27 ENCOUNTER — EMERGENCY (EMERGENCY)
Facility: HOSPITAL | Age: 69
LOS: 1 days | Discharge: HOME | End: 2024-01-27
Attending: EMERGENCY MEDICINE
Payer: MEDICARE

## 2024-01-27 VITALS
TEMPERATURE: 98 F | OXYGEN SATURATION: 98 % | HEART RATE: 68 BPM | SYSTOLIC BLOOD PRESSURE: 133 MMHG | DIASTOLIC BLOOD PRESSURE: 79 MMHG | WEIGHT: 293 LBS | HEIGHT: 66 IN | RESPIRATION RATE: 18 BRPM

## 2024-01-27 LAB
ALBUMIN SERPL ELPH-MCNC: 3.6 G/DL — SIGNIFICANT CHANGE UP (ref 3.3–5)
ALP SERPL-CCNC: 97 U/L — SIGNIFICANT CHANGE UP (ref 40–120)
ALT FLD-CCNC: 11 U/L — SIGNIFICANT CHANGE UP (ref 10–45)
ANION GAP SERPL CALC-SCNC: 10 MMOL/L — SIGNIFICANT CHANGE UP (ref 5–17)
AST SERPL-CCNC: 12 U/L — SIGNIFICANT CHANGE UP (ref 10–40)
B-OH-BUTYR SERPL-SCNC: 0.1 MMOL/L — SIGNIFICANT CHANGE UP
BASE EXCESS BLDV CALC-SCNC: 3.6 MMOL/L — HIGH (ref -2–3)
BASOPHILS # BLD AUTO: 0.03 K/UL — SIGNIFICANT CHANGE UP (ref 0–0.2)
BASOPHILS NFR BLD AUTO: 0.4 % — SIGNIFICANT CHANGE UP (ref 0–2)
BILIRUB SERPL-MCNC: 0.2 MG/DL — SIGNIFICANT CHANGE UP (ref 0.2–1.2)
BUN SERPL-MCNC: 15 MG/DL — SIGNIFICANT CHANGE UP (ref 7–23)
CA-I SERPL-SCNC: 1.18 MMOL/L — SIGNIFICANT CHANGE UP (ref 1.15–1.33)
CALCIUM SERPL-MCNC: 9.3 MG/DL — SIGNIFICANT CHANGE UP (ref 8.4–10.5)
CHLORIDE BLDV-SCNC: 104 MMOL/L — SIGNIFICANT CHANGE UP (ref 96–108)
CHLORIDE SERPL-SCNC: 100 MMOL/L — SIGNIFICANT CHANGE UP (ref 96–108)
CO2 BLDV-SCNC: 30 MMOL/L — HIGH (ref 22–26)
CO2 SERPL-SCNC: 29 MMOL/L — SIGNIFICANT CHANGE UP (ref 22–31)
CREAT SERPL-MCNC: 0.93 MG/DL — SIGNIFICANT CHANGE UP (ref 0.5–1.3)
EGFR: 67 ML/MIN/1.73M2 — SIGNIFICANT CHANGE UP
EOSINOPHIL # BLD AUTO: 0.33 K/UL — SIGNIFICANT CHANGE UP (ref 0–0.5)
EOSINOPHIL NFR BLD AUTO: 4.9 % — SIGNIFICANT CHANGE UP (ref 0–6)
GAS PNL BLDV: 136 MMOL/L — SIGNIFICANT CHANGE UP (ref 136–145)
GAS PNL BLDV: SIGNIFICANT CHANGE UP
GLUCOSE BLDV-MCNC: 133 MG/DL — HIGH (ref 70–99)
GLUCOSE SERPL-MCNC: 173 MG/DL — HIGH (ref 70–99)
HCO3 BLDV-SCNC: 29 MMOL/L — SIGNIFICANT CHANGE UP (ref 22–29)
HCT VFR BLD CALC: 37.6 % — SIGNIFICANT CHANGE UP (ref 34.5–45)
HCT VFR BLDA CALC: 33 % — LOW (ref 34.5–46.5)
HGB BLD CALC-MCNC: 11.1 G/DL — LOW (ref 11.7–16.1)
HGB BLD-MCNC: 12 G/DL — SIGNIFICANT CHANGE UP (ref 11.5–15.5)
IMM GRANULOCYTES NFR BLD AUTO: 0.3 % — SIGNIFICANT CHANGE UP (ref 0–0.9)
LACTATE BLDV-MCNC: 1.2 MMOL/L — SIGNIFICANT CHANGE UP (ref 0.5–2)
LYMPHOCYTES # BLD AUTO: 1.59 K/UL — SIGNIFICANT CHANGE UP (ref 1–3.3)
LYMPHOCYTES # BLD AUTO: 23.7 % — SIGNIFICANT CHANGE UP (ref 13–44)
MAGNESIUM SERPL-MCNC: 1.6 MG/DL — SIGNIFICANT CHANGE UP (ref 1.6–2.6)
MCHC RBC-ENTMCNC: 27 PG — SIGNIFICANT CHANGE UP (ref 27–34)
MCHC RBC-ENTMCNC: 31.9 GM/DL — LOW (ref 32–36)
MCV RBC AUTO: 84.5 FL — SIGNIFICANT CHANGE UP (ref 80–100)
MONOCYTES # BLD AUTO: 0.65 K/UL — SIGNIFICANT CHANGE UP (ref 0–0.9)
MONOCYTES NFR BLD AUTO: 9.7 % — SIGNIFICANT CHANGE UP (ref 2–14)
NEUTROPHILS # BLD AUTO: 4.08 K/UL — SIGNIFICANT CHANGE UP (ref 1.8–7.4)
NEUTROPHILS NFR BLD AUTO: 61 % — SIGNIFICANT CHANGE UP (ref 43–77)
NRBC # BLD: 0 /100 WBCS — SIGNIFICANT CHANGE UP (ref 0–0)
NT-PROBNP SERPL-SCNC: 245 PG/ML — SIGNIFICANT CHANGE UP (ref 0–300)
PCO2 BLDV: 47 MMHG — HIGH (ref 39–42)
PH BLDV: 7.4 — SIGNIFICANT CHANGE UP (ref 7.32–7.43)
PLATELET # BLD AUTO: 285 K/UL — SIGNIFICANT CHANGE UP (ref 150–400)
PO2 BLDV: 51 MMHG — HIGH (ref 25–45)
POTASSIUM BLDV-SCNC: 3.8 MMOL/L — SIGNIFICANT CHANGE UP (ref 3.5–5.1)
POTASSIUM SERPL-MCNC: 4.3 MMOL/L — SIGNIFICANT CHANGE UP (ref 3.5–5.3)
POTASSIUM SERPL-SCNC: 4.3 MMOL/L — SIGNIFICANT CHANGE UP (ref 3.5–5.3)
PROT SERPL-MCNC: 6.9 G/DL — SIGNIFICANT CHANGE UP (ref 6–8.3)
RBC # BLD: 4.45 M/UL — SIGNIFICANT CHANGE UP (ref 3.8–5.2)
RBC # FLD: 16.3 % — HIGH (ref 10.3–14.5)
SAO2 % BLDV: 86.3 % — SIGNIFICANT CHANGE UP (ref 67–88)
SODIUM SERPL-SCNC: 139 MMOL/L — SIGNIFICANT CHANGE UP (ref 135–145)
TROPONIN T, HIGH SENSITIVITY RESULT: 20 NG/L — SIGNIFICANT CHANGE UP (ref 0–51)
TROPONIN T, HIGH SENSITIVITY RESULT: 20 NG/L — SIGNIFICANT CHANGE UP (ref 0–51)
WBC # BLD: 6.7 K/UL — SIGNIFICANT CHANGE UP (ref 3.8–10.5)
WBC # FLD AUTO: 6.7 K/UL — SIGNIFICANT CHANGE UP (ref 3.8–10.5)

## 2024-01-27 PROCEDURE — 84484 ASSAY OF TROPONIN QUANT: CPT

## 2024-01-27 PROCEDURE — 71045 X-RAY EXAM CHEST 1 VIEW: CPT | Mod: 26

## 2024-01-27 PROCEDURE — 84443 ASSAY THYROID STIM HORMONE: CPT

## 2024-01-27 PROCEDURE — 84132 ASSAY OF SERUM POTASSIUM: CPT

## 2024-01-27 PROCEDURE — 93970 EXTREMITY STUDY: CPT

## 2024-01-27 PROCEDURE — 83735 ASSAY OF MAGNESIUM: CPT

## 2024-01-27 PROCEDURE — 99285 EMERGENCY DEPT VISIT HI MDM: CPT | Mod: 25

## 2024-01-27 PROCEDURE — 93970 EXTREMITY STUDY: CPT | Mod: 26

## 2024-01-27 PROCEDURE — 80053 COMPREHEN METABOLIC PANEL: CPT

## 2024-01-27 PROCEDURE — 71045 X-RAY EXAM CHEST 1 VIEW: CPT

## 2024-01-27 PROCEDURE — 85018 HEMOGLOBIN: CPT

## 2024-01-27 PROCEDURE — 83880 ASSAY OF NATRIURETIC PEPTIDE: CPT

## 2024-01-27 PROCEDURE — 93005 ELECTROCARDIOGRAM TRACING: CPT

## 2024-01-27 PROCEDURE — 82010 KETONE BODYS QUAN: CPT

## 2024-01-27 PROCEDURE — 85014 HEMATOCRIT: CPT

## 2024-01-27 PROCEDURE — 36415 COLL VENOUS BLD VENIPUNCTURE: CPT

## 2024-01-27 PROCEDURE — 96374 THER/PROPH/DIAG INJ IV PUSH: CPT

## 2024-01-27 PROCEDURE — 99285 EMERGENCY DEPT VISIT HI MDM: CPT

## 2024-01-27 PROCEDURE — 83605 ASSAY OF LACTIC ACID: CPT

## 2024-01-27 PROCEDURE — 85025 COMPLETE CBC W/AUTO DIFF WBC: CPT

## 2024-01-27 PROCEDURE — 84295 ASSAY OF SERUM SODIUM: CPT

## 2024-01-27 PROCEDURE — 82803 BLOOD GASES ANY COMBINATION: CPT

## 2024-01-27 PROCEDURE — 82435 ASSAY OF BLOOD CHLORIDE: CPT

## 2024-01-27 PROCEDURE — 82330 ASSAY OF CALCIUM: CPT

## 2024-01-27 PROCEDURE — 82947 ASSAY GLUCOSE BLOOD QUANT: CPT

## 2024-01-27 RX ORDER — CEFAZOLIN SODIUM 1 G
1000 VIAL (EA) INJECTION ONCE
Refills: 0 | Status: COMPLETED | OUTPATIENT
Start: 2024-01-27 | End: 2024-01-27

## 2024-01-27 RX ORDER — CEPHALEXIN 500 MG
1 CAPSULE ORAL
Qty: 30 | Refills: 0
Start: 2024-01-27 | End: 2024-02-05

## 2024-01-27 RX ORDER — ACETAMINOPHEN 500 MG
975 TABLET ORAL ONCE
Refills: 0 | Status: COMPLETED | OUTPATIENT
Start: 2024-01-27 | End: 2024-01-27

## 2024-01-27 RX ADMIN — Medication 100 MILLIGRAM(S): at 20:11

## 2024-01-27 RX ADMIN — Medication 975 MILLIGRAM(S): at 20:08

## 2024-01-27 NOTE — ED ADULT NURSE NOTE - NSFALLUNIVINTERV_ED_ALL_ED
Bed/Stretcher in lowest position, wheels locked, appropriate side rails in place/Call bell, personal items and telephone in reach/Instruct patient to call for assistance before getting out of bed/chair/stretcher/Non-slip footwear applied when patient is off stretcher/[place] to call system/Physically safe environment - no spills, clutter or unnecessary equipment/Purposeful proactive rounding/Room/bathroom lighting operational, light cord in reach

## 2024-01-27 NOTE — ED PROVIDER NOTE - CLINICAL SUMMARY MEDICAL DECISION MAKING FREE TEXT BOX
69yo F with PMH significant for T2DM, Afib s/p ablation on Eliquis, fibromyalgia, OA, RENARD on CPAP, depression, chronic venous insufficiency, endometrial cancer s/p hysterectomy 2010, HTN, HLD, hypothyroidism, coming in for worsening of bilateral LE swelling, redness and pain for 2d. P    VS in ED WNL. PE shows erythema and swelling in LE with ttp. Warm to tough but not hot. DP pulses intact. ddx include but not limited to lymphedema, worsening venous stasis, cellulitis, DVT. plan: cbc, cmp, tsh, trop, bnp, dvt us, CXR, ekg 69yo F with PMH significant for T2DM, Afib s/p ablation on Eliquis, fibromyalgia, OA, RENARD on CPAP, depression, chronic venous insufficiency, endometrial cancer s/p hysterectomy 2010, HTN, HLD, hypothyroidism, coming in for worsening of bilateral LE swelling, redness and pain for 2d. P    VS in ED WNL. PE shows erythema and swelling in LE with ttp. Warm to tough but not hot. DP pulses intact. ddx include but not limited to lymphedema, worsening venous stasis, cellulitis, DVT. plan: cbc, cmp, tsh, trop, bnp, dvt us, CXR, ekg admission for iv antibiotics ZR

## 2024-01-27 NOTE — ED ADULT NURSE NOTE - OBJECTIVE STATEMENT
69 yo female presents to the ED AAOx4 BIBEMS with complaints of bilateral leg pain, swelling, redness over last couple of days. PMH Morbidly obese GERD, AFIB. 67 yo female presents to the ED AAOx4 BIBEMS with complaints of bilateral leg pain, swelling, redness over last couple of days. PMH Morbidly obese GERD, AFIB, currently being treated with abx for UTI. Pt==

## 2024-01-27 NOTE — ED PROVIDER NOTE - NSFOLLOWUPINSTRUCTIONS_ED_ALL_ED_FT
You were seen in the emergency department for cellulitis    1) Follow-up with your primary care provider in 1-5 days.     2) Continue to take all medications as prescribed. We sent an antibiotic to take to your pharmacy FOUR times per day (every 8 hours)    3) Rest and stay hydrated. Pain can be managed with Acetaminophen (aka Tylenol) over the counter as directed.    4) Return to the ER for any new or worsening symptoms. Signs to look out for include fever, vomiting, worsening of the rash, wounds with pus, inability to walk, or any other worsening or concerning symptoms.      Please read all attached patient information.

## 2024-01-27 NOTE — ED PROVIDER NOTE - PATIENT PORTAL LINK FT
You can access the FollowMyHealth Patient Portal offered by [facility] by registering at the following website: [email or web address]. By joining [facility]’s FollowMyHealth portal, you will also be able to view your health information using other applications (apps) compatible with our system.

## 2024-01-27 NOTE — ED PROVIDER NOTE - PHYSICAL EXAMINATION
GENERAL: NAD, lying in bed comfortably  HEAD:  Atraumatic, Normocephalic  EYES: EOMI, conjunctiva and sclera clear  ENT: Moist mucous membranes  NECK: Supple  CHEST/LUNG: Unlabored respirations. Clear to auscultation bilaterally. No rales, rhonchi, wheezing, or rubs  HEART: Regular rate and rhythm. No murmurs, rubs, or gallops  ABDOMEN: Soft, nondistended, nontender  EXTREMITIES:  +b/l DP pulses. +erythema on shins with ttp. mild calf ttp.   NERVOUS SYSTEM:  No focal deficits. A&Ox3  SKIN: No rashes or lesions GENERAL: NAD, lying in bed comfortably  HEAD:  Atraumatic, Normocephalic  EYES: EOMI, conjunctiva and sclera clear  ENT: Moist mucous membranes  NECK: Supple  CHEST/LUNG: Unlabored respirations. Clear to auscultation bilaterally. No rales, rhonchi, wheezing, or rubs  HEART: Regular rate and rhythm. No murmurs, rubs, or gallops  ABDOMEN: Soft, nondistended, nontender  EXTREMITIES:  +b/l DP pulses. +erythema on shins with ttp. mild calf ttp.   NERVOUS SYSTEM:  No focal deficits. A&Ox3

## 2024-01-27 NOTE — ED ADULT NURSE NOTE - NSFALLCONCLUSION_ED_ALL_ED
Patient denies pain. Patient resting in room  
Problem: Communication  Goal: The ability to communicate needs accurately and effectively will improve  Outcome: PROGRESSING AS EXPECTED      Problem: Safety  Goal: Will remain free from injury  Outcome: PROGRESSING AS EXPECTED      Problem: Infection  Goal: Will remain free from infection  Outcome: PROGRESSING AS EXPECTED        
Problem: Knowledge Deficit  Goal: Patient/Significant other demonstrates understanding of disease process, treatment plan, medications and discharge instructions  Updated on POC    Problem: Neuro Status  Goal: Monitor neuro status and rapid identification of neuro changes    Intervention: Neuro checks Q 4 hours and PRN (see Neuro flowsheet)  Q1h neuro check performed.        
Problem: Neuro Status  Goal: Monitor neuro status and rapid identification of neuro changes    Intervention: Neuro checks Q 4 hours and PRN (see Neuro flowsheet)  Q4 hr Neuro assessment, pt intact, no deficits.        Problem: Risk for Impaired Mobility  Goal: Activity Level appropriate for Discharge or Transfer    Intervention: Collaborate with Inpatient Therapies  Pt ambulated around the unit with standby assistance, pt tolerated activity well. Denies pain or headache at this time.       Problem: Nutrition Deficit  Goal: Adequate Food and Fluid Intake    Intervention: Monitor Dietary Intake  Pt on regular cardiac diet and tolerating well at this time, no nausea reported with intake.       Problem: Respiratory:  Goal: Respiratory status will improve    Intervention: Educate and encourage incentive spirometry usage  IS explained and discussed importance of hourly use with pt. Effort ranges from 9638-2400 cc. No cough or sputum noted with exercises.         
Problem: Safety  Goal: Will remain free from falls    Intervention: Assess risk factors for falls  Fall risk assessed (see flow sheet). Bed in low position.      Problem: Infection  Goal: Will remain free from infection    Intervention: Implement standard precautions and perform hand washing before and after patient contact    Hand hygiene and standard precautions implemented        
Problem: Safety  Goal: Will remain free from falls    Intervention: Implement fall precautions  Bed alarm in use. Call light w/in reach. Instructed pt to call for assistance before getting OOB- pt verbalized understanding.      Problem: Knowledge Deficit  Goal: Knowledge of disease process/condition, treatment plan, diagnostic tests, and medications will improve    Intervention: Explain information regarding disease process/condition, treatment plan, diagnostic tests, and medications and document in education  POC discussed. SLP in to do cog eval.        
Problem: Safety  Goal: Will remain free from injury  Outcome: PROGRESSING AS EXPECTED  Precautions in place, no injury noted, pt calls appropriately, call light in reach, bed alarmed, hourly rounding    Problem: Knowledge Deficit  Goal: Knowledge of disease process/condition, treatment plan, diagnostic tests, and medications will improve  Outcome: PROGRESSING AS EXPECTED        
Problem: Safety  Goal: Will remain free from injury  Outcome: PROGRESSING AS EXPECTED  Safety precaution in effect. Call light within reach. Reminded patient to call for assist. Hourly rond in effect. verbalized understanding.     Problem: Knowledge Deficit  Goal: Knowledge of disease process/condition, treatment plan, diagnostic tests, and medications will improve  Outcome: PROGRESSING AS EXPECTED  Discharge Plan of care with the patient & family. Questions answered. Verbalized understanding.      
Problem: Safety  Goal: Will remain free from injury  Outcome: PROGRESSING AS EXPECTED  Safety precaution in effect. Call light within reach. Reminded patient to call for assist. Hourly rounds in effect. Verbalized understanding.    Problem: Infection  Goal: Will remain free from infection  Outcome: PROGRESSING AS EXPECTED  Implement Standard precaution. Hand washing every encounter & before & after patient care. Verbalized understanding.    Problem: Knowledge Deficit  Goal: Knowledge of disease process/condition, treatment plan, diagnostic tests, and medications will improve  Outcome: PROGRESSING AS EXPECTED  Discussed Plan of care with the patient & daughter. Questions answered. Verbalized understanding.      
Problem: Safety  Goal: Will remain free from injury  Outcome: PROGRESSING AS EXPECTED  Safety precaution in effect. Call light within reach. Reminded patient to call for assist. Hourly rounds in effect. Verbalized understanding.    Problem: Infection  Goal: Will remain free from infection  Outcome: PROGRESSING AS EXPECTED  Implement Standard precaution. Hand washing every encounter & before & after patient care. Verbalized understanding.    Problem: Knowledge Deficit  Goal: Knowledge of disease process/condition, treatment plan, diagnostic tests, and medications will improve  Outcome: PROGRESSING AS EXPECTED  Discussed Plan of care with the patient & family members. Questions answered. Verbalized undersanding.      
Universal Safety Interventions

## 2024-01-27 NOTE — ED PROVIDER NOTE - OBJECTIVE STATEMENT
67yo F with PMH significant for T2DM, Afib s/p ablation on Eliquis, fibromyalgia, OA, RENARD on CPAP, depression, chronic venous insufficiency, endometrial cancer s/p hysterectomy 2010, HTN, HLD, hypothyroidism, comign in for worsening of bilateral LE swelling. 69yo F with PMH significant for T2DM, Afib s/p ablation on Eliquis, fibromyalgia, OA, RENARD on CPAP, depression, chronic venous insufficiency, endometrial cancer s/p hysterectomy 2010, HTN, HLD, hypothyroidism, coming in for worsening of bilateral LE swelling, redness and pain for 2d. Pt denies fevers, chills, nausea, vomiting, trauma, chest pain, shortness of breath. Reports she was hospitalized in May for cellulitis in LE.

## 2024-01-28 VITALS
HEART RATE: 60 BPM | TEMPERATURE: 98 F | DIASTOLIC BLOOD PRESSURE: 58 MMHG | SYSTOLIC BLOOD PRESSURE: 138 MMHG | RESPIRATION RATE: 16 BRPM | OXYGEN SATURATION: 99 %

## 2024-01-28 PROBLEM — I48.91 UNSPECIFIED ATRIAL FIBRILLATION: Chronic | Status: ACTIVE | Noted: 2023-05-22

## 2024-01-28 LAB — TSH SERPL-MCNC: 2.4 UIU/ML — SIGNIFICANT CHANGE UP (ref 0.27–4.2)

## 2024-02-07 ENCOUNTER — EMERGENCY (EMERGENCY)
Facility: HOSPITAL | Age: 69
LOS: 1 days | Discharge: HOME | End: 2024-02-07
Attending: STUDENT IN AN ORGANIZED HEALTH CARE EDUCATION/TRAINING PROGRAM
Payer: MEDICARE

## 2024-02-07 VITALS
DIASTOLIC BLOOD PRESSURE: 82 MMHG | HEIGHT: 66 IN | RESPIRATION RATE: 20 BRPM | HEART RATE: 76 BPM | OXYGEN SATURATION: 96 % | WEIGHT: 293 LBS | TEMPERATURE: 98 F | SYSTOLIC BLOOD PRESSURE: 139 MMHG

## 2024-02-07 LAB
ALBUMIN SERPL ELPH-MCNC: 3.6 G/DL — SIGNIFICANT CHANGE UP (ref 3.3–5)
ALP SERPL-CCNC: 93 U/L — SIGNIFICANT CHANGE UP (ref 40–120)
ALT FLD-CCNC: 6 U/L — LOW (ref 10–45)
ANION GAP SERPL CALC-SCNC: 9 MMOL/L — SIGNIFICANT CHANGE UP (ref 5–17)
AST SERPL-CCNC: 8 U/L — LOW (ref 10–40)
B-OH-BUTYR SERPL-SCNC: 0.1 MMOL/L — SIGNIFICANT CHANGE UP
BASE EXCESS BLDV CALC-SCNC: 3.6 MMOL/L — HIGH (ref -2–3)
BASOPHILS # BLD AUTO: 0.01 K/UL — SIGNIFICANT CHANGE UP (ref 0–0.2)
BASOPHILS NFR BLD AUTO: 0.1 % — SIGNIFICANT CHANGE UP (ref 0–2)
BILIRUB SERPL-MCNC: 0.2 MG/DL — SIGNIFICANT CHANGE UP (ref 0.2–1.2)
BUN SERPL-MCNC: 15 MG/DL — SIGNIFICANT CHANGE UP (ref 7–23)
CA-I SERPL-SCNC: 1.15 MMOL/L — SIGNIFICANT CHANGE UP (ref 1.15–1.33)
CALCIUM SERPL-MCNC: 9 MG/DL — SIGNIFICANT CHANGE UP (ref 8.4–10.5)
CHLORIDE BLDV-SCNC: 101 MMOL/L — SIGNIFICANT CHANGE UP (ref 96–108)
CHLORIDE SERPL-SCNC: 101 MMOL/L — SIGNIFICANT CHANGE UP (ref 96–108)
CO2 BLDV-SCNC: 32 MMOL/L — HIGH (ref 22–26)
CO2 SERPL-SCNC: 28 MMOL/L — SIGNIFICANT CHANGE UP (ref 22–31)
CREAT SERPL-MCNC: 0.99 MG/DL — SIGNIFICANT CHANGE UP (ref 0.5–1.3)
EGFR: 62 ML/MIN/1.73M2 — SIGNIFICANT CHANGE UP
EOSINOPHIL # BLD AUTO: 0.36 K/UL — SIGNIFICANT CHANGE UP (ref 0–0.5)
EOSINOPHIL NFR BLD AUTO: 4 % — SIGNIFICANT CHANGE UP (ref 0–6)
GAS PNL BLDV: 133 MMOL/L — LOW (ref 136–145)
GAS PNL BLDV: SIGNIFICANT CHANGE UP
GLUCOSE BLDC GLUCOMTR-MCNC: 145 MG/DL — HIGH (ref 70–99)
GLUCOSE BLDV-MCNC: 223 MG/DL — HIGH (ref 70–99)
GLUCOSE SERPL-MCNC: 227 MG/DL — HIGH (ref 70–99)
HCO3 BLDV-SCNC: 31 MMOL/L — HIGH (ref 22–29)
HCT VFR BLD CALC: 40.5 % — SIGNIFICANT CHANGE UP (ref 34.5–45)
HCT VFR BLDA CALC: 39 % — SIGNIFICANT CHANGE UP (ref 34.5–46.5)
HGB BLD CALC-MCNC: 13 G/DL — SIGNIFICANT CHANGE UP (ref 11.7–16.1)
HGB BLD-MCNC: 12.9 G/DL — SIGNIFICANT CHANGE UP (ref 11.5–15.5)
IMM GRANULOCYTES NFR BLD AUTO: 0.3 % — SIGNIFICANT CHANGE UP (ref 0–0.9)
LACTATE BLDV-MCNC: 2.5 MMOL/L — HIGH (ref 0.5–2)
LYMPHOCYTES # BLD AUTO: 1.1 K/UL — SIGNIFICANT CHANGE UP (ref 1–3.3)
LYMPHOCYTES # BLD AUTO: 12.1 % — LOW (ref 13–44)
MCHC RBC-ENTMCNC: 27.1 PG — SIGNIFICANT CHANGE UP (ref 27–34)
MCHC RBC-ENTMCNC: 31.9 GM/DL — LOW (ref 32–36)
MCV RBC AUTO: 85.1 FL — SIGNIFICANT CHANGE UP (ref 80–100)
MONOCYTES # BLD AUTO: 0.57 K/UL — SIGNIFICANT CHANGE UP (ref 0–0.9)
MONOCYTES NFR BLD AUTO: 6.3 % — SIGNIFICANT CHANGE UP (ref 2–14)
NEUTROPHILS # BLD AUTO: 7.01 K/UL — SIGNIFICANT CHANGE UP (ref 1.8–7.4)
NEUTROPHILS NFR BLD AUTO: 77.2 % — HIGH (ref 43–77)
NRBC # BLD: 0 /100 WBCS — SIGNIFICANT CHANGE UP (ref 0–0)
PCO2 BLDV: 57 MMHG — HIGH (ref 39–42)
PH BLDV: 7.34 — SIGNIFICANT CHANGE UP (ref 7.32–7.43)
PLATELET # BLD AUTO: 401 K/UL — HIGH (ref 150–400)
PO2 BLDV: 48 MMHG — HIGH (ref 25–45)
POTASSIUM BLDV-SCNC: 7.2 MMOL/L — CRITICAL HIGH (ref 3.5–5.1)
POTASSIUM SERPL-MCNC: 4.5 MMOL/L — SIGNIFICANT CHANGE UP (ref 3.5–5.3)
POTASSIUM SERPL-SCNC: 4.5 MMOL/L — SIGNIFICANT CHANGE UP (ref 3.5–5.3)
PROT SERPL-MCNC: 6.6 G/DL — SIGNIFICANT CHANGE UP (ref 6–8.3)
RBC # BLD: 4.76 M/UL — SIGNIFICANT CHANGE UP (ref 3.8–5.2)
RBC # FLD: 16 % — HIGH (ref 10.3–14.5)
SAO2 % BLDV: 76.2 % — SIGNIFICANT CHANGE UP (ref 67–88)
SODIUM SERPL-SCNC: 138 MMOL/L — SIGNIFICANT CHANGE UP (ref 135–145)
WBC # BLD: 9.08 K/UL — SIGNIFICANT CHANGE UP (ref 3.8–10.5)
WBC # FLD AUTO: 9.08 K/UL — SIGNIFICANT CHANGE UP (ref 3.8–10.5)

## 2024-02-07 PROCEDURE — 99223 1ST HOSP IP/OBS HIGH 75: CPT | Mod: FS

## 2024-02-07 RX ORDER — DEXTROSE 50 % IN WATER 50 %
25 SYRINGE (ML) INTRAVENOUS ONCE
Refills: 0 | Status: DISCONTINUED | OUTPATIENT
Start: 2024-02-07 | End: 2024-02-11

## 2024-02-07 RX ORDER — INSULIN LISPRO 100/ML
VIAL (ML) SUBCUTANEOUS
Refills: 0 | Status: DISCONTINUED | OUTPATIENT
Start: 2024-02-07 | End: 2024-02-11

## 2024-02-07 RX ORDER — DEXTROSE 50 % IN WATER 50 %
15 SYRINGE (ML) INTRAVENOUS ONCE
Refills: 0 | Status: DISCONTINUED | OUTPATIENT
Start: 2024-02-07 | End: 2024-02-11

## 2024-02-07 RX ORDER — GLUCAGON INJECTION, SOLUTION 0.5 MG/.1ML
1 INJECTION, SOLUTION SUBCUTANEOUS ONCE
Refills: 0 | Status: DISCONTINUED | OUTPATIENT
Start: 2024-02-07 | End: 2024-02-11

## 2024-02-07 RX ORDER — LORATADINE 10 MG/1
10 TABLET ORAL DAILY
Refills: 0 | Status: DISCONTINUED | OUTPATIENT
Start: 2024-02-08 | End: 2024-02-11

## 2024-02-07 RX ORDER — INSULIN LISPRO 100/ML
8 VIAL (ML) SUBCUTANEOUS
Refills: 0 | Status: DISCONTINUED | OUTPATIENT
Start: 2024-02-07 | End: 2024-02-11

## 2024-02-07 RX ORDER — INSULIN GLARGINE 100 [IU]/ML
24 INJECTION, SOLUTION SUBCUTANEOUS AT BEDTIME
Refills: 0 | Status: DISCONTINUED | OUTPATIENT
Start: 2024-02-07 | End: 2024-02-11

## 2024-02-07 RX ORDER — ACETAMINOPHEN 500 MG
1000 TABLET ORAL ONCE
Refills: 0 | Status: COMPLETED | OUTPATIENT
Start: 2024-02-07 | End: 2024-02-07

## 2024-02-07 RX ORDER — FAMOTIDINE 10 MG/ML
20 INJECTION INTRAVENOUS DAILY
Refills: 0 | Status: DISCONTINUED | OUTPATIENT
Start: 2024-02-08 | End: 2024-02-11

## 2024-02-07 RX ORDER — DEXTROSE 50 % IN WATER 50 %
12.5 SYRINGE (ML) INTRAVENOUS ONCE
Refills: 0 | Status: DISCONTINUED | OUTPATIENT
Start: 2024-02-07 | End: 2024-02-11

## 2024-02-07 RX ORDER — SODIUM CHLORIDE 9 MG/ML
1000 INJECTION, SOLUTION INTRAVENOUS
Refills: 0 | Status: DISCONTINUED | OUTPATIENT
Start: 2024-02-07 | End: 2024-02-11

## 2024-02-07 RX ORDER — ESCITALOPRAM OXALATE 10 MG/1
20 TABLET, FILM COATED ORAL DAILY
Refills: 0 | Status: DISCONTINUED | OUTPATIENT
Start: 2024-02-08 | End: 2024-02-11

## 2024-02-07 RX ORDER — BUPROPION HYDROCHLORIDE 150 MG/1
150 TABLET, EXTENDED RELEASE ORAL DAILY
Refills: 0 | Status: COMPLETED | OUTPATIENT
Start: 2024-02-07 | End: 2024-02-07

## 2024-02-07 RX ORDER — DIPHENHYDRAMINE HCL 50 MG
25 CAPSULE ORAL EVERY 6 HOURS
Refills: 0 | Status: DISCONTINUED | OUTPATIENT
Start: 2024-02-07 | End: 2024-02-08

## 2024-02-07 RX ORDER — GABAPENTIN 400 MG/1
300 CAPSULE ORAL
Refills: 0 | Status: DISCONTINUED | OUTPATIENT
Start: 2024-02-07 | End: 2024-02-11

## 2024-02-07 RX ORDER — ATORVASTATIN CALCIUM 80 MG/1
40 TABLET, FILM COATED ORAL AT BEDTIME
Refills: 0 | Status: DISCONTINUED | OUTPATIENT
Start: 2024-02-07 | End: 2024-02-11

## 2024-02-07 RX ORDER — APIXABAN 2.5 MG/1
5 TABLET, FILM COATED ORAL EVERY 12 HOURS
Refills: 0 | Status: DISCONTINUED | OUTPATIENT
Start: 2024-02-07 | End: 2024-02-11

## 2024-02-07 RX ORDER — FAMOTIDINE 10 MG/ML
20 INJECTION INTRAVENOUS ONCE
Refills: 0 | Status: COMPLETED | OUTPATIENT
Start: 2024-02-07 | End: 2024-02-07

## 2024-02-07 RX ORDER — DIPHENHYDRAMINE HCL 50 MG
25 CAPSULE ORAL ONCE
Refills: 0 | Status: COMPLETED | OUTPATIENT
Start: 2024-02-07 | End: 2024-02-07

## 2024-02-07 RX ORDER — SPIRONOLACTONE 25 MG/1
25 TABLET, FILM COATED ORAL DAILY
Refills: 0 | Status: DISCONTINUED | OUTPATIENT
Start: 2024-02-08 | End: 2024-02-11

## 2024-02-07 RX ORDER — HYDROMORPHONE HYDROCHLORIDE 2 MG/ML
4 INJECTION INTRAMUSCULAR; INTRAVENOUS; SUBCUTANEOUS EVERY 6 HOURS
Refills: 0 | Status: DISCONTINUED | OUTPATIENT
Start: 2024-02-07 | End: 2024-02-09

## 2024-02-07 RX ORDER — BUMETANIDE 0.25 MG/ML
0.5 INJECTION INTRAMUSCULAR; INTRAVENOUS DAILY
Refills: 0 | Status: DISCONTINUED | OUTPATIENT
Start: 2024-02-08 | End: 2024-02-11

## 2024-02-07 RX ORDER — INSULIN LISPRO 100/ML
VIAL (ML) SUBCUTANEOUS AT BEDTIME
Refills: 0 | Status: DISCONTINUED | OUTPATIENT
Start: 2024-02-07 | End: 2024-02-11

## 2024-02-07 RX ORDER — LEVOTHYROXINE SODIUM 125 MCG
125 TABLET ORAL DAILY
Refills: 0 | Status: DISCONTINUED | OUTPATIENT
Start: 2024-02-08 | End: 2024-02-11

## 2024-02-07 RX ADMIN — HYDROMORPHONE HYDROCHLORIDE 4 MILLIGRAM(S): 2 INJECTION INTRAMUSCULAR; INTRAVENOUS; SUBCUTANEOUS at 21:55

## 2024-02-07 RX ADMIN — ATORVASTATIN CALCIUM 40 MILLIGRAM(S): 80 TABLET, FILM COATED ORAL at 21:55

## 2024-02-07 RX ADMIN — Medication 400 MILLIGRAM(S): at 21:54

## 2024-02-07 RX ADMIN — FAMOTIDINE 20 MILLIGRAM(S): 10 INJECTION INTRAVENOUS at 16:49

## 2024-02-07 RX ADMIN — BUPROPION HYDROCHLORIDE 150 MILLIGRAM(S): 150 TABLET, EXTENDED RELEASE ORAL at 21:55

## 2024-02-07 RX ADMIN — APIXABAN 5 MILLIGRAM(S): 2.5 TABLET, FILM COATED ORAL at 22:41

## 2024-02-07 RX ADMIN — Medication 25 MILLIGRAM(S): at 16:49

## 2024-02-07 RX ADMIN — INSULIN GLARGINE 24 UNIT(S): 100 INJECTION, SOLUTION SUBCUTANEOUS at 22:42

## 2024-02-07 RX ADMIN — GABAPENTIN 300 MILLIGRAM(S): 400 CAPSULE ORAL at 21:55

## 2024-02-07 NOTE — ED ADULT NURSE NOTE - NSFALLRISK_ED_ALL_ED
Assessment/Plan:   Patient Instructions   No change in current medication  Go to Cardiology office and  samples of Xarelto 20 mg and start taking 1 daily until you see Dr Carlos Phan on Monday  We can ascertain whether not you will have any side effects  Have labs done if you are able to do so  Follow-up in 2-3 weeks to review all testing and consultations  Quality Measures:       Return in about 3 weeks (around 5/6/2021) for Next scheduled follow up  Diagnoses and all orders for this visit:    New onset atrial fibrillation ([facility] )    Bruit of right carotid artery    Mixed hyperlipidemia  -     atorvastatin (LIPITOR) 20 mg tablet; Take 1 tablet (20 mg total) by mouth daily  -     Lipid panel; Future    Essential hypertension  -     amLODIPine (NORVASC) 10 mg tablet; Take 1 tablet (10 mg total) by mouth daily  -     benazepril (LOTENSIN) 40 MG tablet; Take 0 5 tablets (20 mg total) by mouth daily  -     diltiazem (Cartia XT) 300 mg 24 hr capsule; Take 1 capsule (300 mg total) by mouth daily  -     Comprehensive metabolic panel; Future    Coronary artery disease involving native heart with angina pectoris, unspecified vessel or lesion type ([facility] )    History of adverse reaction to anticoagulant medication  Comments:  Eliquis          Subjective:      Patient ID: Hyun Camejo is a 67 y o  male  Follow-up    New onset atrial fibrillation:  At last visit patient was placed on Eliquis  After starting Eliquis patient started with abdominal bloating, abdominal pain to the point where it caused him to double over  He stop the medication went back to his baby aspirin daily  He had been seen by Cardiology  His Holter was done showing persistent atrial fibrillation  Echocardiogram also done and will be reviewed with the patient by the cardiologist on a follow-up visit Monday    Patient frustrated as he apparently tried to get in touch with both offices about his Eliquis reaction and did not seem to get any satisfaction at least according to him  Review of the record indicates the patient is to stop by the cardiology office for samples of Xarelto to try to see if he is tolerant of that medication  I discussed that with him in a asked him to do so that he could get a few doses in before his Monday follow-up with Cardiology  He is not experiencing any chest pain  His shortness of breath is at baseline  Carotid bruit:  Carotid ultrasound pending  Denies neuro symptoms  Hyperlipidemia:  On atorvastatin  Would like him to repeat his labs  Hypertension:  Initial blood pressure elevated as the patient was anxious  Repeat blood pressure acceptable  CAD:  No complaint of chest pain or palpitations        ALLERGIES:  Allergies   Allergen Reactions    Pollen Extract        CURRENT MEDICATIONS:    Current Outpatient Medications:     amLODIPine (NORVASC) 10 mg tablet, Take 1 tablet (10 mg total) by mouth daily, Disp: 90 tablet, Rfl: 1    atorvastatin (LIPITOR) 20 mg tablet, Take 1 tablet (20 mg total) by mouth daily, Disp: 90 tablet, Rfl: 1    benazepril (LOTENSIN) 40 MG tablet, Take 0 5 tablets (20 mg total) by mouth daily, Disp: 90 tablet, Rfl: 1    diltiazem (Cartia XT) 300 mg 24 hr capsule, Take 1 capsule (300 mg total) by mouth daily, Disp: 90 capsule, Rfl: 1    ipratropium-albuterol (COMBIVENT RESPIMAT) inhaler, Inhale, Disp: , Rfl:     tiotropium-olodaterol (STIOLTO RESPIMAT) 2 5-2 5 MCG/ACT inhaler, Inhale 2 puffs daily, Disp: 1 Inhaler, Rfl: 0    ACTIVE PROBLEM LIST:  Patient Active Problem List   Diagnosis    Coronary artery disease with angina pectoris ([facility] )    Hypertension    Hyperlipidemia    Tobacco use    Pulmonary emphysema (HCC)    Benign hypertensive heart disease with congestive heart failure (HCC)    New onset atrial fibrillation (HCC)    Bruit of right carotid artery    Paresthesia of right arm       PAST MEDICAL HISTORY:  Past Medical History:   Diagnosis Date    Asbestosis ([facility] )     Heart murmur     HTN (hypertension)     Hyperlipidemia     Myocardial infarction (HCC)     SOB (shortness of breath)        PAST SURGICAL HISTORY:  Past Surgical History:   Procedure Laterality Date    CARDIAC CATHETERIZATION      CORONARY ANGIOPLASTY         FAMILY HISTORY:  Family History   Problem Relation Age of Onset    Cancer Mother         unkn type    Dementia Sister     Heart murmur Son        SOCIAL HISTORY:  Social History     Socioeconomic History    Marital status:       Spouse name: Not on file    Number of children: Not on file    Years of education: Not on file    Highest education level: Not on file   Occupational History    Not on file   Social Needs    Financial resource strain: Not on file    Food insecurity     Worry: Not on file     Inability: Not on file    Transportation needs     Medical: Not on file     Non-medical: Not on file   Tobacco Use    Smoking status: Current Every Day Smoker     Packs/day: 1 00     Years: 61 00     Pack years: 61 00    Smokeless tobacco: Never Used   Substance and Sexual Activity    Alcohol use: No     Comment: stopped 6 yrs ago    Drug use: No    Sexual activity: Not Currently   Lifestyle    Physical activity     Days per week: Not on file     Minutes per session: Not on file    Stress: Not on file   Relationships    Social connections     Talks on phone: Not on file     Gets together: Not on file     Attends Rastafari service: Not on file     Active member of club or organization: Not on file     Attends meetings of clubs or organizations: Not on file     Relationship status: Not on file    Intimate partner violence     Fear of current or ex partner: Not on file     Emotionally abused: Not on file     Physically abused: Not on file     Forced sexual activity: Not on file   Other Topics Concern    Not on file   Social History Narrative        Retired-    Hobbies-playing stock market    3 children    dentures       Review of Systems   Constitutional: Negative for activity change, chills, fatigue and fever  HENT: Negative for congestion  Eyes: Negative for discharge  Respiratory: Negative for cough, chest tightness and shortness of breath  Cardiovascular: Negative for chest pain, palpitations and leg swelling  Gastrointestinal: Positive for abdominal pain and nausea  Negative for blood in stool, constipation, diarrhea and vomiting  Endocrine: Negative for polydipsia, polyphagia and polyuria  Genitourinary: Negative for difficulty urinating  Musculoskeletal: Negative for arthralgias and myalgias  Skin: Negative for rash  Allergic/Immunologic: Negative for immunocompromised state  Neurological: Negative for dizziness, syncope, weakness, light-headedness and headaches  Hematological: Negative for adenopathy  Does not bruise/bleed easily  Psychiatric/Behavioral: Negative for dysphoric mood, sleep disturbance and suicidal ideas  The patient is not nervous/anxious  Objective:  Vitals:    04/15/21 0747   BP: 142/82   BP Location: Left arm   Patient Position: Sitting   Cuff Size: Adult   Pulse: 64   Temp: 98 °F (36 7 °C)   TempSrc: Temporal   SpO2: 90%   Weight: 107 kg (236 lb)   Height: 6' 3" (1 905 m)     Body mass index is 29 5 kg/m²  Physical Exam  Vitals signs and nursing note reviewed  Constitutional:       General: He is not in acute distress  Appearance: He is well-developed  He is not ill-appearing  HENT:      Head: Normocephalic and atraumatic  Eyes:      Pupils: Pupils are equal, round, and reactive to light  Neck:      Musculoskeletal: Neck supple  Thyroid: No thyromegaly  Vascular: Carotid bruit (  Bilateral bruits audible today) present  No JVD  Comments: Distant tones  Cardiovascular:      Rate and Rhythm: Normal rate  Rhythm irregularly irregular  Heart sounds: Normal heart sounds     Pulmonary:      Effort: Pulmonary effort is normal  No respiratory distress  Comments: Decreased breath sounds throughout, prolonged expiratory phase, scattered wheezes  Musculoskeletal:      Right lower leg: No edema  Left lower leg: No edema  Lymphadenopathy:      Cervical: No cervical adenopathy  Skin:     General: Skin is warm and dry  Findings: No rash  Neurological:      General: No focal deficit present  Mental Status: He is alert and oriented to person, place, and time  Mental status is at baseline  Psychiatric:         Mood and Affect: Mood normal          Behavior: Behavior normal            RESULTS:    No results found for this or any previous visit (from the past 1008 hour(s))  This note was created with voice recognition software  Phonic, grammatical and spelling errors may be present within the note as a result  No

## 2024-02-07 NOTE — ED PROVIDER NOTE - OBJECTIVE STATEMENT
70 yo F with a PMH of DM, HTN, HLD, from assisted living p/w diffuse erythematous itchy rash to her torso and extremities. States rash started today, took Loratadine w/o improvement. Began on her abdomen and then noticed it on her arms. Has been on Keflex 500mg q8hrs for leg cellulitis. Was prescribed 10 day course, took 9 pills. Has never had allergic rxn to abx to her knowledge. Denies nausea, vomiting, fever, chills, throat/tongue/lip swelling, change in voice/hoarseness, wheezing, chest pain, SOB, cough.

## 2024-02-07 NOTE — ED PROVIDER NOTE - CLINICAL SUMMARY MEDICAL DECISION MAKING FREE TEXT BOX
ap- 68 yo F with a PMH of DM, HTN, HLD, from assisted living p/w diffuse erythematous itchy rash on the torso and extremities, recently started keflex for cellulitis on the leg, pt states she only has 1 pill left, pt w/ no throat/lip/tongue swelling, no change in voice, no trouble breathing no cough, no dyspnea, On exam, pt is awake and alert oriented x3, has clear lungs soft abdomen has a diffuse maculopapular rash on the chest back arms and legs c/w drug eruption rash, pt to have labs and reassessment given extent of sx, pt compliant w/ home loratadine, pt to be observed for further management of symptoms.

## 2024-02-07 NOTE — ED ADULT NURSE NOTE - OBJECTIVE STATEMENT
69 year old Female with a PMH of DM, HTN, HLD, from assisted living presents to the ED via EMS from Assisting Living facility where patient lives complaining of 5 days of diffuse erythematous, itchy rash to her torso and extremities. States rash started 5 days ago, has tried Loratadine w/o improvement, woke up today and noticed both arms now have rash. Has been on Keflex 500mg q8hrs for leg cellulitis. Was prescribed 10 day course, took 9 pills. Has never had allergic rxn to abx to her knowledge. Denies nausea, vomiting, fever, chills, throat/tongue/lip swelling, change in voice/hoarseness, wheezing, chest pain, SOB, cough.

## 2024-02-07 NOTE — ED ADULT NURSE NOTE - NSSEPSISSUSPECTED_ED_A_ED
Is The Patient Presenting As Previously Scheduled?: No, they are coming in before their scheduled appointment How Severe Is Your Rash?: moderate Is This A New Presentation, Or A Follow-Up?: Follow Up Rash No

## 2024-02-08 LAB
ALBUMIN SERPL ELPH-MCNC: 3.3 G/DL — SIGNIFICANT CHANGE UP (ref 3.3–5)
ALP SERPL-CCNC: 82 U/L — SIGNIFICANT CHANGE UP (ref 40–120)
ALT FLD-CCNC: 6 U/L — LOW (ref 10–45)
ANION GAP SERPL CALC-SCNC: 10 MMOL/L — SIGNIFICANT CHANGE UP (ref 5–17)
AST SERPL-CCNC: 11 U/L — SIGNIFICANT CHANGE UP (ref 10–40)
BASOPHILS # BLD AUTO: 0.01 K/UL — SIGNIFICANT CHANGE UP (ref 0–0.2)
BASOPHILS NFR BLD AUTO: 0.1 % — SIGNIFICANT CHANGE UP (ref 0–2)
BILIRUB SERPL-MCNC: 0.3 MG/DL — SIGNIFICANT CHANGE UP (ref 0.2–1.2)
BUN SERPL-MCNC: 15 MG/DL — SIGNIFICANT CHANGE UP (ref 7–23)
CALCIUM SERPL-MCNC: 8.8 MG/DL — SIGNIFICANT CHANGE UP (ref 8.4–10.5)
CHLORIDE SERPL-SCNC: 103 MMOL/L — SIGNIFICANT CHANGE UP (ref 96–108)
CO2 SERPL-SCNC: 27 MMOL/L — SIGNIFICANT CHANGE UP (ref 22–31)
CREAT SERPL-MCNC: 0.99 MG/DL — SIGNIFICANT CHANGE UP (ref 0.5–1.3)
EGFR: 62 ML/MIN/1.73M2 — SIGNIFICANT CHANGE UP
EOSINOPHIL # BLD AUTO: 0.41 K/UL — SIGNIFICANT CHANGE UP (ref 0–0.5)
EOSINOPHIL NFR BLD AUTO: 5.5 % — SIGNIFICANT CHANGE UP (ref 0–6)
ERYTHROCYTE [SEDIMENTATION RATE] IN BLOOD: 76 MM/HR — HIGH (ref 0–20)
GLUCOSE BLDC GLUCOMTR-MCNC: 120 MG/DL — HIGH (ref 70–99)
GLUCOSE BLDC GLUCOMTR-MCNC: 127 MG/DL — HIGH (ref 70–99)
GLUCOSE BLDC GLUCOMTR-MCNC: 132 MG/DL — HIGH (ref 70–99)
GLUCOSE BLDC GLUCOMTR-MCNC: 136 MG/DL — HIGH (ref 70–99)
GLUCOSE SERPL-MCNC: 149 MG/DL — HIGH (ref 70–99)
HCT VFR BLD CALC: 36.6 % — SIGNIFICANT CHANGE UP (ref 34.5–45)
HGB BLD-MCNC: 11.6 G/DL — SIGNIFICANT CHANGE UP (ref 11.5–15.5)
IMM GRANULOCYTES NFR BLD AUTO: 0.4 % — SIGNIFICANT CHANGE UP (ref 0–0.9)
LYMPHOCYTES # BLD AUTO: 1.44 K/UL — SIGNIFICANT CHANGE UP (ref 1–3.3)
LYMPHOCYTES # BLD AUTO: 19.4 % — SIGNIFICANT CHANGE UP (ref 13–44)
MCHC RBC-ENTMCNC: 26.8 PG — LOW (ref 27–34)
MCHC RBC-ENTMCNC: 31.7 GM/DL — LOW (ref 32–36)
MCV RBC AUTO: 84.5 FL — SIGNIFICANT CHANGE UP (ref 80–100)
MONOCYTES # BLD AUTO: 0.65 K/UL — SIGNIFICANT CHANGE UP (ref 0–0.9)
MONOCYTES NFR BLD AUTO: 8.8 % — SIGNIFICANT CHANGE UP (ref 2–14)
NEUTROPHILS # BLD AUTO: 4.87 K/UL — SIGNIFICANT CHANGE UP (ref 1.8–7.4)
NEUTROPHILS NFR BLD AUTO: 65.8 % — SIGNIFICANT CHANGE UP (ref 43–77)
NRBC # BLD: 0 /100 WBCS — SIGNIFICANT CHANGE UP (ref 0–0)
PLATELET # BLD AUTO: 389 K/UL — SIGNIFICANT CHANGE UP (ref 150–400)
POTASSIUM SERPL-MCNC: 4 MMOL/L — SIGNIFICANT CHANGE UP (ref 3.5–5.3)
POTASSIUM SERPL-SCNC: 4 MMOL/L — SIGNIFICANT CHANGE UP (ref 3.5–5.3)
PROT SERPL-MCNC: 6.1 G/DL — SIGNIFICANT CHANGE UP (ref 6–8.3)
RBC # BLD: 4.33 M/UL — SIGNIFICANT CHANGE UP (ref 3.8–5.2)
RBC # FLD: 16.1 % — HIGH (ref 10.3–14.5)
SODIUM SERPL-SCNC: 140 MMOL/L — SIGNIFICANT CHANGE UP (ref 135–145)
WBC # BLD: 7.41 K/UL — SIGNIFICANT CHANGE UP (ref 3.8–10.5)
WBC # FLD AUTO: 7.41 K/UL — SIGNIFICANT CHANGE UP (ref 3.8–10.5)

## 2024-02-08 PROCEDURE — 99233 SBSQ HOSP IP/OBS HIGH 50: CPT | Mod: FS

## 2024-02-08 RX ORDER — DIPHENHYDRAMINE HCL 50 MG
25 CAPSULE ORAL ONCE
Refills: 0 | Status: COMPLETED | OUTPATIENT
Start: 2024-02-08 | End: 2024-02-08

## 2024-02-08 RX ADMIN — BUMETANIDE 0.5 MILLIGRAM(S): 0.25 INJECTION INTRAMUSCULAR; INTRAVENOUS at 08:24

## 2024-02-08 RX ADMIN — ESCITALOPRAM OXALATE 20 MILLIGRAM(S): 10 TABLET, FILM COATED ORAL at 08:20

## 2024-02-08 RX ADMIN — APIXABAN 5 MILLIGRAM(S): 2.5 TABLET, FILM COATED ORAL at 08:20

## 2024-02-08 RX ADMIN — Medication 8 UNIT(S): at 08:52

## 2024-02-08 RX ADMIN — APIXABAN 5 MILLIGRAM(S): 2.5 TABLET, FILM COATED ORAL at 22:13

## 2024-02-08 RX ADMIN — Medication 25 MILLIGRAM(S): at 03:28

## 2024-02-08 RX ADMIN — GABAPENTIN 300 MILLIGRAM(S): 400 CAPSULE ORAL at 22:14

## 2024-02-08 RX ADMIN — Medication 8 UNIT(S): at 18:20

## 2024-02-08 RX ADMIN — Medication 8 UNIT(S): at 12:34

## 2024-02-08 RX ADMIN — HYDROMORPHONE HYDROCHLORIDE 4 MILLIGRAM(S): 2 INJECTION INTRAMUSCULAR; INTRAVENOUS; SUBCUTANEOUS at 17:32

## 2024-02-08 RX ADMIN — Medication 125 MICROGRAM(S): at 05:50

## 2024-02-08 RX ADMIN — Medication 25 MILLIGRAM(S): at 17:31

## 2024-02-08 RX ADMIN — LORATADINE 10 MILLIGRAM(S): 10 TABLET ORAL at 08:21

## 2024-02-08 RX ADMIN — SPIRONOLACTONE 25 MILLIGRAM(S): 25 TABLET, FILM COATED ORAL at 05:50

## 2024-02-08 RX ADMIN — GABAPENTIN 300 MILLIGRAM(S): 400 CAPSULE ORAL at 08:20

## 2024-02-08 RX ADMIN — ATORVASTATIN CALCIUM 40 MILLIGRAM(S): 80 TABLET, FILM COATED ORAL at 22:13

## 2024-02-08 RX ADMIN — FAMOTIDINE 20 MILLIGRAM(S): 10 INJECTION INTRAVENOUS at 08:21

## 2024-02-08 RX ADMIN — INSULIN GLARGINE 24 UNIT(S): 100 INJECTION, SOLUTION SUBCUTANEOUS at 22:14

## 2024-02-08 NOTE — ED CDU PROVIDER DISPOSITION NOTE - ATTENDING APP SHARED VISIT CONTRIBUTION OF CARE
ATTENDING, Edward PATEL: I have personally performed a face to face diagnostic evaluation on this patient.  I have reviewed the ACP note and agree with the history, exam, and plan of care, except as noted here.     rash to intertriginous areas and dorsal ue mostly. derm was consulted and presumes dermatitis. rec for steroid cream. in my opinion le rashes are not currently infected and agree w plan to stop abx. not obviously a reaction to keflex

## 2024-02-08 NOTE — ED ADULT NURSE REASSESSMENT NOTE - NS ED NURSE REASSESS COMMENT FT1
Pt received from DEBRA Weir. Pt oriented to CDU & plan of care was discussed. Pt A&O x 4. Pt in CDU for dermatology consult. Rash noted on bilateral arms and elbows. V/S stable, pt afebrile,  IV in place, patent and free of signs of infiltration. Pt resting in bed. Safety & comfort measures maintained. Call bell in reach. Care continues. Pt received from DEBRA Weir. Pt oriented to CDU & plan of care was discussed. Pt A&O x 4. Pt in CDU for dermatology consult. Pink edematous rash noted on bilateral arms, upper thigh and elbows. V/S stable, pt afebrile,  IV in place, patent and free of signs of infiltration. Pt resting in bed. Safety & comfort measures maintained. Call bell in reach. Care continues.

## 2024-02-08 NOTE — ED ADULT NURSE REASSESSMENT NOTE - NSFALLHARMRISKINTERV_ED_ALL_ED
Assistance OOB with selected safe patient handling equipment if applicable/Communicate risk of Fall with Harm to all staff, patient, and family/Monitor gait and stability/Provide patient with walking aids/Provide visual cue: red socks, yellow wristband, yellow gown, etc/Reinforce activity limits and safety measures with patient and family/Bed in lowest position, wheels locked, appropriate side rails in place/Call bell, personal items and telephone in reach/Instruct patient to call for assistance before getting out of bed/chair/stretcher/Non-slip footwear applied when patient is off stretcher/[place] to call system/Physically safe environment - no spills, clutter or unnecessary equipment/Purposeful Proactive Rounding/Room/bathroom lighting operational, light cord in reach

## 2024-02-08 NOTE — ED CDU PROVIDER INITIAL DAY NOTE - PHYSICAL EXAMINATION
CONSTITUTIONAL: Well appearing and in no apparent distress.  	ENT: Airway patent, moist mucous membranes. No visible oral lesions.   	CARDIAC: Normal rate, regular rhythm.  Heart sounds S1, S2.    	RESPIRATORY: Breath sounds clear and equal bilaterally.   	GASTROINTESTINAL: Abdomen soft, non-tender  	MUSCULOSKELETAL: Spine appears normal.  	NEUROLOGICAL: Alert and oriented x3, moving all extremities, clear speech, follows commands    	SKIN: Diffuse erythematous rash to extremities and  torso. With areas of erythema to bilateral shins.   PSYCHIATRIC: Normal mood and affect.

## 2024-02-08 NOTE — ED CDU PROVIDER DISPOSITION NOTE - CLINICAL COURSE
70 yo F with a PMH of DM, HTN, HLD, RENARD, Fibromyalgia. OA, Depression, Chronic venous insuffiencey presents, endometrial cancer from assisted living p/w diffuse erythematous itchy rash to her torso and extremities. States that redness started a few days ago on her abdomen and progressed to her extremities today.  Has been itchy, taking Loratadine w/o improvement. Has been on Keflex 500mg q8hrs for leg cellulitis. Was prescribed 10 day course, with only 1 more pill left to take. Reports some improvement to the redness on her legs from the cellulitis. Has never had allergic rxn to antibiotics to her knowledge. Ambulates with a walker. Denies vomiting, fever, throat/tongue/lip swelling, change in voice/hoarseness, wheezing, chest pain, SOB, cough.  ED course: Concern for rash 2/2 Keflex use. Plan to continue to monitor with Dermatology consult in CDU. 68 yo F with a PMH of DM, HTN, HLD, RENARD, Fibromyalgia. OA, Depression, Chronic venous insuffiencey presents, endometrial cancer from assisted living p/w diffuse erythematous itchy rash to her torso and extremities. States that redness started a few days ago on her abdomen and progressed to her extremities today.  Has been itchy, taking Loratadine w/o improvement. Has been on Keflex 500mg q8hrs for leg cellulitis. Was prescribed 10 day course, with only 1 more pill left to take. Reports some improvement to the redness on her legs from the cellulitis. Has never had allergic rxn to antibiotics to her knowledge. Ambulates with a walker. Denies vomiting, fever, throat/tongue/lip swelling, change in voice/hoarseness, wheezing, chest pain, SOB, cough.  ED course: Concern for rash 2/2 Keflex use. Plan to continue to monitor with Dermatology consult in CDU. In CDU, patient was evaluated by dermatology, who recommended Triamcinolone ointment. Cleared for discharge by Dr. Leon. Non-emergent transport arranged.

## 2024-02-08 NOTE — CONSULT NOTE ADULT - SUBJECTIVE AND OBJECTIVE BOX
HPI:    T(F): 97.8 (02-08 @ 16:14) // 3-day: T(F): , Max: 98.9 (02-08 @ 11:34) // 7-day: T(F): , Max: 98.9 (02-08 @ 11:34)  HR: 59 (02-08 @ 16:14) (55 - 80) // BP: 134/61 (02-08 @ 16:14) (108/67 - 176/88) // RR: 16 (02-08 @ 16:14) (16 - 20) // SpO2: 99% (02-08 @ 16:14) (96% - 100%)  =========================================  WBC: 7.41 // Eos: 0.41 (5.5%) // Neut: 4.87 (65.8%) // Lymph: 1.44 (19.4%) // Atypical Lymphs (--% or --%) (02-08)  WBC: 9.08 // Eos: 0.36 (4.0%) // Neut: 7.01 (77.2<H>%) // Lymph: 1.10 (12.1<L>%) // Atypical Lymphs (--% or --%) (02-07)  Hb: 11.6 // PLT: 389 (02-08)  Hb: 12.9 // PLT: 401<H> (02-07)    BUN: 15 // Cr: 0.99 (02-08)  BUN: 15 // Cr: 0.99 (02-07)  AST: 11 // ALT: 6<L> // ALP: 82 (02-08)  AST: 8<L> // ALT: 6<L> // ALP: 93 (02-07)  HCO3: 27 // Protein: 6.1 // Albumin: 3.3 // T. bili 0.3 // Glu: 149<H> (02-08)   // aPTT:   COVID:  // COVID Ab:  // PCT:  // HSV/VZV PCR:  // RVP:  // EBV IgM:  // ASO:  // ADB:  // HBsAg:  // HBsAb:  // HBcAg:  // HBcAb: // HCV:  // IGRA:  // HIV:  // CD4:  // RPR:  Titer:  // FTA-ABS:  // Histoplasma:  // Mycoplasma IgM:  // ESR: 76 *H* (02-07)   // CRP: 32 *H* (02-07)   // Ferritin:     U/A -   JEREMY:  // anti-dsDNA:  // anti-Sm:  // RF:  // anti-CCP:  // Scleroderma Abs:  // LYNDSAY:  // anti-SS-A:  // anti-SS-B:  // anti-RNP:  // C3:  // C4:  // CH50:  // C1q:  // C1-INH:  // c-ANCA:  // PR3:  // p-ANCA:  // MPO:  // Cryoglobulin:  // SPEP:  // serum VENKATESH:  // UPEP:  // urine VENKATESH:  // serum FLC:  // urine FLC:  // CK:  // Aldolase:  // ACE:  // TSH: 2.40 (01-27)   // T4: // PTH:  // Ca: 8.8 (02-08)   // Phos:  // Uric acid:  // Zn:  // Glu: 120 *H* (02-08 @ 22:01)  127 *H* (02-08 @ 17:31)   // A1c:     Dsg 1/3:  // /180: ***    Mariah-1:   =========================================  CXR:     CT / MRI / ultrasound: ***  =========================================    Culture - Blood (collected 02-07)  Source: .Blood Blood-Peripheral  Preliminary Report (02-08):    No growth at 24 hours    Culture - Blood (collected 02-07)  Source: .Blood Blood-Peripheral  Preliminary Report (02-08):    No growth at 24 hours      =========================================  Pathology:  =========================================  INPATIENT MEDS:  apixaban 5 milliGRAM(s) Oral every 12 hours  atorvastatin 40 milliGRAM(s) Oral at bedtime  buMETAnide 0.5 milliGRAM(s) Oral daily  dextrose 50% Injectable 25 Gram(s) IV Push once  dextrose 50% Injectable 25 Gram(s) IV Push once  dextrose 50% Injectable 12.5 Gram(s) IV Push once  escitalopram 20 milliGRAM(s) Oral daily  famotidine Injectable 20 milliGRAM(s) IV Push daily  gabapentin 300 milliGRAM(s) Oral two times a day  glucagon  Injectable 1 milliGRAM(s) IntraMuscular once  insulin glargine Injectable (LANTUS) 24 Unit(s) SubCutaneous at bedtime  insulin lispro (ADMELOG) corrective regimen sliding scale   SubCutaneous three times a day before meals  insulin lispro (ADMELOG) corrective regimen sliding scale   SubCutaneous at bedtime  insulin lispro Injectable (ADMELOG) 8 Unit(s) SubCutaneous three times a day before meals  levothyroxine 125 MICROGram(s) Oral daily  loratadine 10 milliGRAM(s) Oral daily  spironolactone 25 milliGRAM(s) Oral daily    ADMINISTERED MEDS (ANTIMICROBIAL):   ceFAZolin   IVPB: 100 IV Intermittent (01-27)    ADMINISTERED MEDS (NEUROLOGIC):   acetaminophen     Tablet ..: 975 Oral (01-27)  acetaminophen   IVPB ..: 400 IV Intermittent (02-07)  buPROPion XL (24-Hour): 150 Oral (02-07)  diphenhydrAMINE: 25 Oral (02-08)  escitalopram: 20 Oral (02-08)  gabapentin: 300 Oral (02-08),  300 Oral (02-08),  300 Oral (02-07)  HYDROmorphone   Tablet: 4 Oral (02-08),  4 Oral (02-07)    ADMINISTERED MEDS (OTHER):   apixaban: 5 Oral (02-08),  5 Oral (02-08),  5 Oral (02-07)  atorvastatin: 40 Oral (02-08),  40 Oral (02-07)  buMETAnide: 0.5 Oral (02-08)  diphenhydrAMINE Injectable: 25 IV Push (02-07)  diphenhydrAMINE Injectable: 25 IV Push (02-08)  famotidine Injectable: 20 IV Push (02-08)  famotidine Injectable: 20 IV Push (02-07)  insulin glargine Injectable (LANTUS): 24 SubCutaneous (02-08),  24 SubCutaneous (02-07)  insulin lispro Injectable (ADMELOG): 8 SubCutaneous (02-08),  8 SubCutaneous (02-08),  8 SubCutaneous (02-08)  levothyroxine: 125 Oral (02-08)  loratadine: 10 Oral (02-08)  spironolactone: 25 Oral (02-08)          PAST MEDICAL & SURGICAL HISTORY:  Personal History of Arthritis      Personal History of Female Genital Cancer      Personal History of Hypertension      Hypothyroidism      Diabetes Mellitus      Hyperlipidemia      High Triglycerides      Osteoarthritis of Knee  right. requires cane      Obstructive Sleep Apnea      Constipation      Diarrhea      Depression      Environmental Allergies      Morbidly Obese      Fibromyalgia      GERD with Apnea      Restless Legs Syndrome (RLS)      Umbilical Hernia      Atrial fibrillation      S/P Tonsillectomy and Adenoidectomy      S/P FESS (Functional Endoscopic Sinus Surgery)      Carpal Tunnel Syndrome  release left      Endometrial Ca s/p RODRIGO, BSO      Incarcerated Ventral Hernia          REVIEW OF SYSTEMS      General: no fevers/chills, no lethary	    Skin/Breast: see HPI  	  Ophthalmologic: no eye pain or change in vision  	  ENMT: no dysphagia or change in hearing    Respiratory and Thorax: no SOB or cough  	  Cardiovascular: no palpitations or chest pain    Gastrointestinal: no abdomenal pain or blood in stool     Genitourinary: no dysuria or frequency    Musculoskeletal: no joint pains or weakness	    Neurological:no weakness, numbness , or tingling    MEDICATIONS  (STANDING):  apixaban 5 milliGRAM(s) Oral every 12 hours  atorvastatin 40 milliGRAM(s) Oral at bedtime  buMETAnide 0.5 milliGRAM(s) Oral daily  dextrose 5%. 1000 milliLiter(s) IV Continuous <Continuous>  dextrose 5%. 1000 milliLiter(s) IV Continuous <Continuous>  dextrose 50% Injectable 25 Gram(s) IV Push once  dextrose 50% Injectable 25 Gram(s) IV Push once  dextrose 50% Injectable 12.5 Gram(s) IV Push once  escitalopram 20 milliGRAM(s) Oral daily  famotidine Injectable 20 milliGRAM(s) IV Push daily  gabapentin 300 milliGRAM(s) Oral two times a day  glucagon  Injectable 1 milliGRAM(s) IntraMuscular once  insulin glargine Injectable (LANTUS) 24 Unit(s) SubCutaneous at bedtime  insulin lispro (ADMELOG) corrective regimen sliding scale   SubCutaneous three times a day before meals  insulin lispro (ADMELOG) corrective regimen sliding scale   SubCutaneous at bedtime  insulin lispro Injectable (ADMELOG) 8 Unit(s) SubCutaneous three times a day before meals  levothyroxine 125 MICROGram(s) Oral daily  loratadine 10 milliGRAM(s) Oral daily  spironolactone 25 milliGRAM(s) Oral daily    ALLERGIES: smoke  Lyrica  dust  pollen  aerosols, perfumes, fabric softeners  adhesives  Tin/costume jewelry  cephalosporins      Social History:  denies past or present tobacco, etoh, or illicit drug use.  Lives at assisted living facility.    FAMILY HISTORY:  No pertinent family history in first degree relatives          VITAL SIGNS LAST 24 HOURS:  T(F): 97.8 (02-08 @ 16:14), Max: 98.9 (02-08 @ 11:34)  HR: 59 (02-08 @ 16:14) (55 - 80)  BP: 134/61 (02-08 @ 16:14) (108/67 - 176/88)  RR: 16 (02-08 @ 16:14) (16 - 18)    ___________________________________  Physical Exam:     The patient was alert and oriented X 3, and in no  apparent distress.  OP showed no ulcerations  There was no visible lymphadenopathy.  Conjunctiva were non injected  There was no clubbing or edema of extremities.  The scalp, hair, face, eyebrows, lips, OP, neck, chest, back,   extremities X 4, nails were examined.  There was no hyperhidrosis or bromhidrosis.    Of note on skin exam:   ____________________________________    LABS:                        11.6   7.41  )-----------( 389      ( 08 Feb 2024 06:36 )             36.6     02-08    140  |  103  |  15  ----------------------------<  149<H>  4.0   |  27  |  0.99    Ca    8.8      08 Feb 2024 06:36    TPro  6.1  /  Alb  3.3  /  TBili  0.3  /  DBili  x   /  AST  11  /  ALT  6<L>  /  AlkPhos  82  02-08      Urinalysis Basic - ( 08 Feb 2024 06:36 )    Color: x / Appearance: x / SG: x / pH: x  Gluc: 149 mg/dL / Ketone: x  / Bili: x / Urobili: x   Blood: x / Protein: x / Nitrite: x   Leuk Esterase: x / RBC: x / WBC x   Sq Epi: x / Non Sq Epi: x / Bacteria: x

## 2024-02-08 NOTE — ED ADULT NURSE REASSESSMENT NOTE - NS ED NURSE REASSESS COMMENT FT1
Pt received from DEBRA Youssef. Pt A&O X4, oriented to CDU & plan of care was discussed. Pt is being observed in CDU for Dermatology consult and monitoring rash. Generalized red rash noted, redness noted over BLLE above ankle. Pt denies any chest pain, SOB, dizziness or palpitations. Pt using CPAP at night. V/S stable, IV patent and free of signs of infiltration. Pt resting in bed, using primafit for urination. Fall and safety precautions initiated and reviewed with patient. Pt verbalized understanding of the same. Family at bed side. Call bell in reach. Will continue to monitor.

## 2024-02-08 NOTE — ED CDU PROVIDER DISPOSITION NOTE - PATIENT PORTAL LINK FT
You can access the FollowMyHealth Patient Portal offered by [facility] by registering at the following website: [email or web address]. By joining [facility]’s FollowMyHealth portal, you will also be able to view your health information using other applications (apps) compatible with our system.

## 2024-02-08 NOTE — ED CDU PROVIDER DISPOSITION NOTE - NSFOLLOWUPINSTRUCTIONS_ED_ALL_ED_FT
Hydrate.      We recommend you follow up with your primary care provider within the next 2-3 days, please bring all of your results with you.    Please return to the Emergency Department with new, worsening, or concerning symptoms, such as:  -Shortness of breath or trouble breathing  -Pressure, pain, tightness in chest  -Facial drooping, arm weakness, or speech difficulty   -Head injury or loss of consciousness   -Nonstop bleeding or an open wound     *More detailed information regarding your visit and discharge can be found by reviewing this packet Hydrate.     Stop taking Keflex.  Please apply the Triamcinolone 0.1% ointment to rash twice a day for two weeks.   Follow up with Dr. Melissa Silver at [facility] dermatology 1week after discharge.     We recommend you follow up with your primary care provider within the next 2-3 days, please bring all of your results with you.    Please return to the Emergency Department with new, worsening, or concerning symptoms, such as:  -Worsening redness/rash  -Severe, worsening pain   -Shortness of breath or trouble breathing  -Pressure, pain, tightness in chest  -Facial drooping, arm weakness, or speech difficulty     *More detailed information regarding your visit and discharge can be found by reviewing this packet Hydrate.     Stop taking Keflex.  Please apply the Triamcinolone 0.1% ointment to rash twice a day for two weeks.   Follow up with Dr. Melissa Silver at [facility] dermatology 1week after discharge. Call to confirm appointment.   [address] [place] [id number]  Phone: [phone]  Fax: [phone]      We recommend you follow up with your primary care provider within the next 2-3 days, please bring all of your results with you.    Please return to the Emergency Department with new, worsening, or concerning symptoms, such as:  -Worsening redness/rash  -Severe, worsening pain   -Shortness of breath or trouble breathing  -Pressure, pain, tightness in chest  -Facial drooping, arm weakness, or speech difficulty     *More detailed information regarding your visit and discharge can be found by reviewing this packet

## 2024-02-08 NOTE — ED CDU PROVIDER INITIAL DAY NOTE - OBJECTIVE STATEMENT
68 yo F with a PMH of DM, HTN, HLD, RENARD, Fibromyalgia. OA, Depression, Chronic venous insuffiencey presents, endometrial cancer from assisted living p/w diffuse erythematous itchy rash to her torso and extremities. States that redness started a few days ago on her abdomen and progressed to her extremities today.  Has been itchy, taking Loratadine w/o improvement. Has been on Keflex 500mg q8hrs for leg cellulitis. Was prescribed 10 day course, with only 1 more pill left to take. Reports some improvement to the redness on her legs from the cellulitis. Has never had allergic rxn to antibiotics to her knowledge. Ambulates with a walker. Denies vomiting, fever, throat/tongue/lip swelling, change in voice/hoarseness, wheezing, chest pain, SOB, cough.  ED course: Concern for rash 2/2 Keflex use. Plan to continue to monitor with Dermatology consult in CDU.

## 2024-02-08 NOTE — ED CDU PROVIDER INITIAL DAY NOTE - CLINICAL SUMMARY MEDICAL DECISION MAKING FREE TEXT BOX
70 yo F with a PMH of DM, HTN, HLD, from assisted living p/w diffuse erythematous itchy rash on the torso and extremities, recently started keflex for cellulitis on the leg, no features to suggest anaphylaxis, findings c/w drug eruption rash, pt to have labs and reassessment given extent of sx, pt compliant w/ home loratadine, pt to be observed for further management of symptoms. given hx of DM will hold sterodis at this time,

## 2024-02-08 NOTE — ED CDU PROVIDER SUBSEQUENT DAY NOTE - PROGRESS NOTE DETAILS
Messaged and called dermatology resident x2. Received call back, states that patient will be seen tonight. - SHAHNAZ ThomasC Derm at bedside. - Maryuri Wynne PA-C Per sign out, dermatology aware and will see patient, awaiting dermatology evaluation at this time. - Maryuri Wynne PA-C No dermatology contact information on on-call list. Rep spoke with  to obtain pager number. Page placed. - Maryuri Wynne PA-C

## 2024-02-08 NOTE — ED CDU PROVIDER INITIAL DAY NOTE - DETAILS
benadryl PRN, continue patient's loratadine   Dermatology consult  Hold Keflex  No steroids  No isolation precautions at this time  DW Panosian, vitals every 4 hours, frequent reevaluations

## 2024-02-08 NOTE — ED ADULT NURSE REASSESSMENT NOTE - NS ED NURSE REASSESS COMMENT FT1
07.00 Received the Pt from  DEBRA Ravi  Pt is Observed for urticaria for derm consult . Received the Pt A&OX 4  Pt obeys commands  Pt is morbid obese walks with walker as base line Kristi N/V/D fever chills cp SOB  has extensive urticaria with itching medicated as per order    Comfort care & safety measures continued  IV site looks clean & dry no signs of infiltration noted pt denies  pain IV site .Pt is oriented to the unit Plan of care explained .  Pt is advised to call for help  call bell with in the reach pt verbalized the understanding .pending CDU  MD hawthorne . GCS 15/15 A&OX 4 PERRLA  size 3 Strong upper & lower extremities   No facial droop  No Hand Leg drop denies numbness tingling  Plan of care ongoing

## 2024-02-09 VITALS
OXYGEN SATURATION: 95 % | RESPIRATION RATE: 18 BRPM | SYSTOLIC BLOOD PRESSURE: 129 MMHG | DIASTOLIC BLOOD PRESSURE: 58 MMHG | HEART RATE: 59 BPM | TEMPERATURE: 98 F

## 2024-02-09 PROCEDURE — 84132 ASSAY OF SERUM POTASSIUM: CPT

## 2024-02-09 PROCEDURE — 85652 RBC SED RATE AUTOMATED: CPT

## 2024-02-09 PROCEDURE — 99284 EMERGENCY DEPT VISIT MOD MDM: CPT | Mod: 25

## 2024-02-09 PROCEDURE — 83036 HEMOGLOBIN GLYCOSYLATED A1C: CPT

## 2024-02-09 PROCEDURE — 85014 HEMATOCRIT: CPT

## 2024-02-09 PROCEDURE — 99238 HOSP IP/OBS DSCHRG MGMT 30/<: CPT

## 2024-02-09 PROCEDURE — 85025 COMPLETE CBC W/AUTO DIFF WBC: CPT

## 2024-02-09 PROCEDURE — 93005 ELECTROCARDIOGRAM TRACING: CPT

## 2024-02-09 PROCEDURE — 82803 BLOOD GASES ANY COMBINATION: CPT

## 2024-02-09 PROCEDURE — 84295 ASSAY OF SERUM SODIUM: CPT

## 2024-02-09 PROCEDURE — 96375 TX/PRO/DX INJ NEW DRUG ADDON: CPT

## 2024-02-09 PROCEDURE — 86140 C-REACTIVE PROTEIN: CPT

## 2024-02-09 PROCEDURE — 87040 BLOOD CULTURE FOR BACTERIA: CPT

## 2024-02-09 PROCEDURE — 85018 HEMOGLOBIN: CPT

## 2024-02-09 PROCEDURE — G0378: CPT

## 2024-02-09 PROCEDURE — 94660 CPAP INITIATION&MGMT: CPT

## 2024-02-09 PROCEDURE — 80053 COMPREHEN METABOLIC PANEL: CPT

## 2024-02-09 PROCEDURE — 82435 ASSAY OF BLOOD CHLORIDE: CPT

## 2024-02-09 PROCEDURE — 83605 ASSAY OF LACTIC ACID: CPT

## 2024-02-09 PROCEDURE — 82010 KETONE BODYS QUAN: CPT

## 2024-02-09 PROCEDURE — 82947 ASSAY GLUCOSE BLOOD QUANT: CPT

## 2024-02-09 PROCEDURE — 82330 ASSAY OF CALCIUM: CPT

## 2024-02-09 PROCEDURE — 82962 GLUCOSE BLOOD TEST: CPT

## 2024-02-09 PROCEDURE — 96374 THER/PROPH/DIAG INJ IV PUSH: CPT

## 2024-02-09 RX ADMIN — Medication 1 APPLICATION(S): at 01:50

## 2024-02-09 RX ADMIN — HYDROMORPHONE HYDROCHLORIDE 4 MILLIGRAM(S): 2 INJECTION INTRAMUSCULAR; INTRAVENOUS; SUBCUTANEOUS at 01:12

## 2024-02-09 RX ADMIN — HYDROMORPHONE HYDROCHLORIDE 4 MILLIGRAM(S): 2 INJECTION INTRAMUSCULAR; INTRAVENOUS; SUBCUTANEOUS at 01:48

## 2024-02-09 NOTE — CHART NOTE - NSCHARTNOTEFT_GEN_A_CORE
HPI:    68 yo F with a PMH of DM, HTN, HLD, RENARD, Fibromyalgia. OA, Depression, Chronic venous insuffiencey presents, endometrial cancer from assisted living p/w diffuse itchy rash to her torso and extremities. States that redness started a few days ago on her abdomen and progressed to her extremities today.  Has been itchy, taking Loratadine w/o improvement. Has been on Keflex 500mg q8hrs for leg cellulitis. Was prescribed 10 day course, with only 1 more pill left to take.       Dermatology was consulted for an itch skin rash that was present for the past three days. Of note patient was recently hospitalized for Cellulitis and completed a 10d course of Keflex yesterday. She denies, URI symptoms, muscle aches/joint pain, but does endorse pain in he rmouth and pain with urinating    ADMINISTERED MEDS (ANTIMICROBIAL):   ceFAZolin   IVPB: 100 IV Intermittent ()    ADMINISTERED MEDS (NEUROLOGIC):   acetaminophen     Tablet ..: 975 Oral ()  acetaminophen   IVPB ..: 400 IV Intermittent ()  buPROPion XL (24-Hour): 150 Oral ()  escitalopram: 20 Oral ()  gabapentin: 300 Oral (),  300 Oral ()  HYDROmorphone   Tablet: 4 Oral ()    ADMINISTERED MEDS (OTHER):   apixaban: 5 Oral (),  5 Oral ()  atorvastatin: 40 Oral ()  buMETAnide: 0.5 Oral ()  diphenhydrAMINE Injectable: 25 IV Push ()  diphenhydrAMINE Injectable: 25 IV Push ()  famotidine Injectable: 20 IV Push ()  famotidine Injectable: 20 IV Push ()  insulin glargine Injectable (LANTUS): 24 SubCutaneous ()  insulin lispro Injectable (ADMELOG): 8 SubCutaneous (),  8 SubCutaneous ()  levothyroxine: 125 Oral ()  loratadine: 10 Oral ()  spironolactone: 25 Oral ()        MEDICATIONS  (STANDING):  apixaban 5 milliGRAM(s) Oral every 12 hours  atorvastatin 40 milliGRAM(s) Oral at bedtime  buMETAnide 0.5 milliGRAM(s) Oral daily  dextrose 5%. 1000 milliLiter(s) (50 mL/Hr) IV Continuous <Continuous>  dextrose 5%. 1000 milliLiter(s) (100 mL/Hr) IV Continuous <Continuous>  dextrose 50% Injectable 12.5 Gram(s) IV Push once  dextrose 50% Injectable 25 Gram(s) IV Push once  dextrose 50% Injectable 25 Gram(s) IV Push once  escitalopram 20 milliGRAM(s) Oral daily  famotidine Injectable 20 milliGRAM(s) IV Push daily  gabapentin 300 milliGRAM(s) Oral two times a day  glucagon  Injectable 1 milliGRAM(s) IntraMuscular once  insulin glargine Injectable (LANTUS) 24 Unit(s) SubCutaneous at bedtime  insulin lispro (ADMELOG) corrective regimen sliding scale   SubCutaneous three times a day before meals  insulin lispro (ADMELOG) corrective regimen sliding scale   SubCutaneous at bedtime  insulin lispro Injectable (ADMELOG) 8 Unit(s) SubCutaneous three times a day before meals  levothyroxine 125 MICROGram(s) Oral daily  loratadine 10 milliGRAM(s) Oral daily  spironolactone 25 milliGRAM(s) Oral daily  triamcinolone 0.1% Ointment 1 Application(s) Topical two times a day    MEDICATIONS  (PRN):  dextrose Oral Gel 15 Gram(s) Oral once PRN Blood Glucose LESS THAN 70 milliGRAM(s)/deciliter  HYDROmorphone   Tablet 4 milliGRAM(s) Oral every 6 hours PRN Severe Pain (7 - 10)      Allergies    smoke (Rhinitis; Rhinorrhea)  Lyrica (Rash)  dust (Rhinitis; Rhinorrhea)  pollen (Rhinitis; Rhinorrhea)  aerosols, perfumes, fabric softeners (Rash; Rhinitis; Rhinorrhea)  adhesives (Rash)  Tin/costume jewelry (Rash)  cephalosporins (Rash)    Intolerances        REVIEW OF SYSTEMS      General: no fevers/chills, no NS	    Skin: see HPI  	  Ophthalmologic: no eye pain or change in vision    Genitourinary: endorses vaginal pain    Musculoskeletal: no joint pains or weakness	    Neurological:no weakness or tingling          Vital Signs Last 24 Hrs  T(C): 36.7 (2024 23:59), Max: 37.2 (2024 11:34)  T(F): 98 (2024 23:59), Max: 98.9 (2024 11:34)  HR: 53 (2024 23:59) (53 - 71)  BP: 116/47 (2024 23:59) (108/67 - 176/88)  BP(mean): 64 (2024 23:59) (64 - 80)  RR: 17 (2024 23:59) (16 - 19)  SpO2: 95% (2024 23:59) (95% - 100%)    Parameters below as of 2024 23:59  Patient On (Oxygen Delivery Method): room air        Physical Exam:     The patient was alert and oriented X 3, and in no  apparent distress.  OP showed no ulcerations  There was no visible lymphadenopathy.  Conjunctiva were non injected  There was no clubbing or edema of extremities.  The scalp, hair, face, eyebrows, lips, OP, neck, chest, back,   extremities X 4, nails were examined.  There was no hyperhidrosis or bromhidrosis.    Of note on skin exam: pink edematous papules and plaques on proximal arms, proximal legs, abdomen and buttocks. Oral and occular mucosa clear     Assessment:    #Drug exanthem  Patient with rash consistent with drug exanthem. Likely 2/2 Keflex. Drug exanthems typically occur within 2 weeks of first exposure and Kelfex is frequently implicated. Patient has completed course yesterday. Recommend against initiating this medication in the future. No systemic signs/symptoms of DRESS    --Stop Kephlex   --triamcinolone 0.1% ext ointment to aa areas BID x 2 weeks  --Can follow up with Dr. Melissa Silver at [facility] dermatology 1week after discharge.  If team would like expedited follow up, email [email or web address] with name, MRN, , and one line history requesting urgent outpatient follow up    Patient was seen at bedside by me and staffed virtually with the dermatology attending Dr Silver  Recommendations were communicated with the primary team.  Please page [phone] for further related questions.    Corrie Peters MD  Resident Physician, PGY2  [facility]ermat[facility]  Pager: [phone]  Office: [phone].
HPI:        PAST MEDICAL & SURGICAL HISTORY:  Personal History of Arthritis      Personal History of Female Genital Cancer      Personal History of Hypertension      Hypothyroidism      Diabetes Mellitus      Hyperlipidemia      High Triglycerides      Osteoarthritis of Knee  right. requires cane      Obstructive Sleep Apnea      Constipation      Diarrhea      Depression      Environmental Allergies      Morbidly Obese      Fibromyalgia      GERD with Apnea      Restless Legs Syndrome (RLS)      Umbilical Hernia      Atrial fibrillation      S/P Tonsillectomy and Adenoidectomy      S/P FESS (Functional Endoscopic Sinus Surgery)      Carpal Tunnel Syndrome  release left      Endometrial Ca s/p RODRIGO, BSO      Incarcerated Ventral Hernia          REVIEW OF SYSTEMS      General: no fevers/chills, no lethary	    Skin/Breast: see HPI  	  Ophthalmologic: no eye pain or change in vision  	  ENMT: no dysphagia or change in hearing    Respiratory and Thorax: no SOB or cough  	  Cardiovascular: no palpitations or chest pain    Gastrointestinal: no abdomenal pain or blood in stool     Genitourinary: no dysuria or frequency    Musculoskeletal: no joint pains or weakness	    Neurological:no weakness, numbness , or tingling    MEDICATIONS  (STANDING):  apixaban 5 milliGRAM(s) Oral every 12 hours  atorvastatin 40 milliGRAM(s) Oral at bedtime  buMETAnide 0.5 milliGRAM(s) Oral daily  dextrose 5%. 1000 milliLiter(s) IV Continuous <Continuous>  dextrose 5%. 1000 milliLiter(s) IV Continuous <Continuous>  dextrose 50% Injectable 12.5 Gram(s) IV Push once  dextrose 50% Injectable 25 Gram(s) IV Push once  dextrose 50% Injectable 25 Gram(s) IV Push once  escitalopram 20 milliGRAM(s) Oral daily  famotidine Injectable 20 milliGRAM(s) IV Push daily  gabapentin 300 milliGRAM(s) Oral two times a day  glucagon  Injectable 1 milliGRAM(s) IntraMuscular once  insulin glargine Injectable (LANTUS) 24 Unit(s) SubCutaneous at bedtime  insulin lispro (ADMELOG) corrective regimen sliding scale   SubCutaneous three times a day before meals  insulin lispro (ADMELOG) corrective regimen sliding scale   SubCutaneous at bedtime  insulin lispro Injectable (ADMELOG) 8 Unit(s) SubCutaneous three times a day before meals  levothyroxine 125 MICROGram(s) Oral daily  loratadine 10 milliGRAM(s) Oral daily  spironolactone 25 milliGRAM(s) Oral daily    ALLERGIES: smoke  Lyrica  dust  pollen  aerosols, perfumes, fabric softeners  adhesives  Tin/costume jewelry  cephalosporins      Social History:  denies past or present tobacco, etoh, or illicit drug use.  Lives at assisted living facility.    FAMILY HISTORY:  No pertinent family history in first degree relatives          VITAL SIGNS LAST 24 HOURS:  T(F): 97.8 (02-08 @ 16:14), Max: 98.9 (02-08 @ 11:34)  HR: 59 (02-08 @ 16:14) (55 - 80)  BP: 134/61 (02-08 @ 16:14) (108/67 - 176/88)  RR: 16 (02-08 @ 16:14) (16 - 18)    ___________________________________  Physical Exam:     The patient was alert and oriented X 3, and in no  apparent distress.  OP showed no ulcerations  There was no visible lymphadenopathy.  Conjunctiva were non injected  There was no clubbing or edema of extremities.  The scalp, hair, face, eyebrows, lips, OP, neck, chest, back,   extremities X 4, nails were examined.  There was no hyperhidrosis or bromhidrosis.    Of note on skin exam:   ____________________________________    LABS:                        11.6   7.41  )-----------( 389      ( 08 Feb 2024 06:36 )             36.6     02-08    140  |  103  |  15  ----------------------------<  149<H>  4.0   |  27  |  0.99    Ca    8.8      08 Feb 2024 06:36    TPro  6.1  /  Alb  3.3  /  TBili  0.3  /  DBili  x   /  AST  11  /  ALT  6<L>  /  AlkPhos  82  02-08      Urinalysis Basic - ( 08 Feb 2024 06:36 )    Color: x / Appearance: x / SG: x / pH: x  Gluc: 149 mg/dL / Ketone: x  / Bili: x / Urobili: x   Blood: x / Protein: x / Nitrite: x   Leuk Esterase: x / RBC: x / WBC x   Sq Epi: x / Non Sq Epi: x / Bacteria: x

## 2024-02-09 NOTE — ED CDU PROVIDER SUBSEQUENT DAY NOTE - ATTENDING APP SHARED VISIT CONTRIBUTION OF CARE
see CDU Dispo Note -- Jovanny Lauren MD
ATTENDING, Edward PATEL: I have personally performed a face to face diagnostic evaluation on this patient.  I have reviewed the ACP note and agree with the history, exam, and plan of care, except as noted here.     rash to intertriginous areas and dorsal ue mostly. derm was consulted and presumes dermatitis. rec for steroid cream. in my opinion le rashes are not currently infected and agree w plan to stop abx. not obviously a reaction to keflex

## 2024-02-09 NOTE — ED CDU PROVIDER SUBSEQUENT DAY NOTE - NSICDXPASTSURGICALHX_GEN_ALL_CORE_FT
PAST SURGICAL HISTORY:  Carpal Tunnel Syndrome release left    Endometrial Ca s/p RODRIGO, BSO     Incarcerated Ventral Hernia     S/P FESS (Functional Endoscopic Sinus Surgery)     S/P Tonsillectomy and Adenoidectomy     
PAST SURGICAL HISTORY:  Carpal Tunnel Syndrome release left    Endometrial Ca s/p RODRIGO, BSO     Incarcerated Ventral Hernia     S/P FESS (Functional Endoscopic Sinus Surgery)     S/P Tonsillectomy and Adenoidectomy

## 2024-02-09 NOTE — ED CDU PROVIDER SUBSEQUENT DAY NOTE - HISTORY
Per dermatology resident, recommending Triamcinolone 0.1% ointment twice a day.  Patient evaluated by CDU attending MAXIMO Toledo with discharge back to assisted living facility pending dermatology final note, aware of BP and HR.   Patient with some improvement in rash. NAD. Pending final dermatology note at this time.. - Maryuri Wynne PA-C
No interval changes since initial CDU provider note. Pt without new complaint. NAD VSS. Pending dermatology consult in the setting fo rash. - KENDY Wynne

## 2024-02-09 NOTE — ED CDU PROVIDER SUBSEQUENT DAY NOTE - NS ED ROS FT
· SKIN: - - -  · Skin [+]: ITCH, RASH  · ENDOCRINE: - - -  · Endocrine [+]: +known diabetes  · ROS STATEMENT: all other ROS negative except as per HPI
SKIN: - - -  	· Skin [+]: ITCH, RASH  	· ENDOCRINE: - - -  	· Endocrine [+]: +known diabetes  · ROS STATEMENT: all other ROS negative except as per HPI

## 2024-02-09 NOTE — ED CDU PROVIDER SUBSEQUENT DAY NOTE - PROGRESS NOTE DETAILS
Per dermatology note "--Stop Kephlex   --triamcinolone 0.1% ext ointment to aa areas BID x 2 weeks  --Can follow up with Dr. Melissa Silver at [facility] dermatology 1week after discharge.  If team would like expedited follow up, email [email or web address] with name, MRN, , and one line history requesting urgent outpatient follow up"   I spoke with [facility] at [facility] at [phone], states that they can take patient back to facility at this time. Will fill out non-emergent transport. Patient in NAD. - Maryuri Wynne PA-C I signed discharge paperwork with patient. Pending transport back to assisted living facility at this time. - Maryuri Wynne PA-C Per dermatology note "--Stop Kephlex   --triamcinolone 0.1% ext ointment to aa areas BID x 2 weeks  --Can follow up with Dr. Melissa Silver at [facility] dermatology 1week after discharge.  If team would like expedited follow up, email [email or web address] with name, MRN, , and one line history requesting urgent outpatient follow up"   I spoke with [facility] at [phone], states that they can take patient back to facility at this time. Will fill out non-emergent transport. Patient in NAD. Email sent for dermatology followup- Maryuri Wynne PA-C I signed discharge paperwork with patient and reviewed recommendations . Pending transport back to assisted living facility at this time. Will sign out patient to day team, it still pending transport at the time. - Maryuri Wynne PA-C

## 2024-02-09 NOTE — ED CDU PROVIDER SUBSEQUENT DAY NOTE - PHYSICAL EXAMINATION
CONSTITUTIONAL: Well appearing and in no apparent distress.  		ENT: Airway patent, moist mucous membranes. No visible oral lesions.   		CARDIAC: Normal rate, regular rhythm.  Heart sounds S1, S2.    		RESPIRATORY: Breath sounds clear and equal bilaterally.   		GASTROINTESTINAL: Abdomen soft, non-tender  		MUSCULOSKELETAL: Spine appears normal.  		NEUROLOGICAL: Alert and oriented x3, moving all extremities, clear speech, follows commands    		SKIN: Diffuse erythematous rash to extremities and  torso. With areas of erythema to bilateral shins.   PSYCHIATRIC: Normal mood and affect.
CONSTITUTIONAL: Well appearing and in no apparent distress.  	ENT: Airway patent, moist mucous membranes. No visible oral lesions.   	CARDIAC: Normal rate, regular rhythm.  Heart sounds S1, S2.    	RESPIRATORY: Breath sounds clear and equal bilaterally.   	GASTROINTESTINAL: Abdomen soft, non-tender  	MUSCULOSKELETAL: Spine appears normal.  	NEUROLOGICAL: Alert and oriented x3, moving all extremities, clear speech, follows commands    	SKIN: Diffuse erythematous rash to extremities and  torso. With areas of erythema to bilateral shins.   PSYCHIATRIC: Normal mood and affect.

## 2024-02-09 NOTE — ED CDU PROVIDER SUBSEQUENT DAY NOTE - NSICDXPASTMEDICALHX_GEN_ALL_CORE_FT
PAST MEDICAL HISTORY:  Atrial fibrillation     Constipation     Depression     Diabetes Mellitus     Diarrhea     Environmental Allergies     Fibromyalgia     GERD with Apnea     High Triglycerides     Hyperlipidemia     Hypothyroidism     Morbidly Obese     Obstructive Sleep Apnea     Osteoarthritis of Knee right. requires cane    Personal History of Arthritis     Personal History of Female Genital Cancer     Personal History of Hypertension     Restless Legs Syndrome (RLS)     Umbilical Hernia     
PAST MEDICAL HISTORY:  Atrial fibrillation     Constipation     Depression     Diabetes Mellitus     Diarrhea     Environmental Allergies     Fibromyalgia     GERD with Apnea     High Triglycerides     Hyperlipidemia     Hypothyroidism     Morbidly Obese     Obstructive Sleep Apnea     Osteoarthritis of Knee right. requires cane    Personal History of Arthritis     Personal History of Female Genital Cancer     Personal History of Hypertension     Restless Legs Syndrome (RLS)     Umbilical Hernia

## 2024-02-09 NOTE — ED CDU PROVIDER SUBSEQUENT DAY NOTE - NS ED ATTENDING STATEMENT MOD
This was a shared visit with the JAN. I reviewed and verified the documentation.
This was a shared visit with the JAN. I reviewed and verified the documentation.

## 2024-02-12 LAB
CULTURE RESULTS: SIGNIFICANT CHANGE UP
CULTURE RESULTS: SIGNIFICANT CHANGE UP
SPECIMEN SOURCE: SIGNIFICANT CHANGE UP
SPECIMEN SOURCE: SIGNIFICANT CHANGE UP

## 2024-06-17 NOTE — ED PROVIDER NOTE - NS ED MD DISPO DIVISION
[FreeTextEntry1] : - Focalin ER 15 mg daily - Discussed use of medication as well as possible side effects - Follow up 1 month [facility]

## 2024-07-06 ENCOUNTER — INPATIENT (INPATIENT)
Facility: HOSPITAL | Age: 69
LOS: 2 days | Discharge: NURSING, REHAB OR LONG-TERM CARE | DRG: 90 | End: 2024-07-09
Attending: INTERNAL MEDICINE | Admitting: INTERNAL MEDICINE
Payer: MEDICARE

## 2024-07-06 VITALS
RESPIRATION RATE: 18 BRPM | HEIGHT: 70 IN | OXYGEN SATURATION: 96 % | HEART RATE: 63 BPM | WEIGHT: 293 LBS | SYSTOLIC BLOOD PRESSURE: 137 MMHG | DIASTOLIC BLOOD PRESSURE: 82 MMHG | TEMPERATURE: 98 F
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
## 2024-07-06 DIAGNOSIS — I10 ESSENTIAL (PRIMARY) HYPERTENSION: ICD-10-CM

## 2024-07-06 DIAGNOSIS — S06.0X0A CONCUSSION WITHOUT LOSS OF CONSCIOUSNESS, INITIAL ENCOUNTER: ICD-10-CM

## 2024-07-06 DIAGNOSIS — R63.8 OTHER SYMPTOMS AND SIGNS CONCERNING FOOD AND FLUID INTAKE: ICD-10-CM

## 2024-07-06 DIAGNOSIS — E11.9 TYPE 2 DIABETES MELLITUS WITHOUT COMPLICATIONS: ICD-10-CM

## 2024-07-06 DIAGNOSIS — E78.5 HYPERLIPIDEMIA, UNSPECIFIED: ICD-10-CM

## 2024-07-06 DIAGNOSIS — G25.81 RESTLESS LEGS SYNDROME: ICD-10-CM

## 2024-07-06 DIAGNOSIS — Z87.39 PERSONAL HISTORY OF OTHER DISEASES OF THE MUSCULOSKELETAL SYSTEM AND CONNECTIVE TISSUE: ICD-10-CM

## 2024-07-06 LAB
ALBUMIN SERPL ELPH-MCNC: 3.3 G/DL — SIGNIFICANT CHANGE UP (ref 3.3–5)
ALP SERPL-CCNC: 95 U/L — SIGNIFICANT CHANGE UP (ref 40–120)
ALT FLD-CCNC: 12 U/L — SIGNIFICANT CHANGE UP (ref 10–45)
ANION GAP SERPL CALC-SCNC: 10 MMOL/L — SIGNIFICANT CHANGE UP (ref 5–17)
AST SERPL-CCNC: 23 U/L — SIGNIFICANT CHANGE UP (ref 10–40)
BASOPHILS # BLD AUTO: 0.03 K/UL — SIGNIFICANT CHANGE UP (ref 0–0.2)
BASOPHILS NFR BLD AUTO: 0.3 % — SIGNIFICANT CHANGE UP (ref 0–2)
BILIRUB SERPL-MCNC: 0.5 MG/DL — SIGNIFICANT CHANGE UP (ref 0.2–1.2)
BUN SERPL-MCNC: 19 MG/DL — SIGNIFICANT CHANGE UP (ref 7–23)
CALCIUM SERPL-MCNC: 9.4 MG/DL — SIGNIFICANT CHANGE UP (ref 8.4–10.5)
CHLORIDE SERPL-SCNC: 99 MMOL/L — SIGNIFICANT CHANGE UP (ref 96–108)
CO2 SERPL-SCNC: 25 MMOL/L — SIGNIFICANT CHANGE UP (ref 22–31)
CREAT SERPL-MCNC: 0.98 MG/DL — SIGNIFICANT CHANGE UP (ref 0.5–1.3)
EGFR: 62 ML/MIN/1.73M2 — SIGNIFICANT CHANGE UP
EOSINOPHIL # BLD AUTO: 0.2 K/UL — SIGNIFICANT CHANGE UP (ref 0–0.5)
EOSINOPHIL NFR BLD AUTO: 2.3 % — SIGNIFICANT CHANGE UP (ref 0–6)
GLUCOSE BLDC GLUCOMTR-MCNC: 187 MG/DL — HIGH (ref 70–99)
GLUCOSE SERPL-MCNC: 165 MG/DL — HIGH (ref 70–99)
HCT VFR BLD CALC: 37.1 % — SIGNIFICANT CHANGE UP (ref 34.5–45)
HGB BLD-MCNC: 11.6 G/DL — SIGNIFICANT CHANGE UP (ref 11.5–15.5)
IMM GRANULOCYTES NFR BLD AUTO: 0.3 % — SIGNIFICANT CHANGE UP (ref 0–0.9)
LYMPHOCYTES # BLD AUTO: 1.25 K/UL — SIGNIFICANT CHANGE UP (ref 1–3.3)
LYMPHOCYTES # BLD AUTO: 14.5 % — SIGNIFICANT CHANGE UP (ref 13–44)
MCHC RBC-ENTMCNC: 26.7 PG — LOW (ref 27–34)
MCHC RBC-ENTMCNC: 31.3 GM/DL — LOW (ref 32–36)
MCV RBC AUTO: 85.3 FL — SIGNIFICANT CHANGE UP (ref 80–100)
MONOCYTES # BLD AUTO: 0.93 K/UL — HIGH (ref 0–0.9)
MONOCYTES NFR BLD AUTO: 10.8 % — SIGNIFICANT CHANGE UP (ref 2–14)
NEUTROPHILS # BLD AUTO: 6.21 K/UL — SIGNIFICANT CHANGE UP (ref 1.8–7.4)
NEUTROPHILS NFR BLD AUTO: 71.8 % — SIGNIFICANT CHANGE UP (ref 43–77)
NRBC # BLD: 0 /100 WBCS — SIGNIFICANT CHANGE UP (ref 0–0)
PLATELET # BLD AUTO: 294 K/UL — SIGNIFICANT CHANGE UP (ref 150–400)
POTASSIUM SERPL-MCNC: 5.3 MMOL/L — SIGNIFICANT CHANGE UP (ref 3.5–5.3)
POTASSIUM SERPL-SCNC: 5.3 MMOL/L — SIGNIFICANT CHANGE UP (ref 3.5–5.3)
PROT SERPL-MCNC: 7.3 G/DL — SIGNIFICANT CHANGE UP (ref 6–8.3)
RBC # BLD: 4.35 M/UL — SIGNIFICANT CHANGE UP (ref 3.8–5.2)
RBC # FLD: 15.9 % — HIGH (ref 10.3–14.5)
SODIUM SERPL-SCNC: 134 MMOL/L — LOW (ref 135–145)
WBC # BLD: 8.65 K/UL — SIGNIFICANT CHANGE UP (ref 3.8–10.5)
WBC # FLD AUTO: 8.65 K/UL — SIGNIFICANT CHANGE UP (ref 3.8–10.5)

## 2024-07-06 PROCEDURE — 99285 EMERGENCY DEPT VISIT HI MDM: CPT

## 2024-07-06 PROCEDURE — 72125 CT NECK SPINE W/O DYE: CPT | Mod: 26,MC

## 2024-07-06 PROCEDURE — 76377 3D RENDER W/INTRP POSTPROCES: CPT | Mod: 26,77

## 2024-07-06 PROCEDURE — 76377 3D RENDER W/INTRP POSTPROCES: CPT | Mod: 26

## 2024-07-06 PROCEDURE — 70450 CT HEAD/BRAIN W/O DYE: CPT | Mod: 26,MC

## 2024-07-06 PROCEDURE — 70486 CT MAXILLOFACIAL W/O DYE: CPT | Mod: 26,MC

## 2024-07-06 PROCEDURE — 72192 CT PELVIS W/O DYE: CPT | Mod: 26,MC

## 2024-07-06 PROCEDURE — 72170 X-RAY EXAM OF PELVIS: CPT | Mod: 26

## 2024-07-06 RX ORDER — LEVOTHYROXINE SODIUM 25 MCG
125 TABLET ORAL DAILY
Refills: 0 | Status: DISCONTINUED | OUTPATIENT
Start: 2024-07-06 | End: 2024-07-09

## 2024-07-06 RX ORDER — FERROUS SULFATE 325(65) MG
1 TABLET ORAL
Refills: 0 | DISCHARGE

## 2024-07-06 RX ORDER — INSULIN GLARGINE 100 [IU]/ML
15 INJECTION, SOLUTION SUBCUTANEOUS AT BEDTIME
Refills: 0 | Status: DISCONTINUED | OUTPATIENT
Start: 2024-07-06 | End: 2024-07-09

## 2024-07-06 RX ORDER — APIXABAN 5 MG/1
5 TABLET, FILM COATED ORAL EVERY 12 HOURS
Refills: 0 | Status: DISCONTINUED | OUTPATIENT
Start: 2024-07-06 | End: 2024-07-09

## 2024-07-06 RX ORDER — POLYETHYLENE GLYCOL 3350 1 G/G
17 POWDER ORAL DAILY
Refills: 0 | Status: DISCONTINUED | OUTPATIENT
Start: 2024-07-06 | End: 2024-07-09

## 2024-07-06 RX ORDER — POLYETHYLENE GLYCOL 3350 1 G/G
17 POWDER ORAL
Refills: 0 | DISCHARGE

## 2024-07-06 RX ORDER — INSULIN LISPRO 100 [IU]/ML
5 INJECTION, SOLUTION SUBCUTANEOUS
Refills: 0 | Status: DISCONTINUED | OUTPATIENT
Start: 2024-07-06 | End: 2024-07-09

## 2024-07-06 RX ORDER — ARIPIPRAZOLE 15 MG/1
1 TABLET ORAL
Refills: 0 | DISCHARGE

## 2024-07-06 RX ORDER — ARIPIPRAZOLE 15 MG/1
2 TABLET ORAL DAILY
Refills: 0 | Status: DISCONTINUED | OUTPATIENT
Start: 2024-07-06 | End: 2024-07-09

## 2024-07-06 RX ORDER — PANTOPRAZOLE SODIUM 40 MG/10ML
1 INJECTION, POWDER, FOR SOLUTION INTRAVENOUS
Refills: 0 | DISCHARGE

## 2024-07-06 RX ORDER — HYDROMORPHONE HCL 0.2 MG/ML
4 INJECTION, SOLUTION INTRAVENOUS ONCE
Refills: 0 | Status: DISCONTINUED | OUTPATIENT
Start: 2024-07-06 | End: 2024-07-06

## 2024-07-06 RX ORDER — GABAPENTIN
600 POWDER (GRAM) MISCELLANEOUS
Refills: 0 | Status: DISCONTINUED | OUTPATIENT
Start: 2024-07-06 | End: 2024-07-09

## 2024-07-06 RX ORDER — GLUCAGON HYDROCHLORIDE 1 MG/ML
1 INJECTION, POWDER, FOR SOLUTION INTRAMUSCULAR; INTRAVENOUS; SUBCUTANEOUS ONCE
Refills: 0 | Status: DISCONTINUED | OUTPATIENT
Start: 2024-07-06 | End: 2024-07-09

## 2024-07-06 RX ORDER — SENNOSIDES 8.6 MG
2 TABLET ORAL AT BEDTIME
Refills: 0 | Status: DISCONTINUED | OUTPATIENT
Start: 2024-07-06 | End: 2024-07-09

## 2024-07-06 RX ORDER — FERROUS SULFATE 325(65) MG
325 TABLET ORAL DAILY
Refills: 0 | Status: DISCONTINUED | OUTPATIENT
Start: 2024-07-06 | End: 2024-07-09

## 2024-07-06 RX ORDER — METFORMIN HYDROCHLORIDE 850 MG/1
1 TABLET, FILM COATED ORAL
Refills: 0 | DISCHARGE

## 2024-07-06 RX ORDER — SPIRONOLACTONE 25 MG/1
25 TABLET ORAL DAILY
Refills: 0 | Status: DISCONTINUED | OUTPATIENT
Start: 2024-07-06 | End: 2024-07-09

## 2024-07-06 RX ORDER — CYANOCOBALAMIN (VITAMIN B-12) 1000 MCG
1 TABLET, EXTENDED RELEASE ORAL
Refills: 0 | DISCHARGE

## 2024-07-06 RX ORDER — LORATADINE 10 MG/1
10 TABLET ORAL DAILY
Refills: 0 | Status: DISCONTINUED | OUTPATIENT
Start: 2024-07-06 | End: 2024-07-09

## 2024-07-06 RX ORDER — BUMETANIDE 0.25 MG/ML
0.5 INJECTION INTRAMUSCULAR; INTRAVENOUS DAILY
Refills: 0 | Status: DISCONTINUED | OUTPATIENT
Start: 2024-07-07 | End: 2024-07-09

## 2024-07-06 RX ORDER — SENNOSIDES 8.6 MG
2 TABLET ORAL
Refills: 0 | DISCHARGE

## 2024-07-06 RX ORDER — DEXTROSE MONOHYDRATE AND SODIUM CHLORIDE 5; .3 G/100ML; G/100ML
1000 INJECTION, SOLUTION INTRAVENOUS
Refills: 0 | Status: DISCONTINUED | OUTPATIENT
Start: 2024-07-06 | End: 2024-07-09

## 2024-07-06 RX ORDER — CYANOCOBALAMIN (VITAMIN B-12) 1000 MCG
1000 TABLET, EXTENDED RELEASE ORAL DAILY
Refills: 0 | Status: DISCONTINUED | OUTPATIENT
Start: 2024-07-06 | End: 2024-07-09

## 2024-07-06 RX ORDER — INSULIN LISPRO 100 [IU]/ML
INJECTION, SOLUTION SUBCUTANEOUS
Refills: 0 | Status: DISCONTINUED | OUTPATIENT
Start: 2024-07-06 | End: 2024-07-09

## 2024-07-06 RX ORDER — DEXTROSE 30 % IN WATER 30 %
15 VIAL (ML) INTRAVENOUS ONCE
Refills: 0 | Status: DISCONTINUED | OUTPATIENT
Start: 2024-07-06 | End: 2024-07-09

## 2024-07-06 RX ORDER — LORATADINE 10 MG/1
1 TABLET ORAL
Refills: 0 | DISCHARGE

## 2024-07-06 RX ORDER — GABAPENTIN
1 POWDER (GRAM) MISCELLANEOUS
Refills: 0 | DISCHARGE

## 2024-07-06 RX ORDER — DEXTROSE 30 % IN WATER 30 %
25 VIAL (ML) INTRAVENOUS ONCE
Refills: 0 | Status: DISCONTINUED | OUTPATIENT
Start: 2024-07-06 | End: 2024-07-09

## 2024-07-06 RX ORDER — DEXTROSE 30 % IN WATER 30 %
12.5 VIAL (ML) INTRAVENOUS ONCE
Refills: 0 | Status: DISCONTINUED | OUTPATIENT
Start: 2024-07-06 | End: 2024-07-09

## 2024-07-06 RX ORDER — PANTOPRAZOLE SODIUM 40 MG/10ML
40 INJECTION, POWDER, FOR SOLUTION INTRAVENOUS
Refills: 0 | Status: DISCONTINUED | OUTPATIENT
Start: 2024-07-06 | End: 2024-07-09

## 2024-07-06 RX ORDER — METOPROLOL TARTRATE 50 MG
25 TABLET ORAL DAILY
Refills: 0 | Status: DISCONTINUED | OUTPATIENT
Start: 2024-07-06 | End: 2024-07-09

## 2024-07-06 RX ORDER — ESCITALOPRAM OXALATE 20 MG/1
20 TABLET, FILM COATED ORAL DAILY
Refills: 0 | Status: DISCONTINUED | OUTPATIENT
Start: 2024-07-06 | End: 2024-07-09

## 2024-07-06 RX ORDER — BUPROPION HYDROCHLORIDE 150 MG/1
300 TABLET, EXTENDED RELEASE ORAL DAILY
Refills: 0 | Status: DISCONTINUED | OUTPATIENT
Start: 2024-07-06 | End: 2024-07-09

## 2024-07-06 RX ORDER — DEXTROSE MONOHYDRATE 100 MG/ML
125 INJECTION, SOLUTION INTRAVENOUS ONCE
Refills: 0 | Status: DISCONTINUED | OUTPATIENT
Start: 2024-07-06 | End: 2024-07-09

## 2024-07-06 RX ORDER — ACETAMINOPHEN 325 MG
650 TABLET ORAL EVERY 6 HOURS
Refills: 0 | Status: DISCONTINUED | OUTPATIENT
Start: 2024-07-06 | End: 2024-07-09

## 2024-07-06 RX ORDER — LORATADINE 10 MG/1
10 TABLET ORAL ONCE
Refills: 0 | Status: COMPLETED | OUTPATIENT
Start: 2024-07-06 | End: 2024-07-06

## 2024-07-06 RX ORDER — ATORVASTATIN CALCIUM 20 MG/1
40 TABLET, FILM COATED ORAL AT BEDTIME
Refills: 0 | Status: DISCONTINUED | OUTPATIENT
Start: 2024-07-06 | End: 2024-07-09

## 2024-07-06 RX ORDER — INSULIN LISPRO 100 [IU]/ML
15 INJECTION, SOLUTION SUBCUTANEOUS
Refills: 0 | DISCHARGE

## 2024-07-06 RX ORDER — ACETAMINOPHEN 325 MG
975 TABLET ORAL ONCE
Refills: 0 | Status: COMPLETED | OUTPATIENT
Start: 2024-07-06 | End: 2024-07-06

## 2024-07-06 RX ORDER — ACETAMINOPHEN 325 MG
650 TABLET ORAL ONCE
Refills: 0 | Status: COMPLETED | OUTPATIENT
Start: 2024-07-06 | End: 2024-07-06

## 2024-07-06 RX ORDER — TETANUS TOXOID, REDUCED DIPHTHERIA TOXOID AND ACELLULAR PERTUSSIS VACCINE, ADSORBED 5; 2.5; 8; 8; 2.5 [IU]/.5ML; [IU]/.5ML; UG/.5ML; UG/.5ML; UG/.5ML
0.5 SUSPENSION INTRAMUSCULAR ONCE
Refills: 0 | Status: COMPLETED | OUTPATIENT
Start: 2024-07-06 | End: 2024-07-06

## 2024-07-06 RX ADMIN — Medication 650 MILLIGRAM(S): at 06:20

## 2024-07-06 RX ADMIN — Medication 975 MILLIGRAM(S): at 01:55

## 2024-07-06 RX ADMIN — HYDROMORPHONE HCL 4 MILLIGRAM(S): 0.2 INJECTION, SOLUTION INTRAVENOUS at 06:50

## 2024-07-06 RX ADMIN — HYDROMORPHONE HCL 4 MILLIGRAM(S): 0.2 INJECTION, SOLUTION INTRAVENOUS at 15:25

## 2024-07-06 RX ADMIN — LORATADINE 10 MILLIGRAM(S): 10 TABLET ORAL at 06:51

## 2024-07-06 RX ADMIN — TETANUS TOXOID, REDUCED DIPHTHERIA TOXOID AND ACELLULAR PERTUSSIS VACCINE, ADSORBED 0.5 MILLILITER(S): 5; 2.5; 8; 8; 2.5 SUSPENSION INTRAMUSCULAR at 01:55

## 2024-07-06 RX ADMIN — HYDROMORPHONE HCL 4 MILLIGRAM(S): 0.2 INJECTION, SOLUTION INTRAVENOUS at 01:54

## 2024-07-06 NOTE — ED PROVIDER NOTE - NS ED MD DISPO SPECIAL CONSIDERATION1
HPI     Chief Complaint   Patient presents with    Joint Pain     Has been going on since around May 28. Completed lab work on May 28th, labs were normal however patient is still experiencing joint pain. Has tried advil, aleve, and the Glucosum-chondroitin for Joint pain - which is not helping.      Other     Therapy Form to be completed.        History of Present Illness  The patient is a 54-year-old female who is coming in for joint pain and an administrative encounter.    The patient reports experiencing pain in multiple joints, predominantly in her right knee and bilateral thumbs, particularly when attempting to open objects. She describes a sensation akin to being stuck, but denies any pain in her hands. She has been self-medicating with Advil, Aleve, glucosamine, chondroitin, and Tylenol Arthritis, all of which have not provided relief. She speculates that her symptoms may be related to her hormone therapy, as the pain was a sudden onset. She has also tried Voltaren gel and Biofreeze, which provide temporary relief. The pain is most severe at night, characterized by a throbbing sensation. She has been taking Advil 800 mg daily since 05/2023. She has a scheduled appointment with Dr. Juarez, a rheumatologist, on 08/07/2024. She denies any injuries, falls The swelling is primarily located in the front of her R knee. She denies any bruising. Her range of motion is limited, and she experiences constant soreness. She denies any knee injuries in her youth.    The patient has discontinued the use of Ambien due to its ineffectiveness. She underwent a sleep study, which was inconclusive. She is scheduled for inpatient testing.     She is currently on a progesterone estrogen patch and has an appointment with her gynecologist on 09/04/2024.    The patient's blood pressure was elevated during her sleep study consult, but prior to that, it had been running normal. She sleeps from 10:00 PM to 12:30 AM, then wakes up to  None

## 2024-07-06 NOTE — ED PROVIDER NOTE - PROGRESS NOTE DETAILS
Attending Jeremiah Cintron:  -> (07:00)  patient signed out pending remainder of w/u, close reassessments, discussion of results, dispo.

## 2024-07-06 NOTE — ED PROVIDER NOTE - CLINICAL SUMMARY MEDICAL DECISION MAKING FREE TEXT BOX
Attending Jeremiah Cintron:  69 female with history of chronic pain on 4 mg of oral Dilaudid, fibromyalgia here status post trip and fall.  Patient coming from [facility].  Patient was try to get up with her roller, roller slipped from under her, fell on right side of face.  Noted some chipping of her frontal incisors and scant bleeding from the upper lip.  There is swelling to the right eye as well as right chin.  EMS was called.  Patient denying vision changes, nausea, vomiting, chest pain, abdominal pain, pain in her extremities is different than her baseline.  Patient has right lower extremity cellulitis which is currently being evaluated for an outpatient basis and just finished a course of antibiotics.    Here patient is hemodynamically stable no acute distress, GCS 15 ABCs intact primary survey intact.  Positive bruising around the right orbit as well as right chin.  Scant ooze from the upper lip.  There are some chipped teeth very slightly so by the central incisors.  Is no exposed pulp.  There is no visible foreign bodies.  There is no injury to the facial muscles.  There is minimal tenderness of the face throughout.  There is no C-spine tenderness.  There is no tenderness to the mastoid.  Regular rate and rhythm.  Clear lungs no increased work of breathing.  Benign abdomen.  Stable pelvis.  Able to range bilateral lower extremities, slightly so secondary to habitus.  The hips are nontender, the knees are nontender.  There are 2+ distal pulses.    Patient is on anticoagulation as such will eval for facial fracture, ICH.  Dose analgesia, reassess.    -> CT head without acute findings.  There is comment of possible foreign body fragments by the upper lip.  This was investigated and looked at, there are no visible foreign bodies on exam.  Patient now complaining of pelvis pain bilaterally however the pelvis is still stable and patient is neurovascular intact with soft compartments.  Given patient baseline amatory status with roller will obtain x-ray pelvis and if no acute fracture will need to ambulate with roller for safe discharge back to the facility.  Redosed home chronic pain medicine for analgesia as well as loratadine for sinus symptoms that patient chronically suffers from.

## 2024-07-06 NOTE — H&P ADULT - HISTORY OF PRESENT ILLNESS
69 female with history of chronic pain on 4 mg of oral Dilaudid, fibromyalgia here status post trip and fall.  Patient coming from [facility].  Patient was try to get up with her roller, roller slipped from under her, fell on right side of face.  Noted some chipping of her frontal incisors and scant bleeding from the upper lip.  There is swelling to the right eye as well as right chin.  EMS was called.  Patient denying vision changes, nausea, vomiting, chest pain, abdominal pain, pain in her extremities is different than her baseline.  Patient has right lower extremity cellulitis which is currently being evaluated for an outpatient basis and just finished a course of antibiotics. She describes a 1 month of chronic erythematous rash on her ACW since she took the antibiotics as outpatient a month ago

## 2024-07-06 NOTE — ED ADULT NURSE REASSESSMENT NOTE - NS ED NURSE REASSESS COMMENT FT1
Pt cleaned turned and repositioned, perineum clean dry and intact, new primafit placed for safety and comfort.

## 2024-07-06 NOTE — H&P ADULT - NSHPPHYSICALEXAM_GEN_ALL_CORE
PHYSICAL EXAM: vital signs noted on Sunrise  in no apparent distress  ++ increased BMI  HEENT: ALEX EOMI + lower lip ecchymosis  Neck: Supple, no JVD, no thyromegaly  Lungs: no wheeze, no crackles  CVS: S1 S2 no M/R/G  Abdomen: no tenderness, no organomegaly, BS present  Neuro: AO x 3 no focal weakness, no sensory abnormalities  Skin: warm, dry  Ext: no cyanosis or clubbing, no edema  chronic erythema bilateral LE above ankles  Msk: no joint swelling or deformities  Back: no CVA tenderness, no kyphosis/scoliosis

## 2024-07-06 NOTE — ED ADULT NURSE REASSESSMENT NOTE - NS ED NURSE REASSESS COMMENT FT1
Pt is requesting to eat, MD Cortes OK'd. Pt was provided a breakfast tray. No further RN interventions are needed at this time.

## 2024-07-06 NOTE — ED ADULT NURSE REASSESSMENT NOTE - NS ED NURSE REASSESS COMMENT FT1
Report received from DEBRA Chapa. Pt A&Ox4, IV intact and patent, VSS, pt endorses BLE pain, bed locked and in lowest position, call bell within reach and pt instructed on use, safety and comfort measures maintained.

## 2024-07-06 NOTE — H&P ADULT - NSHPREVIEWOFSYSTEMS_GEN_ALL_CORE
Gen: no loss of wt no loss of appetite  ENT: no dizziness no hearing loss  Ophth: no blurring of vision no loss of vision  Resp: No cough no sputum production  CVS: No chest pain no palpitations no orthopnea  GI: no nausea, vomiting or diarrhea   : no dysuria, hematuria  Endo: no polyuria no excessive sweating  Neuro: no weakness no paresthesias  Heme: No petechiae no easy bruising  Msk: No joint pain no swelling  generalized pain consistent with fibromyalgia  Skin: No rash no itching

## 2024-07-06 NOTE — ED ADULT NURSE NOTE - NSFALLHARMRISKINTERV_ED_ALL_ED

## 2024-07-06 NOTE — H&P ADULT - NSHPSOCIALHISTORY_GEN_ALL_CORE
non smoker  no IVDA  no ETOH abuse   lives in assisted living facility  usually ambulatory with a walker

## 2024-07-06 NOTE — H&P ADULT - NSHPADDITIONALINFOADULT_GEN_ALL_CORE
discussed with patient in detail, expresses understanding of treatment plans.    76 minutes spent on total encounter. The necessity of the time spent during the encounter on this date of service was due to:     detailed physical exam, obtaining and reviewing history, physical examination, explaining the diagnosis, prognosis and treatment plan with the patient/family/caregiver. I also have spent the time ordering studies and testing, interpreting results, medicine reconciliation, and documentation as above

## 2024-07-06 NOTE — ED ADULT TRIAGE NOTE - BSA (M2)
Patient called and I relayed lab results and recommendations. She would like the iron pill to be sent in as a prescription into [facility] in [place].    _________      1.  Call patient urine for microalbumin is negative.  2.  Thyroid test is normal.  3.  Cholesterol looks good at 179.  LDL cholesterol is at 102 HDL is very good at 65 and triglycerides normal at 60.  4.  CMP is unremarkable.  Liver and kidney tests are normal.  5.  Patient is anemic with a hemoglobin of 10.6.  If patient is menstruating it may be related to blood loss from her periods.  Patient is to take ferrous sulfate 1 tablet twice a day.  Repeat CBC, ferritin in 2 to 3 months.  6.  Urine test is unremarkable   2.8

## 2024-07-06 NOTE — H&P ADULT - PROBLEM SELECTOR PLAN 3
HbA1C  finger sticks with short acting insulin sliding scale  no oral meds  diabetic diet  monitor for hypoglycemia   continue long acting insulin lowered dose at 15 for now  admelog 5 units pre-meal

## 2024-07-06 NOTE — ED ADULT NURSE NOTE - OBJECTIVE STATEMENT
69YOF hx HTN/HLD/DM2/hypothyroid/Afib on eliquis BIBEMS from nursing home c/o fall. Pt states she was attempting to  a box and place it on her walker without locking it first when the walker began to roll forward away from her, pt attempted to grab walker when she fell forward injuring 69YOF hx HTN/HLD/DM2/hypothyroid/Afib on eliquis BIBEMS from [facility] c/o fall. Pt states she was attempting to  a box and place it on her walker without locking it first when the walker began to roll forward away from her, pt attempted to grab walker when she fell forward hitting face and head. No LOC, on eliquis. PERRL, neuro intact. On arrival pt is AOx4, breathing unlabored, R periorbital bruising and R upper cheek swelling noted, dried blood to mouth with ecchymosis. Redness to b/l LE. VSS. IV access obtained and safety precautions maintained.

## 2024-07-06 NOTE — ED PROVIDER NOTE - ATTENDING CONTRIBUTION TO CARE
Attending (Jeremiah Cintron D.O.):  I have personally seen and examined this patient. I have performed a substantive portion of the visit including all aspects of the medical decision making. Resident note reviewed. I agree on the plan of care except where noted.

## 2024-07-07 LAB
A1C WITH ESTIMATED AVERAGE GLUCOSE RESULT: 6.5 % — HIGH (ref 4–5.6)
ANION GAP SERPL CALC-SCNC: 12 MMOL/L — SIGNIFICANT CHANGE UP (ref 5–17)
BUN SERPL-MCNC: 15 MG/DL — SIGNIFICANT CHANGE UP (ref 7–23)
CALCIUM SERPL-MCNC: 9.2 MG/DL — SIGNIFICANT CHANGE UP (ref 8.4–10.5)
CHLORIDE SERPL-SCNC: 100 MMOL/L — SIGNIFICANT CHANGE UP (ref 96–108)
CO2 SERPL-SCNC: 24 MMOL/L — SIGNIFICANT CHANGE UP (ref 22–31)
CREAT SERPL-MCNC: 1.01 MG/DL — SIGNIFICANT CHANGE UP (ref 0.5–1.3)
EGFR: 60 ML/MIN/1.73M2 — SIGNIFICANT CHANGE UP
ESTIMATED AVERAGE GLUCOSE: 140 MG/DL — HIGH (ref 68–114)
GLUCOSE BLDC GLUCOMTR-MCNC: 161 MG/DL — HIGH (ref 70–99)
GLUCOSE BLDC GLUCOMTR-MCNC: 164 MG/DL — HIGH (ref 70–99)
GLUCOSE BLDC GLUCOMTR-MCNC: 171 MG/DL — HIGH (ref 70–99)
GLUCOSE BLDC GLUCOMTR-MCNC: 177 MG/DL — HIGH (ref 70–99)
GLUCOSE SERPL-MCNC: 168 MG/DL — HIGH (ref 70–99)
HCT VFR BLD CALC: 36.3 % — SIGNIFICANT CHANGE UP (ref 34.5–45)
HGB BLD-MCNC: 11.9 G/DL — SIGNIFICANT CHANGE UP (ref 11.5–15.5)
MCHC RBC-ENTMCNC: 26.9 PG — LOW (ref 27–34)
MCHC RBC-ENTMCNC: 32.8 GM/DL — SIGNIFICANT CHANGE UP (ref 32–36)
MCV RBC AUTO: 82.1 FL — SIGNIFICANT CHANGE UP (ref 80–100)
NRBC # BLD: 0 /100 WBCS — SIGNIFICANT CHANGE UP (ref 0–0)
PLATELET # BLD AUTO: 305 K/UL — SIGNIFICANT CHANGE UP (ref 150–400)
POTASSIUM SERPL-MCNC: 4.1 MMOL/L — SIGNIFICANT CHANGE UP (ref 3.5–5.3)
POTASSIUM SERPL-SCNC: 4.1 MMOL/L — SIGNIFICANT CHANGE UP (ref 3.5–5.3)
RBC # BLD: 4.42 M/UL — SIGNIFICANT CHANGE UP (ref 3.8–5.2)
RBC # FLD: 15.8 % — HIGH (ref 10.3–14.5)
SODIUM SERPL-SCNC: 136 MMOL/L — SIGNIFICANT CHANGE UP (ref 135–145)
WBC # BLD: 7.74 K/UL — SIGNIFICANT CHANGE UP (ref 3.8–10.5)
WBC # FLD AUTO: 7.74 K/UL — SIGNIFICANT CHANGE UP (ref 3.8–10.5)

## 2024-07-07 RX ORDER — HYDROMORPHONE HCL 0.2 MG/ML
4 INJECTION, SOLUTION INTRAVENOUS EVERY 6 HOURS
Refills: 0 | Status: DISCONTINUED | OUTPATIENT
Start: 2024-07-07 | End: 2024-07-09

## 2024-07-07 RX ORDER — HYDROMORPHONE HCL 0.2 MG/ML
4 INJECTION, SOLUTION INTRAVENOUS ONCE
Refills: 0 | Status: DISCONTINUED | OUTPATIENT
Start: 2024-07-07 | End: 2024-07-07

## 2024-07-07 RX ADMIN — INSULIN LISPRO 1: 100 INJECTION, SOLUTION SUBCUTANEOUS at 17:54

## 2024-07-07 RX ADMIN — Medication 2000 UNIT(S): at 11:43

## 2024-07-07 RX ADMIN — Medication 2 TABLET(S): at 21:18

## 2024-07-07 RX ADMIN — ESCITALOPRAM OXALATE 20 MILLIGRAM(S): 20 TABLET, FILM COATED ORAL at 11:45

## 2024-07-07 RX ADMIN — LORATADINE 10 MILLIGRAM(S): 10 TABLET ORAL at 01:22

## 2024-07-07 RX ADMIN — HYDROMORPHONE HCL 4 MILLIGRAM(S): 0.2 INJECTION, SOLUTION INTRAVENOUS at 17:03

## 2024-07-07 RX ADMIN — HYDROMORPHONE HCL 4 MILLIGRAM(S): 0.2 INJECTION, SOLUTION INTRAVENOUS at 16:19

## 2024-07-07 RX ADMIN — INSULIN GLARGINE 15 UNIT(S): 100 INJECTION, SOLUTION SUBCUTANEOUS at 21:18

## 2024-07-07 RX ADMIN — BUPROPION HYDROCHLORIDE 300 MILLIGRAM(S): 150 TABLET, EXTENDED RELEASE ORAL at 11:46

## 2024-07-07 RX ADMIN — Medication 1000 MICROGRAM(S): at 11:44

## 2024-07-07 RX ADMIN — Medication 2 TABLET(S): at 00:31

## 2024-07-07 RX ADMIN — HYDROMORPHONE HCL 4 MILLIGRAM(S): 0.2 INJECTION, SOLUTION INTRAVENOUS at 01:22

## 2024-07-07 RX ADMIN — INSULIN LISPRO 5 UNIT(S): 100 INJECTION, SOLUTION SUBCUTANEOUS at 07:46

## 2024-07-07 RX ADMIN — PANTOPRAZOLE SODIUM 40 MILLIGRAM(S): 40 INJECTION, POWDER, FOR SOLUTION INTRAVENOUS at 05:34

## 2024-07-07 RX ADMIN — ATORVASTATIN CALCIUM 40 MILLIGRAM(S): 20 TABLET, FILM COATED ORAL at 22:52

## 2024-07-07 RX ADMIN — APIXABAN 5 MILLIGRAM(S): 5 TABLET, FILM COATED ORAL at 17:54

## 2024-07-07 RX ADMIN — Medication 600 MILLIGRAM(S): at 17:54

## 2024-07-07 RX ADMIN — INSULIN LISPRO 1: 100 INJECTION, SOLUTION SUBCUTANEOUS at 07:46

## 2024-07-07 RX ADMIN — INSULIN GLARGINE 15 UNIT(S): 100 INJECTION, SOLUTION SUBCUTANEOUS at 00:31

## 2024-07-07 RX ADMIN — HYDROMORPHONE HCL 4 MILLIGRAM(S): 0.2 INJECTION, SOLUTION INTRAVENOUS at 10:27

## 2024-07-07 RX ADMIN — Medication 25 MILLIGRAM(S): at 05:34

## 2024-07-07 RX ADMIN — APIXABAN 5 MILLIGRAM(S): 5 TABLET, FILM COATED ORAL at 05:33

## 2024-07-07 RX ADMIN — Medication 325 MILLIGRAM(S): at 11:44

## 2024-07-07 RX ADMIN — Medication 125 MICROGRAM(S): at 05:34

## 2024-07-07 RX ADMIN — ATORVASTATIN CALCIUM 40 MILLIGRAM(S): 20 TABLET, FILM COATED ORAL at 00:31

## 2024-07-07 RX ADMIN — ARIPIPRAZOLE 2 MILLIGRAM(S): 15 TABLET ORAL at 11:44

## 2024-07-07 RX ADMIN — Medication 3 MILLIGRAM(S): at 00:31

## 2024-07-07 RX ADMIN — BUMETANIDE 0.5 MILLIGRAM(S): 0.25 INJECTION INTRAMUSCULAR; INTRAVENOUS at 05:34

## 2024-07-07 RX ADMIN — INSULIN LISPRO 5 UNIT(S): 100 INJECTION, SOLUTION SUBCUTANEOUS at 17:54

## 2024-07-07 RX ADMIN — Medication 600 MILLIGRAM(S): at 05:34

## 2024-07-07 RX ADMIN — POLYETHYLENE GLYCOL 3350 17 GRAM(S): 1 POWDER ORAL at 11:45

## 2024-07-07 RX ADMIN — SPIRONOLACTONE 25 MILLIGRAM(S): 25 TABLET ORAL at 05:33

## 2024-07-07 NOTE — PATIENT PROFILE ADULT - HAVE YOU BEEN EATING POORLY BECAUSE OF A DECREASED APPETITE?
CC:  Ayaka Esparza is here today for Pain (New Patient, right ankle pain) and Nail Problem (New Patient, left great toenail and 2nd tonail)  .    Complaint- New Patient, left foot nails (great toenail and 2nd); nails are thick.   Right foot lump medial heel, noticed 7-2017. No change in size. Sometimes it hurts, sometimes it doesn't; seems worse when she is on her feet more. She does note more swelling if she is on it more too. Xrays done, 8-7-17, negative for fractures.   Patient is diabetic.    Date of Onset - 7-2017    Primary Care Physician: Boris Aguilar MD    Patient last seen by Primary Care Physician? 08-07-17    Patient has seen other physicians for reported complaint?Yes, Primary Doctor  Patient relates history related to complaint?  Yes    Question: NonePatient reports they have tried the following treatment and reports improvement:      Patient has had imaging. 08-07-17  Patient has not had lab.     Allergies:   ALLERGIES:   Allergen Reactions   • Codeine VOMITING   • Corticosteroids Other (See Comments)     Elevated blood sugars   • Penicillins Other (See Comments)     Arms became swollen and turned black. Knots were also present   • Sulfa Drugs Cross Reactors        Medications:   Current Outpatient Prescriptions   Medication Sig Dispense Refill   • ramipril (ALTACE) 10 MG capsule TAKE 1 CAPSULE BY MOUTH TWICE DAILY 180 capsule 1   • amLODIPine (NORVASC) 5 MG tablet TAKE 1 TABLET BY MOUTH EVERY DAY 90 tablet 1   • hydrochlorothiazide (HYDRODIURIL) 25 MG tablet TAKE 1 TABLET BY MOUTH DAILY 90 tablet 1   • glipiZIDE (GLUCOTROL) 5 MG tablet Take 1 tablet by mouth 2 times daily (before meals). 60 tablet 12   • metformin (GLUCOPHAGE-XR) 500 MG 24 hr tablet TAKE 2 TABLETS BY MOUTH TWICE DAILY WITH BREAKFAST AND DINNER 360 tablet 3   • insulin glargine (LANTUS SOLOSTAR) 100 UNIT/ML pen-injector 10 units every HS and then every 2 days ,pt will increase 1 unit til target dose is achieved 15 mL 12   •  Insulin Pen Needle 32G X 4 MM Misc 1 needle per day 100 each 8   • simvastatin (ZOCOR) 10 MG tablet Take 1 tablet by mouth nightly. 90 tablet 4   • ramipril (ALTACE) 10 MG capsule TAKE 1 CAPSULE BY MOUTH TWICE DAILY 180 capsule 1   • Misc Natural Products (CALCIUM PYRUVATE) 750 MG Cap      • Multiple Vitamins-Minerals (MULTI COMPLETE PO) Take by mouth daily.     • OMEPRAZOLE PO Take 20 mg by mouth 2 times daily.       • aspirin 81 MG tablet Take 1 tablet by mouth daily.     • blood glucose (ONE TOUCH ULTRA TEST) test strip 2 times daily. Test blood sugar 2 times daily as directed. Diagnosis: E 11.9. Meter: One Touch Ultra 200 strip 3   • clobetasol (TEMOVATE) 0.05 % ointment Apply 1 application topically 2 times daily. 30 g 2     No current facility-administered medications for this visit.      Refills: No    Vitals:   Vitals:    02/06/18 1516   BP: 134/60   Pulse: 72   Weight: 113.9 kg   Height: 5' 6\" (1.676 m)       Review of Systems:  Eye Problem(s):glasses or contacts, cataracts  Musculoskeletal problem(s):arthritis and foot pain  Integumentary problem(s):toenails dark/discolored  Neurological problem(s):negative, numbness or tingling of hands and numbness or tingling of feet  Endocrine problem(s):diabetes  Hematologic and/or Lymphatic problem(s):negative    Tobacco history: verified  Patient's current [facility] status: Active.  Health Maintenance Due   Topic Date Due   • Diabetes Foot Exam  10/01/1967   • DTaP/Tdap/Td Vaccine (1 - Tdap) 10/06/2013   • Diabetes Eye Exam  05/03/2017   • Pneumococcal Vaccine Adult (2 of 2 - PPSV23) 08/03/2017     Patient is due for topics as listed above, she wishes to defer to PCP .               No (0)

## 2024-07-07 NOTE — PHYSICAL THERAPY INITIAL EVALUATION ADULT - PERTINENT HX OF CURRENT PROBLEM, REHAB EVAL
Pt is a 69 female adm to [facility] on 7/6/24 with history of chronic pain on 4 mg of oral Dilaudid, fibromyalgia here status post trip and fall.  Patient coming from [facility].  Patient was trying to get up with her roller, roller slipped from under her, fell on right side of face.  Noted some chipping of her frontal incisors and scant bleeding from the upper lip.  There is swelling to the right eye as well as right chin.  EMS was called.  Patient denying vision changes, nausea, vomiting, chest pain, abdominal pain, pain in her extremities is different than her baseline.  Patient has right lower extremity cellulitis which is currently being evaluated for an outpatient basis and just finished a course of antibiotics. She describes a 1 month of chronic erythematous rash on her ACW since she took the antibiotics as outpatient a month ago  CT Head/Neck: No acute intracranial hemorrhage, mass effect or acute displaced calvarium fracture. Few hyperdensities anterior to the upper lip which may represent calcifications, fracture fragments or foreign bodies. Consider oral exam. Mild right lateral periorbital and right cheek soft tissue swelling. No acute cervical spine fracture, subluxation or evidence of traumatic malalignment. Multilevel degenerative changes as described above. XRays/CT pelvis: No fx/disloc

## 2024-07-07 NOTE — PHYSICAL THERAPY INITIAL EVALUATION ADULT - ADDITIONAL COMMENTS
Pt lives in the [facility]. Uses her own rollator for in room mobility. Has an electric wheelchair she uses for longer distances. Pt requires assist for ADL's such as bathing, dressing and occasional toileting. States she has been sleeping in recliner that assists her into standing

## 2024-07-08 LAB
GLUCOSE BLDC GLUCOMTR-MCNC: 138 MG/DL — HIGH (ref 70–99)
GLUCOSE BLDC GLUCOMTR-MCNC: 171 MG/DL — HIGH (ref 70–99)
GLUCOSE BLDC GLUCOMTR-MCNC: 190 MG/DL — HIGH (ref 70–99)
GLUCOSE BLDC GLUCOMTR-MCNC: 201 MG/DL — HIGH (ref 70–99)

## 2024-07-08 PROCEDURE — 99223 1ST HOSP IP/OBS HIGH 75: CPT

## 2024-07-08 RX ORDER — CLOBETASOL PROPIONATE 0.5 MG/G
1 CREAM TOPICAL
Refills: 0 | Status: DISCONTINUED | OUTPATIENT
Start: 2024-07-08 | End: 2024-07-09

## 2024-07-08 RX ORDER — NYSTATIN 100000/G
1 POWDER (GRAM) TOPICAL
Refills: 0 | Status: DISCONTINUED | OUTPATIENT
Start: 2024-07-08 | End: 2024-07-09

## 2024-07-08 RX ADMIN — Medication 1 APPLICATION(S): at 17:16

## 2024-07-08 RX ADMIN — INSULIN GLARGINE 15 UNIT(S): 100 INJECTION, SOLUTION SUBCUTANEOUS at 22:14

## 2024-07-08 RX ADMIN — Medication 650 MILLIGRAM(S): at 22:14

## 2024-07-08 RX ADMIN — Medication 1000 MICROGRAM(S): at 11:30

## 2024-07-08 RX ADMIN — HYDROMORPHONE HCL 4 MILLIGRAM(S): 0.2 INJECTION, SOLUTION INTRAVENOUS at 17:16

## 2024-07-08 RX ADMIN — HYDROMORPHONE HCL 4 MILLIGRAM(S): 0.2 INJECTION, SOLUTION INTRAVENOUS at 18:00

## 2024-07-08 RX ADMIN — HYDROMORPHONE HCL 4 MILLIGRAM(S): 0.2 INJECTION, SOLUTION INTRAVENOUS at 04:51

## 2024-07-08 RX ADMIN — Medication 600 MILLIGRAM(S): at 17:16

## 2024-07-08 RX ADMIN — INSULIN LISPRO 5 UNIT(S): 100 INJECTION, SOLUTION SUBCUTANEOUS at 18:29

## 2024-07-08 RX ADMIN — Medication 2000 UNIT(S): at 11:30

## 2024-07-08 RX ADMIN — ESCITALOPRAM OXALATE 20 MILLIGRAM(S): 20 TABLET, FILM COATED ORAL at 11:29

## 2024-07-08 RX ADMIN — INSULIN LISPRO 1: 100 INJECTION, SOLUTION SUBCUTANEOUS at 13:45

## 2024-07-08 RX ADMIN — INSULIN LISPRO 5 UNIT(S): 100 INJECTION, SOLUTION SUBCUTANEOUS at 09:20

## 2024-07-08 RX ADMIN — HYDROMORPHONE HCL 4 MILLIGRAM(S): 0.2 INJECTION, SOLUTION INTRAVENOUS at 11:35

## 2024-07-08 RX ADMIN — SPIRONOLACTONE 25 MILLIGRAM(S): 25 TABLET ORAL at 05:11

## 2024-07-08 RX ADMIN — ARIPIPRAZOLE 2 MILLIGRAM(S): 15 TABLET ORAL at 11:29

## 2024-07-08 RX ADMIN — Medication 650 MILLIGRAM(S): at 14:14

## 2024-07-08 RX ADMIN — APIXABAN 5 MILLIGRAM(S): 5 TABLET, FILM COATED ORAL at 17:16

## 2024-07-08 RX ADMIN — Medication 2 TABLET(S): at 22:13

## 2024-07-08 RX ADMIN — HYDROMORPHONE HCL 4 MILLIGRAM(S): 0.2 INJECTION, SOLUTION INTRAVENOUS at 12:15

## 2024-07-08 RX ADMIN — APIXABAN 5 MILLIGRAM(S): 5 TABLET, FILM COATED ORAL at 05:11

## 2024-07-08 RX ADMIN — ATORVASTATIN CALCIUM 40 MILLIGRAM(S): 20 TABLET, FILM COATED ORAL at 22:13

## 2024-07-08 RX ADMIN — BUPROPION HYDROCHLORIDE 300 MILLIGRAM(S): 150 TABLET, EXTENDED RELEASE ORAL at 11:29

## 2024-07-08 RX ADMIN — INSULIN LISPRO 5 UNIT(S): 100 INJECTION, SOLUTION SUBCUTANEOUS at 13:44

## 2024-07-08 RX ADMIN — Medication 125 MICROGRAM(S): at 05:11

## 2024-07-08 RX ADMIN — Medication 600 MILLIGRAM(S): at 05:11

## 2024-07-08 RX ADMIN — BUMETANIDE 0.5 MILLIGRAM(S): 0.25 INJECTION INTRAMUSCULAR; INTRAVENOUS at 05:12

## 2024-07-08 RX ADMIN — HYDROMORPHONE HCL 4 MILLIGRAM(S): 0.2 INJECTION, SOLUTION INTRAVENOUS at 05:51

## 2024-07-08 RX ADMIN — Medication 650 MILLIGRAM(S): at 15:00

## 2024-07-08 RX ADMIN — INSULIN LISPRO 1: 100 INJECTION, SOLUTION SUBCUTANEOUS at 18:30

## 2024-07-08 RX ADMIN — POLYETHYLENE GLYCOL 3350 17 GRAM(S): 1 POWDER ORAL at 11:29

## 2024-07-08 RX ADMIN — Medication 325 MILLIGRAM(S): at 11:30

## 2024-07-08 RX ADMIN — PANTOPRAZOLE SODIUM 40 MILLIGRAM(S): 40 INJECTION, POWDER, FOR SOLUTION INTRAVENOUS at 05:11

## 2024-07-08 NOTE — PROGRESS NOTE ADULT - PROBLEM/PLAN-4
DISPLAY PLAN FREE TEXT
DISPLAY PLAN FREE TEXT
Hydroxychloroquine Pregnancy And Lactation Text: This medication has been shown to cause fetal harm but it isn't assigned a Pregnancy Risk Category. There are small amounts excreted in breast milk.

## 2024-07-08 NOTE — PROGRESS NOTE ADULT - ASSESSMENT
69 female with history of chronic pain, increased BMI, A fib, depression,  HTN , restless leg syndrome, DMfibromyalgia here status post trip and fall.  Patient coming from [facility].  Patient was try to get up with her roller, roller slipped from under her, fell on right side of face.  Noted some chipping of her frontal incisors and scant bleeding from the upper lip.  There is swelling to the right eye as well as right chin.  EMS was called.  Patient denying vision changes, nausea, vomiting, chest pain, abdominal pain, pain in her extremities is different than her baseline.  Patient has right lower extremity cellulitis which is currently being evaluated for an outpatient basis and just finished a course of antibiotics.
69 female with history of chronic pain, increased BMI, A fib, depression,  HTN , restless leg syndrome, DMfibromyalgia here status post trip and fall.  Patient coming from [facility].  Patient was try to get up with her roller, roller slipped from under her, fell on right side of face.  Noted some chipping of her frontal incisors and scant bleeding from the upper lip.  There is swelling to the right eye as well as right chin.  EMS was called.  Patient denying vision changes, nausea, vomiting, chest pain, abdominal pain, pain in her extremities is different than her baseline.  Patient has right lower extremity cellulitis which is currently being evaluated for an outpatient basis and just finished a course of antibiotics.

## 2024-07-08 NOTE — PROGRESS NOTE ADULT - PROBLEM SELECTOR PLAN 1
mechanical  fall precautions   trauma work up negative   disposition per PT evaluation to Subacute Rehab   continue home Dilaudid po 4 mg q 6 PRN
mechanical  fall precautions   trauma work up negative   disposition per PT evaluation  continue home Dilaudid po 4 mg q 6 PRN

## 2024-07-08 NOTE — ADVANCED PRACTICE NURSE CONSULT - REASON FOR CONSULT
Wound care consult initiated by RN to assess patient's skin for a possible sacral deep tissue injury present on admission     Reason for Admission: s/p fall  History of Present Illness:   69 female with history of chronic pain on 4 mg of oral Dilaudid, fibromyalgia here status post trip and fall.  Patient coming from [facility].  Patient was try to get up with her roller, roller slipped from under her, fell on right side of face.  Noted some chipping of her frontal incisors and scant bleeding from the upper lip.  There is swelling to the right eye as well as right chin.  EMS was called.  Patient denying vision changes, nausea, vomiting, chest pain, abdominal pain, pain in her extremities is different than her baseline.  Patient has right lower extremity cellulitis which is currently being evaluated for an outpatient basis and just finished a course of antibiotics. She describes a 1 month of chronic erythematous rash on her ACW since she took the antibiotics as outpatient a month ago

## 2024-07-08 NOTE — ADVANCED PRACTICE NURSE CONSULT - RECOMMEDATIONS
Impression:    B/L buttocks/sacral hyperpigmentation, cannot rule out a deep tissue injury present on admission  B/L posterior thighs with dark discoloration, cannot rule out a deep tissue injury present on admission  B/L ischium hyperpigmentation, cannot rule out a deep tissue injury present on admission   B/L heel hyperpigmentation, cannot rule out a deep tissue injury present on admission  intertriginous dermatitis of breasts and groin  urinary incontinence    Recommendations:     1) turn and position q2 and PRN utilizing offloading assistive devices  2) routine pericare daily and PRN soiling  3) encourage optimal nutrition  4) waffle cushion when oob to chair  5) B/L LE complete cair air fluidized boots or carina-lock pillow to offload heels/feet  6) triad protective barrier cream to B/L buttocks/sacrum/ischium daily and PRN soiling  7) incontinence management - consider external urinary catheter to divert urine from skin if incontinent  8) nystatin powder BID within abdominal folds and B/L groin folds    Plan discussed with DEBRA Sharif on unit    For questions/comments regarding the recommendations in this consult, please contact Elva at [phone]. Wound care will not actively follow, please place future consults for new concerns. Thank you!

## 2024-07-08 NOTE — PROGRESS NOTE ADULT - PROBLEM SELECTOR PLAN 3
HbA1C testing   finger sticks with short acting insulin sliding scale  no oral meds  diabetic diet  monitor for hypoglycemia   continue long acting insulin lowered dose at 15 for now  ADMELOG 5 units pre-meal  finger sticks continue to be acceptable on this regimen  will likely discharge to Subacute Rehab on this regimen
HbA1C testing   finger sticks with short acting insulin sliding scale  no oral meds  diabetic diet  monitor for hypoglycemia   continue long acting insulin lowered dose at 15 for now  admelog 5 units pre-meal  finger sticks acceptable on this regimen

## 2024-07-08 NOTE — CONSULT NOTE ADULT - SUBJECTIVE AND OBJECTIVE BOX
S:  Patient is a 69y Female Patient is a 69y old  Female who presents with a chief complaint of s/p fall (08 Jul 2024 11:04)    HPI:  69 female with history of chronic pain on 4 mg of oral Dilaudid, fibromyalgia here status post trip and fall.  [place]  Patient was try to get up with her roller, roller slipped from under her, fell on right side of face.  Noted some chipping of her frontal incisors and scant bleeding from the upper lip.  There is swelling to the right eye as well as right chin.  EMS was called.  Patient denying vision changes, nausea, vomiting, chest pain, abdominal pain, pain in her extremities is different than her baseline.  Patient has right lower extremity cellulitis which is currently being evaluated for an outpatient basis and just finished a course of antibiotics. She describes a 1 month of chronic erythematous rash on her ACW since she took the antibiotics as outpatient a month ago (06 Jul 2024 19:30)      DERM HPI  Consulted for rash on chest. Per pt. area on R chest has been itchy since prior rash (was seen by Derm in Feb for drug eruption 2/2 Keflex). Since then, red area has spread - she has been using Neosporin, OTC hydrocortisone, other anti-itch creams, tried TAC cream for a couple days, clotrimazole cream. None seem to have cleared the rash and she is worried it is spreading. Starting to develop itch on R cheek    PAST MEDICAL & SURGICAL HISTORY:  Personal History of Arthritis      Personal History of Female Genital Cancer      Personal History of Hypertension      Hypothyroidism      Diabetes Mellitus      Hyperlipidemia      High Triglycerides      Osteoarthritis of Knee  right. requires cane      Obstructive Sleep Apnea      Constipation      Diarrhea      Depression      Environmental Allergies      Morbidly Obese      Fibromyalgia      GERD with Apnea      Restless Legs Syndrome (RLS)      Umbilical Hernia      Atrial fibrillation      S/P Tonsillectomy and Adenoidectomy      S/P FESS (Functional Endoscopic Sinus Surgery)      Carpal Tunnel Syndrome  release left      Endometrial Ca s/p RODRIGO, BSO      Incarcerated Ventral Hernia          REVIEW OF SYSTEMS:  CONSTITUTIONAL: No weakness, fevers or chills  EYES/ENT: No visual changes.  No vertigo or throat pain   NECK: No pain or stiffness  RESPIRATORY: No cough, wheezing, hemoptysis. No shortness of breath  CARDIOVASCULAR: No chest pain or palpitations  GASTROINTESTINAL: No abdominal pain. No nausea, vomiting, or hematemesis. No diarrhea or constipation. No melena or hematochezia.  GENITOURINARY: No dysuria, increased frequency or hematuria  MSK: No joint pain, swelling, or stiffness  NEUROLOGICAL: No numbness or weakness  SKIN: as per HPI      MEDICATIONS  (STANDING):  apixaban 5 milliGRAM(s) Oral every 12 hours  ARIPiprazole 2 milliGRAM(s) Oral daily  atorvastatin 40 milliGRAM(s) Oral at bedtime  buMETAnide 0.5 milliGRAM(s) Oral daily  buPROPion XL (24-Hour) . 300 milliGRAM(s) Oral daily  cholecalciferol 2000 Unit(s) Oral daily  cyanocobalamin 1000 MICROGram(s) Oral daily  dextrose 10% Bolus 125 milliLiter(s) IV Bolus once  dextrose 5%. 1000 milliLiter(s) (100 mL/Hr) IV Continuous <Continuous>  dextrose 5%. 1000 milliLiter(s) (50 mL/Hr) IV Continuous <Continuous>  dextrose 50% Injectable 25 Gram(s) IV Push once  dextrose 50% Injectable 12.5 Gram(s) IV Push once  escitalopram 20 milliGRAM(s) Oral daily  ferrous    sulfate 325 milliGRAM(s) Oral daily  gabapentin 600 milliGRAM(s) Oral two times a day  glucagon  Injectable 1 milliGRAM(s) IntraMuscular once  insulin glargine Injectable (LANTUS) 15 Unit(s) SubCutaneous at bedtime  insulin lispro (ADMELOG) corrective regimen sliding scale   SubCutaneous three times a day before meals  insulin lispro Injectable (ADMELOG) 5 Unit(s) SubCutaneous three times a day before meals  levothyroxine 125 MICROGram(s) Oral daily  metoprolol succinate ER 25 milliGRAM(s) Oral daily  nystatin Powder 1 Application(s) Topical two times a day  pantoprazole    Tablet 40 milliGRAM(s) Oral before breakfast  polyethylene glycol 3350 17 Gram(s) Oral daily  senna 2 Tablet(s) Oral at bedtime  spironolactone 25 milliGRAM(s) Oral daily    MEDICATIONS  (PRN):  acetaminophen     Tablet .. 650 milliGRAM(s) Oral every 6 hours PRN Temp greater or equal to 38C (100.4F), Mild Pain (1 - 3)  dextrose Oral Gel 15 Gram(s) Oral once PRN Blood Glucose LESS THAN 70 milliGRAM(s)/deciliter  HYDROmorphone   Tablet 4 milliGRAM(s) Oral every 6 hours PRN Moderate Pain (4 - 6)  loratadine 10 milliGRAM(s) Oral daily PRN for allergy symptoms  melatonin 3 milliGRAM(s) Oral at bedtime PRN for insomnia      Allergies    dust (Rhinitis; Rhinorrhea)  smoke (Rhinitis; Rhinorrhea)  Tin/costume jewelry (Rash)  Lyrica (Rash)  aerosols, perfumes, fabric softeners (Rash; Rhinitis; Rhinorrhea)  cephalosporins (Rash)  adhesives (Rash)  pollen (Rhinitis; Rhinorrhea)    Intolerances        Social History:  non smoker  no IVDA  no ETOH abuse   lives in assisted living facility  usually ambulatory with a walker    FAMILY HISTORY:  No pertinent family history in first degree relatives        Vital Signs Last 24 Hrs  T(C): 36.8 (08 Jul 2024 16:24), Max: 36.8 (07 Jul 2024 20:10)  T(F): 98.2 (08 Jul 2024 16:24), Max: 98.2 (07 Jul 2024 20:10)  HR: 50 (08 Jul 2024 16:24) (50 - 54)  BP: 108/46 (08 Jul 2024 16:24) (100/48 - 142/76)  BP(mean): --  RR: 18 (08 Jul 2024 16:24) (18 - 18)  SpO2: 96% (08 Jul 2024 16:24) (95% - 96%)    Parameters below as of 08 Jul 2024 16:24  Patient On (Oxygen Delivery Method): room air        PHYSICAL EXAM:   The patient was alert and oriented and in no apparent distress.  There was no visible lymphadenopathy.  Conjunctiva were non-injected.  There was no clubbing or edema of extremities.  Skin: The scalp/hair, head/face, conjunctivae/lids, lips/teeth/gums, neck, digits/nails, right and left axilla, right and left upper and lower extremities, chest, abdomen, back, buttocks and groin area. No bromhidrosis or hyperhidrosis.      Of note on skin exam:   well circumscribed pink thin plaque with overlying microvesicular papules extending from R to central chest    LABS:                        11.9   7.74  )-----------( 305      ( 07 Jul 2024 06:54 )             36.3     CBC Full  -  ( 07 Jul 2024 06:54 )  WBC Count : 7.74 K/uL  RBC Count : 4.42 M/uL  Hemoglobin : 11.9 g/dL  Hematocrit : 36.3 %  Platelet Count - Automated : 305 K/uL  Mean Cell Volume : 82.1 fl  Mean Cell Hemoglobin : 26.9 pg  Mean Cell Hemoglobin Concentration : 32.8 gm/dL  Auto Neutrophil # : x  Auto Lymphocyte # : x  Auto Monocyte # : x  Auto Eosinophil # : x  Auto Basophil # : x  Auto Neutrophil % : x  Auto Lymphocyte % : x  Auto Monocyte % : x  Auto Eosinophil % : x  Auto Basophil % : x    07-07    136  |  100  |  15  ----------------------------<  168<H>  4.1   |  24  |  1.01    Ca    9.2      07 Jul 2024 06:54

## 2024-07-08 NOTE — PROGRESS NOTE ADULT - SUBJECTIVE AND OBJECTIVE BOX
Patient is a 69y old  Female who presents with a chief complaint of s/p fall (07 Jul 2024 11:32)      DATE OF SERVICE: 07-08-24 @ 11:04    SUBJECTIVE / OVERNIGHT EVENTS: overnight events noted    ROS:  Resp: No cough no sputum production  CVS: No chest pain no palpitations no orthopnea  GI: no N/V/D        MEDICATIONS  (STANDING):  apixaban 5 milliGRAM(s) Oral every 12 hours  ARIPiprazole 2 milliGRAM(s) Oral daily  atorvastatin 40 milliGRAM(s) Oral at bedtime  buMETAnide 0.5 milliGRAM(s) Oral daily  buPROPion XL (24-Hour) . 300 milliGRAM(s) Oral daily  cholecalciferol 2000 Unit(s) Oral daily  cyanocobalamin 1000 MICROGram(s) Oral daily  dextrose 10% Bolus 125 milliLiter(s) IV Bolus once  dextrose 5%. 1000 milliLiter(s) (100 mL/Hr) IV Continuous <Continuous>  dextrose 5%. 1000 milliLiter(s) (50 mL/Hr) IV Continuous <Continuous>  dextrose 50% Injectable 25 Gram(s) IV Push once  dextrose 50% Injectable 12.5 Gram(s) IV Push once  escitalopram 20 milliGRAM(s) Oral daily  ferrous    sulfate 325 milliGRAM(s) Oral daily  gabapentin 600 milliGRAM(s) Oral two times a day  glucagon  Injectable 1 milliGRAM(s) IntraMuscular once  insulin glargine Injectable (LANTUS) 15 Unit(s) SubCutaneous at bedtime  insulin lispro (ADMELOG) corrective regimen sliding scale   SubCutaneous three times a day before meals  insulin lispro Injectable (ADMELOG) 5 Unit(s) SubCutaneous three times a day before meals  levothyroxine 125 MICROGram(s) Oral daily  metoprolol succinate ER 25 milliGRAM(s) Oral daily  pantoprazole    Tablet 40 milliGRAM(s) Oral before breakfast  polyethylene glycol 3350 17 Gram(s) Oral daily  senna 2 Tablet(s) Oral at bedtime  spironolactone 25 milliGRAM(s) Oral daily    MEDICATIONS  (PRN):  acetaminophen     Tablet .. 650 milliGRAM(s) Oral every 6 hours PRN Temp greater or equal to 38C (100.4F), Mild Pain (1 - 3)  dextrose Oral Gel 15 Gram(s) Oral once PRN Blood Glucose LESS THAN 70 milliGRAM(s)/deciliter  HYDROmorphone   Tablet 4 milliGRAM(s) Oral every 6 hours PRN Moderate Pain (4 - 6)  loratadine 10 milliGRAM(s) Oral daily PRN for allergy symptoms  melatonin 3 milliGRAM(s) Oral at bedtime PRN for insomnia        CAPILLARY BLOOD GLUCOSE      POCT Blood Glucose.: 138 mg/dL (08 Jul 2024 08:31)  POCT Blood Glucose.: 164 mg/dL (07 Jul 2024 20:52)  POCT Blood Glucose.: 171 mg/dL (07 Jul 2024 17:41)    I&O's Summary    07 Jul 2024 07:01  -  08 Jul 2024 07:00  --------------------------------------------------------  IN: 540 mL / OUT: 1300 mL / NET: -760 mL        Vital Signs Last 24 Hrs  T(C): 36.6 (08 Jul 2024 04:11), Max: 36.8 (07 Jul 2024 20:10)  T(F): 97.8 (08 Jul 2024 04:11), Max: 98.2 (07 Jul 2024 20:10)  HR: 53 (08 Jul 2024 04:11) (53 - 97)  BP: 142/76 (08 Jul 2024 04:11) (104/52 - 142/76)  BP(mean): --  RR: 18 (08 Jul 2024 04:11) (17 - 18)  SpO2: 96% (08 Jul 2024 04:11) (94% - 96%)    PHYSICAL EXAM:  CHEST/LUNG: clear   HEART: S1 S2; no murmurs   ABDOMEN: Soft, Nontender  EXTREMITIES: no edema  NEUROLOGY: AO x 3 non-focal  SKIN: chronic bilateral above ankle erythema  ACW rash unchanged    LABS:                        11.9   7.74  )-----------( 305      ( 07 Jul 2024 06:54 )             36.3     07-07    136  |  100  |  15  ----------------------------<  168<H>  4.1   |  24  |  1.01    Ca    9.2      07 Jul 2024 06:54    TPro  7.3  /  Alb  3.3  /  TBili  0.5  /  DBili  x   /  AST  23  /  ALT  12  /  AlkPhos  95  07-06          Urinalysis Basic - ( 07 Jul 2024 06:54 )    Color: x / Appearance: x / SG: x / pH: x  Gluc: 168 mg/dL / Ketone: x  / Bili: x / Urobili: x   Blood: x / Protein: x / Nitrite: x   Leuk Esterase: x / RBC: x / WBC x   Sq Epi: x / Non Sq Epi: x / Bacteria: x          All consultant(s) notes reviewed and care discussed with other providers        Contact Number, Dr Ovalle [phone]
Patient is a 69y old  Female who presents with a chief complaint of s/p fall (06 Jul 2024 19:30)      DATE OF SERVICE: 07-07-24 @ 11:32    SUBJECTIVE / OVERNIGHT EVENTS: overnight events noted    ROS:  Resp: No cough no sputum production  CVS: No chest pain no palpitations no orthopnea  GI: no N/V/D  : no dysuria, no hematuria          MEDICATIONS  (STANDING):  apixaban 5 milliGRAM(s) Oral every 12 hours  ARIPiprazole 2 milliGRAM(s) Oral daily  atorvastatin 40 milliGRAM(s) Oral at bedtime  buMETAnide 0.5 milliGRAM(s) Oral daily  buPROPion XL (24-Hour) . 300 milliGRAM(s) Oral daily  cholecalciferol 2000 Unit(s) Oral daily  cyanocobalamin 1000 MICROGram(s) Oral daily  dextrose 10% Bolus 125 milliLiter(s) IV Bolus once  dextrose 5%. 1000 milliLiter(s) (100 mL/Hr) IV Continuous <Continuous>  dextrose 5%. 1000 milliLiter(s) (50 mL/Hr) IV Continuous <Continuous>  dextrose 50% Injectable 12.5 Gram(s) IV Push once  dextrose 50% Injectable 25 Gram(s) IV Push once  escitalopram 20 milliGRAM(s) Oral daily  ferrous    sulfate 325 milliGRAM(s) Oral daily  gabapentin 600 milliGRAM(s) Oral two times a day  glucagon  Injectable 1 milliGRAM(s) IntraMuscular once  insulin glargine Injectable (LANTUS) 15 Unit(s) SubCutaneous at bedtime  insulin lispro (ADMELOG) corrective regimen sliding scale   SubCutaneous three times a day before meals  insulin lispro Injectable (ADMELOG) 5 Unit(s) SubCutaneous three times a day before meals  levothyroxine 125 MICROGram(s) Oral daily  metoprolol succinate ER 25 milliGRAM(s) Oral daily  pantoprazole    Tablet 40 milliGRAM(s) Oral before breakfast  polyethylene glycol 3350 17 Gram(s) Oral daily  senna 2 Tablet(s) Oral at bedtime  spironolactone 25 milliGRAM(s) Oral daily    MEDICATIONS  (PRN):  acetaminophen     Tablet .. 650 milliGRAM(s) Oral every 6 hours PRN Temp greater or equal to 38C (100.4F), Mild Pain (1 - 3)  dextrose Oral Gel 15 Gram(s) Oral once PRN Blood Glucose LESS THAN 70 milliGRAM(s)/deciliter  HYDROmorphone   Tablet 4 milliGRAM(s) Oral every 6 hours PRN Moderate Pain (4 - 6)  loratadine 10 milliGRAM(s) Oral daily PRN for allergy symptoms  melatonin 3 milliGRAM(s) Oral at bedtime PRN for insomnia        CAPILLARY BLOOD GLUCOSE      POCT Blood Glucose.: 177 mg/dL (07 Jul 2024 10:56)  POCT Blood Glucose.: 161 mg/dL (07 Jul 2024 07:24)  POCT Blood Glucose.: 187 mg/dL (06 Jul 2024 22:01)    I&O's Summary      Vital Signs Last 24 Hrs  T(C): 36.8 (07 Jul 2024 05:05), Max: 37.2 (07 Jul 2024 00:30)  T(F): 98.3 (07 Jul 2024 05:05), Max: 99 (07 Jul 2024 00:30)  HR: 60 (07 Jul 2024 05:05) (60 - 68)  BP: 130/68 (07 Jul 2024 05:05) (113/70 - 130/68)  BP(mean): 84 (07 Jul 2024 00:30) (84 - 84)  RR: 17 (07 Jul 2024 05:05) (15 - 18)  SpO2: 93% (07 Jul 2024 05:05) (93% - 97%)    PHYSICAL EXAM:  GENERAL: in no apparent distress  evolving right lower face ecchymosis  EYES: EOMI, PERRLA,  NECK: Supple, No JVD  CHEST/LUNG: clear   HEART: S1 S2; no murmurs   ABDOMEN: Soft, Nontender  EXTREMITIES: no edema  NEUROLOGY: AO x 3 non-focal  SKIN: chronic bilateral above ankle erythema    LABS:                        11.9   7.74  )-----------( 305      ( 07 Jul 2024 06:54 )             36.3     07-07    136  |  100  |  15  ----------------------------<  168<H>  4.1   |  24  |  1.01    Ca    9.2      07 Jul 2024 06:54    TPro  7.3  /  Alb  3.3  /  TBili  0.5  /  DBili  x   /  AST  23  /  ALT  12  /  AlkPhos  95  07-06          Urinalysis Basic - ( 07 Jul 2024 06:54 )    Color: x / Appearance: x / SG: x / pH: x  Gluc: 168 mg/dL / Ketone: x  / Bili: x / Urobili: x   Blood: x / Protein: x / Nitrite: x   Leuk Esterase: x / RBC: x / WBC x   Sq Epi: x / Non Sq Epi: x / Bacteria: x          All consultant(s) notes reviewed and care discussed with other providers        Contact Number, Dr Ovalle [phone]

## 2024-07-08 NOTE — PROGRESS NOTE ADULT - NSPROGADDITIONALINFOA_GEN_ALL_CORE
discussed with covering ACP
discussed with patient in detail, expresses understanding of treatment plans.

## 2024-07-08 NOTE — ADVANCED PRACTICE NURSE CONSULT - ASSESSMENT
Patient encountered on [address] [place]. When wound care RN arrived on unit, patient was found lying in a low air loss pressure redistribution support surface style bed with hover mat in place to assist with mobility. Patient was alert and oriented and gave consent to skin consult. Mr Alvarez is unable to turn independently and staff assistance x 2 was provided. Patient reports that she had fallen at home, ecchymosis noted on right eye and mouth. Once turned, urinary incontinence noted and pericare provided- purewick external female urinary device in place to divert urine. The wound care RN was able to visualize an area of persistent nonblanchable purple discoloration on B/L posterior thighs- area measures approximately 20cm x 10cm x 0cm- cannot rule out a deep tissue injury present on admission. B/L buttocks/sacral hyperpigmentation measures approximately 5cm x 5cm x 0cm- presentation cannot rule out a deep tissue injury present on admission.   B/L ischium hyperpigmentation measures approximately 6cm x 6cm x 0cm, cannot rule out a deep tissue injury present on admission. B/L heel hyperpigmentation measuring approximately 2cm x 2cm x 0cm, cannot rule out a deep tissue injury present on admission. Moisture noted within folds of abdomen and B/L groin, linear slits noted. Once consult was complete, patient was educated regarding the need for routine turning and positioning to prevent pressure injuries. Patient encountered on [address] [place]. When wound care RN arrived on unit, patient was found lying in a low air loss pressure redistribution support surface style bed with hover mat in place to assist with mobility. Patient was alert and oriented and gave consent to skin consult. Mr Alvarez is unable to turn independently and staff assistance x 2 was provided. Patient reports that she had fallen at home, ecchymosis noted on right eye and mouth. Once turned, urinary incontinence noted and pericare provided- purewick external female urinary device in place to divert urine. The wound care RN was able to visualize an area of persistent nonblanchable purple discoloration on B/L posterior thighs- area measures approximately 20cm x 10cm x 0cm- cannot rule out a deep tissue injury present on admission. B/L buttocks/sacral hyperpigmentation measures approximately 5cm x 5cm x 0cm- presentation cannot rule out a deep tissue injury present on admission.   B/L ischium hyperpigmentation measures approximately 6cm x 6cm x 0cm, cannot rule out a deep tissue injury present on admission. B/L heel hyperpigmentation measuring approximately 2cm x 2cm x 0cm, cannot rule out a deep tissue injury present on admission. Moisture noted within folds of abdomen and B/L groin, linear slits noted- red satellite lesions noted, ordering nystatin discussed with KENDY Roberson via teams. Once consult was complete, patient was educated regarding the need for routine turning and positioning to prevent pressure injuries.

## 2024-07-08 NOTE — PROGRESS NOTE ADULT - PROBLEM SELECTOR PLAN 2
continue home meds with hold parameters  acceptable for now
continue home meds with hold parameters  acceptable for now

## 2024-07-09 ENCOUNTER — TRANSCRIPTION ENCOUNTER (OUTPATIENT)
Age: 69
End: 2024-07-09

## 2024-07-09 VITALS
DIASTOLIC BLOOD PRESSURE: 81 MMHG | SYSTOLIC BLOOD PRESSURE: 120 MMHG | TEMPERATURE: 98 F | RESPIRATION RATE: 18 BRPM | OXYGEN SATURATION: 97 % | HEART RATE: 53 BPM

## 2024-07-09 LAB
GLUCOSE BLDC GLUCOMTR-MCNC: 165 MG/DL — HIGH (ref 70–99)
GLUCOSE BLDC GLUCOMTR-MCNC: 201 MG/DL — HIGH (ref 70–99)

## 2024-07-09 PROCEDURE — 85027 COMPLETE CBC AUTOMATED: CPT

## 2024-07-09 PROCEDURE — 99285 EMERGENCY DEPT VISIT HI MDM: CPT | Mod: 25

## 2024-07-09 PROCEDURE — 90471 IMMUNIZATION ADMIN: CPT

## 2024-07-09 PROCEDURE — 83036 HEMOGLOBIN GLYCOSYLATED A1C: CPT

## 2024-07-09 PROCEDURE — 70450 CT HEAD/BRAIN W/O DYE: CPT | Mod: MC

## 2024-07-09 PROCEDURE — 80053 COMPREHEN METABOLIC PANEL: CPT

## 2024-07-09 PROCEDURE — 80048 BASIC METABOLIC PNL TOTAL CA: CPT

## 2024-07-09 PROCEDURE — 70486 CT MAXILLOFACIAL W/O DYE: CPT | Mod: MC

## 2024-07-09 PROCEDURE — 76377 3D RENDER W/INTRP POSTPROCES: CPT

## 2024-07-09 PROCEDURE — 85025 COMPLETE CBC W/AUTO DIFF WBC: CPT

## 2024-07-09 PROCEDURE — 90715 TDAP VACCINE 7 YRS/> IM: CPT

## 2024-07-09 PROCEDURE — 82962 GLUCOSE BLOOD TEST: CPT

## 2024-07-09 PROCEDURE — 72125 CT NECK SPINE W/O DYE: CPT | Mod: MC

## 2024-07-09 PROCEDURE — 72192 CT PELVIS W/O DYE: CPT | Mod: MC

## 2024-07-09 PROCEDURE — 97162 PT EVAL MOD COMPLEX 30 MIN: CPT

## 2024-07-09 PROCEDURE — 72170 X-RAY EXAM OF PELVIS: CPT

## 2024-07-09 RX ORDER — ACETAMINOPHEN 325 MG
2 TABLET ORAL
Qty: 0 | Refills: 0 | DISCHARGE
Start: 2024-07-09

## 2024-07-09 RX ORDER — INSULIN GLARGINE 100 [IU]/ML
20 INJECTION, SOLUTION SUBCUTANEOUS
Qty: 0 | Refills: 0 | DISCHARGE
Start: 2024-07-09

## 2024-07-09 RX ORDER — CLOBETASOL PROPIONATE 0.5 MG/G
0 CREAM TOPICAL
Qty: 0 | Refills: 0 | DISCHARGE
Start: 2024-07-09

## 2024-07-09 RX ORDER — CLOBETASOL PROPIONATE 0.5 MG/G
1 CREAM TOPICAL
Qty: 0 | Refills: 0 | DISCHARGE

## 2024-07-09 RX ORDER — METOPROLOL TARTRATE 50 MG
1 TABLET ORAL
Qty: 0 | Refills: 0 | DISCHARGE

## 2024-07-09 RX ORDER — APIXABAN 5 MG/1
1 TABLET, FILM COATED ORAL
Qty: 0 | Refills: 0 | DISCHARGE
Start: 2024-07-09

## 2024-07-09 RX ORDER — SPIRONOLACTONE 25 MG/1
1 TABLET ORAL
Refills: 0 | DISCHARGE

## 2024-07-09 RX ORDER — SPIRONOLACTONE 25 MG/1
1 TABLET ORAL
Qty: 0 | Refills: 0 | DISCHARGE
Start: 2024-07-09

## 2024-07-09 RX ORDER — INSULIN ASPART 100 [IU]/ML
10 INJECTION, SOLUTION INTRAVENOUS; SUBCUTANEOUS
Refills: 0 | DISCHARGE

## 2024-07-09 RX ORDER — BUMETANIDE 0.25 MG/ML
1 INJECTION INTRAMUSCULAR; INTRAVENOUS
Qty: 0 | Refills: 0 | DISCHARGE
Start: 2024-07-09

## 2024-07-09 RX ORDER — ACETAMINOPHEN 325 MG
1 TABLET ORAL
Refills: 0 | DISCHARGE

## 2024-07-09 RX ORDER — HYDROMORPHONE HCL 0.2 MG/ML
1 INJECTION, SOLUTION INTRAVENOUS
Qty: 0 | Refills: 0 | DISCHARGE
Start: 2024-07-09

## 2024-07-09 RX ORDER — APIXABAN 5 MG/1
1 TABLET, FILM COATED ORAL
Refills: 0 | DISCHARGE

## 2024-07-09 RX ORDER — NYSTATIN 100000/G
1 POWDER (GRAM) TOPICAL
Qty: 0 | Refills: 0 | DISCHARGE
Start: 2024-07-09

## 2024-07-09 RX ORDER — INSULIN LISPRO 100 [IU]/ML
5 INJECTION, SOLUTION SUBCUTANEOUS
Qty: 0 | Refills: 0 | DISCHARGE
Start: 2024-07-09

## 2024-07-09 RX ADMIN — ESCITALOPRAM OXALATE 20 MILLIGRAM(S): 20 TABLET, FILM COATED ORAL at 12:27

## 2024-07-09 RX ADMIN — Medication 600 MILLIGRAM(S): at 06:44

## 2024-07-09 RX ADMIN — INSULIN LISPRO 5 UNIT(S): 100 INJECTION, SOLUTION SUBCUTANEOUS at 09:05

## 2024-07-09 RX ADMIN — APIXABAN 5 MILLIGRAM(S): 5 TABLET, FILM COATED ORAL at 06:44

## 2024-07-09 RX ADMIN — BUMETANIDE 0.5 MILLIGRAM(S): 0.25 INJECTION INTRAMUSCULAR; INTRAVENOUS at 06:46

## 2024-07-09 RX ADMIN — INSULIN LISPRO 5 UNIT(S): 100 INJECTION, SOLUTION SUBCUTANEOUS at 12:26

## 2024-07-09 RX ADMIN — HYDROMORPHONE HCL 4 MILLIGRAM(S): 0.2 INJECTION, SOLUTION INTRAVENOUS at 08:00

## 2024-07-09 RX ADMIN — Medication 2000 UNIT(S): at 12:27

## 2024-07-09 RX ADMIN — INSULIN LISPRO 2: 100 INJECTION, SOLUTION SUBCUTANEOUS at 12:26

## 2024-07-09 RX ADMIN — PANTOPRAZOLE SODIUM 40 MILLIGRAM(S): 40 INJECTION, POWDER, FOR SOLUTION INTRAVENOUS at 06:45

## 2024-07-09 RX ADMIN — ARIPIPRAZOLE 2 MILLIGRAM(S): 15 TABLET ORAL at 12:27

## 2024-07-09 RX ADMIN — Medication 1 APPLICATION(S): at 06:46

## 2024-07-09 RX ADMIN — CLOBETASOL PROPIONATE 1 APPLICATION(S): 0.5 CREAM TOPICAL at 06:47

## 2024-07-09 RX ADMIN — Medication 125 MICROGRAM(S): at 06:44

## 2024-07-09 RX ADMIN — POLYETHYLENE GLYCOL 3350 17 GRAM(S): 1 POWDER ORAL at 12:38

## 2024-07-09 RX ADMIN — Medication 1000 MICROGRAM(S): at 12:27

## 2024-07-09 RX ADMIN — INSULIN LISPRO 1: 100 INJECTION, SOLUTION SUBCUTANEOUS at 09:04

## 2024-07-09 RX ADMIN — BUPROPION HYDROCHLORIDE 300 MILLIGRAM(S): 150 TABLET, EXTENDED RELEASE ORAL at 12:27

## 2024-07-09 RX ADMIN — Medication 325 MILLIGRAM(S): at 12:26

## 2024-07-09 RX ADMIN — HYDROMORPHONE HCL 4 MILLIGRAM(S): 0.2 INJECTION, SOLUTION INTRAVENOUS at 09:00

## 2024-07-09 RX ADMIN — SPIRONOLACTONE 25 MILLIGRAM(S): 25 TABLET ORAL at 06:44

## 2024-07-09 NOTE — DISCHARGE NOTE PROVIDER - PROVIDER TOKENS
FREE:[LAST:[Paola],FIRST:[Carmen],PHONE:[phone]],FAX:[(   )    -],FOLLOWUP:[1 week]],PROVIDER:[TOKEN:[[id number]]]

## 2024-07-09 NOTE — DISCHARGE NOTE PROVIDER - HOSPITAL COURSE
69 female with history of chronic pain, increased BMI, A fib, depression,  HTN , restless leg syndrome, DMfibromyalgia here status post trip and fall.  Patient coming from [facility].  Patient was try to get up with her roller, roller slipped from under her, fell on right side of face.  Noted some chipping of her frontal incisors and scant bleeding from the upper lip.  There is swelling to the right eye as well as right chin.  EMS was called.  Patient denying vision changes, nausea, vomiting, chest pain, abdominal pain, pain in her extremities is different than her baseline.  Patient has right lower extremity cellulitis which is currently being evaluated for an outpatient basis and just finished a course of antibiotics.      Problem: Fall.   ·  Plan: mechanical  fall precautions   trauma work up negative   disposition per PT evaluation to Subacute Rehab   continue home Dilaudid po 4 mg q 6 PRN.    ·  Problem: HTN (hypertension).   ·  Plan: continue home meds with hold parameters  acceptable for now.    ·  Problem: DM (diabetes mellitus).   ·  Plan: HbA1C testing   finger sticks with short acting insulin sliding scale  no oral meds  diabetic diet  monitor for hypoglycemia   continue long acting insulin lowered dose at 15 for now  ADMELOG 5 units pre-meal  finger sticks continue to be acceptable on this regimen  will likely discharge to Subacute Rehab on this regimen.    ·  Problem: HLD (hyperlipidemia).   ·  Plan: continue atorvastatin.    ·  Problem: Increased BMI.   ·  Plan: nutrition evaluation  fall precautions  on apixaban.    ·  Problem: H/O fibromyalgia.   ·  Plan: continue home meds with hold parameters.     Problem/Plan - 7:  ·  Problem: Restless leg syndrome.   ·  Plan: continue aripiprazole.    Pt stable dc home with outpatient follow up with PMD and Dermatology.

## 2024-07-09 NOTE — DISCHARGE NOTE PROVIDER - NSDCFUADDAPPT_GEN_ALL_CORE_FT
Follow up with your PMD as an outpatient.   Follow up with Dermatology for your Rash, please take the topical agent as recommended in which use it for 2 weeks on then 1 week off, before resuming as needed.

## 2024-07-09 NOTE — DISCHARGE NOTE PROVIDER - CARE PROVIDER_API CALL
Carmen Guevara  Phone: [phone]  Fax: (   )    -  Follow Up Time: 1 week    Pamela Guo  Dermatology  [address] [place] [address], [address]  [place] [address]  Phone: [phone]  Fax: [phone]  Follow Up Time:

## 2024-07-09 NOTE — DISCHARGE NOTE PROVIDER - NSDCCPCAREPLAN_GEN_ALL_CORE_FT
PRINCIPAL DISCHARGE DIAGNOSIS  Diagnosis: Fall  Assessment and Plan of Treatment: fall precautions   trauma work up negative   disposition per PT evaluation to Subacute Rehab   continue home Dilaudid po 4 mg q 6 PRN.        SECONDARY DISCHARGE DIAGNOSES  Diagnosis: DM (diabetes mellitus)  Assessment and Plan of Treatment: HgA1C this admission 6.5.  Make sure you get your HgA1c checked every three months.  If you take oral diabetes medications, check your blood glucose two times a day.  If you take insulin, check your blood glucose before meals and at bedtime.  It's important not to skip any meals.  Keep a log of your blood glucose results and always take it with you to your doctor appointments.  Keep a list of your current medications including injectables and over the counter medications and bring this medication list with you to all your doctor appointments.  If you have not seen your opthalmologist this year call for appointment.  Check your feet daily for redness, sores, or openings. Do not self treat. If no improvement in two days call your primary care physician for an appointment.  Low blood sugar (hypoglycemia) is a blood sugar below 70mg/dl. Check your blood sugar if you feel signs/symptoms of hypoglycemia. If your blood sugar is below 70 take 15 grams of carbohydrates (ex 4 oz of apple juice, 3-4 glucosr tablets, or 4-6 oz of regular soda) wait 15 minutes and repeat blood sugar to make sure it comes up above 70.  If your blood sugar is above 70 and you are due for a meal, have a meal.  If you are not due for a meal have a snack.  This snack helps keeps your blood sugar at a safe range.      Diagnosis: HTN (hypertension)  Assessment and Plan of Treatment: Low salt diet  Activity as tolerated.  Take all medication as prescribed.  Follow up with your medical doctor for routine blood pressure monitoring at your next visit.  Notify your doctor if you have any of the following symptoms:   Dizziness, Lightheadedness, Blurry vision, Headache, Chest pain, Shortness of breath      Diagnosis: Rash  Assessment and Plan of Treatment: Please use the topical agent per Dermatology as recommended.  Start clobetasol 0.05% ointment BID to affected area on BODY for up to 2 weeks at a time, 1 week break before resuming as needed.     PRINCIPAL DISCHARGE DIAGNOSIS  Diagnosis: Fall  Assessment and Plan of Treatment: fall precautions   trauma work up negative   continue Dilaudid po 4 mg q 6 PRN.        SECONDARY DISCHARGE DIAGNOSES  Diagnosis: DM (diabetes mellitus)  Assessment and Plan of Treatment: HgA1C this admission 6.5.  Make sure you get your HgA1c checked every three months.  If you take oral diabetes medications, check your blood glucose two times a day.  If you take insulin, check your blood glucose before meals and at bedtime.  It's important not to skip any meals.  Keep a log of your blood glucose results and always take it with you to your doctor appointments.  Keep a list of your current medications including injectables and over the counter medications and bring this medication list with you to all your doctor appointments.  If you have not seen your opthalmologist this year call for appointment.  Check your feet daily for redness, sores, or openings. Do not self treat. If no improvement in two days call your primary care physician for an appointment.  Low blood sugar (hypoglycemia) is a blood sugar below 70mg/dl. Check your blood sugar if you feel signs/symptoms of hypoglycemia. If your blood sugar is below 70 take 15 grams of carbohydrates (ex 4 oz of apple juice, 3-4 glucosr tablets, or 4-6 oz of regular soda) wait 15 minutes and repeat blood sugar to make sure it comes up above 70.  If your blood sugar is above 70 and you are due for a meal, have a meal.  If you are not due for a meal have a snack.  This snack helps keeps your blood sugar at a safe range.      Diagnosis: HTN (hypertension)  Assessment and Plan of Treatment: Low salt diet  Activity as tolerated.  Take all medication as prescribed.  Follow up with your medical doctor for routine blood pressure monitoring at your next visit.  Notify your doctor if you have any of the following symptoms:   Dizziness, Lightheadedness, Blurry vision, Headache, Chest pain, Shortness of breath      Diagnosis: Rash  Assessment and Plan of Treatment: Please use the topical agent per Dermatology as recommended.  Start clobetasol 0.05% ointment BID to affected area on BODY for up to 2 weeks at a time, 1 week break before resuming as needed.

## 2024-07-09 NOTE — DISCHARGE NOTE NURSING/CASE MANAGEMENT/SOCIAL WORK - NSDCPEFALRISK_GEN_ALL_CORE
For information on Fall & Injury Prevention, visit: [email or web address] OR  [email or web address] OR  https://www.cdc.gov/steadi/patient.html

## 2024-07-09 NOTE — DISCHARGE NOTE PROVIDER - NSDCMRMEDTOKEN_GEN_ALL_CORE_FT
acetaminophen 325 mg oral tablet: 2 tab(s) orally every 6 hours As needed Temp greater or equal to 38C (100.4F), Mild Pain (1 - 3)  apixaban 5 mg oral tablet: 1 tab(s) orally every 12 hours  ARIPiprazole 2 mg oral tablet: 1 tab(s) orally once a day  atorvastatin 40 mg oral tablet: 1 tab(s) orally once a day (at bedtime)  bumetanide 0.5 mg oral tablet: 1 tab(s) orally once a day  buPROPion 300 mg/24 hours (XL) oral tablet, extended release: 1 tab(s) orally once a day  cholecalciferol 50 mcg (2000 intl units) oral tablet: 1 tab(s) orally once a day  clobetasol 0.05% topical ointment: Apply topically to affected area 2 times a day Please use this ointment 2 weeks on and 1 week off before resuming it. (this medication was started in the hospital on 7/8/24)  cyanocobalamin 1000 mcg oral tablet: 1 tab(s) orally once a day  escitalopram 20 mg oral tablet: 1 tab(s) orally once a day  ferrous sulfate 325 mg (65 mg elemental iron) oral tablet: 1 tab(s) orally once a day  gabapentin 600 mg oral tablet: 1 tab(s) orally 2 times a day  HYDROmorphone 4 mg oral tablet: 1 tab(s) orally every 6 hours As needed Moderate Pain (4 - 6)  insulin glargine 100 units/mL subcutaneous solution: 20 unit(s) subcutaneous once a day (at bedtime)  insulin lispro 100 units/mL injectable solution: 5 international unit(s) injectable 3 times a day (with meals) Please hold if patient is not eating  levothyroxine 125 mcg (0.125 mg) oral tablet: 1 tab(s) orally once a day  loratadine 10 mg oral tablet: 1 tab(s) orally once a day as needed for  allergy symptoms  melatonin 3 mg oral tablet: 1 tab(s) orally once a day (at bedtime) as needed for  insomnia  metFORMIN 1000 mg oral tablet: 1 tab(s) orally 2 times a day  metoprolol succinate 25 mg oral tablet, extended release: 1 tab(s) orally once a day  nystatin 100,000 units/g topical powder: 1 Apply topically to affected area 2 times a day  pantoprazole 40 mg oral delayed release tablet: 1 tab(s) orally once a day  polyethylene glycol 3350 oral powder for reconstitution: 17 gram(s) orally once a day  senna (sennosides) 8.6 mg oral tablet: 2 tab(s) orally once a day (at bedtime)  spironolactone 25 mg oral tablet: 1 tab(s) orally once a day   acetaminophen 325 mg oral tablet: 2 tab(s) orally every 6 hours As needed Temp greater or equal to 38C (100.4F), Mild Pain (1 - 3)  apixaban 5 mg oral tablet: 1 tab(s) orally every 12 hours  ARIPiprazole 2 mg oral tablet: 1 tab(s) orally once a day  atorvastatin 40 mg oral tablet: 1 tab(s) orally once a day (at bedtime)  bumetanide 0.5 mg oral tablet: 1 tab(s) orally once a day  buPROPion 300 mg/24 hours (XL) oral tablet, extended release: 1 tab(s) orally once a day  cholecalciferol 50 mcg (2000 intl units) oral tablet: 1 tab(s) orally once a day  clobetasol 0.05% topical ointment: Apply topically to affected area 2 times a day Please use this ointment 2 weeks on and 1 week off before resuming it. (this medication was started in the hospital on 7/8/24)  cyanocobalamin 1000 mcg oral tablet: 1 tab(s) orally once a day  escitalopram 20 mg oral tablet: 1 tab(s) orally once a day  ferrous sulfate 325 mg (65 mg elemental iron) oral tablet: 1 tab(s) orally once a day  gabapentin 600 mg oral tablet: 1 tab(s) orally 2 times a day  HYDROmorphone 4 mg oral tablet: 1 tab(s) orally every 6 hours As needed Moderate Pain (4 - 6)  insulin glargine 100 units/mL subcutaneous solution: 20 unit(s) subcutaneous once a day (at bedtime)  insulin lispro 100 units/mL injectable solution: 5 international unit(s) injectable 3 times a day (with meals) Please hold if patient is not eating  levothyroxine 125 mcg (0.125 mg) oral tablet: 1 tab(s) orally once a day  loratadine 10 mg oral tablet: 1 tab(s) orally once a day as needed for  allergy symptoms  melatonin 3 mg oral tablet: 1 tab(s) orally once a day (at bedtime) as needed for  insomnia  metFORMIN 1000 mg oral tablet: 1 tab(s) orally 2 times a day  metoprolol succinate 25 mg oral tablet, extended release: 1 tab(s) orally once a day Hold for HR&lt;60 and SBP&lt; 100  nystatin 100,000 units/g topical powder: 1 Apply topically to affected area 2 times a day  pantoprazole 40 mg oral delayed release tablet: 1 tab(s) orally once a day  polyethylene glycol 3350 oral powder for reconstitution: 17 gram(s) orally once a day  senna (sennosides) 8.6 mg oral tablet: 2 tab(s) orally once a day (at bedtime)  spironolactone 25 mg oral tablet: 1 tab(s) orally once a day

## 2024-07-09 NOTE — DISCHARGE NOTE NURSING/CASE MANAGEMENT/SOCIAL WORK - PATIENT PORTAL LINK FT
You can access the FollowMyHealth Patient Portal offered by [facility] by registering at the following website: [email or web address]. By joining [facility]’s FollowMyHealth portal, you will also be able to view your health information using other applications (apps) compatible with our system.

## 2024-07-09 NOTE — DISCHARGE NOTE NURSING/CASE MANAGEMENT/SOCIAL WORK - NSDCVIVACCINE_GEN_ALL_CORE_FT
COVID-19 vaccine, vector-nr, rS-Ad26, PF, 0.5 mL (Anika); 06-May-2021 11:14; Katey Burdick (RN); Anika; [id number] (Exp. Date: 21-Jun-2021); IntraMuscular; Deltoid Left.; 0.5 milliLiter(s);   Tdap; 06-Jul-2024 01:55; Jam Blanco); [employer]; [id number] (Exp. Date: 06-Jul-2024); IntraMuscular; Deltoid Left.; 0.5 milliLiter(s); VIS (VIS Published: 09-May-2013, VIS Presented: 06-Jul-2024);

## 2024-07-09 NOTE — DISCHARGE NOTE NURSING/CASE MANAGEMENT/SOCIAL WORK - NSCORESITESY/N_GEN_A_CORE_RD
Daily Note     Today's date: 2020  Patient name: Colt Carney  : 1962  MRN: [id number]  Referring provider: Perfecto Blancas, PT  Dx:   Encounter Diagnosis     ICD-10-CM    1  Lumbar spine pain M54 5                   Subjective: Patient reports she carried a mini refrigerator into her house over this past weekend - "I was sore the next day "  However patient reports overall she has been feeling "almost normal "    Objective: See treatment diary below  Assessment: Therapeutic exercise program is tolerated well without complaints  Plan: Continue treatment as per PT plan of care         Precautions: OA, chronic lumbar pain    Manuals                                 laser 5'  5' 5' 5' 5' 5'                   Neuro Re-Ed       TaA  TaA           TaA glute set  5"x15 5"x20          TaA bridge 5"x20 with ball squeeze 5"x20 5"x20          Prone TaA hip ir/er             TaA prone hip ext             TaA ball squeeze seated on ball 5"x20 5"x20 5"x20 seated on pball  5"x20 seated on pball  5"x30 seated on pball  5"x30 seated on pball  5"x30      TaA clamshell blue tb x30 seated Blue tb x30 Blue x30 blue  20 blue  20 blue  30 blue  30      TaA  20 20          TaA alt UE 20 20 20          TaA alt UE/LE pball 20            TaA shoulder flexion             TaA LPD green x30 Green x30 red x30 red  20 red  20 red  30 green  20      Step outs green x20 Green x20 Green x20 red  20 red  20  red  20 green  20      pball seated  20 20 20 20 30 30      pball shoulder flexion 1#x20 1#x20 1#x20 1#  20 1#  20 1#  30 1#  30      TaA rockerboard M/l 20 20 30 M/L  20 M/L  20 M/L  30 M/L  30      Leg press 20#x20 20#x20 20#x20 20#  20 20#  30 20#  30 20#  30      Ther Ex             Repeated ext standing             rows green tb x20 Green tb 5"x20 Green x20 red  20 red  20 red  30 green  20      ltr np 5"x20           Gluteal stretch  15"x4 saba             Prone IR ball squeeze                                       Ther Activity             Recumbent bike np    sci fit  5' sci fit  5' sci fit  7'                   Gait Training                                       Modalities             MHP prn             CP post tx No

## 2024-08-08 NOTE — ED PROVIDER NOTE - WR ORDER NAME 1
Well Child Check Note    Subjective   Khadijah Charles is a 18 m.o. unvaccinated female who is brought in for this well child visit.    HPI:   Concerns: Diarrhea for 4 days, mix between loose stools and watery. Will have at least 8-10 episodes in a 24 hour period. No blood or mucous in stools. No fever. Had difficulty sleeping last night because of it. No emesis, no rashes. Has a history of UTIs. Drinking fine, eating fine. No decreased UOP or funky smells in her urine. Is on prophylactic bactrim. Has been more fussy but not lethargic.    No known sick contacts. Does not attend . Tried kielbasa but otherwise no new foods, no undercooked foods. Was in [place] for July 4th. Had some mosquito bites but no known tick bites. No petting zoos or water sanches.    Well Child Assessment:  History was provided by the mother.   Nutrition  Food source: Picky eater. Breastfeeds 5-6x per day, latches for <5 min, nighttime up to 20 min on both breasts. Offers all table foods. Likes chicken nuggets, strawberries, green beans, PB and J,, broccoli. Not on any multivitamins. Drinks water.   Dental  The patient does not have a dental home.   Elimination  Elimination problems include constipation and diarrhea. (Has tried lactulose, will strain and have a red face when stooling. Usually stools every 4-5 days. Stool is soft and formed.)   Behavioral  Behavioral issues include throwing tantrums and waking up at night. (is a big climber, house is toddler proofed)   Sleep  The patient sleeps in her own bed (own bed in mom's room). There are sleep problems (wakes up 1-2 times every night to nurse, then falls back asleep. Can sometimes go to bed without nurseing, sometimes requires nursing to go to bed.).   Safety  Home is child-proofed? yes. There is smoking in the home (Mary Jo smokes outside). Home has working smoke alarms? yes. Home has working carbon monoxide alarms? yes. There is an appropriate car seat in use.  "  Screening  Immunizations are not up-to-date.       Development:   Social Language and Self-Help:   Helps dress and undress self? Yes   Points to pictures in a book? Yes   Points to objects to attract your attention? No   Turns and looks at adult if something new happens? Yes   Engages with others for play? Yes   Begins to scoop with a spoon? Yes    Verbal Language:   Identifies at least 2 body parts? Yes   Names at least 5 familiar objects? At least 3   Follows directions that do not involve a gesture    Gross Motor:   Sits in a small chair? Yes   Walks up steps leading with one foot with hand held?  Yes   Carries a toy while walking? Yes    Fine Motor:   Scribbles spontaneously? Yes   Throws a small ball a few feet while standing? Yes    Objective   Vitals:   Visit Vitals  Ht 0.826 m (2' 8.5\")   Wt 10.8 kg   HC 47 cm   BMI 15.84 kg/m²   Smoking Status Never Assessed   BSA 0.5 m²        Stature percentile: 73 %ile (Z= 0.61) based on WHO (Girls, 0-2 years) Length-for-age data based on Length recorded on 8/8/2024.  Weight percentile: 66 %ile (Z= 0.42) based on WHO (Girls, 0-2 years) weight-for-age data using data from 8/8/2024.  Head circumference percentile: 71 %ile (Z= 0.54) based on WHO (Girls, 0-2 years) head circumference-for-age using data recorded on 8/8/2024.     Please see Dr. Cooper's note/addendum for a full physical exam.  General: Alert, active  HEENT: mmm, NCAT  Cardio: pink and well-perfused  Pulm: strong cry  Abd: non-distended  Extremities: warm and well-perfused, normal ROM     Physical exam:  General: child is well appearing, accompanied by mom  Head: normocephalic, atraumatic  Eyes: pupils equal, round and reactive to light, bilaterally red reflex symmetric, sclera and conjunctiva clear, extraocular movements intact, no nystagmus  Ears: symmetric position with normal placement, external canal clear, tympanic membranes clear bilaterally, hearing appears normal. PE tubes are patent.  Nose: clear, " no discharge  Mouth/Throat: moist mucous membranes, no oral lesions, posterior oropharynx clear  Neck: supple, full range of movement  Heart: regular rate and rhythm, normal S1 and S2, no murmur, rubs or gallops, brachial pulses 2+ bilaterally, femoral pulses 2+ bilaterally  Lungs: clear to auscultation bilaterally, no wheezes, rhonchi or crackles, no retractions, no nasal flaring, no grunting, breathing comfortably on room air  Abdomen: soft, non-tender, non-distended, normal bowel sounds in all 4 quadrants, no hepatosplenomegaly  Genitourinary: testes descended bilaterally, penis circumcised, vance stage 1  Back: symmetric, no scoliosis  Musculoskeletal: full range of motion, normal gait  Skin: no rashes   Lymphatic: no lymphadenopathy    Assessment/Plan   Khadijah is a 18 m.o. unvaccinated female here for this well child exam. She is growing and developing normally. Vaccines were again discussed, mom again requested to withhold vaccinations. AAP vaccine refusal form signed 5/14/24. Mom is trying to get set up with a dentist- fluoride varnish was applied today. Khadijah is still breastfeeding and a picky eater. Will prescribe a MVI today. Sleep continues to be interrupted by feeding, counseled today on making nighttime feeds brief and boring.  She has a history of febrile UTIs with left grade 1 VUR following with urology on prophylactic Bactrim.    Khadijah has been having diarrhea for the last 4 days without fevers or other systemic symptoms. She appears well-hydrated on today's exam, and has been drinking appropriate volumes with good UOP. Most likely etiology for her diarrhea is viral enteritis. Will continue to monitor for symptoms resolution, provided strict return precautions today. Will start a probiotic.    Plan:  #well child exam  - Anticipatory guidance discussed.  Gave handout on well-child issues at this age.  Specific topics reviewed: caution with possible poisons (including pills, plants, and  "cosmetics), child-proof home with cabinet locks, outlet plugs, window guards, and stair safety larson, discipline issues: limit-setting, positive reinforcement, fluoride supplementation if unfluoridated water supply, importance of varied diet, make middle-of-night feeds \"brief and boring\", never leave unattended, and wind-down activities to help with sleep.  - Development: appropriate for age  - Primary water source has adequate fluoride: no  - Autism screen completed.  High risk for autism: no  - fluoride applied today by Dr. Cooper  - poly-vi-sol with iron    #Vaccine refused by parent  - counseling provided    #Diarrhea, unspecified type  - start probiotic    Follow-up visit in 6 months for next well child visit, or sooner as needed.    Patient seen and discussed with Dr. Kenneth Gonzalez MD  PGY-2    Teeth inspected with no obvious cavities unless otherwise documented in physical exam, discussion about appropriate teeth hygiene and the fluoride application discussed with guardian, patient referred to dentist &/or reminded guardian to continue seeing the dentist as appropriate. Fluoride applied to teeth during visit.    I reviewed the resident/fellow's documentation and discussed the patient with the resident/fellow. I agree with the resident/fellow's medical decision making as documented in the note.    Ju Cooper MD    " Xray Chest 1 View-PORTABLE IMMEDIATE

## 2024-08-13 ENCOUNTER — RESULT REVIEW (OUTPATIENT)
Age: 69
End: 2024-08-13

## 2024-08-26 NOTE — PROGRESS NOTE ADULT - SUBJECTIVE AND OBJECTIVE BOX
CARDIOLOGY     PROGRESS  NOTE   ________________________________________________    CHIEF COMPLAINT:Patient is a 66y old  Female who presents with a chief complaint of hypotension (04 Feb 2021 18:11)  doing better.  	  REVIEW OF SYSTEMS:  CONSTITUTIONAL: No fever, weight loss, or fatigue  EYES: No eye pain, visual disturbances, or discharge  ENT:  No difficulty hearing, tinnitus, vertigo; No sinus or throat pain  NECK: No pain or stiffness  RESPIRATORY: No cough, wheezing, chills or hemoptysis; No Shortness of Breath  CARDIOVASCULAR: No chest pain, palpitations, passing out, dizziness, or leg swelling  GASTROINTESTINAL: No abdominal or epigastric pain. No nausea, vomiting, or hematemesis; No diarrhea or constipation. No melena or hematochezia.  GENITOURINARY: No dysuria, frequency, hematuria, or incontinence  NEUROLOGICAL: No headaches, memory loss, loss of strength, numbness, or tremors  SKIN: No itching, burning, rashes, or lesions   LYMPH Nodes: No enlarged glands  ENDOCRINE: No heat or cold intolerance; No hair loss  MUSCULOSKELETAL: No joint pain or swelling; No muscle, back, or extremity pain  PSYCHIATRIC: No depression, anxiety, mood swings, or difficulty sleeping  HEME/LYMPH: No easy bruising, or bleeding gums  ALLERGY AND IMMUNOLOGIC: No hives or eczema	    [ ] All others negative	  [ ] Unable to obtain    PHYSICAL EXAM:  T(C): 36.8 (02-05-21 @ 03:45), Max: 37.3 (02-04-21 @ 21:05)  HR: 62 (02-05-21 @ 06:21) (56 - 68)  BP: 95/51 (02-05-21 @ 03:45) (95/51 - 107/95)  RR: 17 (02-05-21 @ 03:45) (16 - 18)  SpO2: 94% (02-05-21 @ 06:21) (94% - 98%)  Wt(kg): --  I&O's Summary    04 Feb 2021 07:01  -  05 Feb 2021 07:00  --------------------------------------------------------  IN: 800 mL / OUT: 700 mL / NET: 100 mL        Appearance: Normal	  HEENT:   Normal oral mucosa, PERRL, EOMI	  Lymphatic: No lymphadenopathy  Cardiovascular: Normal S1 S2, No JVD, + murmurs, No edema  Respiratory: Lungs clear to auscultation	  Psychiatry: A & O x 3, Mood & affect appropriate  Gastrointestinal:  Soft, Non-tender, + BS	  Skin: No rashes, No ecchymoses, No cyanosis	  Neurologic: Non-focal  Extremities: Normal range of motion, No clubbing, cyanosis or edema  Vascular: Peripheral pulses palpable 2+ bilaterally    MEDICATIONS  (STANDING):  aspirin enteric coated 81 milliGRAM(s) Oral daily  atorvastatin 40 milliGRAM(s) Oral at bedtime  buPROPion XL . 300 milliGRAM(s) Oral daily  dextrose 40% Gel 15 Gram(s) Oral once  dextrose 5%. 1000 milliLiter(s) (50 mL/Hr) IV Continuous <Continuous>  dextrose 5%. 1000 milliLiter(s) (100 mL/Hr) IV Continuous <Continuous>  dextrose 50% Injectable 25 Gram(s) IV Push once  dextrose 50% Injectable 12.5 Gram(s) IV Push once  dextrose 50% Injectable 25 Gram(s) IV Push once  escitalopram 20 milliGRAM(s) Oral daily  gabapentin 600 milliGRAM(s) Oral three times a day  glucagon  Injectable 1 milliGRAM(s) IntraMuscular once  heparin   Injectable 5000 Unit(s) SubCutaneous every 8 hours  insulin glargine Injectable (LANTUS) 25 Unit(s) SubCutaneous at bedtime  insulin lispro (ADMELOG) corrective regimen sliding scale   SubCutaneous three times a day before meals  levothyroxine 125 MICROGram(s) Oral daily  piperacillin/tazobactam IVPB.. 3.375 Gram(s) IV Intermittent every 8 hours  sodium chloride 0.9%. 1000 milliLiter(s) (80 mL/Hr) IV Continuous <Continuous>      TELEMETRY: 	    ECG:  	  RADIOLOGY:  OTHER: 	  	  LABS:	 	    CARDIAC MARKERS:  CARDIAC MARKERS ( 04 Feb 2021 18:57 )  x     / x     / 187 U/L / x     / 2.2 ng/mL                                10.2   7.98  )-----------( 247      ( 05 Feb 2021 06:11 )             31.6     02-05    139  |  102  |  30<H>  ----------------------------<  127<H>  3.5   |  27  |  1.27    Ca    9.1      05 Feb 2021 06:10  Phos  3.9     02-04  Mg     1.8     02-04    TPro  7.9  /  Alb  4.0  /  TBili  0.6  /  DBili  x   /  AST  11  /  ALT  10  /  AlkPhos  101  02-04    proBNP:   Lipid Profile:   HgA1c:   TSH:   < from: CT Angio Chest w/ IV Cont (07.25.20 @ 17:37) >  1.  Limited exam. The segmental and subsegmental pulmonary arteries are not evaluated secondary to poor opacification.    2.  Clear lungs. No aortic aneurysm or dissection.    < from: [facility] Duplex Lower Ext Vein Scan, Bilat (07.27.20 @ 11:46) >  No evidence of deep venous thrombosis in either lower extremity.      Assessment and plan  ---------------------------   66 year-old femal  with PMH of DM2 on insulin, fibromyalgia, osteoarthritis, sleep apnea on CPAP, depression, venous insufficiency, endometrial CA s/p hysterectomy (2010), HTN, HLD, hypothyroidism, obesity presenting with onset of weakness, chills this morning. Notes she woke up and felt generally crummy. Has had low energy, fatigue, generalized SOB, no fevers at home, episodes of incontinence today without dysuria, foul urine, flank pain. Notes sometimes she feels this was with fibromyalgia flares.   Also reports generalized nausea, but no vomiting. Notes legs are red, but have been this way previously. "maybe a little warmer than usual."  pt with hx of obesity and with  RENARD ,with increasing sob. no chest pain.  hypotension, r/o sepsis  check cultures  ivf  chest x ray noted  will add midodrine if decrease bp  le venous doppler  dvt prophylaxis  tsh  esr  physical therapy  oob to chailr  replete k  no source of sepsis yet    	         Opt out Yes, get tested

## 2025-03-13 ENCOUNTER — INPATIENT (INPATIENT)
Facility: HOSPITAL | Age: 70
LOS: 5 days | Discharge: NURSING, REHAB OR LONG-TERM CARE | DRG: 177 | End: 2025-03-19
Attending: INTERNAL MEDICINE | Admitting: INTERNAL MEDICINE
Payer: MEDICARE

## 2025-03-13 VITALS
SYSTOLIC BLOOD PRESSURE: 133 MMHG | OXYGEN SATURATION: 99 % | RESPIRATION RATE: 18 BRPM | HEIGHT: 70 IN | HEART RATE: 93 BPM | WEIGHT: 293 LBS | TEMPERATURE: 99 F | DIASTOLIC BLOOD PRESSURE: 72 MMHG

## 2025-03-13 DIAGNOSIS — G93.40 ENCEPHALOPATHY, UNSPECIFIED: ICD-10-CM

## 2025-03-13 LAB
ALBUMIN SERPL ELPH-MCNC: 3.2 G/DL — LOW (ref 3.3–5)
ALP SERPL-CCNC: 108 U/L — SIGNIFICANT CHANGE UP (ref 40–120)
ALP SERPL-CCNC: 95 U/L — SIGNIFICANT CHANGE UP (ref 40–120)
ALT FLD-CCNC: 10 U/L — SIGNIFICANT CHANGE UP (ref 10–45)
ALT FLD-CCNC: 9 U/L — LOW (ref 10–45)
AMPHET UR-MCNC: NEGATIVE — SIGNIFICANT CHANGE UP
ANION GAP SERPL CALC-SCNC: 13 MMOL/L — SIGNIFICANT CHANGE UP (ref 5–17)
APAP SERPL-MCNC: <15 UG/ML — SIGNIFICANT CHANGE UP (ref 10–30)
APPEARANCE UR: ABNORMAL
APTT BLD: 36.6 SEC — HIGH (ref 24.5–35.6)
AST SERPL-CCNC: 13 U/L — SIGNIFICANT CHANGE UP (ref 10–40)
BACTERIA # UR AUTO: ABNORMAL /HPF
BARBITURATES UR SCN-MCNC: NEGATIVE — SIGNIFICANT CHANGE UP
BASOPHILS # BLD AUTO: 0.01 K/UL — SIGNIFICANT CHANGE UP (ref 0–0.2)
BASOPHILS NFR BLD AUTO: 0.2 % — SIGNIFICANT CHANGE UP (ref 0–2)
BENZODIAZ UR-MCNC: NEGATIVE — SIGNIFICANT CHANGE UP
BILIRUB DIRECT SERPL-MCNC: <0.1 MG/DL — SIGNIFICANT CHANGE UP (ref 0–0.3)
BILIRUB INDIRECT FLD-MCNC: >0.1 MG/DL — LOW (ref 0.2–1)
BILIRUB SERPL-MCNC: 0.2 MG/DL — SIGNIFICANT CHANGE UP (ref 0.2–1.2)
BILIRUB SERPL-MCNC: 0.2 MG/DL — SIGNIFICANT CHANGE UP (ref 0.2–1.2)
BUN SERPL-MCNC: 21 MG/DL — SIGNIFICANT CHANGE UP (ref 7–23)
CALCIUM SERPL-MCNC: 9.1 MG/DL — SIGNIFICANT CHANGE UP (ref 8.4–10.5)
CAST: 0 /LPF — SIGNIFICANT CHANGE UP (ref 0–4)
CHLORIDE SERPL-SCNC: 98 MMOL/L — SIGNIFICANT CHANGE UP (ref 96–108)
CO2 SERPL-SCNC: 24 MMOL/L — SIGNIFICANT CHANGE UP (ref 22–31)
COCAINE METAB.OTHER UR-MCNC: NEGATIVE — SIGNIFICANT CHANGE UP
COLOR SPEC: YELLOW — SIGNIFICANT CHANGE UP
CREAT SERPL-MCNC: 0.93 MG/DL — SIGNIFICANT CHANGE UP (ref 0.5–1.3)
CREAT SERPL-MCNC: 0.94 MG/DL — SIGNIFICANT CHANGE UP (ref 0.5–1.3)
DIFF PNL FLD: NEGATIVE — SIGNIFICANT CHANGE UP
EGFR: 65 ML/MIN/1.73M2 — SIGNIFICANT CHANGE UP
EGFR: 65 ML/MIN/1.73M2 — SIGNIFICANT CHANGE UP
EGFR: 66 ML/MIN/1.73M2 — SIGNIFICANT CHANGE UP
EGFR: 66 ML/MIN/1.73M2 — SIGNIFICANT CHANGE UP
EOSINOPHIL # BLD AUTO: 0.03 K/UL — SIGNIFICANT CHANGE UP (ref 0–0.5)
EOSINOPHIL NFR BLD AUTO: 0.6 % — SIGNIFICANT CHANGE UP (ref 0–6)
ETHANOL SERPL-MCNC: <10 MG/DL — SIGNIFICANT CHANGE UP (ref 0–10)
FLUAV AG NPH QL: SIGNIFICANT CHANGE UP
FLUBV AG NPH QL: SIGNIFICANT CHANGE UP
GAS PNL BLDV: SIGNIFICANT CHANGE UP
GLUCOSE BLDC GLUCOMTR-MCNC: 133 MG/DL — HIGH (ref 70–99)
GLUCOSE SERPL-MCNC: 172 MG/DL — HIGH (ref 70–99)
GLUCOSE UR QL: NEGATIVE MG/DL — SIGNIFICANT CHANGE UP
HCT VFR BLD CALC: 39.2 % — SIGNIFICANT CHANGE UP (ref 34.5–45)
HGB BLD-MCNC: 12.1 G/DL — SIGNIFICANT CHANGE UP (ref 11.5–15.5)
IMM GRANULOCYTES NFR BLD AUTO: 0.4 % — SIGNIFICANT CHANGE UP (ref 0–0.9)
INR BLD: 1.2 RATIO — HIGH (ref 0.85–1.16)
KETONES UR-MCNC: NEGATIVE MG/DL — SIGNIFICANT CHANGE UP
LEUKOCYTE ESTERASE UR-ACNC: NEGATIVE — SIGNIFICANT CHANGE UP
LYMPHOCYTES # BLD AUTO: 0.94 K/UL — LOW (ref 1–3.3)
LYMPHOCYTES # BLD AUTO: 18.7 % — SIGNIFICANT CHANGE UP (ref 13–44)
MCHC RBC-ENTMCNC: 26.1 PG — LOW (ref 27–34)
MCHC RBC-ENTMCNC: 30.9 G/DL — LOW (ref 32–36)
MCV RBC AUTO: 84.7 FL — SIGNIFICANT CHANGE UP (ref 80–100)
MONOCYTES # BLD AUTO: 0.81 K/UL — SIGNIFICANT CHANGE UP (ref 0–0.9)
NEUTROPHILS # BLD AUTO: 3.22 K/UL — SIGNIFICANT CHANGE UP (ref 1.8–7.4)
NEUTROPHILS NFR BLD AUTO: 64 % — SIGNIFICANT CHANGE UP (ref 43–77)
NITRITE UR-MCNC: NEGATIVE — SIGNIFICANT CHANGE UP
NRBC BLD AUTO-RTO: 0 /100 WBCS — SIGNIFICANT CHANGE UP (ref 0–0)
OPIATES UR-MCNC: NEGATIVE — SIGNIFICANT CHANGE UP
OXYCODONE UR-MCNC: NEGATIVE — SIGNIFICANT CHANGE UP
PCP SPEC-MCNC: SIGNIFICANT CHANGE UP
PCP UR-MCNC: NEGATIVE — SIGNIFICANT CHANGE UP
PH UR: 6 — SIGNIFICANT CHANGE UP (ref 5–8)
PLATELET # BLD AUTO: 250 K/UL — SIGNIFICANT CHANGE UP (ref 150–400)
POTASSIUM SERPL-MCNC: 4.2 MMOL/L — SIGNIFICANT CHANGE UP (ref 3.5–5.3)
POTASSIUM SERPL-SCNC: 4.2 MMOL/L — SIGNIFICANT CHANGE UP (ref 3.5–5.3)
PROT SERPL-MCNC: 6.6 G/DL — SIGNIFICANT CHANGE UP (ref 6–8.3)
PROT UR-MCNC: 30 MG/DL
PROTHROM AB SERPL-ACNC: 13.8 SEC — HIGH (ref 9.9–13.4)
RBC # BLD: 4.63 M/UL — SIGNIFICANT CHANGE UP (ref 3.8–5.2)
RBC # FLD: 16.2 % — HIGH (ref 10.3–14.5)
REVIEW: SIGNIFICANT CHANGE UP
RSV RNA NPH QL NAA+NON-PROBE: SIGNIFICANT CHANGE UP
SALICYLATES SERPL-MCNC: <2 MG/DL — LOW (ref 15–30)
SARS-COV-2 RNA SPEC QL NAA+PROBE: DETECTED
SODIUM SERPL-SCNC: 135 MMOL/L — SIGNIFICANT CHANGE UP (ref 135–145)
SP GR SPEC: 1.03 — SIGNIFICANT CHANGE UP (ref 1–1.03)
SQUAMOUS # UR AUTO: 1 /HPF — SIGNIFICANT CHANGE UP (ref 0–5)
THC UR QL: NEGATIVE — SIGNIFICANT CHANGE UP
UROBILINOGEN FLD QL: 0.2 MG/DL — SIGNIFICANT CHANGE UP (ref 0.2–1)
WBC # BLD: 5.03 K/UL — SIGNIFICANT CHANGE UP (ref 3.8–10.5)
WBC # FLD AUTO: 5.03 K/UL — SIGNIFICANT CHANGE UP (ref 3.8–10.5)
WBC UR QL: 1 /HPF — SIGNIFICANT CHANGE UP (ref 0–5)

## 2025-03-13 PROCEDURE — 70450 CT HEAD/BRAIN W/O DYE: CPT | Mod: 26

## 2025-03-13 PROCEDURE — 71045 X-RAY EXAM CHEST 1 VIEW: CPT | Mod: 26

## 2025-03-13 PROCEDURE — 99285 EMERGENCY DEPT VISIT HI MDM: CPT

## 2025-03-13 RX ORDER — REMDESIVIR 5 MG/ML
200 INJECTION INTRAVENOUS EVERY 24 HOURS
Refills: 0 | Status: COMPLETED | OUTPATIENT
Start: 2025-03-13 | End: 2025-03-13

## 2025-03-13 RX ORDER — GABAPENTIN 400 MG/1
600 CAPSULE ORAL
Refills: 0 | Status: DISCONTINUED | OUTPATIENT
Start: 2025-03-13 | End: 2025-03-19

## 2025-03-13 RX ORDER — DEXTROSE 50 % IN WATER 50 %
15 SYRINGE (ML) INTRAVENOUS ONCE
Refills: 0 | Status: DISCONTINUED | OUTPATIENT
Start: 2025-03-13 | End: 2025-03-19

## 2025-03-13 RX ORDER — MIRABEGRON 50 MG/1
1 TABLET, FILM COATED, EXTENDED RELEASE ORAL
Refills: 0 | DISCHARGE

## 2025-03-13 RX ORDER — SODIUM CHLORIDE 9 G/1000ML
1000 INJECTION, SOLUTION INTRAVENOUS
Refills: 0 | Status: DISCONTINUED | OUTPATIENT
Start: 2025-03-13 | End: 2025-03-19

## 2025-03-13 RX ORDER — BUPROPION HYDROBROMIDE 522 MG/1
1 TABLET, EXTENDED RELEASE ORAL
Refills: 0 | DISCHARGE

## 2025-03-13 RX ORDER — INSULIN LISPRO 100 U/ML
5 INJECTION, SOLUTION INTRAVENOUS; SUBCUTANEOUS
Refills: 0 | Status: DISCONTINUED | OUTPATIENT
Start: 2025-03-13 | End: 2025-03-19

## 2025-03-13 RX ORDER — METOPROLOL SUCCINATE 50 MG/1
25 TABLET, EXTENDED RELEASE ORAL DAILY
Refills: 0 | Status: DISCONTINUED | OUTPATIENT
Start: 2025-03-13 | End: 2025-03-19

## 2025-03-13 RX ORDER — FENTANYL CITRATE-0.9 % NACL/PF 100MCG/2ML
1 SYRINGE (ML) INTRAVENOUS
Refills: 0 | DISCHARGE

## 2025-03-13 RX ORDER — HYDROMORPHONE/SOD CHLOR,ISO/PF 2 MG/10 ML
4 SYRINGE (ML) INJECTION EVERY 6 HOURS
Refills: 0 | Status: DISCONTINUED | OUTPATIENT
Start: 2025-03-13 | End: 2025-03-19

## 2025-03-13 RX ORDER — BUMETANIDE 1 MG/1
0.5 TABLET ORAL DAILY
Refills: 0 | Status: DISCONTINUED | OUTPATIENT
Start: 2025-03-13 | End: 2025-03-19

## 2025-03-13 RX ORDER — INSULIN GLARGINE-YFGN 100 [IU]/ML
15 INJECTION, SOLUTION SUBCUTANEOUS AT BEDTIME
Refills: 0 | Status: DISCONTINUED | OUTPATIENT
Start: 2025-03-13 | End: 2025-03-19

## 2025-03-13 RX ORDER — SENNA 187 MG
2 TABLET ORAL AT BEDTIME
Refills: 0 | Status: DISCONTINUED | OUTPATIENT
Start: 2025-03-13 | End: 2025-03-19

## 2025-03-13 RX ORDER — INFLUENZA A VIRUS A/IDAHO/07/2018 (H1N1) ANTIGEN (MDCK CELL DERIVED, PROPIOLACTONE INACTIVATED, INFLUENZA A VIRUS A/INDIANA/08/2018 (H3N2) ANTIGEN (MDCK CELL DERIVED, PROPIOLACTONE INACTIVATED), INFLUENZA B VIRUS B/SINGAPORE/INFTT-16-0610/2016 ANTIGEN (MDCK CELL DERIVED, PROPIOLACTONE INACTIVATED), INFLUENZA B VIRUS B/IOWA/06/2017 ANTIGEN (MDCK CELL DERIVED, PROPIOLACTONE INACTIVATED) 15; 15; 15; 15 UG/.5ML; UG/.5ML; UG/.5ML; UG/.5ML
0.5 INJECTION, SUSPENSION INTRAMUSCULAR ONCE
Refills: 0 | Status: DISCONTINUED | OUTPATIENT
Start: 2025-03-13 | End: 2025-03-19

## 2025-03-13 RX ORDER — REMDESIVIR 5 MG/ML
100 INJECTION INTRAVENOUS EVERY 24 HOURS
Refills: 0 | Status: COMPLETED | OUTPATIENT
Start: 2025-03-14 | End: 2025-03-17

## 2025-03-13 RX ORDER — ATORVASTATIN CALCIUM 80 MG/1
40 TABLET, FILM COATED ORAL AT BEDTIME
Refills: 0 | Status: DISCONTINUED | OUTPATIENT
Start: 2025-03-13 | End: 2025-03-19

## 2025-03-13 RX ORDER — DEXTROSE 50 % IN WATER 50 %
25 SYRINGE (ML) INTRAVENOUS ONCE
Refills: 0 | Status: DISCONTINUED | OUTPATIENT
Start: 2025-03-13 | End: 2025-03-19

## 2025-03-13 RX ORDER — DEXTROSE 50 % IN WATER 50 %
12.5 SYRINGE (ML) INTRAVENOUS ONCE
Refills: 0 | Status: DISCONTINUED | OUTPATIENT
Start: 2025-03-13 | End: 2025-03-19

## 2025-03-13 RX ORDER — POLYETHYLENE GLYCOL 3350 17 G/17G
17 POWDER, FOR SOLUTION ORAL DAILY
Refills: 0 | Status: DISCONTINUED | OUTPATIENT
Start: 2025-03-13 | End: 2025-03-19

## 2025-03-13 RX ORDER — ESCITALOPRAM OXALATE 20 MG/1
20 TABLET ORAL DAILY
Refills: 0 | Status: DISCONTINUED | OUTPATIENT
Start: 2025-03-13 | End: 2025-03-19

## 2025-03-13 RX ORDER — LEVOTHYROXINE SODIUM 300 MCG
125 TABLET ORAL DAILY
Refills: 0 | Status: DISCONTINUED | OUTPATIENT
Start: 2025-03-13 | End: 2025-03-19

## 2025-03-13 RX ORDER — ESCITALOPRAM OXALATE 20 MG/1
1 TABLET ORAL
Refills: 0 | DISCHARGE

## 2025-03-13 RX ORDER — GLUCAGON 3 MG/1
1 POWDER NASAL ONCE
Refills: 0 | Status: DISCONTINUED | OUTPATIENT
Start: 2025-03-13 | End: 2025-03-19

## 2025-03-13 RX ORDER — REMDESIVIR 5 MG/ML
INJECTION INTRAVENOUS
Refills: 0 | Status: COMPLETED | OUTPATIENT
Start: 2025-03-13 | End: 2025-03-17

## 2025-03-13 RX ORDER — APIXABAN 2.5 MG/1
5 TABLET, FILM COATED ORAL EVERY 12 HOURS
Refills: 0 | Status: DISCONTINUED | OUTPATIENT
Start: 2025-03-13 | End: 2025-03-19

## 2025-03-13 RX ADMIN — APIXABAN 5 MILLIGRAM(S): 2.5 TABLET, FILM COATED ORAL at 18:19

## 2025-03-13 RX ADMIN — Medication 2 TABLET(S): at 21:47

## 2025-03-13 RX ADMIN — Medication 40 MILLIGRAM(S): at 22:24

## 2025-03-13 RX ADMIN — ESCITALOPRAM OXALATE 20 MILLIGRAM(S): 20 TABLET ORAL at 22:13

## 2025-03-13 RX ADMIN — ATORVASTATIN CALCIUM 40 MILLIGRAM(S): 80 TABLET, FILM COATED ORAL at 21:47

## 2025-03-13 RX ADMIN — REMDESIVIR 200 MILLIGRAM(S): 5 INJECTION INTRAVENOUS at 22:55

## 2025-03-13 RX ADMIN — METOPROLOL SUCCINATE 25 MILLIGRAM(S): 50 TABLET, EXTENDED RELEASE ORAL at 22:13

## 2025-03-13 RX ADMIN — INSULIN GLARGINE-YFGN 15 UNIT(S): 100 INJECTION, SOLUTION SUBCUTANEOUS at 21:51

## 2025-03-13 RX ADMIN — GABAPENTIN 600 MILLIGRAM(S): 400 CAPSULE ORAL at 22:12

## 2025-03-13 NOTE — ED PROVIDER NOTE - ATTENDING CONTRIBUTION TO CARE
Attending MD Ugalde:  I have seen and examined this patient and fully participated in the care of this patient as the teaching attending. I personally made/approved the management plan and take responsibility for the patient management.        *The above represents an initial assessment/impression. Please refer to progress notes for potential changes in patient clinical course*

## 2025-03-13 NOTE — ED PROVIDER NOTE - CLINICAL SUMMARY MEDICAL DECISION MAKING FREE TEXT BOX
Attending MD Ugalde:  Presents from assisted living facility with altered mental status. Tested positive for COVID-19 last night. Currently experiencing cough, mild difficulty breathing, and headaches. Sister reports that when speaking with her this morning, she was making "absolutely no sense." Sister notes this level of confusion is unusual. Has had previous episodes of altered mental status with urinary infection and cellulitis in the past, though these episodes were brief. Currently manages own medications at baseline.    Past Medical History: Diabetes, cardiac arrhythmia, obesity, recurrent cellulitis, fibromyalgia, congestive heart failure, pulmonary hypertension, osteoarthritis, hypertension    Primary Care Doctor: Dr. Thomas Leal    Medication History:  - synthroid  - Metformin  - Basaglar  - Novolog  - Atorvastatin  - Bumetanide 0.5mg  - Spironolactone  - Gabapentin  - Fluticasone  - Losartan  - Loratadine  - Pantoprazole  - Bupropion  - Escitalopram  - Aripiprazole  - Eliquis 5mg twice daily  - Ozempic  - Fentanyl patch (recent addition)    Social History: Lives in assisted living facility. Sister Keely is local family contact.    Patient's vital signs are nonactionable.  She is sitting in the stretcher in no apparent acute distress.  High BMI.  Breathing comfortably on room air.  Alert and oriented x 1.  Very forgetful and repetitive at times during examination.  Moves all extremities spontaneously. Symmetric  strength bilateral upper extremities, elbow flexion 5/5 bilaterally, elbow extension 5/5 bilaterally, patient can straight leg raise bilaterally and resist symmetrically. Sensation grossly intact to light tough in bilateral face, arms and legs. Symmetric smile, EOMI b/l. PERRL b/l.  Clear to auscultation anteriorly regular heart sounds extremities warm well-perfused.  Venous stasis dermatitis of bilateral anterior shins.    Patient is presenting for evaluation of altered mental status since yesterday, reported diagnosis of COVID-19 yesterday as well.  Patient has grossly nonfocal neurologic exam.  Considerations in this patient include but not fully limited to metabolic encephalopathy, spontaneous IPH, polypharmacy.  Will obtain CT head screening labs screening chest x-ray and anticipate admission given encephalopathy.      *The above represents an initial assessment/impression. Please refer to progress notes for potential changes in patient clinical course*

## 2025-03-13 NOTE — ED ADULT NURSE NOTE - OBJECTIVE STATEMENT
PT is a 70 year old female, baseline A&OX4 but currently A&OX2 who presents to the ED via EMS from [facility] with c/o AMS and COVID. PT has PMH of hypothyroidism, DM, HLD, CHF, HTN, and CAD on Eliquis with recent medication additions of Ozempic and Fentanyl patches. Per EMS, PT was experiencing a cough last night and tested positive for COVID. PT becoming increasingly more confused and slow to response. PT is a poor historian. PT is resting comfortably in bed, breathing unlabored on room air, and speaking in complete sentences extremely slowly. Abdomen is soft, non-tender, and non-distended. Skin is warm and dry, no diaphoresis noted. No edema noted to B/L extremities. PT able to move all extremities spontaneously, sensation intact. IV access established 20G in LAC. At baseline, PT uses motorized wheelchair to get around, PT unsteady when ambulating. Safety and comfort maintained. Sister at the bedside.

## 2025-03-13 NOTE — ED PROVIDER NOTE - PHYSICAL EXAMINATION
General: no acute distress  Psych: mood appropriate  Head: normocephalic; atraumatic  Eyes: conjunctivae clear bilaterally, sclerae anicteric  ENT: no nasal flaring, patent nares  Cardio: regular rate and rhythm; normal heart sounds  Resp: clear to auscultation bilaterally  GI: abdomen soft, nontender, nondistended  Neuro: A&Ox2 (not oriented to time)  Skin: no rashes or bruising noted  MSK: normal movement of extremities  Lymph/Vasc: no LE edema

## 2025-03-13 NOTE — CONSULT NOTE ADULT - SUBJECTIVE AND OBJECTIVE BOX
Date of Service, 03-13-25 @ 18:57  CHIEF COMPLAINT:Patient is a 70y old  Female who presents with a chief complaint of AMS x 1 day (13 Mar 2025 17:58)      HPI:  71 y/o F with past medical history of AFIB on xarelto, DM, HTN, HLD, fibromyalgia on fentanyl patch, presenting from nursing home with altered mental status.   Patient reports 2 days of cough, congestion, and mild shortness of breath. Diagnosed with covid at nursing home yesterday evening. Today was noted to be increasingly confused, per patient's sister the patient is A&Ox3 at baseline, currently A&Ox2   No hx chest pain, fever, abd pain, nausea, vomiting, diarrhea, dysuria, or any other symptoms.       PAST MEDICAL & SURGICAL HISTORY:  Personal History of Arthritis  Personal History of Female Genital Cancer  Personal History of Hypertension  Hypothyroidism  Diabetes Mellitus  Hyperlipidemia  High Triglycerides  Osteoarthritis of Knee  right. requires cane  Obstructive Sleep Apnea  Constipation  Depression  Morbidly Obese  Fibromyalgia  GERD with Apnea  Restless Legs Syndrome (RLS)  Umbilical Hernia  Atrial fibrillation  S/P Tonsillectomy and Adenoidectomy  S/P FESS (Functional Endoscopic Sinus Surgery)  Carpal Tunnel Syndrome  release left  Endometrial Ca s/p RODRIGO, BSO  Incarcerated Ventral Hernia        MEDICATIONS  (STANDING):  apixaban 5 milliGRAM(s) Oral every 12 hours  atorvastatin 40 milliGRAM(s) Oral at bedtime  buMETAnide 0.5 milliGRAM(s) Oral daily  dextrose 5%. 1000 milliLiter(s) (50 mL/Hr) IV Continuous <Continuous>  dextrose 5%. 1000 milliLiter(s) (100 mL/Hr) IV Continuous <Continuous>  dextrose 50% Injectable 25 Gram(s) IV Push once  dextrose 50% Injectable 12.5 Gram(s) IV Push once  dextrose 50% Injectable 25 Gram(s) IV Push once  escitalopram 20 milliGRAM(s) Oral daily  gabapentin 600 milliGRAM(s) Oral two times a day  glucagon  Injectable 1 milliGRAM(s) IntraMuscular once  insulin glargine Injectable (LANTUS) 15 Unit(s) SubCutaneous at bedtime  insulin lispro Injectable (ADMELOG) 5 Unit(s) SubCutaneous three times a day before meals  levothyroxine 125 MICROGram(s) Oral daily  metoprolol succinate ER 25 milliGRAM(s) Oral daily  pantoprazole    Tablet 40 milliGRAM(s) Oral before breakfast  polyethylene glycol 3350 17 Gram(s) Oral daily  senna 2 Tablet(s) Oral at bedtime    MEDICATIONS  (PRN):  dextrose Oral Gel 15 Gram(s) Oral once PRN Blood Glucose LESS THAN 70 milliGRAM(s)/deciliter  HYDROmorphone   Tablet 4 milliGRAM(s) Oral every 6 hours PRN Moderate Pain (4 - 6)      FAMILY HISTORY:  No pertinent family history in first degree relatives        SOCIAL HISTORY:    [x ] Non-smoker  [ ] Smoker  [ ] Alcohol    Allergies    aerosols, perfumes, fabric softeners (Rash; Rhinitis; Rhinorrhea)  Lyrica (Rash)  dust (Rhinitis; Rhinorrhea)  Tin/costume jewelry (Rash)  pollen (Rhinitis; Rhinorrhea)  adhesives (Rash)  smoke (Rhinitis; Rhinorrhea)  cephalosporins (Rash)    Intolerances    	    REVIEW OF SYSTEMS:  CONSTITUTIONAL: No fever, weight loss, or fatigue  EYES: No eye pain, visual disturbances, or discharge  ENT:  No difficulty hearing, tinnitus, vertigo; No sinus or throat pain  NECK: No pain or stiffness  RESPIRATORY: No cough, wheezing, chills or hemoptysis;+ Shortness of Breath  CARDIOVASCULAR: No chest pain, palpitations, passing out, dizziness, or leg swelling  GASTROINTESTINAL: No abdominal or epigastric pain. No nausea, vomiting, or hematemesis; No diarrhea or constipation. No melena or hematochezia.  GENITOURINARY: No dysuria, frequency, hematuria, or incontinence  NEUROLOGICAL: No headaches, memory loss, loss of strength, numbness, or tremors  SKIN: No itching, burning, rashes, or lesions   LYMPH Nodes: No enlarged glands  ENDOCRINE: No heat or cold intolerance; No hair loss  MUSCULOSKELETAL: No joint pain or swelling; No muscle, back, or extremity pain  PSYCHIATRIC: No depression, anxiety, mood swings, or difficulty sleeping  HEME/LYMPH: No easy bruising, or bleeding gums  ALLERGY AND IMMUNOLOGIC: No hives or eczema	    [ ] All others negative	  [x ] Unable to obtain    PHYSICAL EXAM:  T(C): 37.2 (03-13-25 @ 17:35), Max: 37.2 (03-13-25 @ 12:32)  HR: 62 (03-13-25 @ 17:35) (62 - 93)  BP: 138/60 (03-13-25 @ 17:35) (133/72 - 144/65)  RR: 20 (03-13-25 @ 17:35) (18 - 20)  SpO2: 97% (03-13-25 @ 17:35) (97% - 99%)  Wt(kg): --  I&O's Summary      Appearance: Normal	  HEENT:   Normal oral mucosa, PERRL, EOMI	  Lymphatic: No lymphadenopathy  Cardiovascular: Normal S1 S2, No JVD, + murmurs, No edema  Respiratory: rhonchi  Gastrointestinal:  Soft, Non-tender, + BS	  Skin: No rashes, No ecchymoses, No cyanosis	  Extremities: Normal range of motion, No clubbing, cyanosis or edema  Vascular: Peripheral pulses palpable 2+ bilaterally    TELEMETRY: 	    ECG:  	  RADIOLOGY:  OTHER: 	  	  LABS:	 	    CARDIAC MARKERS:                              12.1   5.03  )-----------( 250      ( 13 Mar 2025 13:35 )             39.2     03-13    135  |  98  |  21  ----------------------------<  172[H]  4.2   |  24  |  0.94    Ca    9.1      13 Mar 2025 13:52    TPro  7.3  /  Alb  3.5  /  TBili  0.2  /  DBili  x   /  AST  13  /  ALT  10  /  AlkPhos  108  03-13    proBNP:   Lipid Profile:   HgA1c:   TSH:   PT/INR - ( 13 Mar 2025 13:35 )   PT: 13.8 sec;   INR: 1.20 ratio         PTT - ( 13 Mar 2025 13:35 )  PTT:36.6 sec    PREVIOUS DIAGNOSTIC TESTING:    < from: 12 Lead ECG (02.08.24 @ 10:14) >  Diagnosis Line SINUS BRADYCARDIA WITH SINUS ARRHYTHMIA  MINIMAL VOLTAGE CRITERIA FOR LVH, MAY BE NORMAL VARIANT ( R in aVL )  INFERIOR INFARCT , AGE UNDETERMINED  CANNOT RULE OUT ANTERIOR INFARCT (CITED ON OR BEFORE 04-FEB-2021)  ABNORMAL ECG  WHEN COMPARED WITH ECG OF 27-JAN-2024 20:10,  NO SIGNIFICANT CHANGE WAS FOUND    < from: Xray Chest 1 View- PORTABLE-Urgent (Xray Chest 1 View- PORTABLE-Urgent .) (03.13.25 @ 14:58) >  Lungs/Pleura: No focal parenchymal opacities.  No pneumothorax. No   pleural effusions.  No pulmonary vascular congestion.    Heart/Mediastinum: The cardiomediastinal silhouetteis within normal   limits. Left chest wall loop recorder.    Skeletal/soft tissues: No acute pathology.    IMPRESSION:  No evidence of acute cardiopulmonary disease.      FluA/FluB/RSV/COVID PCR (03.13.25 @ 13:37)    SARS-CoV-2 Result: Detected: This molecular assay uses polymerase chain reaction (PCR) to detect  influenza A virus, influenza B virus, respiratory syncytial virus (RSV),  and SARS-CoV-2 (COVID-19). Test results should be correlated with  clinical presentation, patient history, and epidemiology.   Influenza A Result: NotDetec   Influenza B Result: NotDetec   Resp Syn Virus Result: NotDetec      < from: Transthoracic Echocardiogram (07.26.20 @ 10:36) >  1. Mitral annular calcification, otherwise normal mitral  valve. Minimal mitral regurgitation.  2. Normal left ventricular internal dimensions and wall  thicknesses.  3. Endocardium not well visualized; grossly preserved  overall left ventricular systolic function.  Unable to  exclude segmental wall motion abnormalities.  4. Right ventricular enlargement with decreased right  ventricular systolic function.    < from: CT Head No Cont (03.13.25 @ 15:36) >  VISUALIZED SINUSES:  Clear.  TYMPANOMASTOID CAVITIES:  Clear.  VISUALIZED ORBITS: Normal.  CALVARIUM: Intact.    MISCELLANEOUS: None.      IMPRESSION:  No acute intracranial hemorrhage, mass effect, or midline shift.

## 2025-03-13 NOTE — ED ADULT NURSE NOTE - NSFALLHARMRISKINTERV_ED_ALL_ED

## 2025-03-13 NOTE — ED ADULT NURSE NOTE - ED STAT RN HANDOFF DETAILS
Report given to DEBRA Escobar. Report given to DEBRA Escobar. Receiving RN aware PT has a bed, report was given, and is awaiting transport.

## 2025-03-13 NOTE — ED PROVIDER NOTE - PROGRESS NOTE DETAILS
Attending MD Ugalde: Patient signed out to Dr. Renteria pending results of head CT, recommend admission to hospital for further investigation of acute encephalopathy

## 2025-03-13 NOTE — PATIENT PROFILE ADULT - CENTRAL VENOUS CATHETER/PICC LINE
[Normal] : normal rate, regular rhythm, normal S1 and S2 and no murmur heard [de-identified] : bibasilar crackles no

## 2025-03-13 NOTE — H&P ADULT - HISTORY OF PRESENT ILLNESS
71 y/o F with past medical history of AFIB on xarelto, DM, HTN, HLD, fibromyalgia on fentanyl patch, presenting from nursing home with altered mental status.   Patient reports 2 days of cough, congestion, and mild shortness of breath. Diagnosed with covid at nursing home yesterday evening. Today was noted to be increasingly confused, per patient's sister the patient is A&Ox3 at baseline, currently A&Ox2 (to self and place).     No hx chest pain, fever, abd pain, nausea, vomiting, diarrhea, dysuria, or any other symptoms.

## 2025-03-13 NOTE — ED PROVIDER NOTE - OBJECTIVE STATEMENT
The patient is a 69 y/o F with past medical history of AFIB on xarelto, DM, HTN, HLD, fibromyalgia on fentanyl patch, presenting from nursing home with altered mental status. Endorsing 2 days of cough, congestion, and mild shortness of breath. Diagnosed with covid at nursing home yesterday evening. Today was noted to be increasingly confused, per patient's sister the patient is A&Ox3 at baseline, currently A&Ox2 (to self and place). Endorsing decreased appetite but still tolerating PO intake. Denies any chest pain, fever, abd pain, nausea, vomiting, diarrhea, dysuria, or any other symptoms.

## 2025-03-13 NOTE — CONSULT NOTE ADULT - ASSESSMENT
71 y/o F with past medical history of AFIB on xarelto, DM, HTN, HLD, fibromyalgia on fentanyl patch, presenting from nursing home with altered mental status.   Patient reports 2 days of cough, congestion, and mild shortness of breath. Diagnosed with covid at nursing home yesterday evening. Today was noted to be increasingly confused, per patient's sister the patient is A&Ox3 at baseline, currently A&Ox2 (  No hx chest pain, fever, abd pain, nausea, vomiting, diarrhea, dysuria, or any other symptoms.  pt is well known to me with hx of a.fib, htn, chf. PHTN , sss with c/o change of mental status and sob  last echo with sig RV dysfunction and RVE  continue ac  continue Bumex  continue metoprolol , fu hr closely  may repeat echo

## 2025-03-13 NOTE — ED ADULT TRIAGE NOTE - PAIN: PRESENCE, MLM
Patient given Rituxan and premedications per MD orders. Dr. Ibarra had chairside appointment with patient. CBC and CMP collected this visit. Tolerated infusion well.   
denies pain/discomfort (Rating = 0)

## 2025-03-13 NOTE — H&P ADULT - ASSESSMENT
Associated symptoms include a fever. Pertinent negatives include no constipation, diarrhea, headaches, hematochezia, hematuria, myalgias, rash, sore throat or vomiting. The symptoms are relieved by bowel movements. The treatment provided moderate relief. Diarrhea   The current episode started in the past 7 days. The problem has been waxing and waning. Associated symptoms include coughing and a fever. Pertinent negatives include no abdominal pain, chest pain, congestion, fatigue, headaches, myalgias, rash, sore throat, vomiting or weakness. The symptoms are aggravated by eating. She has tried nothing for the symptoms. The treatment provided moderate relief. Past Mediacal / Surgical history    Parent denies patient using any OTC medication at this time. No change in PMH/ Surgical history since last visit       Social history    All communication needs, concerns and issues assessed and addressed with patient and parent    Adverse effects of 2nd hand smoking discussed with parents and importance of avoiding the cigarette smoke discussed with them      No change in [facility] since last visit      Family history    No change in [facility] since last visit        Health History     Allergies are reviewed, no change in since last visit          Vitals:    10/16/18 1625   Pulse: 140   Resp: 20   Temp: 99.8 °F (37.7 °C)   TempSrc: Temporal   SpO2: 98%   Weight: 83 lb 3.2 oz (37.7 kg)           Review of Systems   Constitutional: Positive for fever. Negative for activity change, appetite change, fatigue and irritability. HENT: Positive for postnasal drip and rhinorrhea. Negative for congestion, ear discharge, ear pain, sinus pressure, sneezing, sore throat and voice change. Respiratory: Positive for cough. Negative for chest tightness, shortness of breath and wheezing. Cardiovascular: Negative for chest pain and palpitations.    Gastrointestinal: Negative for abdominal pain, constipation, diarrhea, hematochezia and  69 y/o F with past medical history of AFIB on xarelto, DM, HTN, HLD, fibromyalgia on fentanyl patch, presenting from nursing home with altered mental status.       Acute Metabolic Encephalopathy   COVID infection   Remdesivir  Standard precautions     Afib on Eliquis     DM (diabetes mellitus).   ·  Plan: HbA1C  finger sticks with short acting insulin sliding scale  no oral meds  diabetic diet  monitor for hypoglycemia     Lantus and pre meal    HLD (hyperlipidemia).   continue atorvastatin.         H/O fibromyalgia.   continue home meds

## 2025-03-13 NOTE — PATIENT PROFILE ADULT - FALL HARM RISK - HARM RISK INTERVENTIONS

## 2025-03-14 ENCOUNTER — RESULT REVIEW (OUTPATIENT)
Age: 70
End: 2025-03-14

## 2025-03-14 LAB
A1C WITH ESTIMATED AVERAGE GLUCOSE RESULT: 7.2 % — HIGH (ref 4–5.6)
ALP SERPL-CCNC: 92 U/L — SIGNIFICANT CHANGE UP (ref 40–120)
ALT FLD-CCNC: 11 U/L — SIGNIFICANT CHANGE UP (ref 10–45)
ANION GAP SERPL CALC-SCNC: 11 MMOL/L — SIGNIFICANT CHANGE UP (ref 5–17)
AST SERPL-CCNC: 13 U/L — SIGNIFICANT CHANGE UP (ref 10–40)
BASOPHILS # BLD AUTO: 0.02 K/UL — SIGNIFICANT CHANGE UP (ref 0–0.2)
BASOPHILS NFR BLD AUTO: 0.4 % — SIGNIFICANT CHANGE UP (ref 0–2)
BILIRUB DIRECT SERPL-MCNC: <0.1 MG/DL — SIGNIFICANT CHANGE UP (ref 0–0.3)
BILIRUB SERPL-MCNC: 0.2 MG/DL — SIGNIFICANT CHANGE UP (ref 0.2–1.2)
BUN SERPL-MCNC: 20 MG/DL — SIGNIFICANT CHANGE UP (ref 7–23)
CHLORIDE SERPL-SCNC: 101 MMOL/L — SIGNIFICANT CHANGE UP (ref 96–108)
CO2 SERPL-SCNC: 28 MMOL/L — SIGNIFICANT CHANGE UP (ref 22–31)
CREAT SERPL-MCNC: 0.98 MG/DL — SIGNIFICANT CHANGE UP (ref 0.5–1.3)
CREAT SERPL-MCNC: 1.12 MG/DL — SIGNIFICANT CHANGE UP (ref 0.5–1.3)
EGFR: 53 ML/MIN/1.73M2 — LOW
EGFR: 62 ML/MIN/1.73M2 — SIGNIFICANT CHANGE UP
EGFR: 62 ML/MIN/1.73M2 — SIGNIFICANT CHANGE UP
EOSINOPHIL # BLD AUTO: 0.07 K/UL — SIGNIFICANT CHANGE UP (ref 0–0.5)
EOSINOPHIL NFR BLD AUTO: 1.6 % — SIGNIFICANT CHANGE UP (ref 0–6)
ESTIMATED AVERAGE GLUCOSE: 160 MG/DL — HIGH (ref 68–114)
GLUCOSE BLDC GLUCOMTR-MCNC: 113 MG/DL — HIGH (ref 70–99)
GLUCOSE BLDC GLUCOMTR-MCNC: 117 MG/DL — HIGH (ref 70–99)
GLUCOSE BLDC GLUCOMTR-MCNC: 119 MG/DL — HIGH (ref 70–99)
GLUCOSE BLDC GLUCOMTR-MCNC: 137 MG/DL — HIGH (ref 70–99)
GLUCOSE BLDC GLUCOMTR-MCNC: 93 MG/DL — SIGNIFICANT CHANGE UP (ref 70–99)
GLUCOSE BLDC GLUCOMTR-MCNC: 96 MG/DL — SIGNIFICANT CHANGE UP (ref 70–99)
GLUCOSE SERPL-MCNC: 152 MG/DL — HIGH (ref 70–99)
HCT VFR BLD CALC: 40.1 % — SIGNIFICANT CHANGE UP (ref 34.5–45)
HGB BLD-MCNC: 12.6 G/DL — SIGNIFICANT CHANGE UP (ref 11.5–15.5)
IMM GRANULOCYTES NFR BLD AUTO: 0.4 % — SIGNIFICANT CHANGE UP (ref 0–0.9)
MONOCYTES # BLD AUTO: 0.73 K/UL — SIGNIFICANT CHANGE UP (ref 0–0.9)
MONOCYTES NFR BLD AUTO: 16.3 % — HIGH (ref 2–14)
NEUTROPHILS # BLD AUTO: 2.37 K/UL — SIGNIFICANT CHANGE UP (ref 1.8–7.4)
NEUTROPHILS NFR BLD AUTO: 52.8 % — SIGNIFICANT CHANGE UP (ref 43–77)
NRBC BLD AUTO-RTO: 0 /100 WBCS — SIGNIFICANT CHANGE UP (ref 0–0)
PLATELET # BLD AUTO: 243 K/UL — SIGNIFICANT CHANGE UP (ref 150–400)
POTASSIUM SERPL-SCNC: 4.8 MMOL/L — SIGNIFICANT CHANGE UP (ref 3.5–5.3)
PROT SERPL-MCNC: 6.4 G/DL — SIGNIFICANT CHANGE UP (ref 6–8.3)
RBC # BLD: 4.74 M/UL — SIGNIFICANT CHANGE UP (ref 3.8–5.2)
RBC # FLD: 16.2 % — HIGH (ref 10.3–14.5)
SODIUM SERPL-SCNC: 140 MMOL/L — SIGNIFICANT CHANGE UP (ref 135–145)
WBC # BLD: 4.49 K/UL — SIGNIFICANT CHANGE UP (ref 3.8–10.5)
WBC # FLD AUTO: 4.49 K/UL — SIGNIFICANT CHANGE UP (ref 3.8–10.5)

## 2025-03-14 PROCEDURE — 93308 TTE F-UP OR LMTD: CPT | Mod: 26

## 2025-03-14 PROCEDURE — 93325 DOPPLER ECHO COLOR FLOW MAPG: CPT | Mod: 26

## 2025-03-14 PROCEDURE — 93321 DOPPLER ECHO F-UP/LMTD STD: CPT | Mod: 26

## 2025-03-14 RX ORDER — INSULIN LISPRO 100 U/ML
INJECTION, SOLUTION INTRAVENOUS; SUBCUTANEOUS AT BEDTIME
Refills: 0 | Status: DISCONTINUED | OUTPATIENT
Start: 2025-03-14 | End: 2025-03-19

## 2025-03-14 RX ORDER — INSULIN LISPRO 100 U/ML
INJECTION, SOLUTION INTRAVENOUS; SUBCUTANEOUS
Refills: 0 | Status: DISCONTINUED | OUTPATIENT
Start: 2025-03-14 | End: 2025-03-19

## 2025-03-14 RX ADMIN — Medication 2 TABLET(S): at 22:42

## 2025-03-14 RX ADMIN — INSULIN LISPRO 5 UNIT(S): 100 INJECTION, SOLUTION INTRAVENOUS; SUBCUTANEOUS at 17:46

## 2025-03-14 RX ADMIN — BUMETANIDE 0.5 MILLIGRAM(S): 1 TABLET ORAL at 05:16

## 2025-03-14 RX ADMIN — INSULIN GLARGINE-YFGN 15 UNIT(S): 100 INJECTION, SOLUTION SUBCUTANEOUS at 23:49

## 2025-03-14 RX ADMIN — Medication 125 MICROGRAM(S): at 05:17

## 2025-03-14 RX ADMIN — POLYETHYLENE GLYCOL 3350 17 GRAM(S): 17 POWDER, FOR SOLUTION ORAL at 11:28

## 2025-03-14 RX ADMIN — INSULIN LISPRO 5 UNIT(S): 100 INJECTION, SOLUTION INTRAVENOUS; SUBCUTANEOUS at 12:42

## 2025-03-14 RX ADMIN — GABAPENTIN 600 MILLIGRAM(S): 400 CAPSULE ORAL at 17:46

## 2025-03-14 RX ADMIN — APIXABAN 5 MILLIGRAM(S): 2.5 TABLET, FILM COATED ORAL at 05:16

## 2025-03-14 RX ADMIN — APIXABAN 5 MILLIGRAM(S): 2.5 TABLET, FILM COATED ORAL at 17:46

## 2025-03-14 RX ADMIN — ESCITALOPRAM OXALATE 20 MILLIGRAM(S): 20 TABLET ORAL at 11:29

## 2025-03-14 RX ADMIN — Medication 40 MILLIGRAM(S): at 05:17

## 2025-03-14 RX ADMIN — GABAPENTIN 600 MILLIGRAM(S): 400 CAPSULE ORAL at 05:17

## 2025-03-14 RX ADMIN — METOPROLOL SUCCINATE 25 MILLIGRAM(S): 50 TABLET, EXTENDED RELEASE ORAL at 05:16

## 2025-03-14 RX ADMIN — Medication 40 MILLIEQUIVALENT(S): at 11:28

## 2025-03-14 RX ADMIN — ATORVASTATIN CALCIUM 40 MILLIGRAM(S): 80 TABLET, FILM COATED ORAL at 22:41

## 2025-03-14 RX ADMIN — REMDESIVIR 200 MILLIGRAM(S): 5 INJECTION INTRAVENOUS at 22:42

## 2025-03-14 RX ADMIN — INSULIN LISPRO 5 UNIT(S): 100 INJECTION, SOLUTION INTRAVENOUS; SUBCUTANEOUS at 08:02

## 2025-03-14 RX ADMIN — Medication 40 MILLIEQUIVALENT(S): at 15:49

## 2025-03-15 LAB
ALBUMIN SERPL ELPH-MCNC: 3.4 G/DL — SIGNIFICANT CHANGE UP (ref 3.3–5)
ALBUMIN SERPL ELPH-MCNC: 3.4 G/DL — SIGNIFICANT CHANGE UP (ref 3.3–5)
ALP SERPL-CCNC: 98 U/L — SIGNIFICANT CHANGE UP (ref 40–120)
ALP SERPL-CCNC: 99 U/L — SIGNIFICANT CHANGE UP (ref 40–120)
ALT FLD-CCNC: 10 U/L — SIGNIFICANT CHANGE UP (ref 10–45)
ALT FLD-CCNC: 9 U/L — LOW (ref 10–45)
ANION GAP SERPL CALC-SCNC: 15 MMOL/L — SIGNIFICANT CHANGE UP (ref 5–17)
AST SERPL-CCNC: 15 U/L — SIGNIFICANT CHANGE UP (ref 10–40)
AST SERPL-CCNC: 16 U/L — SIGNIFICANT CHANGE UP (ref 10–40)
BILIRUB DIRECT SERPL-MCNC: <0.1 MG/DL — SIGNIFICANT CHANGE UP (ref 0–0.3)
BILIRUB SERPL-MCNC: 0.3 MG/DL — SIGNIFICANT CHANGE UP (ref 0.2–1.2)
BUN SERPL-MCNC: 18 MG/DL — SIGNIFICANT CHANGE UP (ref 7–23)
CALCIUM SERPL-MCNC: 9.2 MG/DL — SIGNIFICANT CHANGE UP (ref 8.4–10.5)
CHLORIDE SERPL-SCNC: 100 MMOL/L — SIGNIFICANT CHANGE UP (ref 96–108)
CO2 SERPL-SCNC: 25 MMOL/L — SIGNIFICANT CHANGE UP (ref 22–31)
CREAT SERPL-MCNC: 1.08 MG/DL — SIGNIFICANT CHANGE UP (ref 0.5–1.3)
CREAT SERPL-MCNC: 1.1 MG/DL — SIGNIFICANT CHANGE UP (ref 0.5–1.3)
CULTURE RESULTS: SIGNIFICANT CHANGE UP
EGFR: 54 ML/MIN/1.73M2 — LOW
EGFR: 54 ML/MIN/1.73M2 — LOW
EGFR: 55 ML/MIN/1.73M2 — LOW
EGFR: 55 ML/MIN/1.73M2 — LOW
GLUCOSE BLDC GLUCOMTR-MCNC: 135 MG/DL — HIGH (ref 70–99)
GLUCOSE BLDC GLUCOMTR-MCNC: 136 MG/DL — HIGH (ref 70–99)
GLUCOSE BLDC GLUCOMTR-MCNC: 142 MG/DL — HIGH (ref 70–99)
GLUCOSE BLDC GLUCOMTR-MCNC: 157 MG/DL — HIGH (ref 70–99)
GLUCOSE SERPL-MCNC: 128 MG/DL — HIGH (ref 70–99)
HCT VFR BLD CALC: 40.6 % — SIGNIFICANT CHANGE UP (ref 34.5–45)
HGB BLD-MCNC: 12.7 G/DL — SIGNIFICANT CHANGE UP (ref 11.5–15.5)
MAGNESIUM SERPL-MCNC: 1.8 MG/DL — SIGNIFICANT CHANGE UP (ref 1.6–2.6)
MCHC RBC-ENTMCNC: 27 PG — SIGNIFICANT CHANGE UP (ref 27–34)
MCHC RBC-ENTMCNC: 31.3 G/DL — LOW (ref 32–36)
MCV RBC AUTO: 86.2 FL — SIGNIFICANT CHANGE UP (ref 80–100)
NRBC BLD AUTO-RTO: 0 /100 WBCS — SIGNIFICANT CHANGE UP (ref 0–0)
PLATELET # BLD AUTO: 235 K/UL — SIGNIFICANT CHANGE UP (ref 150–400)
POTASSIUM SERPL-MCNC: 4.2 MMOL/L — SIGNIFICANT CHANGE UP (ref 3.5–5.3)
POTASSIUM SERPL-SCNC: 4.2 MMOL/L — SIGNIFICANT CHANGE UP (ref 3.5–5.3)
PROT SERPL-MCNC: 6.8 G/DL — SIGNIFICANT CHANGE UP (ref 6–8.3)
PROT SERPL-MCNC: 6.9 G/DL — SIGNIFICANT CHANGE UP (ref 6–8.3)
RBC # BLD: 4.71 M/UL — SIGNIFICANT CHANGE UP (ref 3.8–5.2)
RBC # FLD: 16.2 % — HIGH (ref 10.3–14.5)
SODIUM SERPL-SCNC: 140 MMOL/L — SIGNIFICANT CHANGE UP (ref 135–145)
SPECIMEN SOURCE: SIGNIFICANT CHANGE UP
WBC # BLD: 6.05 K/UL — SIGNIFICANT CHANGE UP (ref 3.8–10.5)

## 2025-03-15 RX ADMIN — ATORVASTATIN CALCIUM 40 MILLIGRAM(S): 80 TABLET, FILM COATED ORAL at 21:16

## 2025-03-15 RX ADMIN — INSULIN LISPRO 5 UNIT(S): 100 INJECTION, SOLUTION INTRAVENOUS; SUBCUTANEOUS at 08:15

## 2025-03-15 RX ADMIN — METOPROLOL SUCCINATE 25 MILLIGRAM(S): 50 TABLET, EXTENDED RELEASE ORAL at 05:50

## 2025-03-15 RX ADMIN — GABAPENTIN 600 MILLIGRAM(S): 400 CAPSULE ORAL at 05:49

## 2025-03-15 RX ADMIN — APIXABAN 5 MILLIGRAM(S): 2.5 TABLET, FILM COATED ORAL at 17:17

## 2025-03-15 RX ADMIN — INSULIN GLARGINE-YFGN 15 UNIT(S): 100 INJECTION, SOLUTION SUBCUTANEOUS at 21:19

## 2025-03-15 RX ADMIN — GABAPENTIN 600 MILLIGRAM(S): 400 CAPSULE ORAL at 17:17

## 2025-03-15 RX ADMIN — INSULIN LISPRO 5 UNIT(S): 100 INJECTION, SOLUTION INTRAVENOUS; SUBCUTANEOUS at 17:17

## 2025-03-15 RX ADMIN — Medication 2 TABLET(S): at 21:16

## 2025-03-15 RX ADMIN — Medication 4 MILLIGRAM(S): at 23:37

## 2025-03-15 RX ADMIN — INSULIN LISPRO 5 UNIT(S): 100 INJECTION, SOLUTION INTRAVENOUS; SUBCUTANEOUS at 11:36

## 2025-03-15 RX ADMIN — Medication 40 MILLIGRAM(S): at 05:50

## 2025-03-15 RX ADMIN — Medication 125 MICROGRAM(S): at 05:50

## 2025-03-15 RX ADMIN — APIXABAN 5 MILLIGRAM(S): 2.5 TABLET, FILM COATED ORAL at 05:49

## 2025-03-15 RX ADMIN — BUMETANIDE 0.5 MILLIGRAM(S): 1 TABLET ORAL at 05:49

## 2025-03-15 RX ADMIN — REMDESIVIR 200 MILLIGRAM(S): 5 INJECTION INTRAVENOUS at 21:53

## 2025-03-15 RX ADMIN — ESCITALOPRAM OXALATE 20 MILLIGRAM(S): 20 TABLET ORAL at 11:34

## 2025-03-15 NOTE — PHYSICAL THERAPY INITIAL EVALUATION ADULT - ADDITIONAL COMMENTS
Pt reports  she resides in [place].  Patient ambulated with rollator independent. Uses w/c for long distance mobility.

## 2025-03-15 NOTE — PHYSICAL THERAPY INITIAL EVALUATION ADULT - PERTINENT HX OF CURRENT PROBLEM, REHAB EVAL
69 y/o F with past medical history of AFIB on xarelto, DM, HTN, HLD, fibromyalgia on fentanyl patch, presenting from nursing home with altered mental status. Endorsing 2 days of cough, congestion, and mild shortness of breath. Diagnosed with covid at nursing home yesterday evening. Today was noted to be increasingly confused, per patient's sister the patient is A&Ox3 at baseline, currently A&Ox2 (to self and place). Acute Metabolic Encephalopathy.  CTH- No acute findings.

## 2025-03-16 LAB
ALBUMIN SERPL ELPH-MCNC: 3 G/DL — LOW (ref 3.3–5)
ALP SERPL-CCNC: 89 U/L — SIGNIFICANT CHANGE UP (ref 40–120)
ALT FLD-CCNC: 11 U/L — SIGNIFICANT CHANGE UP (ref 10–45)
AST SERPL-CCNC: 11 U/L — SIGNIFICANT CHANGE UP (ref 10–40)
BILIRUB DIRECT SERPL-MCNC: <0.1 MG/DL — SIGNIFICANT CHANGE UP (ref 0–0.3)
BILIRUB INDIRECT FLD-MCNC: >0.1 MG/DL — LOW (ref 0.2–1)
BILIRUB SERPL-MCNC: 0.2 MG/DL — SIGNIFICANT CHANGE UP (ref 0.2–1.2)
CREAT SERPL-MCNC: 1.08 MG/DL — SIGNIFICANT CHANGE UP (ref 0.5–1.3)
EGFR: 55 ML/MIN/1.73M2 — LOW
EGFR: 55 ML/MIN/1.73M2 — LOW
GAS PNL BLDA: SIGNIFICANT CHANGE UP
GLUCOSE BLDC GLUCOMTR-MCNC: 126 MG/DL — HIGH (ref 70–99)
GLUCOSE BLDC GLUCOMTR-MCNC: 137 MG/DL — HIGH (ref 70–99)
GLUCOSE BLDC GLUCOMTR-MCNC: 155 MG/DL — HIGH (ref 70–99)
GLUCOSE BLDC GLUCOMTR-MCNC: 176 MG/DL — HIGH (ref 70–99)
PROT SERPL-MCNC: 6.2 G/DL — SIGNIFICANT CHANGE UP (ref 6–8.3)

## 2025-03-16 RX ORDER — DEXTROMETHORPHAN HBR, GUAIFENESIN 200 MG/10ML
100 LIQUID ORAL EVERY 6 HOURS
Refills: 0 | Status: DISCONTINUED | OUTPATIENT
Start: 2025-03-16 | End: 2025-03-19

## 2025-03-16 RX ADMIN — APIXABAN 5 MILLIGRAM(S): 2.5 TABLET, FILM COATED ORAL at 05:22

## 2025-03-16 RX ADMIN — BUMETANIDE 0.5 MILLIGRAM(S): 1 TABLET ORAL at 05:22

## 2025-03-16 RX ADMIN — GABAPENTIN 600 MILLIGRAM(S): 400 CAPSULE ORAL at 18:14

## 2025-03-16 RX ADMIN — REMDESIVIR 200 MILLIGRAM(S): 5 INJECTION INTRAVENOUS at 21:21

## 2025-03-16 RX ADMIN — Medication 4 MILLIGRAM(S): at 06:03

## 2025-03-16 RX ADMIN — INSULIN LISPRO 2: 100 INJECTION, SOLUTION INTRAVENOUS; SUBCUTANEOUS at 18:15

## 2025-03-16 RX ADMIN — APIXABAN 5 MILLIGRAM(S): 2.5 TABLET, FILM COATED ORAL at 18:14

## 2025-03-16 RX ADMIN — INSULIN GLARGINE-YFGN 15 UNIT(S): 100 INJECTION, SOLUTION SUBCUTANEOUS at 21:22

## 2025-03-16 RX ADMIN — GABAPENTIN 600 MILLIGRAM(S): 400 CAPSULE ORAL at 05:22

## 2025-03-16 RX ADMIN — DEXTROMETHORPHAN HBR, GUAIFENESIN 100 MILLIGRAM(S): 200 LIQUID ORAL at 21:58

## 2025-03-16 RX ADMIN — ESCITALOPRAM OXALATE 20 MILLIGRAM(S): 20 TABLET ORAL at 12:33

## 2025-03-16 RX ADMIN — ATORVASTATIN CALCIUM 40 MILLIGRAM(S): 80 TABLET, FILM COATED ORAL at 21:22

## 2025-03-16 RX ADMIN — POLYETHYLENE GLYCOL 3350 17 GRAM(S): 17 POWDER, FOR SOLUTION ORAL at 12:33

## 2025-03-16 RX ADMIN — INSULIN LISPRO 5 UNIT(S): 100 INJECTION, SOLUTION INTRAVENOUS; SUBCUTANEOUS at 07:53

## 2025-03-16 RX ADMIN — INSULIN LISPRO 5 UNIT(S): 100 INJECTION, SOLUTION INTRAVENOUS; SUBCUTANEOUS at 12:34

## 2025-03-16 RX ADMIN — INSULIN LISPRO 5 UNIT(S): 100 INJECTION, SOLUTION INTRAVENOUS; SUBCUTANEOUS at 18:15

## 2025-03-16 RX ADMIN — Medication 40 MILLIGRAM(S): at 05:23

## 2025-03-16 RX ADMIN — Medication 125 MICROGRAM(S): at 05:22

## 2025-03-16 NOTE — CHART NOTE - NSCHARTNOTEFT_GEN_A_CORE
Patient stated she has RENARD and used bipap at nights . Bipap ordered for sleep apnea. Patient had abg performed and show no respiratory acidosis . Continue present care . Discharge in am Patient had abg performed and show no respiratory acidosis . Continue present care . Discharge in am as per Dr Hunter

## 2025-03-17 LAB
ALBUMIN SERPL ELPH-MCNC: 3.1 G/DL — LOW (ref 3.3–5)
ALT FLD-CCNC: 8 U/L — LOW (ref 10–45)
AST SERPL-CCNC: 12 U/L — SIGNIFICANT CHANGE UP (ref 10–40)
BILIRUB DIRECT SERPL-MCNC: <0.1 MG/DL — SIGNIFICANT CHANGE UP (ref 0–0.3)
BILIRUB INDIRECT FLD-MCNC: >0.2 MG/DL — SIGNIFICANT CHANGE UP (ref 0.2–1)
BILIRUB SERPL-MCNC: 0.3 MG/DL — SIGNIFICANT CHANGE UP (ref 0.2–1.2)
CREAT SERPL-MCNC: 1.05 MG/DL — SIGNIFICANT CHANGE UP (ref 0.5–1.3)
EGFR: 57 ML/MIN/1.73M2 — LOW
EGFR: 57 ML/MIN/1.73M2 — LOW
GLUCOSE BLDC GLUCOMTR-MCNC: 124 MG/DL — HIGH (ref 70–99)
GLUCOSE BLDC GLUCOMTR-MCNC: 131 MG/DL — HIGH (ref 70–99)
GLUCOSE BLDC GLUCOMTR-MCNC: 158 MG/DL — HIGH (ref 70–99)
GLUCOSE BLDC GLUCOMTR-MCNC: 167 MG/DL — HIGH (ref 70–99)
PROT SERPL-MCNC: 6.4 G/DL — SIGNIFICANT CHANGE UP (ref 6–8.3)

## 2025-03-17 RX ADMIN — GABAPENTIN 600 MILLIGRAM(S): 400 CAPSULE ORAL at 17:46

## 2025-03-17 RX ADMIN — ESCITALOPRAM OXALATE 20 MILLIGRAM(S): 20 TABLET ORAL at 12:38

## 2025-03-17 RX ADMIN — APIXABAN 5 MILLIGRAM(S): 2.5 TABLET, FILM COATED ORAL at 17:47

## 2025-03-17 RX ADMIN — POLYETHYLENE GLYCOL 3350 17 GRAM(S): 17 POWDER, FOR SOLUTION ORAL at 12:38

## 2025-03-17 RX ADMIN — DEXTROMETHORPHAN HBR, GUAIFENESIN 100 MILLIGRAM(S): 200 LIQUID ORAL at 08:11

## 2025-03-17 RX ADMIN — INSULIN LISPRO 5 UNIT(S): 100 INJECTION, SOLUTION INTRAVENOUS; SUBCUTANEOUS at 12:39

## 2025-03-17 RX ADMIN — INSULIN GLARGINE-YFGN 15 UNIT(S): 100 INJECTION, SOLUTION SUBCUTANEOUS at 22:02

## 2025-03-17 RX ADMIN — GABAPENTIN 600 MILLIGRAM(S): 400 CAPSULE ORAL at 05:14

## 2025-03-17 RX ADMIN — INSULIN LISPRO 2: 100 INJECTION, SOLUTION INTRAVENOUS; SUBCUTANEOUS at 08:12

## 2025-03-17 RX ADMIN — Medication 125 MICROGRAM(S): at 05:14

## 2025-03-17 RX ADMIN — INSULIN LISPRO 2: 100 INJECTION, SOLUTION INTRAVENOUS; SUBCUTANEOUS at 12:39

## 2025-03-17 RX ADMIN — REMDESIVIR 200 MILLIGRAM(S): 5 INJECTION INTRAVENOUS at 22:07

## 2025-03-17 RX ADMIN — BUMETANIDE 0.5 MILLIGRAM(S): 1 TABLET ORAL at 05:14

## 2025-03-17 RX ADMIN — ATORVASTATIN CALCIUM 40 MILLIGRAM(S): 80 TABLET, FILM COATED ORAL at 22:02

## 2025-03-17 RX ADMIN — INSULIN LISPRO 5 UNIT(S): 100 INJECTION, SOLUTION INTRAVENOUS; SUBCUTANEOUS at 18:13

## 2025-03-17 RX ADMIN — APIXABAN 5 MILLIGRAM(S): 2.5 TABLET, FILM COATED ORAL at 05:14

## 2025-03-17 RX ADMIN — INSULIN LISPRO 5 UNIT(S): 100 INJECTION, SOLUTION INTRAVENOUS; SUBCUTANEOUS at 08:12

## 2025-03-17 RX ADMIN — Medication 40 MILLIGRAM(S): at 05:14

## 2025-03-17 RX ADMIN — DEXTROMETHORPHAN HBR, GUAIFENESIN 100 MILLIGRAM(S): 200 LIQUID ORAL at 22:03

## 2025-03-18 ENCOUNTER — TRANSCRIPTION ENCOUNTER (OUTPATIENT)
Age: 70
End: 2025-03-18

## 2025-03-18 LAB
GLUCOSE BLDC GLUCOMTR-MCNC: 122 MG/DL — HIGH (ref 70–99)
GLUCOSE BLDC GLUCOMTR-MCNC: 128 MG/DL — HIGH (ref 70–99)
GLUCOSE BLDC GLUCOMTR-MCNC: 135 MG/DL — HIGH (ref 70–99)
GLUCOSE BLDC GLUCOMTR-MCNC: 135 MG/DL — HIGH (ref 70–99)
GLUCOSE BLDC GLUCOMTR-MCNC: 138 MG/DL — HIGH (ref 70–99)

## 2025-03-18 RX ORDER — SENNA 187 MG
2 TABLET ORAL
Qty: 0 | Refills: 0 | DISCHARGE
Start: 2025-03-18

## 2025-03-18 RX ORDER — METOPROLOL SUCCINATE 50 MG/1
1 TABLET, EXTENDED RELEASE ORAL
Qty: 0 | Refills: 0 | DISCHARGE
Start: 2025-03-18

## 2025-03-18 RX ORDER — DEXTROMETHORPHAN HBR, GUAIFENESIN 200 MG/10ML
5 LIQUID ORAL
Qty: 0 | Refills: 0 | DISCHARGE
Start: 2025-03-18

## 2025-03-18 RX ADMIN — Medication 125 MICROGRAM(S): at 05:23

## 2025-03-18 RX ADMIN — ESCITALOPRAM OXALATE 20 MILLIGRAM(S): 20 TABLET ORAL at 12:12

## 2025-03-18 RX ADMIN — INSULIN GLARGINE-YFGN 15 UNIT(S): 100 INJECTION, SOLUTION SUBCUTANEOUS at 22:18

## 2025-03-18 RX ADMIN — POLYETHYLENE GLYCOL 3350 17 GRAM(S): 17 POWDER, FOR SOLUTION ORAL at 12:14

## 2025-03-18 RX ADMIN — Medication 4 MILLIGRAM(S): at 05:27

## 2025-03-18 RX ADMIN — GABAPENTIN 600 MILLIGRAM(S): 400 CAPSULE ORAL at 17:34

## 2025-03-18 RX ADMIN — APIXABAN 5 MILLIGRAM(S): 2.5 TABLET, FILM COATED ORAL at 17:34

## 2025-03-18 RX ADMIN — BUMETANIDE 0.5 MILLIGRAM(S): 1 TABLET ORAL at 05:22

## 2025-03-18 RX ADMIN — INSULIN LISPRO 5 UNIT(S): 100 INJECTION, SOLUTION INTRAVENOUS; SUBCUTANEOUS at 08:40

## 2025-03-18 RX ADMIN — Medication 2 TABLET(S): at 22:19

## 2025-03-18 RX ADMIN — INSULIN LISPRO 5 UNIT(S): 100 INJECTION, SOLUTION INTRAVENOUS; SUBCUTANEOUS at 12:12

## 2025-03-18 RX ADMIN — APIXABAN 5 MILLIGRAM(S): 2.5 TABLET, FILM COATED ORAL at 05:22

## 2025-03-18 RX ADMIN — ATORVASTATIN CALCIUM 40 MILLIGRAM(S): 80 TABLET, FILM COATED ORAL at 22:19

## 2025-03-18 RX ADMIN — GABAPENTIN 600 MILLIGRAM(S): 400 CAPSULE ORAL at 05:23

## 2025-03-18 RX ADMIN — INSULIN LISPRO 5 UNIT(S): 100 INJECTION, SOLUTION INTRAVENOUS; SUBCUTANEOUS at 17:34

## 2025-03-18 RX ADMIN — Medication 4 MILLIGRAM(S): at 06:27

## 2025-03-18 RX ADMIN — Medication 40 MILLIGRAM(S): at 05:23

## 2025-03-18 NOTE — DISCHARGE NOTE PROVIDER - NSDCMRMEDTOKEN_GEN_ALL_CORE_FT
acetaminophen 325 mg oral tablet: 2 tab(s) orally every 6 hours As needed Temp greater or equal to 38C (100.4F), Mild Pain (1 - 3)  apixaban 5 mg oral tablet: 1 tab(s) orally every 12 hours  ARIPiprazole 2 mg oral tablet: 1 tab(s) orally once a day  atorvastatin 40 mg oral tablet: 1 tab(s) orally once a day (at bedtime)  bumetanide 0.5 mg oral tablet: 1 tab(s) orally once a day  buPROPion 150 mg/24 hours (XL) oral tablet, extended release: 1 tab(s) orally once a day  cholecalciferol 50 mcg (2000 intl units) oral tablet: 1 tab(s) orally once a day  cyanocobalamin 1000 mcg oral tablet: 1 tab(s) orally once a day  escitalopram 20 mg oral tablet: 1 tab(s) orally once a day Patient takes a total of 25mg daily  escitalopram 5 mg oral tablet: 1 tab(s) orally once a day Patient takes a total of 25mg daily  fentaNYL 50 mcg/hr transdermal film, extended release: 1 patch transdermally every 72 hours  ferrous sulfate 325 mg (65 mg elemental iron) oral tablet: 1 tab(s) orally once a day  gabapentin 600 mg oral tablet: 1 tab(s) orally 3 times a day  guaiFENesin 100 mg/5 mL oral liquid: 5 milliliter(s) orally every 6 hours As needed Cough  insulin glargine 100 units/mL subcutaneous solution: 30 unit(s) subcutaneous once a day  insulin lispro 100 units/mL injectable solution: 5 unit(s) injectable 3 times a day (with meals)  levothyroxine 125 mcg (0.125 mg) oral tablet: 1 tab(s) orally once a day  metFORMIN 1000 mg oral tablet: 1 tab(s) orally 2 times a day  metoprolol succinate 25 mg oral tablet, extended release: 1 tab(s) orally once a day  mirabegron 25 mg oral tablet, extended release: 1 tab(s) orally once a day  pantoprazole 40 mg oral delayed release tablet: 1 tab(s) orally once a day  polyethylene glycol 3350 oral powder for reconstitution: 17 gram(s) orally once a day  senna leaf extract oral tablet: 2 tab(s) orally once a day (at bedtime)

## 2025-03-18 NOTE — DISCHARGE NOTE PROVIDER - NSDCFUADDAPPT_GEN_ALL_CORE_FT
APPTS ARE READY TO BE MADE: [X] YES    Best Family or Patient Contact (if needed):    Additional Information about above appointments (if needed):    1:   2:   3:     Other comments or requests:    APPTS ARE READY TO BE MADE: [X] YES    Best Family or Patient Contact (if needed):    Additional Information about above appointments (if needed):    1:   2:   3:     Other comments or requests:   Patient is being transferred. Caregiver will arrange follow up.

## 2025-03-18 NOTE — DISCHARGE NOTE PROVIDER - CARE PROVIDER_API CALL
CHRIS MUNGUIA  Phone: [phone]  Fax: [phone]  Established Patient  Follow Up Time: 1 week

## 2025-03-18 NOTE — DISCHARGE NOTE PROVIDER - NSDCCPCAREPLAN_GEN_ALL_CORE_FT
PRINCIPAL DISCHARGE DIAGNOSIS  Diagnosis: Acute encephalopathy  Assessment and Plan of Treatment: CT scan of head negative. Altered mental status likely due to positive covid status. Please follow-up with your primary care physician within one week of discharge.      SECONDARY DISCHARGE DIAGNOSES  Diagnosis: COVID-19  Assessment and Plan of Treatment: You completed a course of remdesivir. You are on room air. Please continue home bipap.

## 2025-03-18 NOTE — PROGRESS NOTE ADULT - ASSESSMENT
69 y/o F with past medical history of AFIB on xarelto, DM, HTN, HLD, fibromyalgia on fentanyl patch, presenting from nursing home with altered mental status.     D/C planning to [facility]   Acute Metabolic Encephalopathy resolving  COVID infection   Remdesivir last dose tomorrow   Standard precautions     Afib on Eliquis     DM (diabetes mellitus).   ·  Plan: HbA1C 7.2   finger sticks with short acting insulin sliding scale  no oral meds  diabetic diet  monitor for hypoglycemia     Lantus and pre meal    HLD (hyperlipidemia).   continue atorvastatin.    OSAP out patient follow up      H/O fibromyalgia.   continue home meds 
 71 y/o F with past medical history of AFIB on xarelto, DM, HTN, HLD, fibromyalgia on fentanyl patch, presenting from nursing home with altered mental status.       Acute Metabolic Encephalopathy resolving  COVID infection   Remdesivir  Standard precautions     Afib on Eliquis     DM (diabetes mellitus).   ·  Plan: HbA1C  finger sticks with short acting insulin sliding scale  no oral meds  diabetic diet  monitor for hypoglycemia     Lantus and pre meal    HLD (hyperlipidemia).   continue atorvastatin.         H/O fibromyalgia.   continue home meds 
 69 y/o F with past medical history of AFIB on xarelto, DM, HTN, HLD, fibromyalgia on fentanyl patch, presenting from nursing home with altered mental status.     D/C planning to [facility]   Acute Metabolic Encephalopathy resolving  COVID infection   Remdesivir last dose   Standard precautions     Afib on Eliquis     DM (diabetes mellitus).   ·  Plan: HbA1C 7.2   finger sticks with short acting insulin sliding scale  no oral meds  diabetic diet  monitor for hypoglycemia     Lantus and pre meal    HLD (hyperlipidemia).   continue atorvastatin.    OSAP out patient follow up      H/O fibromyalgia.   continue home meds 
 71 y/o F with past medical history of AFIB on xarelto, DM, HTN, HLD, fibromyalgia on fentanyl patch, presenting from nursing home with altered mental status.       Acute Metabolic Encephalopathy resolving  COVID infection   Remdesivir last dose tomorrow   Standard precautions     Afib on Eliquis     DM (diabetes mellitus).   ·  Plan: HbA1C  finger sticks with short acting insulin sliding scale  no oral meds  diabetic diet  monitor for hypoglycemia     Lantus and pre meal    HLD (hyperlipidemia).   continue atorvastatin.    [facility] out patient follow up      H/O fibromyalgia.   continue home meds 
 71 y/o F with past medical history of AFIB on xarelto, DM, HTN, HLD, fibromyalgia on fentanyl patch, presenting from nursing home with altered mental status.       Acute Metabolic Encephalopathy   COVID infection   Remdesivir  Standard precautions     Afib on Eliquis     DM (diabetes mellitus).   ·  Plan: HbA1C  finger sticks with short acting insulin sliding scale  no oral meds  diabetic diet  monitor for hypoglycemia     Lantus and pre meal    HLD (hyperlipidemia).   continue atorvastatin.         H/O fibromyalgia.   continue home meds

## 2025-03-18 NOTE — DISCHARGE NOTE PROVIDER - HOSPITAL COURSE
HPI:  69 y/o F with past medical history of AFIB on xarelto, DM, HTN, HLD, fibromyalgia on fentanyl patch, presenting from nursing home with altered mental status.   Patient reports 2 days of cough, congestion, and mild shortness of breath. Diagnosed with covid at nursing home yesterday evening. Today was noted to be increasingly confused, per patient's sister the patient is A&Ox3 at baseline, currently A&Ox2 (to self and place).     No hx chest pain, fever, abd pain, nausea, vomiting, diarrhea, dysuria, or any other symptoms. (13 Mar 2025 17:58)    Hospital Course:      Important Medication Changes and Reason:  n/a    Active or Pending Issues Requiring Follow-up:  - PCP    Advanced Directives:   [X] Full code  [ ] DNR  [ ] Hospice    Discharge Diagnoses:  AMS  COVID  RENARD       HPI:  69 y/o F with past medical history of AFIB on xarelto, DM, HTN, HLD, fibromyalgia on fentanyl patch, presenting from nursing home with altered mental status.   Patient reports 2 days of cough, congestion, and mild shortness of breath. Diagnosed with covid at nursing home yesterday evening. Today was noted to be increasingly confused, per patient's sister the patient is A&Ox3 at baseline, currently A&Ox2 (to self and place).     No hx chest pain, fever, abd pain, nausea, vomiting, diarrhea, dysuria, or any other symptoms. (13 Mar 2025 17:58)    Hospital Course:  AMS, CTH neg. RVP +COVID. Start RDV x5 days. Acute Metabolic Encephalopathy resolving.   Cards following given hx Afib, CHF. continue Bumex. continue metoprolol , fu hr closely. Echo reading no change as per read, but last echo pt had RVE and RV dysfunction. pt with sig sleep apnea- pt had a mask at night at home, needs to use it in rehab.    Discharge planning discussed with attending Dr Lechuga. Patient is medically cleared and stable for discharge to [facility]. Medication Reconciliation reviewed with attending.    Important Medication Changes and Reason:  - See med rec    Active or Pending Issues Requiring Follow-up:  - PCP    Advanced Directives:   [X] Full code  [ ] DNR  [ ] Hospice    Discharge Diagnoses:  AMS  COVID  RENARD

## 2025-03-19 ENCOUNTER — TRANSCRIPTION ENCOUNTER (OUTPATIENT)
Age: 70
End: 2025-03-19

## 2025-03-19 VITALS
SYSTOLIC BLOOD PRESSURE: 120 MMHG | DIASTOLIC BLOOD PRESSURE: 70 MMHG | OXYGEN SATURATION: 93 % | HEART RATE: 54 BPM | TEMPERATURE: 98 F | RESPIRATION RATE: 18 BRPM

## 2025-03-19 LAB
ALBUMIN SERPL ELPH-MCNC: 3.2 G/DL — LOW (ref 3.3–5)
ALP SERPL-CCNC: 86 U/L — SIGNIFICANT CHANGE UP (ref 40–120)
ALT FLD-CCNC: 7 U/L — LOW (ref 10–45)
AST SERPL-CCNC: 10 U/L — SIGNIFICANT CHANGE UP (ref 10–40)
BILIRUB DIRECT SERPL-MCNC: 0.2 MG/DL — SIGNIFICANT CHANGE UP (ref 0–0.3)
BILIRUB INDIRECT FLD-MCNC: 0.2 MG/DL — SIGNIFICANT CHANGE UP (ref 0.2–1)
BILIRUB SERPL-MCNC: 0.4 MG/DL — SIGNIFICANT CHANGE UP (ref 0.2–1.2)
CREAT SERPL-MCNC: 1.12 MG/DL — SIGNIFICANT CHANGE UP (ref 0.5–1.3)
EGFR: 53 ML/MIN/1.73M2 — LOW
EGFR: 53 ML/MIN/1.73M2 — LOW
GLUCOSE BLDC GLUCOMTR-MCNC: 123 MG/DL — HIGH (ref 70–99)
GLUCOSE BLDC GLUCOMTR-MCNC: 146 MG/DL — HIGH (ref 70–99)
GLUCOSE BLDC GLUCOMTR-MCNC: 147 MG/DL — HIGH (ref 70–99)

## 2025-03-19 PROCEDURE — 85610 PROTHROMBIN TIME: CPT

## 2025-03-19 PROCEDURE — 85025 COMPLETE CBC W/AUTO DIFF WBC: CPT

## 2025-03-19 PROCEDURE — 83036 HEMOGLOBIN GLYCOSYLATED A1C: CPT

## 2025-03-19 PROCEDURE — 93308 TTE F-UP OR LMTD: CPT

## 2025-03-19 PROCEDURE — 71045 X-RAY EXAM CHEST 1 VIEW: CPT

## 2025-03-19 PROCEDURE — 97162 PT EVAL MOD COMPLEX 30 MIN: CPT

## 2025-03-19 PROCEDURE — 81001 URINALYSIS AUTO W/SCOPE: CPT

## 2025-03-19 PROCEDURE — 82803 BLOOD GASES ANY COMBINATION: CPT

## 2025-03-19 PROCEDURE — 85730 THROMBOPLASTIN TIME PARTIAL: CPT

## 2025-03-19 PROCEDURE — 36415 COLL VENOUS BLD VENIPUNCTURE: CPT

## 2025-03-19 PROCEDURE — 82330 ASSAY OF CALCIUM: CPT

## 2025-03-19 PROCEDURE — 93321 DOPPLER ECHO F-UP/LMTD STD: CPT

## 2025-03-19 PROCEDURE — 82435 ASSAY OF BLOOD CHLORIDE: CPT

## 2025-03-19 PROCEDURE — 87637 SARSCOV2&INF A&B&RSV AMP PRB: CPT

## 2025-03-19 PROCEDURE — 93325 DOPPLER ECHO COLOR FLOW MAPG: CPT

## 2025-03-19 PROCEDURE — 85027 COMPLETE CBC AUTOMATED: CPT

## 2025-03-19 PROCEDURE — 80048 BASIC METABOLIC PNL TOTAL CA: CPT

## 2025-03-19 PROCEDURE — 80053 COMPREHEN METABOLIC PANEL: CPT

## 2025-03-19 PROCEDURE — 99285 EMERGENCY DEPT VISIT HI MDM: CPT

## 2025-03-19 PROCEDURE — 83605 ASSAY OF LACTIC ACID: CPT

## 2025-03-19 PROCEDURE — 36600 WITHDRAWAL OF ARTERIAL BLOOD: CPT

## 2025-03-19 PROCEDURE — 87086 URINE CULTURE/COLONY COUNT: CPT

## 2025-03-19 PROCEDURE — 97116 GAIT TRAINING THERAPY: CPT

## 2025-03-19 PROCEDURE — 80307 DRUG TEST PRSMV CHEM ANLYZR: CPT

## 2025-03-19 PROCEDURE — 85014 HEMATOCRIT: CPT

## 2025-03-19 PROCEDURE — 80076 HEPATIC FUNCTION PANEL: CPT

## 2025-03-19 PROCEDURE — 82565 ASSAY OF CREATININE: CPT

## 2025-03-19 PROCEDURE — 84295 ASSAY OF SERUM SODIUM: CPT

## 2025-03-19 PROCEDURE — 70450 CT HEAD/BRAIN W/O DYE: CPT | Mod: MC

## 2025-03-19 PROCEDURE — 83735 ASSAY OF MAGNESIUM: CPT

## 2025-03-19 PROCEDURE — 85018 HEMOGLOBIN: CPT

## 2025-03-19 PROCEDURE — 97530 THERAPEUTIC ACTIVITIES: CPT

## 2025-03-19 PROCEDURE — 82947 ASSAY GLUCOSE BLOOD QUANT: CPT

## 2025-03-19 PROCEDURE — 82962 GLUCOSE BLOOD TEST: CPT

## 2025-03-19 PROCEDURE — 84132 ASSAY OF SERUM POTASSIUM: CPT

## 2025-03-19 RX ADMIN — INSULIN LISPRO 5 UNIT(S): 100 INJECTION, SOLUTION INTRAVENOUS; SUBCUTANEOUS at 17:41

## 2025-03-19 RX ADMIN — INSULIN LISPRO 5 UNIT(S): 100 INJECTION, SOLUTION INTRAVENOUS; SUBCUTANEOUS at 08:02

## 2025-03-19 RX ADMIN — Medication 4 MILLIGRAM(S): at 03:40

## 2025-03-19 RX ADMIN — APIXABAN 5 MILLIGRAM(S): 2.5 TABLET, FILM COATED ORAL at 17:41

## 2025-03-19 RX ADMIN — GABAPENTIN 600 MILLIGRAM(S): 400 CAPSULE ORAL at 05:25

## 2025-03-19 RX ADMIN — ESCITALOPRAM OXALATE 20 MILLIGRAM(S): 20 TABLET ORAL at 08:03

## 2025-03-19 RX ADMIN — INSULIN LISPRO 5 UNIT(S): 100 INJECTION, SOLUTION INTRAVENOUS; SUBCUTANEOUS at 11:44

## 2025-03-19 RX ADMIN — BUMETANIDE 0.5 MILLIGRAM(S): 1 TABLET ORAL at 05:24

## 2025-03-19 RX ADMIN — Medication 125 MICROGRAM(S): at 05:25

## 2025-03-19 RX ADMIN — GABAPENTIN 600 MILLIGRAM(S): 400 CAPSULE ORAL at 17:41

## 2025-03-19 RX ADMIN — Medication 40 MILLIGRAM(S): at 05:24

## 2025-03-19 RX ADMIN — Medication 4 MILLIGRAM(S): at 17:41

## 2025-03-19 RX ADMIN — METOPROLOL SUCCINATE 25 MILLIGRAM(S): 50 TABLET, EXTENDED RELEASE ORAL at 05:24

## 2025-03-19 RX ADMIN — POLYETHYLENE GLYCOL 3350 17 GRAM(S): 17 POWDER, FOR SOLUTION ORAL at 08:03

## 2025-03-19 RX ADMIN — APIXABAN 5 MILLIGRAM(S): 2.5 TABLET, FILM COATED ORAL at 05:24

## 2025-03-19 NOTE — PROGRESS NOTE ADULT - REASON FOR ADMISSION
AMS x 1 day

## 2025-03-19 NOTE — DISCHARGE NOTE NURSING/CASE MANAGEMENT/SOCIAL WORK - PATIENT PORTAL LINK FT
You can access the FollowMyHealth Patient Portal offered by [facility] by registering at the following website: [email or web address]. By joining [facility]’s FollowMyHealth portal, you will also be able to view your health information using other applications (apps) compatible with our system.

## 2025-03-19 NOTE — PROGRESS NOTE ADULT - SUBJECTIVE AND OBJECTIVE BOX
Date of Service: 03-15-25 @ 13:07           CARDIOLOGY     PROGRESS  NOTE   ________________________________________________    CHIEF COMPLAINT:Patient is a 70y old  Female who presents with a chief complaint of AMS x 1 day (14 Mar 2025 22:19)  doing better  	  REVIEW OF SYSTEMS:  CONSTITUTIONAL: No fever, weight loss, or fatigue  EYES: No eye pain, visual disturbances, or discharge  ENT:  No difficulty hearing, tinnitus, vertigo; No sinus or throat pain  NECK: No pain or stiffness  RESPIRATORY: No cough, wheezing, chills or hemoptysis; No Shortness of Breath  CARDIOVASCULAR: No chest pain, palpitations, passing out, dizziness, or leg swelling  GASTROINTESTINAL: No abdominal or epigastric pain. No nausea, vomiting, or hematemesis; No diarrhea or constipation. No melena or hematochezia.  GENITOURINARY: No dysuria, frequency, hematuria, or incontinence  NEUROLOGICAL: No headaches, memory loss, loss of strength, numbness, or tremors  SKIN: No itching, burning, rashes, or lesions   LYMPH Nodes: No enlarged glands  ENDOCRINE: No heat or cold intolerance; No hair loss  MUSCULOSKELETAL: No joint pain or swelling; No muscle, back, or extremity pain  PSYCHIATRIC: No depression, anxiety, mood swings, or difficulty sleeping  HEME/LYMPH: No easy bruising, or bleeding gums  ALLERGY AND IMMUNOLOGIC: No hives or eczema	    [ x] All others negative	  [ ] Unable to obtain    PHYSICAL EXAM:  T(C): 37 (03-15-25 @ 12:04), Max: 37 (03-15-25 @ 12:04)  HR: 53 (03-15-25 @ 12:04) (50 - 56)  BP: 111/77 (03-15-25 @ 12:04) (111/77 - 131/79)  RR: 18 (03-15-25 @ 12:04) (18 - 18)  SpO2: 94% (03-15-25 @ 12:04) (94% - 96%)  Wt(kg): --  I&O's Summary    14 Mar 2025 07:01  -  15 Mar 2025 07:00  --------------------------------------------------------  IN: 0 mL / OUT: 500 mL / NET: -500 mL        Appearance: Normal	  HEENT:   Normal oral mucosa, PERRL, EOMI	  Lymphatic: No lymphadenopathy  Cardiovascular: Normal S1 S2, No JVD, + murmurs, No edema  Respiratory: Lungs clear to auscultation	  Psychiatry: A & O x 3, Mood & affect appropriate  Gastrointestinal:  Soft, Non-tender, + BS	  Skin: No rashes, No ecchymoses, No cyanosis,+  erythema both LE  Neurologic: Non-focal  Extremities: Normal range of motion, No clubbing, cyanosis or edema  Vascular: Peripheral pulses palpable 2+ bilaterally    MEDICATIONS  (STANDING):  apixaban 5 milliGRAM(s) Oral every 12 hours  atorvastatin 40 milliGRAM(s) Oral at bedtime  buMETAnide 0.5 milliGRAM(s) Oral daily  dextrose 5%. 1000 milliLiter(s) (50 mL/Hr) IV Continuous <Continuous>  dextrose 5%. 1000 milliLiter(s) (100 mL/Hr) IV Continuous <Continuous>  dextrose 50% Injectable 25 Gram(s) IV Push once  dextrose 50% Injectable 12.5 Gram(s) IV Push once  dextrose 50% Injectable 25 Gram(s) IV Push once  escitalopram 20 milliGRAM(s) Oral daily  gabapentin 600 milliGRAM(s) Oral two times a day  glucagon  Injectable 1 milliGRAM(s) IntraMuscular once  influenza  Vaccine (HIGH DOSE) 0.5 milliLiter(s) IntraMuscular once  insulin glargine Injectable (LANTUS) 15 Unit(s) SubCutaneous at bedtime  insulin lispro (ADMELOG) corrective regimen sliding scale   SubCutaneous three times a day before meals  insulin lispro (ADMELOG) corrective regimen sliding scale   SubCutaneous at bedtime  insulin lispro Injectable (ADMELOG) 5 Unit(s) SubCutaneous three times a day before meals  levothyroxine 125 MICROGram(s) Oral daily  metoprolol succinate ER 25 milliGRAM(s) Oral daily  pantoprazole    Tablet 40 milliGRAM(s) Oral before breakfast  polyethylene glycol 3350 17 Gram(s) Oral daily  remdesivir  IVPB   IV Intermittent   remdesivir  IVPB 100 milliGRAM(s) IV Intermittent every 24 hours  senna 2 Tablet(s) Oral at bedtime      TELEMETRY: 	    ECG:  	  RADIOLOGY:  OTHER: 	  	  LABS:	 	    CARDIAC MARKERS:                                12.7   6.05  )-----------( 235      ( 15 Mar 2025 07:02 )             40.6     03-15    140  |  100  |  18  ----------------------------<  128[H]  4.2   |  25  |  1.08    Ca    9.2      15 Mar 2025 07:02  Mg     1.8     03-15    TPro  6.8  /  Alb  3.4  /  TBili  0.2  /  DBili  <0.1  /  AST  15  /  ALT  9[L]  /  AlkPhos  98  03-15    proBNP:   Lipid Profile:   HgA1c:   TSH:   PT/INR - ( 13 Mar 2025 13:35 )   PT: 13.8 sec;   INR: 1.20 ratio         PTT - ( 13 Mar 2025 13:35 )  PTT:36.6 sec    < from: TTE W or WO Ultrasound Enhancing Agent (03.14.25 @ 08:57) >   1. Technically difficult image quality.   2. Left ventricular systolic function is normal with an ejection fraction of 67 % by Caldwell's method of disks. There is poor visualization of the endocardial borders to determine the presence of wall motion abnormalities.   3. Based on visual assessment, the right ventricle appears mildly enlarged. normal right ventricular systolic function.   4. Estimated pulmonary artery systolic pressure is 26 mmHg.   5. There is no evidence of significant valve disease within the technical limitations of the study.   6. No pericardial effusion seen.   7. Compared to the transthoracic echocardiogram performed on 7/26/2020, there have been no significant interval changes.      < from: Transthoracic Echocardiogram (07.26.20 @ 10:36) >  1. Mitral annular calcification, otherwise normal mitral  valve. Minimal mitral regurgitation.  2. Normal left ventricular internal dimensions and wall  thicknesses.  3. Endocardium not well visualized; grossly preserved  overall left ventricular systolic function.  Unable to  exclude segmental wall motion abnormalities.  4. Right ventricular enlargement with decreased right  ventricular systolic function.      < from: 12 Lead ECG (02.08.24 @ 10:14) >  Diagnosis Line SINUS BRADYCARDIA WITH SINUS ARRHYTHMIA  MINIMAL VOLTAGE CRITERIA FOR LVH, MAY BE NORMAL VARIANT ( R in aVL )  INFERIOR INFARCT , AGE UNDETERMINED  CANNOT RULE OUT ANTERIOR INFARCT (CITED ON OR BEFORE 04-FEB-2021)  ABNORMAL ECG  WHEN COMPARED WITH ECG OF 27-JAN-2024 20:10,  NO SIGNIFICANT CHANGE WAS FOUND      Assessment and plan  ---------------------------  71 y/o F with past medical history of AFIB on xarelto, DM, HTN, HLD, fibromyalgia on fentanyl patch, presenting from nursing home with altered mental status.   Patient reports 2 days of cough, congestion, and mild shortness of breath. Diagnosed with covid at nursing home yesterday evening. Today was noted to be increasingly confused, per patient's sister the patient is A&Ox3 at baseline, currently A&Ox2 (  No hx chest pain, fever, abd pain, nausea, vomiting, diarrhea, dysuria, or any other symptoms.  pt is well known to me with hx of a.fib, htn, chf. PHTN , sss with c/o change of mental status and sob  last echo with sig RV dysfunction and RVE  continue ac  continue Bumex  continue metoprolol , fu hr closely  may repeat echo  ckeck K level, fu mag  sleep apnea, needs Bipap or Cpap at night  dvt prophylaxis  pt apparently seeing cardiologist at [facility] will call him  echo reading no change as per read, but last echo pt had RVE and RV dysfunction, will review the echo  pt with sig sleep apnea    	        
Date of Service: 03-16-25 @ 11:03           CARDIOLOGY     PROGRESS  NOTE   ________________________________________________    CHIEF COMPLAINT:Patient is a 70y old  Female who presents with a chief complaint of AMS x 1 day (15 Mar 2025 20:46)  no complain  	  REVIEW OF SYSTEMS:  CONSTITUTIONAL: No fever, weight loss, or fatigue  EYES: No eye pain, visual disturbances, or discharge  ENT:  No difficulty hearing, tinnitus, vertigo; No sinus or throat pain  NECK: No pain or stiffness  RESPIRATORY: No cough, wheezing, chills or hemoptysis; decrease  Shortness of Breath  CARDIOVASCULAR: No chest pain, palpitations, passing out, dizziness, or leg swelling  GASTROINTESTINAL: No abdominal or epigastric pain. No nausea, vomiting, or hematemesis; No diarrhea or constipation. No melena or hematochezia.  GENITOURINARY: No dysuria, frequency, hematuria, or incontinence  NEUROLOGICAL: No headaches, memory loss, loss of strength, numbness, or tremors  SKIN: No itching, burning, rashes, or lesions   LYMPH Nodes: No enlarged glands  ENDOCRINE: No heat or cold intolerance; No hair loss  MUSCULOSKELETAL: No joint pain or swelling; No muscle, back, or extremity pain  PSYCHIATRIC: No depression, anxiety, mood swings, or difficulty sleeping  HEME/LYMPH: No easy bruising, or bleeding gums  ALLERGY AND IMMUNOLOGIC: No hives or eczema	    [ ] All others negative	  [ ] Unable to obtain    PHYSICAL EXAM:  T(C): 36.6 (03-16-25 @ 05:31), Max: 37 (03-15-25 @ 12:04)  HR: 51 (03-16-25 @ 05:31) (51 - 53)  BP: 113/70 (03-16-25 @ 05:31) (111/77 - 122/69)  RR: 18 (03-16-25 @ 05:31) (18 - 18)  SpO2: 95% (03-16-25 @ 05:31) (93% - 95%)  Wt(kg): --  I&O's Summary    15 Mar 2025 07:01  -  16 Mar 2025 07:00  --------------------------------------------------------  IN: 0 mL / OUT: 500 mL / NET: -500 mL        Appearance: Normal	  HEENT:   Normal oral mucosa, PERRL, EOMI	  Lymphatic: No lymphadenopathy  Cardiovascular: Normal S1 S2, No JVD, No murmurs, No edema  Respiratory: Lungs clear to auscultation	  Psychiatry: A & O x 3, Mood & affect appropriate  Gastrointestinal:  Soft, Non-tender, + BS	  Skin: No rashes, No ecchymoses, No cyanosis, +bl LE erythema  Neurologic: Non-focal  Extremities: Normal range of motion, No clubbing, cyanosis or edema  Vascular: Peripheral pulses palpable 2+ bilaterally    MEDICATIONS  (STANDING):  apixaban 5 milliGRAM(s) Oral every 12 hours  atorvastatin 40 milliGRAM(s) Oral at bedtime  buMETAnide 0.5 milliGRAM(s) Oral daily  dextrose 5%. 1000 milliLiter(s) (50 mL/Hr) IV Continuous <Continuous>  dextrose 5%. 1000 milliLiter(s) (100 mL/Hr) IV Continuous <Continuous>  dextrose 50% Injectable 25 Gram(s) IV Push once  dextrose 50% Injectable 12.5 Gram(s) IV Push once  dextrose 50% Injectable 25 Gram(s) IV Push once  escitalopram 20 milliGRAM(s) Oral daily  gabapentin 600 milliGRAM(s) Oral two times a day  glucagon  Injectable 1 milliGRAM(s) IntraMuscular once  influenza  Vaccine (HIGH DOSE) 0.5 milliLiter(s) IntraMuscular once  insulin glargine Injectable (LANTUS) 15 Unit(s) SubCutaneous at bedtime  insulin lispro (ADMELOG) corrective regimen sliding scale   SubCutaneous three times a day before meals  insulin lispro (ADMELOG) corrective regimen sliding scale   SubCutaneous at bedtime  insulin lispro Injectable (ADMELOG) 5 Unit(s) SubCutaneous three times a day before meals  levothyroxine 125 MICROGram(s) Oral daily  metoprolol succinate ER 25 milliGRAM(s) Oral daily  pantoprazole    Tablet 40 milliGRAM(s) Oral before breakfast  polyethylene glycol 3350 17 Gram(s) Oral daily  remdesivir  IVPB   IV Intermittent   remdesivir  IVPB 100 milliGRAM(s) IV Intermittent every 24 hours  senna 2 Tablet(s) Oral at bedtime      TELEMETRY: 	    ECG:  	  RADIOLOGY:  OTHER: 	  	  LABS:	 	    CARDIAC MARKERS:                                12.7   6.05  )-----------( 235      ( 15 Mar 2025 07:02 )             40.6     03-16    x   |  x   |  x   ----------------------------<  x   x    |  x   |  1.08    Ca    9.2      15 Mar 2025 07:02  Mg     1.8     03-15    TPro  6.2  /  Alb  3.0[L]  /  TBili  0.2  /  DBili  <0.1  /  AST  11  /  ALT  11  /  AlkPhos  89  03-16    proBNP:   Lipid Profile:   HgA1c:   TSH:         Assessment and plan  ---------------------------  71 y/o F with past medical history of AFIB on xarelto, DM, HTN, HLD, fibromyalgia on fentanyl patch, presenting from nursing home with altered mental status.   Patient reports 2 days of cough, congestion, and mild shortness of breath. Diagnosed with covid at nursing home yesterday evening. Today was noted to be increasingly confused, per patient's sister the patient is A&Ox3 at baseline, currently A&Ox2 (  No hx chest pain, fever, abd pain, nausea, vomiting, diarrhea, dysuria, or any other symptoms.  pt is well known to me with hx of a.fib, htn, chf. PHTN , sss with c/o change of mental status and sob  last echo with sig RV dysfunction and RVE  continue ac  continue Bumex  continue metoprolol , fu hr closely  may repeat echo  ckeck K level, fu mag  sleep apnea, needs Bipap or Cpap at night  dvt prophylaxis  pt apparently seeing cardiologist at [facility] will call him  echo reading no change as per read, but last echo pt had RVE and RV dysfunction, will review the echo  pt with sig sleep apnea will ask pt is wearing cpap or bipap needs to use it, discuss with ACP      	        
Date of Service: 03-19-25 @ 07:57           CARDIOLOGY     PROGRESS  NOTE   ________________________________________________    CHIEF COMPLAINT:Patient is a 70y old  Female who presents with a chief complaint of AMS x 1 day (18 Mar 2025 18:57)  comfortable  	  REVIEW OF SYSTEMS:  CONSTITUTIONAL: No fever, weight loss, or fatigue  EYES: No eye pain, visual disturbances, or discharge  ENT:  No difficulty hearing, tinnitus, vertigo; No sinus or throat pain  NECK: No pain or stiffness  RESPIRATORY: No cough, wheezing, chills or hemoptysis; No Shortness of Breath  CARDIOVASCULAR: No chest pain, palpitations, passing out, dizziness, or leg swelling  GASTROINTESTINAL: No abdominal or epigastric pain. No nausea, vomiting, or hematemesis; No diarrhea or constipation. No melena or hematochezia.  GENITOURINARY: No dysuria, frequency, hematuria, or incontinence  NEUROLOGICAL: No headaches, memory loss, loss of strength, numbness, or tremors  SKIN: No itching, burning, rashes, or lesions   LYMPH Nodes: No enlarged glands  ENDOCRINE: No heat or cold intolerance; No hair loss  MUSCULOSKELETAL: No joint pain or swelling; No muscle, back, or extremity pain  PSYCHIATRIC: No depression, anxiety, mood swings, or difficulty sleeping  HEME/LYMPH: No easy bruising, or bleeding gums  ALLERGY AND IMMUNOLOGIC: No hives or eczema	    [x ] All others negative	  [ ] Unable to obtain    PHYSICAL EXAM:  T(C): 36.3 (03-19-25 @ 05:40), Max: 36.5 (03-18-25 @ 08:51)  HR: 53 (03-19-25 @ 05:40) (50 - 55)  BP: 135/60 (03-19-25 @ 05:40) (107/61 - 135/60)  RR: 18 (03-19-25 @ 05:40) (18 - 19)  SpO2: 94% (03-19-25 @ 05:40) (91% - 97%)  Wt(kg): --  I&O's Summary    18 Mar 2025 07:01  -  19 Mar 2025 07:00  --------------------------------------------------------  IN: 540 mL / OUT: 1300 mL / NET: -760 mL        Appearance: Normal	  HEENT:   Normal oral mucosa, PERRL, EOMI	  Lymphatic: No lymphadenopathy  Cardiovascular: Normal S1 S2, No JVD, + murmurs, No edema  Respiratory: rhonchi  Psychiatry: A & O x 3, Mood & affect appropriate  Gastrointestinal:  Soft, Non-tender, + BS	  Skin: No rashes, No ecchymoses, No cyanosis	  Extremities: Normal range of motion, No clubbing, cyanosis or edema  Vascular: Peripheral pulses palpable 2+ bilaterally    MEDICATIONS  (STANDING):  apixaban 5 milliGRAM(s) Oral every 12 hours  atorvastatin 40 milliGRAM(s) Oral at bedtime  buMETAnide 0.5 milliGRAM(s) Oral daily  dextrose 5%. 1000 milliLiter(s) (50 mL/Hr) IV Continuous <Continuous>  dextrose 5%. 1000 milliLiter(s) (100 mL/Hr) IV Continuous <Continuous>  dextrose 50% Injectable 25 Gram(s) IV Push once  dextrose 50% Injectable 12.5 Gram(s) IV Push once  dextrose 50% Injectable 25 Gram(s) IV Push once  escitalopram 20 milliGRAM(s) Oral daily  gabapentin 600 milliGRAM(s) Oral two times a day  glucagon  Injectable 1 milliGRAM(s) IntraMuscular once  influenza  Vaccine (HIGH DOSE) 0.5 milliLiter(s) IntraMuscular once  insulin glargine Injectable (LANTUS) 15 Unit(s) SubCutaneous at bedtime  insulin lispro (ADMELOG) corrective regimen sliding scale   SubCutaneous three times a day before meals  insulin lispro (ADMELOG) corrective regimen sliding scale   SubCutaneous at bedtime  insulin lispro Injectable (ADMELOG) 5 Unit(s) SubCutaneous three times a day before meals  levothyroxine 125 MICROGram(s) Oral daily  metoprolol succinate ER 25 milliGRAM(s) Oral daily  pantoprazole    Tablet 40 milliGRAM(s) Oral before breakfast  polyethylene glycol 3350 17 Gram(s) Oral daily  potassium chloride    Tablet ER 40 milliEquivalent(s) Oral once  senna 2 Tablet(s) Oral at bedtime      TELEMETRY: 	    ECG:  	  RADIOLOGY:  OTHER: 	  	  LABS:	 	    CARDIAC MARKERS:      03-19    x   |  x   |  x   ----------------------------<  x   x    |  x   |  1.12      TPro  6.4  /  Alb  3.2[L]  /  TBili  0.4  /  DBili  0.2  /  AST  10  /  ALT  7[L]  /  AlkPhos  86  03-19    proBNP:   Lipid Profile:   HgA1c:   TSH:       Notes    Notes: Patient in agreement to global [facility] referral.  Patient's choice is [facility] and they will have an isolation bed tomorrow.  Patient and team made  aware.  SW provided support to patient and sister.  SW continues to follow.      < from: Xray Chest 1 View- PORTABLE-Urgent (Xray Chest 1 View- PORTABLE-Urgent .) (03.13.25 @ 14:58) >  Lungs/Pleura: No focal parenchymal opacities.  No pneumothorax. No   pleural effusions.  No pulmonary vascular congestion.    Heart/Mediastinum: The cardiomediastinal silhouetteis within normal   limits. Left chest wall loop recorder.    Skeletal/soft tissues: No acute pathology.    IMPRESSION:  No evidence of acute cardiopulmonary disease.      Assessment and plan  ---------------------------  71 y/o F with past medical history of AFIB on xarelto, DM, HTN, HLD, fibromyalgia on fentanyl patch, presenting from nursing home with altered mental status.   Patient reports 2 days of cough, congestion, and mild shortness of breath. Diagnosed with covid at nursing home yesterday evening. Today was noted to be increasingly confused, per patient's sister the patient is A&Ox3 at baseline, currently A&Ox2 (  No hx chest pain, fever, abd pain, nausea, vomiting, diarrhea, dysuria, or any other symptoms.  pt is well known to me with hx of a.fib, htn, chf. PHTN , sss with c/o change of mental status and sob  last echo with sig RV dysfunction and RVE  continue ac  continue Bumex  continue metoprolol , fu hr closely  may repeat echo  ckeck K level, fu mag  sleep apnea, needs Bipap or Cpap at night  dvt prophylaxis  pt apparently seeing cardiologist at [facility] will call him  echo reading no change as per read, but last echo pt had RVE and RV dysfunction, will review the echo  pt with sig sleep apnea will ask pt is wearing cpap or bipap needs to use it, discuss with ACP  pt had a mask at night at home, needs to use it in rehab  oob to the chair  continue bumex, awaiting DC    	        
Patient is a 70y old  Female who presents with a chief complaint of AMS x 1 day (14 Mar 2025 09:23)      SUBJECTIVE / OVERNIGHT EVENTS: no events     T(C): 36.8 (03-16-25 @ 20:47), Max: 36.8 (03-16-25 @ 20:47)  HR: 55 (03-16-25 @ 20:47) (55 - 55)  BP: 126/71 (03-16-25 @ 20:47) (126/71 - 126/71)  RR: 18 (03-16-25 @ 20:47) (18 - 18)  SpO2: 93% (03-16-25 @ 20:47) (93% - 93%)        MEDICATIONS  (STANDING):  apixaban 5 milliGRAM(s) Oral every 12 hours  atorvastatin 40 milliGRAM(s) Oral at bedtime  buMETAnide 0.5 milliGRAM(s) Oral daily  dextrose 5%. 1000 milliLiter(s) (50 mL/Hr) IV Continuous <Continuous>  dextrose 5%. 1000 milliLiter(s) (100 mL/Hr) IV Continuous <Continuous>  dextrose 50% Injectable 25 Gram(s) IV Push once  dextrose 50% Injectable 12.5 Gram(s) IV Push once  dextrose 50% Injectable 25 Gram(s) IV Push once  escitalopram 20 milliGRAM(s) Oral daily  gabapentin 600 milliGRAM(s) Oral two times a day  glucagon  Injectable 1 milliGRAM(s) IntraMuscular once  influenza  Vaccine (HIGH DOSE) 0.5 milliLiter(s) IntraMuscular once  insulin glargine Injectable (LANTUS) 15 Unit(s) SubCutaneous at bedtime  insulin lispro (ADMELOG) corrective regimen sliding scale   SubCutaneous at bedtime  insulin lispro (ADMELOG) corrective regimen sliding scale   SubCutaneous three times a day before meals  insulin lispro Injectable (ADMELOG) 5 Unit(s) SubCutaneous three times a day before meals  levothyroxine 125 MICROGram(s) Oral daily  metoprolol succinate ER 25 milliGRAM(s) Oral daily  pantoprazole    Tablet 40 milliGRAM(s) Oral before breakfast  polyethylene glycol 3350 17 Gram(s) Oral daily  remdesivir  IVPB   IV Intermittent   remdesivir  IVPB 100 milliGRAM(s) IV Intermittent every 24 hours  senna 2 Tablet(s) Oral at bedtime    MEDICATIONS  (PRN):  dextrose Oral Gel 15 Gram(s) Oral once PRN Blood Glucose LESS THAN 70 milliGRAM(s)/deciliter  guaiFENesin Oral Liquid (Sugar-Free) 100 milliGRAM(s) Oral every 6 hours PRN Cough  HYDROmorphone   Tablet 4 milliGRAM(s) Oral every 6 hours PRN Moderate Pain (4 - 6)  PHYSICAL EXAM:  GENERAL: NAD, well-developed  HEAD:  Atraumatic, Normocephalic  EYES: EOMI, conjunctiva and sclera clear  NECK: Supple, No JVD  CHEST/LUNG: Clear to auscultation bilaterally; No wheeze  HEART: Regular rate and rhythm; No murmurs, rubs, or gallops  ABDOMEN: Soft, Nontender, Nondistended; Bowel sounds present  EXTREMITIES:  2+ Peripheral Pulses, No clubbing, cyanosis, or edema  PSYCH: AAOx3  NEUROLOGY: non-focal  SKIN: No rashes or lesions                                            12.7   6.05  )-----------( 235      ( 15 Mar 2025 07:02 )             40.6           LIVER FUNCTIONS - ( 16 Mar 2025 07:32 )  Alb: 3.0 g/dL / Pro: 6.2 g/dL / ALK PHOS: 89 U/L / ALT: 11 U/L / AST: 11 U/L / GGT: x             --|--|--<--  --|--|1.08  --,--,--  03-16 @ 07:32  
Date of Service: 03-14-25 @ 09:24           CARDIOLOGY     PROGRESS  NOTE   ________________________________________________    CHIEF COMPLAINT:Patient is a 70y old  Female who presents with a chief complaint of AMS x 1 day (13 Mar 2025 18:54)  no complain  	  REVIEW OF SYSTEMS:  CONSTITUTIONAL: No fever, weight loss, or fatigue  EYES: No eye pain, visual disturbances, or discharge  ENT:  No difficulty hearing, tinnitus, vertigo; No sinus or throat pain  NECK: No pain or stiffness  RESPIRATORY: No cough, wheezing, chills or hemoptysis; No Shortness of Breath  CARDIOVASCULAR: No chest pain, palpitations, passing out, dizziness, or leg swelling  GASTROINTESTINAL: No abdominal or epigastric pain. No nausea, vomiting, or hematemesis; No diarrhea or constipation. No melena or hematochezia.  GENITOURINARY: No dysuria, frequency, hematuria, or incontinence  NEUROLOGICAL: No headaches, memory loss, loss of strength, numbness, or tremors  SKIN: No itching, burning, rashes, or lesions   LYMPH Nodes: No enlarged glands  ENDOCRINE: No heat or cold intolerance; No hair loss  MUSCULOSKELETAL: No joint pain or swelling; No muscle, back, or extremity pain  PSYCHIATRIC: No depression, anxiety, mood swings, or difficulty sleeping  HEME/LYMPH: No easy bruising, or bleeding gums  ALLERGY AND IMMUNOLOGIC: No hives or eczema	    [ ] All others negative	  [x ] Unable to obtain    PHYSICAL EXAM:  T(C): 36.5 (03-14-25 @ 04:31), Max: 37.2 (03-13-25 @ 12:32)  HR: 60 (03-14-25 @ 04:31) (60 - 93)  BP: 117/67 (03-14-25 @ 04:31) (111/63 - 144/65)  RR: 18 (03-14-25 @ 04:31) (18 - 20)  SpO2: 93% (03-14-25 @ 04:31) (93% - 99%)  Wt(kg): --  I&O's Summary    13 Mar 2025 07:01  -  14 Mar 2025 07:00  --------------------------------------------------------  IN: 0 mL / OUT: 600 mL / NET: -600 mL        Appearance: Normal	  HEENT:   Normal oral mucosa, PERRL, EOMI	  Lymphatic: No lymphadenopathy  Cardiovascular: Normal S1 S2, No JVD, + murmurs, + edema  Respiratory: Lungs clear to auscultation	  Psychiatry: A & O x 3, Mood & affect appropriate  Gastrointestinal:  Soft, Non-tender, + BS	  Skin: No rashes, No ecchymoses, No cyanosis	  Neurologic: Non-focal  Extremities:+ erythema No clubbing, cyanosis +edema  Vascular: Peripheral pulses palpable 2+ bilaterally    MEDICATIONS  (STANDING):  apixaban 5 milliGRAM(s) Oral every 12 hours  atorvastatin 40 milliGRAM(s) Oral at bedtime  buMETAnide 0.5 milliGRAM(s) Oral daily  dextrose 5%. 1000 milliLiter(s) (50 mL/Hr) IV Continuous <Continuous>  dextrose 5%. 1000 milliLiter(s) (100 mL/Hr) IV Continuous <Continuous>  dextrose 50% Injectable 25 Gram(s) IV Push once  dextrose 50% Injectable 12.5 Gram(s) IV Push once  dextrose 50% Injectable 25 Gram(s) IV Push once  escitalopram 20 milliGRAM(s) Oral daily  gabapentin 600 milliGRAM(s) Oral two times a day  glucagon  Injectable 1 milliGRAM(s) IntraMuscular once  influenza  Vaccine (HIGH DOSE) 0.5 milliLiter(s) IntraMuscular once  insulin glargine Injectable (LANTUS) 15 Unit(s) SubCutaneous at bedtime  insulin lispro Injectable (ADMELOG) 5 Unit(s) SubCutaneous three times a day before meals  levothyroxine 125 MICROGram(s) Oral daily  metoprolol succinate ER 25 milliGRAM(s) Oral daily  pantoprazole    Tablet 40 milliGRAM(s) Oral before breakfast  polyethylene glycol 3350 17 Gram(s) Oral daily  potassium chloride   Powder 40 milliEquivalent(s) Oral every 4 hours  remdesivir  IVPB   IV Intermittent   remdesivir  IVPB 100 milliGRAM(s) IV Intermittent every 24 hours  senna 2 Tablet(s) Oral at bedtime      TELEMETRY: 	    ECG:  	  RADIOLOGY:  OTHER: 	  	  LABS:	 	    CARDIAC MARKERS:                                12.1   5.03  )-----------( 250      ( 13 Mar 2025 13:35 )             39.2     03-14    x   |  x   |  x   ----------------------------<  x   x    |  x   |  0.98    Ca    9.1      13 Mar 2025 13:52    TPro  6.4  /  Alb  3.1[L]  /  TBili  0.2  /  DBili  <0.1  /  AST  13  /  ALT  11  /  AlkPhos  92  03-14    proBNP:   Lipid Profile:   HgA1c:   TSH:   PT/INR - ( 13 Mar 2025 13:35 )   PT: 13.8 sec;   INR: 1.20 ratio         PTT - ( 13 Mar 2025 13:35 )  PTT:36.6 sec    < from: 12 Lead ECG (02.08.24 @ 10:14) >  Diagnosis Line SINUS BRADYCARDIA WITH SINUS ARRHYTHMIA  MINIMAL VOLTAGE CRITERIA FOR LVH, MAY BE NORMAL VARIANT ( R in aVL )  INFERIOR INFARCT , AGE UNDETERMINED  CANNOT RULE OUT ANTERIOR INFARCT (CITED ON OR BEFORE 04-FEB-2021)  ABNORMAL ECG  WHEN COMPARED WITH ECG OF 27-JAN-2024 20:10,  NO SIGNIFICANT CHANGE WAS FOUND          Assessment and plan  ---------------------------  71 y/o F with past medical history of AFIB on xarelto, DM, HTN, HLD, fibromyalgia on fentanyl patch, presenting from nursing home with altered mental status.   Patient reports 2 days of cough, congestion, and mild shortness of breath. Diagnosed with covid at nursing home yesterday evening. Today was noted to be increasingly confused, per patient's sister the patient is A&Ox3 at baseline, currently A&Ox2 (  No hx chest pain, fever, abd pain, nausea, vomiting, diarrhea, dysuria, or any other symptoms.  pt is well known to me with hx of a.fib, htn, chf. PHTN , sss with c/o change of mental status and sob  last echo with sig RV dysfunction and RVE  continue ac  continue Bumex  continue metoprolol , fu hr closely  may repeat echo  ckeck K level, fu mag  sleep apnea, needs Bipap or Cpap at night  dvt prophylaxis  	        
Date of Service: 03-17-25 @ 08:42           CARDIOLOGY     PROGRESS  NOTE   ________________________________________________    CHIEF COMPLAINT:Patient is a 70y old  Female who presents with a chief complaint of AMS x 1 day (16 Mar 2025 20:21)  no complain  	  REVIEW OF SYSTEMS:  CONSTITUTIONAL: No fever, weight loss, or fatigue  EYES: No eye pain, visual disturbances, or discharge  ENT:  No difficulty hearing, tinnitus, vertigo; No sinus or throat pain  NECK: No pain or stiffness  RESPIRATORY: No cough, wheezing, chills or hemoptysis; No Shortness of Breath  CARDIOVASCULAR: No chest pain, palpitations, passing out, dizziness, or leg swelling  GASTROINTESTINAL: No abdominal or epigastric pain. No nausea, vomiting, or hematemesis; No diarrhea or constipation. No melena or hematochezia.  GENITOURINARY: No dysuria, frequency, hematuria, or incontinence  NEUROLOGICAL: No headaches, memory loss, loss of strength, numbness, or tremors  SKIN: No itching, burning, rashes, or lesions   LYMPH Nodes: No enlarged glands  ENDOCRINE: No heat or cold intolerance; No hair loss  MUSCULOSKELETAL: No joint pain or swelling; No muscle, back, or extremity pain  PSYCHIATRIC: No depression, anxiety, mood swings, or difficulty sleeping  HEME/LYMPH: No easy bruising, or bleeding gums  ALLERGY AND IMMUNOLOGIC: No hives or eczema	    [x ] All others negative	  [ ] Unable to obtain    PHYSICAL EXAM:  T(C): 36.8 (03-17-25 @ 05:04), Max: 36.8 (03-16-25 @ 20:47)  HR: 53 (03-17-25 @ 05:04) (53 - 55)  BP: 125/67 (03-17-25 @ 05:04) (125/67 - 126/71)  RR: 18 (03-17-25 @ 05:04) (18 - 18)  SpO2: 94% (03-17-25 @ 05:04) (93% - 94%)  Wt(kg): --  I&O's Summary    16 Mar 2025 07:01  -  17 Mar 2025 07:00  --------------------------------------------------------  IN: 0 mL / OUT: 1150 mL / NET: -1150 mL        Appearance: Normal	  HEENT:   Normal oral mucosa, PERRL, EOMI	  Lymphatic: No lymphadenopathy  Cardiovascular: Normal S1 S2, No JVD, + murmurs, No edema  Respiratory: Lungs clear to auscultation	  Psychiatry: A & O x 3, Mood & affect appropriate  Gastrointestinal:  Soft, Non-tender, + BS	  Skin: No rashes, No ecchymoses, No cyanosis	  Neurologic: Non-focal  Extremities: Normal range of motion, No clubbing, cyanosis or edema  Vascular: Peripheral pulses palpable 2+ bilaterally    MEDICATIONS  (STANDING):  apixaban 5 milliGRAM(s) Oral every 12 hours  atorvastatin 40 milliGRAM(s) Oral at bedtime  buMETAnide 0.5 milliGRAM(s) Oral daily  dextrose 5%. 1000 milliLiter(s) (50 mL/Hr) IV Continuous <Continuous>  dextrose 5%. 1000 milliLiter(s) (100 mL/Hr) IV Continuous <Continuous>  dextrose 50% Injectable 25 Gram(s) IV Push once  dextrose 50% Injectable 12.5 Gram(s) IV Push once  dextrose 50% Injectable 25 Gram(s) IV Push once  escitalopram 20 milliGRAM(s) Oral daily  gabapentin 600 milliGRAM(s) Oral two times a day  glucagon  Injectable 1 milliGRAM(s) IntraMuscular once  influenza  Vaccine (HIGH DOSE) 0.5 milliLiter(s) IntraMuscular once  insulin glargine Injectable (LANTUS) 15 Unit(s) SubCutaneous at bedtime  insulin lispro (ADMELOG) corrective regimen sliding scale   SubCutaneous three times a day before meals  insulin lispro (ADMELOG) corrective regimen sliding scale   SubCutaneous at bedtime  insulin lispro Injectable (ADMELOG) 5 Unit(s) SubCutaneous three times a day before meals  levothyroxine 125 MICROGram(s) Oral daily  metoprolol succinate ER 25 milliGRAM(s) Oral daily  pantoprazole    Tablet 40 milliGRAM(s) Oral before breakfast  polyethylene glycol 3350 17 Gram(s) Oral daily  remdesivir  IVPB 100 milliGRAM(s) IV Intermittent every 24 hours  remdesivir  IVPB   IV Intermittent   senna 2 Tablet(s) Oral at bedtime      TELEMETRY: 	    ECG:  	  RADIOLOGY:  OTHER: 	  	  LABS:	 	    CARDIAC MARKERS:      03-17    x   |  x   |  x   ----------------------------<  x   x    |  x   |  1.05      TPro  6.4  /  Alb  3.1[L]  /  TBili  0.3  /  DBili  <0.1  /  AST  12  /  ALT  8[L]  /  AlkPhos  87  03-17    proBNP:   Lipid Profile:   HgA1c:   TSH:         Assessment and plan  ---------------------------  69 y/o F with past medical history of AFIB on xarelto, DM, HTN, HLD, fibromyalgia on fentanyl patch, presenting from nursing home with altered mental status.   Patient reports 2 days of cough, congestion, and mild shortness of breath. Diagnosed with covid at nursing home yesterday evening. Today was noted to be increasingly confused, per patient's sister the patient is A&Ox3 at baseline, currently A&Ox2 (  No hx chest pain, fever, abd pain, nausea, vomiting, diarrhea, dysuria, or any other symptoms.  pt is well known to me with hx of a.fib, htn, chf. PHTN , sss with c/o change of mental status and sob  last echo with sig RV dysfunction and RVE  continue ac  continue Bumex  continue metoprolol , fu hr closely  may repeat echo  ckeck K level, fu mag  sleep apnea, needs Bipap or Cpap at night  dvt prophylaxis  pt apparently seeing cardiologist at [facility] will call him  echo reading no change as per read, but last echo pt had RVE and RV dysfunction, will review the echo  pt with sig sleep apnea will ask pt is wearing cpap or bipap needs to use it, discuss with ACP  oob to the chair      	        
Patient is a 70y old  Female who presents with a chief complaint of AMS x 1 day (14 Mar 2025 09:23)      SUBJECTIVE / OVERNIGHT EVENTS: no events       T(C): 36.5 (03-18-25 @ 11:46), Max: 36.5 (03-18-25 @ 08:51)  HR: 52 (03-18-25 @ 11:46) (52 - 55)  BP: 107/61 (03-18-25 @ 11:46) (107/61 - 113/56)  RR: 19 (03-18-25 @ 11:46) (18 - 19)  SpO2: 97% (03-18-25 @ 11:46) (95% - 97%)        MEDICATIONS  (STANDING):  apixaban 5 milliGRAM(s) Oral every 12 hours  atorvastatin 40 milliGRAM(s) Oral at bedtime  buMETAnide 0.5 milliGRAM(s) Oral daily  dextrose 5%. 1000 milliLiter(s) (50 mL/Hr) IV Continuous <Continuous>  dextrose 5%. 1000 milliLiter(s) (100 mL/Hr) IV Continuous <Continuous>  dextrose 50% Injectable 25 Gram(s) IV Push once  dextrose 50% Injectable 12.5 Gram(s) IV Push once  dextrose 50% Injectable 25 Gram(s) IV Push once  escitalopram 20 milliGRAM(s) Oral daily  gabapentin 600 milliGRAM(s) Oral two times a day  glucagon  Injectable 1 milliGRAM(s) IntraMuscular once  influenza  Vaccine (HIGH DOSE) 0.5 milliLiter(s) IntraMuscular once  insulin glargine Injectable (LANTUS) 15 Unit(s) SubCutaneous at bedtime  insulin lispro (ADMELOG) corrective regimen sliding scale   SubCutaneous three times a day before meals  insulin lispro (ADMELOG) corrective regimen sliding scale   SubCutaneous at bedtime  insulin lispro Injectable (ADMELOG) 5 Unit(s) SubCutaneous three times a day before meals  levothyroxine 125 MICROGram(s) Oral daily  metoprolol succinate ER 25 milliGRAM(s) Oral daily  pantoprazole    Tablet 40 milliGRAM(s) Oral before breakfast  polyethylene glycol 3350 17 Gram(s) Oral daily  potassium chloride    Tablet ER 40 milliEquivalent(s) Oral once  senna 2 Tablet(s) Oral at bedtime    MEDICATIONS  (PRN):  dextrose Oral Gel 15 Gram(s) Oral once PRN Blood Glucose LESS THAN 70 milliGRAM(s)/deciliter  guaiFENesin Oral Liquid (Sugar-Free) 100 milliGRAM(s) Oral every 6 hours PRN Cough  HYDROmorphone   Tablet 4 milliGRAM(s) Oral every 6 hours PRN Moderate Pain (4 - 6)  PHYSICAL EXAM:  GENERAL: NAD, well-developed  HEAD:  Atraumatic, Normocephalic  EYES: EOMI, conjunctiva and sclera clear  NECK: Supple, No JVD  CHEST/LUNG: Clear to auscultation bilaterally; No wheeze  HEART: Regular rate and rhythm; No murmurs, rubs, or gallops  ABDOMEN: Soft, Nontender, Nondistended; Bowel sounds present  EXTREMITIES:  2+ Peripheral Pulses, No clubbing, cyanosis, or edema  PSYCH: AAOx3  NEUROLOGY: non-focal  SKIN: No rashes or lesions                              LIVER FUNCTIONS - ( 17 Mar 2025 07:00 )  Alb: 3.1 g/dL / Pro: 6.4 g/dL / ALK PHOS: 87 U/L / ALT: 8 U/L / AST: 12 U/L / GGT: x             
Patient is a 70y old  Female who presents with a chief complaint of AMS x 1 day (14 Mar 2025 09:23)      SUBJECTIVE / OVERNIGHT EVENTS: no events     MEDICATIONS  (STANDING):  apixaban 5 milliGRAM(s) Oral every 12 hours  atorvastatin 40 milliGRAM(s) Oral at bedtime  buMETAnide 0.5 milliGRAM(s) Oral daily  dextrose 5%. 1000 milliLiter(s) (50 mL/Hr) IV Continuous <Continuous>  dextrose 5%. 1000 milliLiter(s) (100 mL/Hr) IV Continuous <Continuous>  dextrose 50% Injectable 25 Gram(s) IV Push once  dextrose 50% Injectable 12.5 Gram(s) IV Push once  dextrose 50% Injectable 25 Gram(s) IV Push once  escitalopram 20 milliGRAM(s) Oral daily  gabapentin 600 milliGRAM(s) Oral two times a day  glucagon  Injectable 1 milliGRAM(s) IntraMuscular once  influenza  Vaccine (HIGH DOSE) 0.5 milliLiter(s) IntraMuscular once  insulin glargine Injectable (LANTUS) 15 Unit(s) SubCutaneous at bedtime  insulin lispro (ADMELOG) corrective regimen sliding scale   SubCutaneous three times a day before meals  insulin lispro (ADMELOG) corrective regimen sliding scale   SubCutaneous at bedtime  insulin lispro Injectable (ADMELOG) 5 Unit(s) SubCutaneous three times a day before meals  levothyroxine 125 MICROGram(s) Oral daily  metoprolol succinate ER 25 milliGRAM(s) Oral daily  pantoprazole    Tablet 40 milliGRAM(s) Oral before breakfast  polyethylene glycol 3350 17 Gram(s) Oral daily  remdesivir  IVPB   IV Intermittent   remdesivir  IVPB 100 milliGRAM(s) IV Intermittent every 24 hours  senna 2 Tablet(s) Oral at bedtime    MEDICATIONS  (PRN):  dextrose Oral Gel 15 Gram(s) Oral once PRN Blood Glucose LESS THAN 70 milliGRAM(s)/deciliter  HYDROmorphone   Tablet 4 milliGRAM(s) Oral every 6 hours PRN Moderate Pain (4 - 6)      CAPILLARY BLOOD GLUCOSE      POCT Blood Glucose.: 117 mg/dL (14 Mar 2025 17:41)  POCT Blood Glucose.: 113 mg/dL (14 Mar 2025 12:03)  POCT Blood Glucose.: 137 mg/dL (14 Mar 2025 07:46)    I&O's Summary    13 Mar 2025 07:01  -  14 Mar 2025 07:00  --------------------------------------------------------  IN: 0 mL / OUT: 600 mL / NET: -600 mL    14 Mar 2025 07:01  -  14 Mar 2025 22:19  --------------------------------------------------------  IN: 0 mL / OUT: 500 mL / NET: -500 mL      T(C): 36.5 (03-14-25 @ 21:34), Max: 36.5 (03-14-25 @ 21:34)  HR: 50 (03-14-25 @ 21:34) (49 - 59)  BP: 130/76 (03-14-25 @ 21:34) (109/68 - 130/76)  RR: 18 (03-14-25 @ 21:34) (18 - 18)  SpO2: 95% (03-14-25 @ 21:34) (95% - 96%)    PHYSICAL EXAM:  GENERAL: NAD, well-developed  HEAD:  Atraumatic, Normocephalic  EYES: EOMI, conjunctiva and sclera clear  NECK: Supple, No JVD  CHEST/LUNG: Clear to auscultation bilaterally; No wheeze  HEART: Regular rate and rhythm; No murmurs, rubs, or gallops  ABDOMEN: Soft, Nontender, Nondistended; Bowel sounds present  EXTREMITIES:  2+ Peripheral Pulses, No clubbing, cyanosis, or edema  PSYCH: AAOx3  NEUROLOGY: non-focal  SKIN: No rashes or lesions                          12.6   4.49  )-----------( 243      ( 14 Mar 2025 20:14 )             40.1           LIVER FUNCTIONS - ( 14 Mar 2025 06:58 )  Alb: 3.1 g/dL / Pro: 6.4 g/dL / ALK PHOS: 92 U/L / ALT: 11 U/L / AST: 13 U/L / GGT: x           PT/INR - ( 13 Mar 2025 13:35 )   PT: 13.8 sec;   INR: 1.20 ratio         PTT - ( 13 Mar 2025 13:35 )  PTT:36.6 sec  140|101|20<152  4.8|28|1.12  9.2,--,--  03-14 @ 20:14  --|--|--<--  --|--|0.98  --,--,--  03-14 @ 06:58  --|--|--<--  --|--|0.93  --,--,--  03-13 @ 23:05    Imaging Personally Reviewed:    Consultant(s) Notes Reviewed:      Care Discussed with Consultants/Other Providers:  
Date of Service: 03-18-25 @ 08:05           CARDIOLOGY     PROGRESS  NOTE   ________________________________________________    CHIEF COMPLAINT:Patient is a 70y old  Female who presents with a chief complaint of AMS x 1 day (17 Mar 2025 16:57)  no complain  	  REVIEW OF SYSTEMS:  CONSTITUTIONAL: No fever, weight loss, or fatigue  EYES: No eye pain, visual disturbances, or discharge  ENT:  No difficulty hearing, tinnitus, vertigo; No sinus or throat pain  NECK: No pain or stiffness  RESPIRATORY: No cough, wheezing, chills or hemoptysis; No Shortness of Breath  CARDIOVASCULAR: No chest pain, palpitations, passing out, dizziness, or leg swelling  GASTROINTESTINAL: No abdominal or epigastric pain. No nausea, vomiting, or hematemesis; No diarrhea or constipation. No melena or hematochezia.  GENITOURINARY: No dysuria, frequency, hematuria, or incontinence  NEUROLOGICAL: No headaches, memory loss, loss of strength, numbness, or tremors  SKIN: No itching, burning, rashes, or lesions   LYMPH Nodes: No enlarged glands  ENDOCRINE: No heat or cold intolerance; No hair loss  MUSCULOSKELETAL: No joint pain or swelling; No muscle, back, or extremity pain  PSYCHIATRIC: No depression, anxiety, mood swings, or difficulty sleeping  HEME/LYMPH: No easy bruising, or bleeding gums  ALLERGY AND IMMUNOLOGIC: No hives or eczema	    [x ] All others negative	  [ ] Unable to obtain    PHYSICAL EXAM:  T(C): 36.7 (03-18-25 @ 05:02), Max: 36.7 (03-18-25 @ 05:02)  HR: 53 (03-18-25 @ 05:02) (53 - 62)  BP: 111/75 (03-18-25 @ 05:02) (104/65 - 116/65)  RR: 18 (03-18-25 @ 05:02) (18 - 18)  SpO2: 98% (03-18-25 @ 05:02) (94% - 98%)  Wt(kg): --  I&O's Summary      Appearance: Normal	  HEENT:   Normal oral mucosa, PERRL, EOMI	  Lymphatic: No lymphadenopathy  Cardiovascular: Normal S1 S2, No JVD, + murmurs, No edema  Respiratory: Lungs clear to auscultation	  Psychiatry: A & O x 3, Mood & affect appropriate  Gastrointestinal:  Soft, Non-tender, + BS	  Skin: No rashes, No ecchymoses, No cyanosis	  Neurologic: Non-focal  Extremities: Normal range of motion, No clubbing, cyanosis or edema  Vascular: Peripheral pulses palpable 2+ bilaterally    MEDICATIONS  (STANDING):  apixaban 5 milliGRAM(s) Oral every 12 hours  atorvastatin 40 milliGRAM(s) Oral at bedtime  buMETAnide 0.5 milliGRAM(s) Oral daily  dextrose 5%. 1000 milliLiter(s) (50 mL/Hr) IV Continuous <Continuous>  dextrose 5%. 1000 milliLiter(s) (100 mL/Hr) IV Continuous <Continuous>  dextrose 50% Injectable 25 Gram(s) IV Push once  dextrose 50% Injectable 12.5 Gram(s) IV Push once  dextrose 50% Injectable 25 Gram(s) IV Push once  escitalopram 20 milliGRAM(s) Oral daily  gabapentin 600 milliGRAM(s) Oral two times a day  glucagon  Injectable 1 milliGRAM(s) IntraMuscular once  influenza  Vaccine (HIGH DOSE) 0.5 milliLiter(s) IntraMuscular once  insulin glargine Injectable (LANTUS) 15 Unit(s) SubCutaneous at bedtime  insulin lispro (ADMELOG) corrective regimen sliding scale   SubCutaneous three times a day before meals  insulin lispro (ADMELOG) corrective regimen sliding scale   SubCutaneous at bedtime  insulin lispro Injectable (ADMELOG) 5 Unit(s) SubCutaneous three times a day before meals  levothyroxine 125 MICROGram(s) Oral daily  metoprolol succinate ER 25 milliGRAM(s) Oral daily  pantoprazole    Tablet 40 milliGRAM(s) Oral before breakfast  polyethylene glycol 3350 17 Gram(s) Oral daily  potassium chloride    Tablet ER 40 milliEquivalent(s) Oral once  senna 2 Tablet(s) Oral at bedtime      TELEMETRY: 	    ECG:  	  RADIOLOGY:  OTHER: 	  	  LABS:	 	    CARDIAC MARKERS:      03-17    x   |  x   |  x   ----------------------------<  x   x    |  x   |  1.05      TPro  6.4  /  Alb  3.1[L]  /  TBili  0.3  /  DBili  <0.1  /  AST  12  /  ALT  8[L]  /  AlkPhos  87  03-17    proBNP:   Lipid Profile:   HgA1c:   TSH:         Assessment and plan  ---------------------------  71 y/o F with past medical history of AFIB on xarelto, DM, HTN, HLD, fibromyalgia on fentanyl patch, presenting from nursing home with altered mental status.   Patient reports 2 days of cough, congestion, and mild shortness of breath. Diagnosed with covid at nursing home yesterday evening. Today was noted to be increasingly confused, per patient's sister the patient is A&Ox3 at baseline, currently A&Ox2 (  No hx chest pain, fever, abd pain, nausea, vomiting, diarrhea, dysuria, or any other symptoms.  pt is well known to me with hx of a.fib, htn, chf. PHTN , sss with c/o change of mental status and sob  last echo with sig RV dysfunction and RVE  continue ac  continue Bumex  continue metoprolol , fu hr closely  may repeat echo  ckeck K level, fu mag  sleep apnea, needs Bipap or Cpap at night  dvt prophylaxis  pt apparently seeing cardiologist at [facility] will call him  echo reading no change as per read, but last echo pt had RVE and RV dysfunction, will review the echo  pt with sig sleep apnea will ask pt is wearing cpap or bipap needs to use it, discuss with ACP  pt had a mask at night at home, needs to use it in rehab  oob to the chair      	        
Patient is a 70y old  Female who presents with a chief complaint of AMS x 1 day (14 Mar 2025 09:23)      SUBJECTIVE / OVERNIGHT EVENTS: no events     MEDICATIONS  (STANDING):  apixaban 5 milliGRAM(s) Oral every 12 hours  atorvastatin 40 milliGRAM(s) Oral at bedtime  buMETAnide 0.5 milliGRAM(s) Oral daily  dextrose 5%. 1000 milliLiter(s) (50 mL/Hr) IV Continuous <Continuous>  dextrose 5%. 1000 milliLiter(s) (100 mL/Hr) IV Continuous <Continuous>  dextrose 50% Injectable 25 Gram(s) IV Push once  dextrose 50% Injectable 12.5 Gram(s) IV Push once  dextrose 50% Injectable 25 Gram(s) IV Push once  escitalopram 20 milliGRAM(s) Oral daily  gabapentin 600 milliGRAM(s) Oral two times a day  glucagon  Injectable 1 milliGRAM(s) IntraMuscular once  influenza  Vaccine (HIGH DOSE) 0.5 milliLiter(s) IntraMuscular once  insulin glargine Injectable (LANTUS) 15 Unit(s) SubCutaneous at bedtime  insulin lispro (ADMELOG) corrective regimen sliding scale   SubCutaneous three times a day before meals  insulin lispro (ADMELOG) corrective regimen sliding scale   SubCutaneous at bedtime  insulin lispro Injectable (ADMELOG) 5 Unit(s) SubCutaneous three times a day before meals  levothyroxine 125 MICROGram(s) Oral daily  metoprolol succinate ER 25 milliGRAM(s) Oral daily  pantoprazole    Tablet 40 milliGRAM(s) Oral before breakfast  polyethylene glycol 3350 17 Gram(s) Oral daily  remdesivir  IVPB   IV Intermittent   remdesivir  IVPB 100 milliGRAM(s) IV Intermittent every 24 hours  senna 2 Tablet(s) Oral at bedtime    MEDICATIONS  (PRN):  dextrose Oral Gel 15 Gram(s) Oral once PRN Blood Glucose LESS THAN 70 milliGRAM(s)/deciliter  HYDROmorphone   Tablet 4 milliGRAM(s) Oral every 6 hours PRN Moderate Pain (4 - 6)      CAPILLARY BLOOD GLUCOSE      POCT Blood Glucose.: 117 mg/dL (14 Mar 2025 17:41)  POCT Blood Glucose.: 113 mg/dL (14 Mar 2025 12:03)  POCT Blood Glucose.: 137 mg/dL (14 Mar 2025 07:46)    I&O's Summary    13 Mar 2025 07:01  -  14 Mar 2025 07:00  --------------------------------------------------------  IN: 0 mL / OUT: 600 mL / NET: -600 mL    14 Mar 2025 07:01  -  14 Mar 2025 22:19  --------------------------------------------------------  IN: 0 mL / OUT: 500 mL / NET: -500 mL      T(C): 36.5 (03-14-25 @ 21:34), Max: 36.5 (03-14-25 @ 21:34)  HR: 50 (03-14-25 @ 21:34) (49 - 59)  BP: 130/76 (03-14-25 @ 21:34) (109/68 - 130/76)  RR: 18 (03-14-25 @ 21:34) (18 - 18)  SpO2: 95% (03-14-25 @ 21:34) (95% - 96%)    PHYSICAL EXAM:  GENERAL: NAD, well-developed  HEAD:  Atraumatic, Normocephalic  EYES: EOMI, conjunctiva and sclera clear  NECK: Supple, No JVD  CHEST/LUNG: Clear to auscultation bilaterally; No wheeze  HEART: Regular rate and rhythm; No murmurs, rubs, or gallops  ABDOMEN: Soft, Nontender, Nondistended; Bowel sounds present  EXTREMITIES:  2+ Peripheral Pulses, No clubbing, cyanosis, or edema  PSYCH: AAOx3  NEUROLOGY: non-focal  SKIN: No rashes or lesions                          12.6   4.49  )-----------( 243      ( 14 Mar 2025 20:14 )             40.1           LIVER FUNCTIONS - ( 14 Mar 2025 06:58 )  Alb: 3.1 g/dL / Pro: 6.4 g/dL / ALK PHOS: 92 U/L / ALT: 11 U/L / AST: 13 U/L / GGT: x           PT/INR - ( 13 Mar 2025 13:35 )   PT: 13.8 sec;   INR: 1.20 ratio         PTT - ( 13 Mar 2025 13:35 )  PTT:36.6 sec  140|101|20<152  4.8|28|1.12  9.2,--,--  03-14 @ 20:14  --|--|--<--  --|--|0.98  --,--,--  03-14 @ 06:58  --|--|--<--  --|--|0.93  --,--,--  03-13 @ 23:05    Imaging Personally Reviewed:    Consultant(s) Notes Reviewed:      Care Discussed with Consultants/Other Providers:  
Patient is a 70y old  Female who presents with a chief complaint of AMS x 1 day (14 Mar 2025 09:23)      SUBJECTIVE / OVERNIGHT EVENTS: no events     T(C): 36.6 (03-17-25 @ 11:06), Max: 36.6 (03-17-25 @ 11:06)  HR: 61 (03-17-25 @ 11:06) (61 - 61)  BP: 104/65 (03-17-25 @ 11:06) (104/65 - 104/65)  RR: 18 (03-17-25 @ 11:06) (18 - 18)  SpO2: 94% (03-17-25 @ 11:06) (94% - 94%)      MEDICATIONS  (STANDING):  apixaban 5 milliGRAM(s) Oral every 12 hours  atorvastatin 40 milliGRAM(s) Oral at bedtime  buMETAnide 0.5 milliGRAM(s) Oral daily  dextrose 5%. 1000 milliLiter(s) (50 mL/Hr) IV Continuous <Continuous>  dextrose 5%. 1000 milliLiter(s) (100 mL/Hr) IV Continuous <Continuous>  dextrose 50% Injectable 25 Gram(s) IV Push once  dextrose 50% Injectable 12.5 Gram(s) IV Push once  dextrose 50% Injectable 25 Gram(s) IV Push once  escitalopram 20 milliGRAM(s) Oral daily  gabapentin 600 milliGRAM(s) Oral two times a day  glucagon  Injectable 1 milliGRAM(s) IntraMuscular once  influenza  Vaccine (HIGH DOSE) 0.5 milliLiter(s) IntraMuscular once  insulin glargine Injectable (LANTUS) 15 Unit(s) SubCutaneous at bedtime  insulin lispro (ADMELOG) corrective regimen sliding scale   SubCutaneous three times a day before meals  insulin lispro (ADMELOG) corrective regimen sliding scale   SubCutaneous at bedtime  insulin lispro Injectable (ADMELOG) 5 Unit(s) SubCutaneous three times a day before meals  levothyroxine 125 MICROGram(s) Oral daily  metoprolol succinate ER 25 milliGRAM(s) Oral daily  pantoprazole    Tablet 40 milliGRAM(s) Oral before breakfast  polyethylene glycol 3350 17 Gram(s) Oral daily  remdesivir  IVPB   IV Intermittent   remdesivir  IVPB 100 milliGRAM(s) IV Intermittent every 24 hours  senna 2 Tablet(s) Oral at bedtime    MEDICATIONS  (PRN):  dextrose Oral Gel 15 Gram(s) Oral once PRN Blood Glucose LESS THAN 70 milliGRAM(s)/deciliter  guaiFENesin Oral Liquid (Sugar-Free) 100 milliGRAM(s) Oral every 6 hours PRN Cough  HYDROmorphone   Tablet 4 milliGRAM(s) Oral every 6 hours PRN Moderate Pain (4 - 6)    PHYSICAL EXAM:  GENERAL: NAD, well-developed  HEAD:  Atraumatic, Normocephalic  EYES: EOMI, conjunctiva and sclera clear  NECK: Supple, No JVD  CHEST/LUNG: Clear to auscultation bilaterally; No wheeze  HEART: Regular rate and rhythm; No murmurs, rubs, or gallops  ABDOMEN: Soft, Nontender, Nondistended; Bowel sounds present  EXTREMITIES:  2+ Peripheral Pulses, No clubbing, cyanosis, or edema  PSYCH: AAOx3  NEUROLOGY: non-focal  SKIN: No rashes or lesions                          LIVER FUNCTIONS - ( 17 Mar 2025 07:00 )  Alb: 3.1 g/dL / Pro: 6.4 g/dL / ALK PHOS: 87 U/L / ALT: 8 U/L / AST: 12 U/L / GGT: x             --|--|--<--  --|--|1.05  --,--,--  03-17 @ 07:00

## 2025-03-19 NOTE — PROGRESS NOTE ADULT - PROVIDER SPECIALTY LIST ADULT
Hospitalist
Hospitalist
Cardiology
Hospitalist

## 2025-03-19 NOTE — DISCHARGE NOTE NURSING/CASE MANAGEMENT/SOCIAL WORK - NSDCPEFALRISK_GEN_ALL_CORE
For information on Fall & Injury Prevention, visit: [email or web address] OR  [email or web address] OR  https://www.cdc.gov/steadi/patient.html

## 2025-03-19 NOTE — DISCHARGE NOTE NURSING/CASE MANAGEMENT/SOCIAL WORK - FINANCIAL ASSISTANCE
[facility] provides services at a reduced cost to those who are determined to be eligible through [facility]’s financial assistance program. Information regarding [facility]’s financial assistance program can be found by going to [email or web address] or by calling [phone].

## 2025-03-19 NOTE — DISCHARGE NOTE NURSING/CASE MANAGEMENT/SOCIAL WORK - NSDCVIVACCINE_GEN_ALL_CORE_FT
COVID-19 vaccine, vector-nr, rS-Ad26, PF, 0.5 mL (Anika); 06-May-2021 11:14; Katey Burdick (RN); Anika; [id number] (Exp. Date: 21-Jun-2021); IntraMuscular; Deltoid Left.; 0.5 milliLiter(s);   Tdap; 06-Jul-2024 01:55; Jam Blanco); [employer]; [id number] (Exp. Date: 06-Jul-2024); IntraMuscular; Deltoid Left.; 0.5 milliLiter(s); VIS (VIS Published: 09-May-2013, VIS Presented: 06-Jul-2024);

## 2025-04-13 NOTE — ED ADULT NURSE NOTE - NSICDXPASTMEDICALHX_GEN_ALL_CORE_FT
Pulse ox was 96% on room air at rest.  Ambulated patient on room air.  Oxygen saturation was 97% on room air while ambulating.  Oxygen not applied.     PAST MEDICAL HISTORY:  Atrial fibrillation     Constipation     Depression     Diabetes Mellitus     Diarrhea     Environmental Allergies     Fibromyalgia     GERD with Apnea     High Triglycerides     Hyperlipidemia     Hypothyroidism     Morbidly Obese     Obstructive Sleep Apnea     Osteoarthritis of Knee right. requires cane    Personal History of Arthritis     Personal History of Female Genital Cancer     Personal History of Hypertension     Restless Legs Syndrome (RLS)     Umbilical Hernia

## 2025-04-30 NOTE — DISCHARGE NOTE PROVIDER - YES NO FOR MLM POSITIVE OR NEGATIVE COVID RESULT
,
Detail Level: Simple
Price (Do Not Change): 0.00
Instructions: This plan will send the code FBSE to the PM system.  DO NOT or CHANGE the price.

## 2025-05-13 ENCOUNTER — EMERGENCY (EMERGENCY)
Facility: HOSPITAL | Age: 70
LOS: 1 days | End: 2025-05-13
Attending: EMERGENCY MEDICINE
Payer: MEDICARE

## 2025-05-13 VITALS
DIASTOLIC BLOOD PRESSURE: 82 MMHG | RESPIRATION RATE: 16 BRPM | SYSTOLIC BLOOD PRESSURE: 131 MMHG | OXYGEN SATURATION: 97 % | TEMPERATURE: 98 F | HEIGHT: 68 IN | HEART RATE: 63 BPM | WEIGHT: 293 LBS

## 2025-05-13 LAB
ALBUMIN SERPL ELPH-MCNC: 3.4 G/DL — SIGNIFICANT CHANGE UP (ref 3.3–5)
ALP SERPL-CCNC: 86 U/L — SIGNIFICANT CHANGE UP (ref 40–120)
ALT FLD-CCNC: 6 U/L — LOW (ref 10–45)
ANION GAP SERPL CALC-SCNC: 15 MMOL/L — SIGNIFICANT CHANGE UP (ref 5–17)
APTT BLD: 38.1 SEC — HIGH (ref 26.1–36.8)
AST SERPL-CCNC: 12 U/L — SIGNIFICANT CHANGE UP (ref 10–40)
BASOPHILS # BLD AUTO: 0.05 K/UL — SIGNIFICANT CHANGE UP (ref 0–0.2)
BASOPHILS NFR BLD AUTO: 0.5 % — SIGNIFICANT CHANGE UP (ref 0–2)
BILIRUB SERPL-MCNC: 0.2 MG/DL — SIGNIFICANT CHANGE UP (ref 0.2–1.2)
BUN SERPL-MCNC: 23 MG/DL — SIGNIFICANT CHANGE UP (ref 7–23)
CALCIUM SERPL-MCNC: 9.5 MG/DL — SIGNIFICANT CHANGE UP (ref 8.4–10.5)
CHLORIDE SERPL-SCNC: 104 MMOL/L — SIGNIFICANT CHANGE UP (ref 96–108)
CO2 SERPL-SCNC: 24 MMOL/L — SIGNIFICANT CHANGE UP (ref 22–31)
CREAT SERPL-MCNC: 1.3 MG/DL — SIGNIFICANT CHANGE UP (ref 0.5–1.3)
EGFR: 44 ML/MIN/1.73M2 — LOW
EGFR: 44 ML/MIN/1.73M2 — LOW
EOSINOPHIL # BLD AUTO: 0.36 K/UL — SIGNIFICANT CHANGE UP (ref 0–0.5)
EOSINOPHIL NFR BLD AUTO: 3.5 % — SIGNIFICANT CHANGE UP (ref 0–6)
GAS PNL BLDV: SIGNIFICANT CHANGE UP
GLUCOSE SERPL-MCNC: 83 MG/DL — SIGNIFICANT CHANGE UP (ref 70–99)
HCT VFR BLD CALC: 36.2 % — SIGNIFICANT CHANGE UP (ref 34.5–45)
HGB BLD-MCNC: 11.8 G/DL — SIGNIFICANT CHANGE UP (ref 11.5–15.5)
IMM GRANULOCYTES NFR BLD AUTO: 0.3 % — SIGNIFICANT CHANGE UP (ref 0–0.9)
INR BLD: 1.45 RATIO — HIGH (ref 0.85–1.16)
LIDOCAIN IGE QN: 11 U/L — SIGNIFICANT CHANGE UP (ref 7–60)
LYMPHOCYTES # BLD AUTO: 2.13 K/UL — SIGNIFICANT CHANGE UP (ref 1–3.3)
LYMPHOCYTES # BLD AUTO: 20.5 % — SIGNIFICANT CHANGE UP (ref 13–44)
MAGNESIUM SERPL-MCNC: 1.2 MG/DL — LOW (ref 1.6–2.6)
MCHC RBC-ENTMCNC: 27.6 PG — SIGNIFICANT CHANGE UP (ref 27–34)
MCHC RBC-ENTMCNC: 32.6 G/DL — SIGNIFICANT CHANGE UP (ref 32–36)
MCV RBC AUTO: 84.8 FL — SIGNIFICANT CHANGE UP (ref 80–100)
MONOCYTES # BLD AUTO: 0.87 K/UL — SIGNIFICANT CHANGE UP (ref 0–0.9)
MONOCYTES NFR BLD AUTO: 8.4 % — SIGNIFICANT CHANGE UP (ref 2–14)
NEUTROPHILS # BLD AUTO: 6.95 K/UL — SIGNIFICANT CHANGE UP (ref 1.8–7.4)
NEUTROPHILS NFR BLD AUTO: 66.8 % — SIGNIFICANT CHANGE UP (ref 43–77)
NRBC BLD AUTO-RTO: 0 /100 WBCS — SIGNIFICANT CHANGE UP (ref 0–0)
NT-PROBNP SERPL-SCNC: 899 PG/ML — HIGH (ref 0–300)
PLATELET # BLD AUTO: 292 K/UL — SIGNIFICANT CHANGE UP (ref 150–400)
POTASSIUM SERPL-MCNC: 4.3 MMOL/L — SIGNIFICANT CHANGE UP (ref 3.5–5.3)
POTASSIUM SERPL-SCNC: 4.3 MMOL/L — SIGNIFICANT CHANGE UP (ref 3.5–5.3)
PROT SERPL-MCNC: 6.7 G/DL — SIGNIFICANT CHANGE UP (ref 6–8.3)
PROTHROM AB SERPL-ACNC: 16.6 SEC — HIGH (ref 9.9–13.4)
RBC # BLD: 4.27 M/UL — SIGNIFICANT CHANGE UP (ref 3.8–5.2)
RBC # FLD: 15.9 % — HIGH (ref 10.3–14.5)
SODIUM SERPL-SCNC: 143 MMOL/L — SIGNIFICANT CHANGE UP (ref 135–145)
TROPONIN T, HIGH SENSITIVITY RESULT: 23 NG/L — SIGNIFICANT CHANGE UP (ref 0–51)
TROPONIN T, HIGH SENSITIVITY RESULT: 23 NG/L — SIGNIFICANT CHANGE UP (ref 0–51)
WBC # BLD: 10.39 K/UL — SIGNIFICANT CHANGE UP (ref 3.8–10.5)
WBC # FLD AUTO: 10.39 K/UL — SIGNIFICANT CHANGE UP (ref 3.8–10.5)

## 2025-05-13 PROCEDURE — 93010 ELECTROCARDIOGRAM REPORT: CPT

## 2025-05-13 PROCEDURE — 71260 CT THORAX DX C+: CPT | Mod: 26

## 2025-05-13 PROCEDURE — 99285 EMERGENCY DEPT VISIT HI MDM: CPT | Mod: FS

## 2025-05-13 PROCEDURE — 71045 X-RAY EXAM CHEST 1 VIEW: CPT | Mod: 26

## 2025-05-13 RX ORDER — HYDROMORPHONE/SOD CHLOR,ISO/PF 2 MG/10 ML
4 SYRINGE (ML) INJECTION ONCE
Refills: 0 | Status: DISCONTINUED | OUTPATIENT
Start: 2025-05-13 | End: 2025-05-13

## 2025-05-13 RX ORDER — MAGNESIUM SULFATE 500 MG/ML
1 SYRINGE (ML) INJECTION ONCE
Refills: 0 | Status: COMPLETED | OUTPATIENT
Start: 2025-05-13 | End: 2025-05-13

## 2025-05-13 RX ORDER — LIDOCAINE HYDROCHLORIDE 20 MG/ML
1 JELLY TOPICAL ONCE
Refills: 0 | Status: COMPLETED | OUTPATIENT
Start: 2025-05-13 | End: 2025-05-13

## 2025-05-13 RX ADMIN — LIDOCAINE HYDROCHLORIDE 1 PATCH: 20 JELLY TOPICAL at 20:09

## 2025-05-13 RX ADMIN — Medication 100 GRAM(S): at 23:31

## 2025-05-13 RX ADMIN — Medication 4 MILLIGRAM(S): at 23:01

## 2025-05-13 NOTE — ED PROVIDER NOTE - MUSCULOSKELETAL, MLM
Spine appears normal, range of motion is not limited, +tenderness over left chest wall/left mid chest wall, no deformities

## 2025-05-13 NOTE — ED PROVIDER NOTE - PROGRESS NOTE DETAILS
Emergency Medicine / Medical Toxicology Attending MD Peterson:   Patient signed out to me by Dr. Ernandez  70F, from a nursing home, fibromyalgia, afib on AC; c/o chest wall pain since this AM; physical exam suggests firm soft tissue area (possible underlying hematoma).   1st trop 23.  If delta trop reassuring, and CT chest unremarkable, could dc back to nursing home. Lab work demonstrating negative troponin x 2.  Mildly hypomagnesemic given replenishment.  Mildly elevated proBNP, patiently clinically not fluid overloaded.  Lactate 2.4.  Patient afebrile rectally with no leukocytosis and no subjective fever or chills.  Chest x-ray clear.  CT chest demonstrating asymmetry of left breast possible hematoma versus myositis, clinically hematoma makes the most sense.  Spoke with Dr. Peterson recommends holding DOAC for 2 days.  Stable to return to facility. Will dc with follow up. Discussed plan and return precautions with patient who understands and agrees. All questions answered.  - Porfirio Do PA-C

## 2025-05-13 NOTE — ED ADULT NURSE NOTE - NSFALLRISKINTERV_ED_ALL_ED

## 2025-05-13 NOTE — ED PROVIDER NOTE - NSFOLLOWUPINSTRUCTIONS_ED_ALL_ED_FT
Please follow up with your primary care doctor in 1-3 days.  *Bring all printed lab/test results to your appointment(s).*    HOLD ELIQUIS TODAY AND TOMORROW  Continue all other medications.    Warm compresses to affected area 20mins at a time 3-5 times a day    Return for worsening pain, difficulty breathing, chest pain, fevers, chills, or any other concerns.

## 2025-05-13 NOTE — ED PROVIDER NOTE - CARDIAC, MLM
Normal rate, regular rhythm.  Heart sounds S1, S2.  No murmurs, rubs or gallops. 1+ pitting edema bilaterally

## 2025-05-13 NOTE — ED ADULT NURSE NOTE - OBJECTIVE STATEMENT
70 year-old female history A-fib on Eliquis, HTN, HLD, diabetes, fibromyalgia BIBA from outpatient living facility for L sided chest pain and swelling.  Patient reports several days of intermittent left-sided chest/breast pain worse with movement of her left shoulder.  Pt denies SOB, cough, fever, chills, n/v/d, numbness/tingling, urinary symptoms, trauma to area.  Pt endorses increased bilateral lower extremity swelling.

## 2025-05-13 NOTE — ED PROVIDER NOTE - ATTENDING CONTRIBUTION TO CARE
70-year-old female history A-fib on Eliquis, HTN, HLD, diabetes, fibromyalgia brought in by ambulance from outpatient living facility for chest pain.  Presenting with chest pain to her left chest wall which started today on exam noted to be indurated hard to the touch maybe 4 x 5 cm no fluctuance but quite tender CT ordered to rule out abscess mass no skin changes analgesia offered cardiac workup already initiated in ED no history of cardiac problems breathing and EKG nonactionable

## 2025-05-13 NOTE — ED PROVIDER NOTE - OBJECTIVE STATEMENT
70-year-old female history A-fib on Eliquis, HTN, HLD, diabetes, fibromyalgia brought in by ambulance from outpatient living facility for chest pain.  Patient reports several days of intermittent left-sided chest/breast pain worse with movement of her left shoulder.  Denies radiation of pain.  Pain occurs when she touches the area or moves her left shoulder, also sometimes feels over her right breast and has a lidocaine patch on the area.  Denies associated trauma.  Denies associated shortness of breath.  Denies associated cough, fever, chills.  Admits to increased bilateral lower extremity swelling.

## 2025-05-13 NOTE — ED PROVIDER NOTE - PATIENT PORTAL LINK FT
You can access the FollowMyHealth Patient Portal offered by [facility] by registering at the following website: [email or web address]. By joining [employer]’s FollowMyHealth portal, you will also be able to view your health information using other applications (apps) compatible with our system.

## 2025-05-13 NOTE — ED PROVIDER NOTE - NS ED ATTENDING STATEMENT MOD
Attending with I have seen and examined this patient and fully participated in the care of this patient as the teaching attending.  The service was shared with the JAN.  I reviewed and verified the documentation.

## 2025-05-13 NOTE — ED PROVIDER NOTE - IV ALTEPLASE ADMIN OUTSIDE HIDDEN
Patient states Xarelto show

## 2025-05-13 NOTE — ED PROVIDER NOTE - SKIN, MLM
Skin normal color for race, warm, dry and intact. Bilateral calf venous stasis dermatitis, no warmth, no crepitus

## 2025-05-13 NOTE — ED PROVIDER NOTE - RESPIRATORY, MLM
Breath sounds clear and equal bilaterally. Breath sounds clear and equal bilaterally. Left lateral breast induration approx 3x5cm no overlying redness/warmth or crepitus

## 2025-05-14 VITALS
OXYGEN SATURATION: 97 % | RESPIRATION RATE: 16 BRPM | HEART RATE: 53 BPM | DIASTOLIC BLOOD PRESSURE: 68 MMHG | TEMPERATURE: 97 F | SYSTOLIC BLOOD PRESSURE: 123 MMHG

## 2025-05-14 LAB — ADD ON TEST-SPECIMEN IN LAB: SIGNIFICANT CHANGE UP

## 2025-05-14 PROCEDURE — 83690 ASSAY OF LIPASE: CPT

## 2025-05-14 PROCEDURE — 85014 HEMATOCRIT: CPT

## 2025-05-14 PROCEDURE — 84132 ASSAY OF SERUM POTASSIUM: CPT

## 2025-05-14 PROCEDURE — 71260 CT THORAX DX C+: CPT

## 2025-05-14 PROCEDURE — 82330 ASSAY OF CALCIUM: CPT

## 2025-05-14 PROCEDURE — 80053 COMPREHEN METABOLIC PANEL: CPT

## 2025-05-14 PROCEDURE — 85025 COMPLETE CBC W/AUTO DIFF WBC: CPT

## 2025-05-14 PROCEDURE — 71045 X-RAY EXAM CHEST 1 VIEW: CPT

## 2025-05-14 PROCEDURE — 84484 ASSAY OF TROPONIN QUANT: CPT

## 2025-05-14 PROCEDURE — 85018 HEMOGLOBIN: CPT

## 2025-05-14 PROCEDURE — 83880 ASSAY OF NATRIURETIC PEPTIDE: CPT

## 2025-05-14 PROCEDURE — 82435 ASSAY OF BLOOD CHLORIDE: CPT

## 2025-05-14 PROCEDURE — 84295 ASSAY OF SERUM SODIUM: CPT

## 2025-05-14 PROCEDURE — 84145 PROCALCITONIN (PCT): CPT

## 2025-05-14 PROCEDURE — 85610 PROTHROMBIN TIME: CPT

## 2025-05-14 PROCEDURE — 99285 EMERGENCY DEPT VISIT HI MDM: CPT | Mod: 25

## 2025-05-14 PROCEDURE — 82803 BLOOD GASES ANY COMBINATION: CPT

## 2025-05-14 PROCEDURE — 83735 ASSAY OF MAGNESIUM: CPT

## 2025-05-14 PROCEDURE — 83605 ASSAY OF LACTIC ACID: CPT

## 2025-05-14 PROCEDURE — 82947 ASSAY GLUCOSE BLOOD QUANT: CPT

## 2025-05-14 PROCEDURE — 93005 ELECTROCARDIOGRAM TRACING: CPT

## 2025-05-14 PROCEDURE — 85730 THROMBOPLASTIN TIME PARTIAL: CPT

## 2025-05-14 PROCEDURE — 96374 THER/PROPH/DIAG INJ IV PUSH: CPT | Mod: XU

## 2025-05-14 RX ORDER — ACETAMINOPHEN 500 MG/5ML
975 LIQUID (ML) ORAL ONCE
Refills: 0 | Status: COMPLETED | OUTPATIENT
Start: 2025-05-14 | End: 2025-05-14

## 2025-05-14 RX ADMIN — Medication 975 MILLIGRAM(S): at 00:19

## 2025-07-03 ENCOUNTER — INPATIENT (INPATIENT)
Facility: HOSPITAL | Age: 70
LOS: 5 days | Discharge: HOME HEALTH | DRG: 603 | End: 2025-07-09
Attending: STUDENT IN AN ORGANIZED HEALTH CARE EDUCATION/TRAINING PROGRAM | Admitting: STUDENT IN AN ORGANIZED HEALTH CARE EDUCATION/TRAINING PROGRAM
Payer: MEDICARE

## 2025-07-03 VITALS
TEMPERATURE: 98 F | RESPIRATION RATE: 20 BRPM | WEIGHT: 293 LBS | OXYGEN SATURATION: 100 % | HEART RATE: 62 BPM | HEIGHT: 68 IN | DIASTOLIC BLOOD PRESSURE: 75 MMHG | SYSTOLIC BLOOD PRESSURE: 118 MMHG

## 2025-07-03 DIAGNOSIS — I48.0 PAROXYSMAL ATRIAL FIBRILLATION: ICD-10-CM

## 2025-07-03 DIAGNOSIS — Z86.59 PERSONAL HISTORY OF OTHER MENTAL AND BEHAVIORAL DISORDERS: ICD-10-CM

## 2025-07-03 DIAGNOSIS — N18.30 CHRONIC KIDNEY DISEASE, STAGE 3 UNSPECIFIED: ICD-10-CM

## 2025-07-03 DIAGNOSIS — E78.5 HYPERLIPIDEMIA, UNSPECIFIED: ICD-10-CM

## 2025-07-03 DIAGNOSIS — L03.115 CELLULITIS OF RIGHT LOWER LIMB: ICD-10-CM

## 2025-07-03 DIAGNOSIS — I87.2 VENOUS INSUFFICIENCY (CHRONIC) (PERIPHERAL): ICD-10-CM

## 2025-07-03 DIAGNOSIS — L03.90 CELLULITIS, UNSPECIFIED: ICD-10-CM

## 2025-07-03 DIAGNOSIS — I10 ESSENTIAL (PRIMARY) HYPERTENSION: ICD-10-CM

## 2025-07-03 DIAGNOSIS — M79.7 FIBROMYALGIA: ICD-10-CM

## 2025-07-03 DIAGNOSIS — E03.9 HYPOTHYROIDISM, UNSPECIFIED: ICD-10-CM

## 2025-07-03 DIAGNOSIS — E11.9 TYPE 2 DIABETES MELLITUS WITHOUT COMPLICATIONS: ICD-10-CM

## 2025-07-03 LAB
ALBUMIN SERPL ELPH-MCNC: 3.5 G/DL — SIGNIFICANT CHANGE UP (ref 3.3–5)
ALP SERPL-CCNC: 100 U/L — SIGNIFICANT CHANGE UP (ref 40–120)
ALT FLD-CCNC: 6 U/L — LOW (ref 10–45)
ANION GAP SERPL CALC-SCNC: 13 MMOL/L — SIGNIFICANT CHANGE UP (ref 5–17)
APTT BLD: 38.4 SEC — HIGH (ref 26.1–36.8)
AST SERPL-CCNC: 8 U/L — LOW (ref 10–40)
BASOPHILS # BLD AUTO: 0.05 K/UL — SIGNIFICANT CHANGE UP (ref 0–0.2)
BASOPHILS NFR BLD AUTO: 0.7 % — SIGNIFICANT CHANGE UP (ref 0–2)
BILIRUB SERPL-MCNC: 0.2 MG/DL — SIGNIFICANT CHANGE UP (ref 0.2–1.2)
BUN SERPL-MCNC: 27 MG/DL — HIGH (ref 7–23)
CALCIUM SERPL-MCNC: 9.4 MG/DL — SIGNIFICANT CHANGE UP (ref 8.4–10.5)
CHLORIDE SERPL-SCNC: 101 MMOL/L — SIGNIFICANT CHANGE UP (ref 96–108)
CO2 SERPL-SCNC: 26 MMOL/L — SIGNIFICANT CHANGE UP (ref 22–31)
CREAT SERPL-MCNC: 1.26 MG/DL — SIGNIFICANT CHANGE UP (ref 0.5–1.3)
EGFR: 46 ML/MIN/1.73M2 — LOW
EGFR: 46 ML/MIN/1.73M2 — LOW
EOSINOPHIL # BLD AUTO: 0.39 K/UL — SIGNIFICANT CHANGE UP (ref 0–0.5)
EOSINOPHIL NFR BLD AUTO: 5.8 % — SIGNIFICANT CHANGE UP (ref 0–6)
GAS PNL BLDV: SIGNIFICANT CHANGE UP
GLUCOSE BLDC GLUCOMTR-MCNC: 103 MG/DL — HIGH (ref 70–99)
GLUCOSE SERPL-MCNC: 80 MG/DL — SIGNIFICANT CHANGE UP (ref 70–99)
HCT VFR BLD CALC: 34.3 % — LOW (ref 34.5–45)
HGB BLD-MCNC: 10.4 G/DL — LOW (ref 11.5–15.5)
IMM GRANULOCYTES # BLD AUTO: 0.02 K/UL — SIGNIFICANT CHANGE UP (ref 0–0.07)
IMM GRANULOCYTES NFR BLD AUTO: 0.3 % — SIGNIFICANT CHANGE UP (ref 0–0.9)
INR BLD: 1.5 RATIO — HIGH (ref 0.85–1.16)
LACTATE SERPL-SCNC: 2.1 MMOL/L — HIGH (ref 0.5–2)
LYMPHOCYTES # BLD AUTO: 1.66 K/UL — SIGNIFICANT CHANGE UP (ref 1–3.3)
LYMPHOCYTES NFR BLD AUTO: 24.5 % — SIGNIFICANT CHANGE UP (ref 13–44)
MCHC RBC-ENTMCNC: 27.2 PG — SIGNIFICANT CHANGE UP (ref 27–34)
MCHC RBC-ENTMCNC: 30.3 G/DL — LOW (ref 32–36)
MCV RBC AUTO: 89.8 FL — SIGNIFICANT CHANGE UP (ref 80–100)
MONOCYTES # BLD AUTO: 0.7 K/UL — SIGNIFICANT CHANGE UP (ref 0–0.9)
MONOCYTES NFR BLD AUTO: 10.3 % — SIGNIFICANT CHANGE UP (ref 2–14)
NEUTROPHILS # BLD AUTO: 3.95 K/UL — SIGNIFICANT CHANGE UP (ref 1.8–7.4)
NEUTROPHILS NFR BLD AUTO: 58.4 % — SIGNIFICANT CHANGE UP (ref 43–77)
NRBC # BLD AUTO: 0 K/UL — SIGNIFICANT CHANGE UP (ref 0–0)
NRBC # FLD: 0 K/UL — SIGNIFICANT CHANGE UP (ref 0–0)
NRBC BLD AUTO-RTO: 0 /100 WBCS — SIGNIFICANT CHANGE UP (ref 0–0)
PLATELET # BLD AUTO: 322 K/UL — SIGNIFICANT CHANGE UP (ref 150–400)
PMV BLD: 10.3 FL — SIGNIFICANT CHANGE UP (ref 7–13)
POTASSIUM SERPL-MCNC: 4.5 MMOL/L — SIGNIFICANT CHANGE UP (ref 3.5–5.3)
POTASSIUM SERPL-SCNC: 4.5 MMOL/L — SIGNIFICANT CHANGE UP (ref 3.5–5.3)
PROT SERPL-MCNC: 7 G/DL — SIGNIFICANT CHANGE UP (ref 6–8.3)
PROTHROM AB SERPL-ACNC: 17.2 SEC — HIGH (ref 9.9–13.4)
RBC # BLD: 3.82 M/UL — SIGNIFICANT CHANGE UP (ref 3.8–5.2)
RBC # FLD: 16.4 % — HIGH (ref 10.3–14.5)
SODIUM SERPL-SCNC: 140 MMOL/L — SIGNIFICANT CHANGE UP (ref 135–145)
WBC # BLD: 6.77 K/UL — SIGNIFICANT CHANGE UP (ref 3.8–10.5)
WBC # FLD AUTO: 6.77 K/UL — SIGNIFICANT CHANGE UP (ref 3.8–10.5)

## 2025-07-03 PROCEDURE — 82330 ASSAY OF CALCIUM: CPT

## 2025-07-03 PROCEDURE — 82962 GLUCOSE BLOOD TEST: CPT

## 2025-07-03 PROCEDURE — 85730 THROMBOPLASTIN TIME PARTIAL: CPT

## 2025-07-03 PROCEDURE — 84295 ASSAY OF SERUM SODIUM: CPT

## 2025-07-03 PROCEDURE — 80053 COMPREHEN METABOLIC PANEL: CPT

## 2025-07-03 PROCEDURE — 85018 HEMOGLOBIN: CPT

## 2025-07-03 PROCEDURE — 85025 COMPLETE CBC W/AUTO DIFF WBC: CPT

## 2025-07-03 PROCEDURE — 82803 BLOOD GASES ANY COMBINATION: CPT

## 2025-07-03 PROCEDURE — 93010 ELECTROCARDIOGRAM REPORT: CPT

## 2025-07-03 PROCEDURE — 87040 BLOOD CULTURE FOR BACTERIA: CPT

## 2025-07-03 PROCEDURE — 84132 ASSAY OF SERUM POTASSIUM: CPT

## 2025-07-03 PROCEDURE — 71045 X-RAY EXAM CHEST 1 VIEW: CPT

## 2025-07-03 PROCEDURE — 71045 X-RAY EXAM CHEST 1 VIEW: CPT | Mod: 26

## 2025-07-03 PROCEDURE — 82435 ASSAY OF BLOOD CHLORIDE: CPT

## 2025-07-03 PROCEDURE — 82947 ASSAY GLUCOSE BLOOD QUANT: CPT

## 2025-07-03 PROCEDURE — 85610 PROTHROMBIN TIME: CPT

## 2025-07-03 PROCEDURE — 99291 CRITICAL CARE FIRST HOUR: CPT | Mod: GC

## 2025-07-03 PROCEDURE — 99223 1ST HOSP IP/OBS HIGH 75: CPT

## 2025-07-03 PROCEDURE — 85014 HEMATOCRIT: CPT

## 2025-07-03 PROCEDURE — 83605 ASSAY OF LACTIC ACID: CPT

## 2025-07-03 RX ORDER — OXYBUTYNIN CHLORIDE 5 MG/1
5 TABLET, FILM COATED, EXTENDED RELEASE ORAL
Refills: 0 | Status: DISCONTINUED | OUTPATIENT
Start: 2025-07-03 | End: 2025-07-09

## 2025-07-03 RX ORDER — DEXTROSE 50 % IN WATER 50 %
25 SYRINGE (ML) INTRAVENOUS ONCE
Refills: 0 | Status: DISCONTINUED | OUTPATIENT
Start: 2025-07-03 | End: 2025-07-09

## 2025-07-03 RX ORDER — LEVOTHYROXINE SODIUM 300 MCG
125 TABLET ORAL DAILY
Refills: 0 | Status: DISCONTINUED | OUTPATIENT
Start: 2025-07-03 | End: 2025-07-09

## 2025-07-03 RX ORDER — CLINDAMYCIN PHOSPHATE 150 MG/ML
600 VIAL (ML) INJECTION ONCE
Refills: 0 | Status: COMPLETED | OUTPATIENT
Start: 2025-07-03 | End: 2025-07-03

## 2025-07-03 RX ORDER — METOPROLOL SUCCINATE 50 MG/1
0.5 TABLET, EXTENDED RELEASE ORAL
Refills: 0 | DISCHARGE

## 2025-07-03 RX ORDER — SODIUM CHLORIDE 9 G/1000ML
1000 INJECTION, SOLUTION INTRAVENOUS
Refills: 0 | Status: DISCONTINUED | OUTPATIENT
Start: 2025-07-03 | End: 2025-07-09

## 2025-07-03 RX ORDER — METOPROLOL SUCCINATE 50 MG/1
12.5 TABLET, EXTENDED RELEASE ORAL DAILY
Refills: 0 | Status: DISCONTINUED | OUTPATIENT
Start: 2025-07-03 | End: 2025-07-09

## 2025-07-03 RX ORDER — BUPROPION HYDROBROMIDE 522 MG/1
150 TABLET, EXTENDED RELEASE ORAL DAILY
Refills: 0 | Status: DISCONTINUED | OUTPATIENT
Start: 2025-07-03 | End: 2025-07-09

## 2025-07-03 RX ORDER — INSULIN LISPRO 100 U/ML
3 INJECTION, SOLUTION INTRAVENOUS; SUBCUTANEOUS
Refills: 0 | Status: DISCONTINUED | OUTPATIENT
Start: 2025-07-03 | End: 2025-07-04

## 2025-07-03 RX ORDER — GABAPENTIN 400 MG/1
600 CAPSULE ORAL THREE TIMES A DAY
Refills: 0 | Status: DISCONTINUED | OUTPATIENT
Start: 2025-07-03 | End: 2025-07-09

## 2025-07-03 RX ORDER — ATORVASTATIN CALCIUM 80 MG/1
40 TABLET, FILM COATED ORAL AT BEDTIME
Refills: 0 | Status: DISCONTINUED | OUTPATIENT
Start: 2025-07-03 | End: 2025-07-09

## 2025-07-03 RX ORDER — CYANOCOBALAMIN 1000 UG/ML
1000 INJECTION INTRAMUSCULAR; SUBCUTANEOUS DAILY
Refills: 0 | Status: DISCONTINUED | OUTPATIENT
Start: 2025-07-03 | End: 2025-07-09

## 2025-07-03 RX ORDER — CLINDAMYCIN PHOSPHATE 150 MG/ML
600 VIAL (ML) INJECTION EVERY 8 HOURS
Refills: 0 | Status: DISCONTINUED | OUTPATIENT
Start: 2025-07-03 | End: 2025-07-04

## 2025-07-03 RX ORDER — DEXTROSE 50 % IN WATER 50 %
15 SYRINGE (ML) INTRAVENOUS ONCE
Refills: 0 | Status: DISCONTINUED | OUTPATIENT
Start: 2025-07-03 | End: 2025-07-09

## 2025-07-03 RX ORDER — SPIRONOLACTONE 25 MG
1 TABLET ORAL
Refills: 0 | DISCHARGE

## 2025-07-03 RX ORDER — INSULIN LISPRO 100 U/ML
INJECTION, SOLUTION INTRAVENOUS; SUBCUTANEOUS AT BEDTIME
Refills: 0 | Status: DISCONTINUED | OUTPATIENT
Start: 2025-07-03 | End: 2025-07-09

## 2025-07-03 RX ORDER — ACETAMINOPHEN 500 MG/5ML
650 LIQUID (ML) ORAL EVERY 6 HOURS
Refills: 0 | Status: DISCONTINUED | OUTPATIENT
Start: 2025-07-03 | End: 2025-07-09

## 2025-07-03 RX ORDER — BUMETANIDE 1 MG/1
1 TABLET ORAL
Refills: 0 | Status: DISCONTINUED | OUTPATIENT
Start: 2025-07-03 | End: 2025-07-04

## 2025-07-03 RX ORDER — APIXABAN 5 MG/1
5 TABLET, FILM COATED ORAL EVERY 12 HOURS
Refills: 0 | Status: DISCONTINUED | OUTPATIENT
Start: 2025-07-03 | End: 2025-07-09

## 2025-07-03 RX ORDER — CLINDAMYCIN PHOSPHATE 150 MG/ML
VIAL (ML) INJECTION
Refills: 0 | Status: DISCONTINUED | OUTPATIENT
Start: 2025-07-03 | End: 2025-07-04

## 2025-07-03 RX ORDER — MELATONIN 5 MG
3 TABLET ORAL AT BEDTIME
Refills: 0 | Status: DISCONTINUED | OUTPATIENT
Start: 2025-07-03 | End: 2025-07-09

## 2025-07-03 RX ORDER — ESCITALOPRAM OXALATE 20 MG/1
10 TABLET ORAL DAILY
Refills: 0 | Status: DISCONTINUED | OUTPATIENT
Start: 2025-07-03 | End: 2025-07-09

## 2025-07-03 RX ORDER — GLUCAGON 3 MG/1
1 POWDER NASAL ONCE
Refills: 0 | Status: DISCONTINUED | OUTPATIENT
Start: 2025-07-03 | End: 2025-07-09

## 2025-07-03 RX ORDER — INSULIN LISPRO 100 U/ML
INJECTION, SOLUTION INTRAVENOUS; SUBCUTANEOUS
Refills: 0 | Status: DISCONTINUED | OUTPATIENT
Start: 2025-07-03 | End: 2025-07-09

## 2025-07-03 RX ORDER — SPIRONOLACTONE 25 MG
25 TABLET ORAL DAILY
Refills: 0 | Status: DISCONTINUED | OUTPATIENT
Start: 2025-07-03 | End: 2025-07-09

## 2025-07-03 RX ORDER — FERROUS SULFATE 137(45) MG
325 TABLET, EXTENDED RELEASE ORAL DAILY
Refills: 0 | Status: DISCONTINUED | OUTPATIENT
Start: 2025-07-03 | End: 2025-07-09

## 2025-07-03 RX ORDER — SODIUM CHLORIDE 9 G/1000ML
2000 INJECTION, SOLUTION INTRAVENOUS ONCE
Refills: 0 | Status: COMPLETED | OUTPATIENT
Start: 2025-07-03 | End: 2025-07-03

## 2025-07-03 RX ORDER — DEXTROSE 50 % IN WATER 50 %
12.5 SYRINGE (ML) INTRAVENOUS ONCE
Refills: 0 | Status: DISCONTINUED | OUTPATIENT
Start: 2025-07-03 | End: 2025-07-09

## 2025-07-03 RX ORDER — INSULIN GLARGINE-YFGN 100 [IU]/ML
12 INJECTION, SOLUTION SUBCUTANEOUS AT BEDTIME
Refills: 0 | Status: DISCONTINUED | OUTPATIENT
Start: 2025-07-03 | End: 2025-07-04

## 2025-07-03 RX ORDER — ESCITALOPRAM OXALATE 20 MG/1
1 TABLET ORAL
Refills: 0 | DISCHARGE

## 2025-07-03 RX ADMIN — SODIUM CHLORIDE 2000 MILLILITER(S): 9 INJECTION, SOLUTION INTRAVENOUS at 14:19

## 2025-07-03 RX ADMIN — GABAPENTIN 600 MILLIGRAM(S): 400 CAPSULE ORAL at 21:35

## 2025-07-03 RX ADMIN — ATORVASTATIN CALCIUM 40 MILLIGRAM(S): 80 TABLET, FILM COATED ORAL at 21:35

## 2025-07-03 RX ADMIN — INSULIN GLARGINE-YFGN 12 UNIT(S): 100 INJECTION, SOLUTION SUBCUTANEOUS at 23:03

## 2025-07-03 RX ADMIN — Medication 100 MILLIGRAM(S): at 15:29

## 2025-07-03 RX ADMIN — Medication 100 MILLIGRAM(S): at 21:35

## 2025-07-03 NOTE — ED ADULT NURSE NOTE - OBJECTIVE STATEMENT
70 year old female presents to ed via ems from home complaining of bilateral leg pain and redness. PMH HTN, diabetes, Afib, arthritis and an hernia. Fentanyl 50mcg patch on patients left chest wall upon arrival. States she has been noticing the redness x1 week was prescribed medication by her primary and told to come to emergency room if swelling got worse. Symptoms did not improved so called ambulance. Took gabapentin and tylenol prior to arrival.  Upon assessment a&o x4, breathing non labored non tachypneic on room air. Bilateral leg redness noted below knees.

## 2025-07-03 NOTE — ED PROVIDER NOTE - HIV OFFER
X-rays today - I will get back to you with results.    Wears tennis shoes when walking, continue to walk as you are able.    Tylenol for pain, warmth or cool.   
Previously Declined (within the last year)

## 2025-07-03 NOTE — H&P ADULT - ASSESSMENT
70F PMHx Afib s/p ablation and ILR on Eliquis, T2DM, HTN, HLD, CKD3, venous insufficiency, fibromyalgia (on fentanyl patch), depression, and RLS who presents from nursing home for swelling and redness of both her legs tracking up to her calves with no improvement after 3 days of doxycycline. Admitted for bilateral LE cellulitis.

## 2025-07-03 NOTE — H&P ADULT - PROBLEM SELECTOR PLAN 3
- Takes metformin 1000mg BID + Lantus 30u QHS + Lispro 5u TID w/ meals  - Lantus 24u QHS + ISS while admitted, adjust as needed - S/p ablation, and has ILR  - C/w eliquis 5mg BID  - C/w metoprolol succinate 25mg daily

## 2025-07-03 NOTE — ED PROVIDER NOTE - OBJECTIVE STATEMENT
71yo female with a pmhx of diabetes, HTN, hypothyroid, OA, fibromyalgia, elevated cholesterol and triglycerides, CHF, venous insufficiency, pulmonary htn, Afib on Eliquis s/p ablation, and cellulitis presents to the ED BIBEMS for bilateral leg swelling, edema, and redness that began a week and a half ago. The pt had a telehealth appt on Monday and was prescribed doxycycline, which she has taken as prescribed since with no improvement. She's had cellulitis twice in the past, both times requiring hospitalization with the most recent being May 21-29, 2023. She says that this feels similar to her past episodes of cellulitis, but this time feels worse and has progressed further up her calves than before. She had a cardiac ablation for her afib, which she was told was curative, and insertion of a loop internal monitor in September of 2021, but she remains on Eliquis.

## 2025-07-03 NOTE — H&P ADULT - PROBLEM SELECTOR PLAN 4
- C/w metoprolol succinate 25mg daily - Takes metformin 1000mg BID + Lantus 22u QHS + Lispro 10u TID w/ meals  - Patient states her sugars have been low at home  - Lantus 12u QHS + Lispro 3u TID + ISS while admitted, adjust as needed  - F/u A1c

## 2025-07-03 NOTE — H&P ADULT - PROBLEM SELECTOR PLAN 1
- C/w IV Clindamycin 600mg TID given hx of rash w/ cephalosporins  - Monitor for improvement w/ IV abx

## 2025-07-03 NOTE — CONSULT NOTE ADULT - SUBJECTIVE AND OBJECTIVE BOX
Date of Service, 07-03-25 @ 23:00  CHIEF COMPLAINT:Patient is a 70y old  Female who presents with a chief complaint of Cellulitis (03 Jul 2025 19:02)      HPI:  70F PMHx Afib s/p ablation and ILR on Eliquis, T2DM, HTN, HLD, hypothyroidism, CKD3, venous insufficiency, fibromyalgia (on fentanyl patch), depression, and RLS who presents from nursing home for swelling and redness of both her legs. The symptoms began around 10 days ago. She then had a telehealth appt this Monday 6/30, and was prescribed doxycycline BID, which she has been taking for the past 3 days without any improvement. She states that her legs are also twice their normal size, and the swelling is so much that the skin has become very tight and hard. Of note, she has had to be hospitalized twice in the past for cellulitis, and compared to those times, she feels that this episode is worse because the redness is further up her calves than those times. Decided to come to the ED for further evaluation.  She also expresses that her sugars have been quite low, going down to as low as 55. She was previously on Lantus 30u QHS + Lispro 5u TID + metformin 1000mg BID, and due to this, her Lantus was dropped to 22u and Lispro was upped to 10u TID. Despite this, she is still having low sugars.   In the ED, she was afebrile and hemodynamically stable, saturating well on RA. CBC w/ normocytic anemia of 10.4. CMP w/ evidence of CKD3. CXR w/o focal consolidations. Received IV Clindamycin 600mg and 2L LR in the ED.  (03 Jul 2025 19:02)      PAST MEDICAL & SURGICAL HISTORY:  Personal History of Arthritis  Personal History of Female Genital Cancer  Personal History of Hypertension  Hypothyroidism  Diabetes Mellitus  Hyperlipidemia  High Triglycerides  Osteoarthritis of Knee  right. requires walker  Obstructive Sleep Apnea  Constipation  Depression  Environmental Allergies  Morbidly Obese  Fibromyalgia  GERD with Apnea  Restless Legs Syndrome (RLS)  Umbilical Hernia  Atrial fibrillation  Personal history of pulmonary hypertension  Venous insufficiency  S/P Tonsillectomy and Adenoidectomy  S/P FESS (Functional Endoscopic Sinus Surgery)  Carpal Tunnel Syndrome  release left  Endometrial Ca s/p RODRIGO, BSO  Incarcerated Ventral Hernia  History of cardiac ablation for atrial fibrillation      MEDICATIONS  (STANDING):  apixaban 5 milliGRAM(s) Oral every 12 hours  atorvastatin 40 milliGRAM(s) Oral at bedtime  bumetanide Injectable 1 milliGRAM(s) IV Push two times a day  buPROPion XL (24-Hour) . 150 milliGRAM(s) Oral daily  cholecalciferol 2000 Unit(s) Oral daily  clindamycin IVPB      clindamycin IVPB 600 milliGRAM(s) IV Intermittent every 8 hours  cyanocobalamin 1000 MICROGram(s) Oral daily  dextrose 5%. 1000 milliLiter(s) (100 mL/Hr) IV Continuous <Continuous>  dextrose 5%. 1000 milliLiter(s) (50 mL/Hr) IV Continuous <Continuous>  dextrose 50% Injectable 25 Gram(s) IV Push once  dextrose 50% Injectable 12.5 Gram(s) IV Push once  dextrose 50% Injectable 25 Gram(s) IV Push once  escitalopram 10 milliGRAM(s) Oral daily  ferrous    sulfate 325 milliGRAM(s) Oral daily  gabapentin 600 milliGRAM(s) Oral three times a day  glucagon  Injectable 1 milliGRAM(s) IntraMuscular once  insulin glargine Injectable (LANTUS) 12 Unit(s) SubCutaneous at bedtime  insulin lispro (ADMELOG) corrective regimen sliding scale   SubCutaneous three times a day before meals  insulin lispro (ADMELOG) corrective regimen sliding scale   SubCutaneous at bedtime  insulin lispro Injectable (ADMELOG) 3 Unit(s) SubCutaneous three times a day before meals  levothyroxine 125 MICROGram(s) Oral daily  metoprolol succinate ER 12.5 milliGRAM(s) Oral daily  oxybutynin 5 milliGRAM(s) Oral two times a day  pantoprazole    Tablet 40 milliGRAM(s) Oral before breakfast  spironolactone 25 milliGRAM(s) Oral daily    MEDICATIONS  (PRN):  acetaminophen     Tablet .. 650 milliGRAM(s) Oral every 6 hours PRN Temp greater or equal to 38C (100.4F), Mild Pain (1 - 3)  dextrose Oral Gel 15 Gram(s) Oral once PRN Blood Glucose LESS THAN 70 milliGRAM(s)/deciliter  melatonin 3 milliGRAM(s) Oral at bedtime PRN Insomnia      FAMILY HISTORY:      SOCIAL HISTORY:    [ x] Non-smoker  [ ] Smoker  [ ] Alcohol    Allergies    pollen (Rhinitis; Rhinorrhea)  smoke (Rhinitis; Rhinorrhea)  adhesives (Rash)  Lyrica (Rash)  dust (Rhinitis; Rhinorrhea)  Tin/costume jewelry (Rash)  cephalosporins (Rash)  aerosols, perfumes, fabric softeners (Rash; Rhinitis; Rhinorrhea)    Intolerances    	    REVIEW OF SYSTEMS:  CONSTITUTIONAL: No fever, weight loss, or fatigue  EYES: No eye pain, visual disturbances, or discharge  ENT:  No difficulty hearing, tinnitus, vertigo; No sinus or throat pain  NECK: No pain or stiffness  RESPIRATORY: No cough, wheezing, chills or hemoptysis; No Shortness of Breath  CARDIOVASCULAR: No chest pain, palpitations, passing out, dizziness, + leg swelling  GASTROINTESTINAL: No abdominal or epigastric pain. No nausea, vomiting, or hematemesis; No diarrhea or constipation. No melena or hematochezia.  GENITOURINARY: No dysuria, frequency, hematuria, or incontinence  NEUROLOGICAL: No headaches, memory loss, loss of strength, numbness, or tremors  SKIN: No itching, burning, rashes, or lesions   LYMPH Nodes: No enlarged glands  ENDOCRINE: No heat or cold intolerance; No hair loss  MUSCULOSKELETAL: No joint pain or swelling; No muscle, back, + extremity pain  PSYCHIATRIC: No depression, anxiety, mood swings, or difficulty sleeping  HEME/LYMPH: No easy bruising, or bleeding gums  ALLERGY AND IMMUNOLOGIC: No hives or eczema	    [ ] All others negative	  [ ] Unable to obtain    PHYSICAL EXAM:  T(C): 36.7 (07-03-25 @ 20:08), Max: 36.7 (07-03-25 @ 20:08)  HR: 69 (07-03-25 @ 20:08) (62 - 70)  BP: 141/72 (07-03-25 @ 20:08) (118/75 - 141/72)  RR: 18 (07-03-25 @ 20:08) (18 - 22)  SpO2: 95% (07-03-25 @ 20:08) (95% - 100%)  Wt(kg): --  I&O's Summary      Appearance: Normal	  HEENT:   Normal oral mucosa, PERRL, EOMI	  Lymphatic: No lymphadenopathy  Cardiovascular: Normal S1 S2, No JVD, + murmurs, No edema  Respiratory: Lungs clear to auscultation	  Gastrointestinal:  Soft, Non-tender, + BS	  Skin: No rashes, No ecchymoses, No cyanosis	  Neurologic: Non-focal  Extremities: Normal range of motion, + cellulitis  Vascular: Peripheral pulses palpable 2+ bilaterally    TELEMETRY: 	    ECG:  	  RADIOLOGY:  OTHER: 	  	  LABS:	 	    CARDIAC MARKERS:                        10.4   6.77  )-----------( 322      ( 03 Jul 2025 14:27 )             34.3     07-03    140  |  101  |  27[H]  ----------------------------<  80  4.5   |  26  |  1.26    Ca    9.4      03 Jul 2025 14:27    TPro  7.0  /  Alb  3.5  /  TBili  0.2  /  DBili  x   /  AST  8[L]  /  ALT  6[L]  /  AlkPhos  100  07-03    proBNP:   Lipid Profile:   HgA1c:   TSH:   PT/INR - ( 03 Jul 2025 14:27 )   PT: 17.2 sec;   INR: 1.50 ratio         PTT - ( 03 Jul 2025 14:27 )  PTT:38.4 sec    PREVIOUS DIAGNOSTIC TESTING:      < from: Transthoracic Echocardiogram (07.26.20 @ 10:36) >  1. Mitral annular calcification, otherwise normal mitral  valve. Minimal mitral regurgitation.  2. Normal left ventricular internal dimensions and wall  thicknesses.  3. Endocardium not well visualized; grossly preserved  overall left ventricular systolic function.  Unable to  exclude segmental wall motion abnormalities.  4. Right ventricular enlargement with decreased right  ventricular systolic function.

## 2025-07-03 NOTE — ED PROVIDER NOTE - NSICDXPASTSURGICALHX_GEN_ALL_CORE_FT
PAST SURGICAL HISTORY:  Carpal Tunnel Syndrome release left    Endometrial Ca s/p RODRIGO, BSO     History of cardiac ablation for atrial fibrillation     Incarcerated Ventral Hernia     S/P FESS (Functional Endoscopic Sinus Surgery)     S/P Tonsillectomy and Adenoidectomy

## 2025-07-03 NOTE — ED ADULT NURSE REASSESSMENT NOTE - NS ED NURSE REASSESS COMMENT FT1
Patient endorses feeling "my sugar getting low", FS performed BSG reading 60. pt provided apple juice. Patient endorses feeling "my sugar getting low", FS performed BSG reading 60. pt provided 2 apple juice. Dexcom reads BSG of "55".

## 2025-07-03 NOTE — H&P ADULT - NSHPLABSRESULTS_GEN_ALL_CORE
10.4   6.77  )-----------( 322      ( 03 Jul 2025 14:27 )             34.3     07-03    140  |  101  |  27[H]  ----------------------------<  80  4.5   |  26  |  1.26    Ca    9.4      03 Jul 2025 14:27    TPro  7.0  /  Alb  3.5  /  TBili  0.2  /  DBili  x   /  AST  8[L]  /  ALT  6[L]  /  AlkPhos  100  07-03          LIVER FUNCTIONS - ( 03 Jul 2025 14:27 )  Alb: 3.5 g/dL / Pro: 7.0 g/dL / ALK PHOS: 100 U/L / ALT: 6 U/L / AST: 8 U/L / GGT: x           PT/INR - ( 03 Jul 2025 14:27 )   PT: 17.2 sec;   INR: 1.50 ratio         PTT - ( 03 Jul 2025 14:27 )  PTT:38.4 sec  Urinalysis Basic - ( 03 Jul 2025 14:27 )    Color: x / Appearance: x / SG: x / pH: x  Gluc: 80 mg/dL / Ketone: x  / Bili: x / Urobili: x   Blood: x / Protein: x / Nitrite: x   Leuk Esterase: x / RBC: x / WBC x   Sq Epi: x / Non Sq Epi: x / Bacteria: x

## 2025-07-03 NOTE — ED PROVIDER NOTE - NSICDXPASTMEDICALHX_GEN_ALL_CORE_FT
PAST MEDICAL HISTORY:  Atrial fibrillation     Constipation     Depression     Diabetes Mellitus     Diarrhea     Environmental Allergies     Fibromyalgia     GERD with Apnea     High Triglycerides     Hyperlipidemia     Hypothyroidism     Morbidly Obese     Obstructive Sleep Apnea     Osteoarthritis of Knee right. requires walker    Personal History of Arthritis     Personal History of Female Genital Cancer     Personal History of Hypertension     Personal history of pulmonary hypertension     Restless Legs Syndrome (RLS)     Umbilical Hernia     Venous insufficiency

## 2025-07-03 NOTE — CONSULT NOTE ADULT - ASSESSMENT
70F PMHx Afib s/p ablation and ILR on Eliquis, T2DM, HTN, HLD, hypothyroidism, CKD3, venous insufficiency, fibromyalgia (on fentanyl patch), depression, and RLS who presents from nursing home for swelling and redness of both her legs. The symptoms began around 10 days ago. She then had a telehealth appt this Monday 6/30, and was prescribed doxycycline BID, which she has been taking for the past 3 days without any improvement. She states that her legs are also twice their normal size, and the swelling is so much that the skin has become very tight and hard. Of note, she has had to be hospitalized twice in the past for cellulitis, and compared to those times, she feels that this episode is worse because the redness is further up her calves than those times. Decided to come to the ED for further evaluation.  She also expresses that her sugars have been quite low, going down to as low as 55. She was previously on Lantus 30u QHS + Lispro 5u TID + metformin 1000mg BID, and due to this, her Lantus was dropped to 22u and Lispro was upped to 10u TID. Despite this, she is still having low sugars.   In the ED, she was afebrile and hemodynamically stable, saturating well on RA. CBC w/ normocytic anemia of 10.4. CMP w/ evidence of CKD3. CXR w/o focal consolidations. Received IV Clindamycin 600mg and 2L LR in the ED.  pt is well known to me with sig cardiac and medical hx with s/p ablation of a.fib, PHTN nad RV dysfunction  LE edema sec to Right heart failure  continue aldactone, add iv lasix 20 mg bid  will review old echo  will add ACRE/ norvasc if bp elevated  dvt prophylaxis

## 2025-07-03 NOTE — H&P ADULT - HISTORY OF PRESENT ILLNESS
70F PMHx Afib s/p ablation and ILR on Eliquis, T2DM, HTN, HLD, hypothyroidism, CKD3, venous insufficiency, fibromyalgia (on fentanyl patch), depression, and RLS who presents from nursing home for swelling and redness of both her legs. The symptoms began around 10 days ago. She then had a telehealth appt this Monday 6/30, and was prescribed doxycycline BID, which she has been taking for the past 3 days without any improvement. Of note, she has had to be hospitalized twice in the past for cellulitis, and compared to those times, she feels that this episode is worse because the redness is further up her calves than those times. Decided to come to the ED for further evaluation.    In the ED, she was afebrile and hemodynamically stable, saturating well on RA. CBC w/ normocytic anemia of 10.4. CMP w/ evidence of CKD3. CXR w/o focal consolidations. Received IV Clindamycin 600mg and 2L LR in the ED.  70F PMHx Afib s/p ablation and ILR on Eliquis, T2DM, HTN, HLD, hypothyroidism, CKD3, venous insufficiency, fibromyalgia (on fentanyl patch), depression, and RLS who presents from nursing home for swelling and redness of both her legs. The symptoms began around 10 days ago. She then had a telehealth appt this Monday 6/30, and was prescribed doxycycline BID, which she has been taking for the past 3 days without any improvement. She states that her legs are also twice their normal size, and the swelling is so much that the skin has become very tight and hard. Of note, she has had to be hospitalized twice in the past for cellulitis, and compared to those times, she feels that this episode is worse because the redness is further up her calves than those times. Decided to come to the ED for further evaluation.  She also expresses that her sugars have been quite low, going down to as low as 55. She was previously on Lantus 30u QHS + Lispro 5u TID + metformin 1000mg BID, and due to this, her Lantus was dropped to 22u and Lispro was upped to 10u TID. Despite this, she is still having low sugars.     In the ED, she was afebrile and hemodynamically stable, saturating well on RA. CBC w/ normocytic anemia of 10.4. CMP w/ evidence of CKD3. CXR w/o focal consolidations. Received IV Clindamycin 600mg and 2L LR in the ED.

## 2025-07-03 NOTE — H&P ADULT - NSHPREVIEWOFSYSTEMS_GEN_ALL_CORE
Review of Systems:   CONSTITUTIONAL: No fever, weight loss  EYES: No eye pain, visual disturbances, or discharge  RESPIRATORY: No SOB. No cough, wheezing, chills or hemoptysis  CARDIOVASCULAR: No chest pain, palpitations, dizziness, or leg swelling  GASTROINTESTINAL: No abdominal or epigastric pain. No nausea, vomiting, or hematemesis; No diarrhea or constipation. No melena or hematochezia.  GENITOURINARY: No dysuria, frequency, hematuria, or incontinence  NEUROLOGICAL: No headaches, memory loss, loss of strength, numbness, or tremors  SKIN: +Redness and swelling of both legs  MUSCULOSKELETAL: No joint pain or swelling; No muscle, back pain  PSYCHIATRIC: No depression, anxiety, mood swings, or difficulty sleeping  HEME/LYMPH: No easy bruising, or bleeding gums

## 2025-07-03 NOTE — ED PROVIDER NOTE - CLINICAL SUMMARY MEDICAL DECISION MAKING FREE TEXT BOX
69yo female with a pmhx of diabetes, HTN, hypothyroid, OA, fibromyalgia, elevated cholesterol and triglycerides, CHF, venous insufficiency, pulmonary htn, Afib on Eliquis s/p ablation, and cellulitis presents to the ED [facility] for bilateral leg swelling, edema, and redness that began a week and a half ago. The pt had a telehealth appt on Monday and was prescribed doxycycline, which she has taken as prescribed since with no improvement. She's had cellulitis twice in the past, both times requiring hospitalization with the most recent being May 21-29, 2023. She says that this feels similar to her past episodes of cellulitis, but this time feels worse and has progressed further up her calves than before. She had a cardiac ablation for her afib, which she was told was curative, and insertion of a loop internal monitor in September of 2021, but she remains on Eliquis. CBC, aPTT, PT/INR, and Lactate were drawn and IV clindamycin was ordered since the pt failed outpatient therapy. Pt is willing to be admitted for iv abx, especially since she has been admitted for cellulitis twice in the past. 71yo female with a pmhx of diabetes, HTN, hypothyroid, OA, fibromyalgia, elevated cholesterol and triglycerides, CHF, venous insufficiency, pulmonary htn, Afib on Eliquis s/p ablation, and [facility]ulitis [facility], edema, and redness that began a week and a half ago. The pt had a telehealth appt on Monday and was prescribed doxycycline, which she has taken as prescribed since with no improvement. She's had cellulitis twice in the past, both times requiring hospitalization with the most recent being May 21-29, 2023. She says that this feels similar to her past episodes of cellulitis, but this time feels worse and has progressed further up her calves than before. She had a cardiac ablation for her afib, which she was told was curative, and insertion of a loop internal monitor in September of 2021, but she remains on Eliquis. CBC, aPTT, PT/INR, and Lactate were drawn and IV clindamycin was ordered since the pt failed outpatient therapy. Pt is willing to be admitted for iv abx, especially since she has been admitted for cellulitis twice in the past.    Adeline: Patient is a 70-year-old with multiple medical problems who presents to the emergency department with increasing redness and swelling of her lower extremities.  Patient with a history of lower extremity cellulitis in the past.  Patient with good pulses but warm red tender areas on both legs that looks cellulitic.  Patient with bilateral leg swelling that is equal.  Patient with no evidence of deep space infection.  Patient with no fever here but clearly an infection.  Patient had been treated with p.o. antibiotics with no relief so this is considered failed outpatient cellulitis.  Would get basic labs including sepsis components start early antibiotics give fluids and admit the patient for IV antibiotics.

## 2025-07-03 NOTE — ED PROVIDER NOTE - CHRONIC CONDITION OTHER
diabetes, HTN, hypothyroid, OA, fibromyalgia, elevated cholesterol and triglycerides, CHF, venous insufficiency, pulmonary htn, Afib

## 2025-07-03 NOTE — H&P ADULT - NSHPPHYSICALEXAM_GEN_ALL_CORE
Vital Signs Last 24 Hrs  T(C): 36.3 (03 Jul 2025 18:24), Max: 36.6 (03 Jul 2025 12:56)  T(F): 97.4 (03 Jul 2025 18:24), Max: 97.9 (03 Jul 2025 12:56)  HR: 70 (03 Jul 2025 18:24) (62 - 70)  BP: 123/60 (03 Jul 2025 18:24) (118/75 - 123/60)  BP(mean): 85 (03 Jul 2025 18:24) (85 - 85)  RR: 22 (03 Jul 2025 18:24) (20 - 22)  SpO2: 98% (03 Jul 2025 18:24) (98% - 100%)    Parameters below as of 03 Jul 2025 18:24  Patient On (Oxygen Delivery Method): room air        CONSTITUTIONAL: Well-groomed, in no apparent distress  EYES: No conjunctival or scleral injection, non-icteric;   NECK: Trachea midline without palpable neck mass  RESPIRATORY: Breathing comfortably; lungs CTA without wheeze/rhonchi/rales  CARDIOVASCULAR: +S1S2, RRR, no M/G/R; no lower extremity edema  GASTROINTESTINAL: No palpable masses or tenderness, +BS throughout, no rebound/guarding  SKIN: No rashes or ulcers noted  NEUROLOGIC: Sensation intact in LEs b/l to light touch  PSYCHIATRIC: A+O x 3; mood and affect appropriate; appropriate insight and judgment Vital Signs Last 24 Hrs  T(C): 36.3 (03 Jul 2025 18:24), Max: 36.6 (03 Jul 2025 12:56)  T(F): 97.4 (03 Jul 2025 18:24), Max: 97.9 (03 Jul 2025 12:56)  HR: 70 (03 Jul 2025 18:24) (62 - 70)  BP: 123/60 (03 Jul 2025 18:24) (118/75 - 123/60)  BP(mean): 85 (03 Jul 2025 18:24) (85 - 85)  RR: 22 (03 Jul 2025 18:24) (20 - 22)  SpO2: 98% (03 Jul 2025 18:24) (98% - 100%)    Parameters below as of 03 Jul 2025 18:24  Patient On (Oxygen Delivery Method): room air        CONSTITUTIONAL: Well-groomed, in no apparent distress  EYES: No conjunctival or scleral injection, non-icteric;   NECK: Trachea midline without palpable neck mass  RESPIRATORY: Breathing comfortably; lungs CTA without wheeze/rhonchi/rales  CARDIOVASCULAR: +S1S2, RRR, no M/G/R; 3+ bilateral LE pitting edema noted  GASTROINTESTINAL: No palpable masses or tenderness, +BS throughout, no rebound/guarding  SKIN: Erythema, pain, and swelling noted on bilateral LEs with warmth and tenderness to touch; fentanyl patch noted on L chest wall  NEUROLOGIC: Sensation intact in LEs b/l to light touch  PSYCHIATRIC: A+O x 3; mood and affect appropriate; appropriate insight and judgment

## 2025-07-03 NOTE — H&P ADULT - PROBLEM SELECTOR PLAN 7
- C/w fentanyl 50mcg patch q72hrs, currently has one on  - C/w gabapentin 600mg TID - C/w levothyroxine 125mcg daily

## 2025-07-03 NOTE — H&P ADULT - PROBLEM SELECTOR PLAN 2
- S/p ablation, and has ILR  - C/w eliquis 5mg BID  - C/w metoprolol succinate 25mg daily - Takes bumex 0.5mg daily + spironolactone 25mg daily  - C/w spironolactone, given increased LE swelling, will also start IV Bumex 1mg BID for now - Takes bumex 0.5mg daily + spironolactone 25mg daily  - C/w spironolactone, given increased LE swelling, will hold PO bumex and start IVP lasix 20mg BID

## 2025-07-03 NOTE — H&P ADULT - PROBLEM SELECTOR PLAN 8
- C/w aripiprazole 2mg daily  - C/w bupropion 150mg daily  - C/w escitalopram 25mg daily - C/w fentanyl 50mcg patch q72hrs, currently has one on which was placed late AM on 7/1, next patch to be placed tomorrow 11 AM  - C/w gabapentin 600mg TID

## 2025-07-04 DIAGNOSIS — I50.9 HEART FAILURE, UNSPECIFIED: ICD-10-CM

## 2025-07-04 LAB
A1C WITH ESTIMATED AVERAGE GLUCOSE RESULT: 6.3 % — HIGH (ref 4–5.6)
ALBUMIN SERPL ELPH-MCNC: 3.2 G/DL — LOW (ref 3.3–5)
ALP SERPL-CCNC: 91 U/L — SIGNIFICANT CHANGE UP (ref 40–120)
ALT FLD-CCNC: 6 U/L — LOW (ref 10–45)
ANION GAP SERPL CALC-SCNC: 14 MMOL/L — SIGNIFICANT CHANGE UP (ref 5–17)
AST SERPL-CCNC: 8 U/L — LOW (ref 10–40)
BILIRUB SERPL-MCNC: 0.3 MG/DL — SIGNIFICANT CHANGE UP (ref 0.2–1.2)
BUN SERPL-MCNC: 26 MG/DL — HIGH (ref 7–23)
CALCIUM SERPL-MCNC: 9.2 MG/DL — SIGNIFICANT CHANGE UP (ref 8.4–10.5)
CHLORIDE SERPL-SCNC: 103 MMOL/L — SIGNIFICANT CHANGE UP (ref 96–108)
CO2 SERPL-SCNC: 24 MMOL/L — SIGNIFICANT CHANGE UP (ref 22–31)
CREAT SERPL-MCNC: 1.25 MG/DL — SIGNIFICANT CHANGE UP (ref 0.5–1.3)
EGFR: 46 ML/MIN/1.73M2 — LOW
EGFR: 46 ML/MIN/1.73M2 — LOW
ESTIMATED AVERAGE GLUCOSE: 134 MG/DL — HIGH (ref 68–114)
GLUCOSE BLDC GLUCOMTR-MCNC: 126 MG/DL — HIGH (ref 70–99)
GLUCOSE BLDC GLUCOMTR-MCNC: 128 MG/DL — HIGH (ref 70–99)
GLUCOSE BLDC GLUCOMTR-MCNC: 157 MG/DL — HIGH (ref 70–99)
GLUCOSE BLDC GLUCOMTR-MCNC: 98 MG/DL — SIGNIFICANT CHANGE UP (ref 70–99)
GLUCOSE SERPL-MCNC: 94 MG/DL — SIGNIFICANT CHANGE UP (ref 70–99)
HCT VFR BLD CALC: 33.2 % — LOW (ref 34.5–45)
HGB BLD-MCNC: 10 G/DL — LOW (ref 11.5–15.5)
MAGNESIUM SERPL-MCNC: 1.7 MG/DL — SIGNIFICANT CHANGE UP (ref 1.6–2.6)
MCHC RBC-ENTMCNC: 26.4 PG — LOW (ref 27–34)
MCHC RBC-ENTMCNC: 30.1 G/DL — LOW (ref 32–36)
MCV RBC AUTO: 87.6 FL — SIGNIFICANT CHANGE UP (ref 80–100)
NRBC # BLD AUTO: 0 K/UL — SIGNIFICANT CHANGE UP (ref 0–0)
NRBC # FLD: 0 K/UL — SIGNIFICANT CHANGE UP (ref 0–0)
NRBC BLD AUTO-RTO: 0 /100 WBCS — SIGNIFICANT CHANGE UP (ref 0–0)
PHOSPHATE SERPL-MCNC: 4.5 MG/DL — SIGNIFICANT CHANGE UP (ref 2.5–4.5)
PLATELET # BLD AUTO: 323 K/UL — SIGNIFICANT CHANGE UP (ref 150–400)
PMV BLD: 10.3 FL — SIGNIFICANT CHANGE UP (ref 7–13)
POTASSIUM SERPL-MCNC: 4.3 MMOL/L — SIGNIFICANT CHANGE UP (ref 3.5–5.3)
POTASSIUM SERPL-SCNC: 4.3 MMOL/L — SIGNIFICANT CHANGE UP (ref 3.5–5.3)
PROT SERPL-MCNC: 6.5 G/DL — SIGNIFICANT CHANGE UP (ref 6–8.3)
RBC # BLD: 3.79 M/UL — LOW (ref 3.8–5.2)
RBC # FLD: 16.2 % — HIGH (ref 10.3–14.5)
SODIUM SERPL-SCNC: 141 MMOL/L — SIGNIFICANT CHANGE UP (ref 135–145)
WBC # BLD: 6.35 K/UL — SIGNIFICANT CHANGE UP (ref 3.8–10.5)
WBC # FLD AUTO: 6.35 K/UL — SIGNIFICANT CHANGE UP (ref 3.8–10.5)

## 2025-07-04 PROCEDURE — 99233 SBSQ HOSP IP/OBS HIGH 50: CPT

## 2025-07-04 RX ORDER — INSULIN LISPRO 100 U/ML
2 INJECTION, SOLUTION INTRAVENOUS; SUBCUTANEOUS
Refills: 0 | Status: DISCONTINUED | OUTPATIENT
Start: 2025-07-04 | End: 2025-07-09

## 2025-07-04 RX ORDER — MAGNESIUM SULFATE 500 MG/ML
2 SYRINGE (ML) INJECTION ONCE
Refills: 0 | Status: COMPLETED | OUTPATIENT
Start: 2025-07-04 | End: 2025-07-04

## 2025-07-04 RX ORDER — INSULIN GLARGINE-YFGN 100 [IU]/ML
6 INJECTION, SOLUTION SUBCUTANEOUS AT BEDTIME
Refills: 0 | Status: DISCONTINUED | OUTPATIENT
Start: 2025-07-04 | End: 2025-07-09

## 2025-07-04 RX ORDER — FUROSEMIDE 10 MG/ML
20 INJECTION INTRAMUSCULAR; INTRAVENOUS
Refills: 0 | Status: DISCONTINUED | OUTPATIENT
Start: 2025-07-04 | End: 2025-07-05

## 2025-07-04 RX ADMIN — Medication 650 MILLIGRAM(S): at 08:02

## 2025-07-04 RX ADMIN — BUPROPION HYDROBROMIDE 150 MILLIGRAM(S): 522 TABLET, EXTENDED RELEASE ORAL at 11:44

## 2025-07-04 RX ADMIN — METOPROLOL SUCCINATE 12.5 MILLIGRAM(S): 50 TABLET, EXTENDED RELEASE ORAL at 05:38

## 2025-07-04 RX ADMIN — Medication 125 MICROGRAM(S): at 05:37

## 2025-07-04 RX ADMIN — GABAPENTIN 600 MILLIGRAM(S): 400 CAPSULE ORAL at 05:38

## 2025-07-04 RX ADMIN — INSULIN LISPRO 2 UNIT(S): 100 INJECTION, SOLUTION INTRAVENOUS; SUBCUTANEOUS at 16:34

## 2025-07-04 RX ADMIN — OXYBUTYNIN CHLORIDE 5 MILLIGRAM(S): 5 TABLET, FILM COATED, EXTENDED RELEASE ORAL at 05:38

## 2025-07-04 RX ADMIN — Medication 325 MILLIGRAM(S): at 11:42

## 2025-07-04 RX ADMIN — FUROSEMIDE 20 MILLIGRAM(S): 10 INJECTION INTRAMUSCULAR; INTRAVENOUS at 13:14

## 2025-07-04 RX ADMIN — APIXABAN 5 MILLIGRAM(S): 5 TABLET, FILM COATED ORAL at 05:38

## 2025-07-04 RX ADMIN — INSULIN LISPRO 3 UNIT(S): 100 INJECTION, SOLUTION INTRAVENOUS; SUBCUTANEOUS at 08:01

## 2025-07-04 RX ADMIN — CYANOCOBALAMIN 1000 MICROGRAM(S): 1000 INJECTION INTRAMUSCULAR; SUBCUTANEOUS at 11:41

## 2025-07-04 RX ADMIN — Medication 25 MILLIGRAM(S): at 05:38

## 2025-07-04 RX ADMIN — Medication 2000 UNIT(S): at 11:42

## 2025-07-04 RX ADMIN — Medication 25 GRAM(S): at 10:27

## 2025-07-04 RX ADMIN — GABAPENTIN 600 MILLIGRAM(S): 400 CAPSULE ORAL at 22:37

## 2025-07-04 RX ADMIN — ATORVASTATIN CALCIUM 40 MILLIGRAM(S): 80 TABLET, FILM COATED ORAL at 22:15

## 2025-07-04 RX ADMIN — GABAPENTIN 600 MILLIGRAM(S): 400 CAPSULE ORAL at 13:13

## 2025-07-04 RX ADMIN — ESCITALOPRAM OXALATE 10 MILLIGRAM(S): 20 TABLET ORAL at 11:41

## 2025-07-04 RX ADMIN — APIXABAN 5 MILLIGRAM(S): 5 TABLET, FILM COATED ORAL at 17:01

## 2025-07-04 RX ADMIN — Medication 650 MILLIGRAM(S): at 08:32

## 2025-07-04 RX ADMIN — OXYBUTYNIN CHLORIDE 5 MILLIGRAM(S): 5 TABLET, FILM COATED, EXTENDED RELEASE ORAL at 17:01

## 2025-07-04 RX ADMIN — INSULIN LISPRO 3 UNIT(S): 100 INJECTION, SOLUTION INTRAVENOUS; SUBCUTANEOUS at 12:00

## 2025-07-04 RX ADMIN — Medication 100 MILLIGRAM(S): at 13:13

## 2025-07-04 RX ADMIN — Medication 100 MILLIGRAM(S): at 05:37

## 2025-07-04 RX ADMIN — INSULIN GLARGINE-YFGN 6 UNIT(S): 100 INJECTION, SOLUTION SUBCUTANEOUS at 22:15

## 2025-07-04 RX ADMIN — FUROSEMIDE 20 MILLIGRAM(S): 10 INJECTION INTRAMUSCULAR; INTRAVENOUS at 05:37

## 2025-07-05 LAB
ALBUMIN SERPL ELPH-MCNC: 3.3 G/DL — SIGNIFICANT CHANGE UP (ref 3.3–5)
ALP SERPL-CCNC: 86 U/L — SIGNIFICANT CHANGE UP (ref 40–120)
ALT FLD-CCNC: <5 U/L — LOW (ref 10–45)
AMORPH CRY # UR COMP ASSIST: PRESENT
ANION GAP SERPL CALC-SCNC: 11 MMOL/L — SIGNIFICANT CHANGE UP (ref 5–17)
ANION GAP SERPL CALC-SCNC: 12 MMOL/L — SIGNIFICANT CHANGE UP (ref 5–17)
APPEARANCE UR: ABNORMAL
AST SERPL-CCNC: 11 U/L — SIGNIFICANT CHANGE UP (ref 10–40)
BACTERIA # UR AUTO: ABNORMAL /HPF
BILIRUB SERPL-MCNC: 0.2 MG/DL — SIGNIFICANT CHANGE UP (ref 0.2–1.2)
BILIRUB UR-MCNC: NEGATIVE — SIGNIFICANT CHANGE UP
BUN SERPL-MCNC: 30 MG/DL — HIGH (ref 7–23)
BUN SERPL-MCNC: 32 MG/DL — HIGH (ref 7–23)
CALCIUM SERPL-MCNC: 9 MG/DL — SIGNIFICANT CHANGE UP (ref 8.4–10.5)
CALCIUM SERPL-MCNC: 9.1 MG/DL — SIGNIFICANT CHANGE UP (ref 8.4–10.5)
CAST: 0 /LPF — SIGNIFICANT CHANGE UP (ref 0–4)
CHLORIDE SERPL-SCNC: 102 MMOL/L — SIGNIFICANT CHANGE UP (ref 96–108)
CHLORIDE SERPL-SCNC: 102 MMOL/L — SIGNIFICANT CHANGE UP (ref 96–108)
CO2 SERPL-SCNC: 27 MMOL/L — SIGNIFICANT CHANGE UP (ref 22–31)
CO2 SERPL-SCNC: 27 MMOL/L — SIGNIFICANT CHANGE UP (ref 22–31)
COLOR SPEC: YELLOW — SIGNIFICANT CHANGE UP
CREAT SERPL-MCNC: 1.34 MG/DL — HIGH (ref 0.5–1.3)
CREAT SERPL-MCNC: 1.51 MG/DL — HIGH (ref 0.5–1.3)
DIFF PNL FLD: NEGATIVE — SIGNIFICANT CHANGE UP
EGFR: 37 ML/MIN/1.73M2 — LOW
EGFR: 37 ML/MIN/1.73M2 — LOW
EGFR: 43 ML/MIN/1.73M2 — LOW
EGFR: 43 ML/MIN/1.73M2 — LOW
GLUCOSE BLDC GLUCOMTR-MCNC: 127 MG/DL — HIGH (ref 70–99)
GLUCOSE BLDC GLUCOMTR-MCNC: 140 MG/DL — HIGH (ref 70–99)
GLUCOSE BLDC GLUCOMTR-MCNC: 151 MG/DL — HIGH (ref 70–99)
GLUCOSE BLDC GLUCOMTR-MCNC: 192 MG/DL — HIGH (ref 70–99)
GLUCOSE SERPL-MCNC: 129 MG/DL — HIGH (ref 70–99)
GLUCOSE SERPL-MCNC: 159 MG/DL — HIGH (ref 70–99)
GLUCOSE UR QL: NEGATIVE MG/DL — SIGNIFICANT CHANGE UP
HCT VFR BLD CALC: 32.8 % — LOW (ref 34.5–45)
HGB BLD-MCNC: 10.2 G/DL — LOW (ref 11.5–15.5)
KETONES UR QL: NEGATIVE MG/DL — SIGNIFICANT CHANGE UP
LEUKOCYTE ESTERASE UR-ACNC: NEGATIVE — SIGNIFICANT CHANGE UP
MAGNESIUM SERPL-MCNC: 2 MG/DL — SIGNIFICANT CHANGE UP (ref 1.6–2.6)
MCHC RBC-ENTMCNC: 27 PG — SIGNIFICANT CHANGE UP (ref 27–34)
MCHC RBC-ENTMCNC: 31.1 G/DL — LOW (ref 32–36)
MCV RBC AUTO: 86.8 FL — SIGNIFICANT CHANGE UP (ref 80–100)
NITRITE UR-MCNC: NEGATIVE — SIGNIFICANT CHANGE UP
NRBC # BLD AUTO: 0 K/UL — SIGNIFICANT CHANGE UP (ref 0–0)
NRBC # FLD: 0 K/UL — SIGNIFICANT CHANGE UP (ref 0–0)
NRBC BLD AUTO-RTO: 0 /100 WBCS — SIGNIFICANT CHANGE UP (ref 0–0)
PH UR: 7 — SIGNIFICANT CHANGE UP (ref 5–8)
PHOSPHATE SERPL-MCNC: 4.9 MG/DL — HIGH (ref 2.5–4.5)
PLATELET # BLD AUTO: 317 K/UL — SIGNIFICANT CHANGE UP (ref 150–400)
PMV BLD: 10.7 FL — SIGNIFICANT CHANGE UP (ref 7–13)
POTASSIUM SERPL-MCNC: 4.3 MMOL/L — SIGNIFICANT CHANGE UP (ref 3.5–5.3)
POTASSIUM SERPL-MCNC: 4.4 MMOL/L — SIGNIFICANT CHANGE UP (ref 3.5–5.3)
POTASSIUM SERPL-SCNC: 4.3 MMOL/L — SIGNIFICANT CHANGE UP (ref 3.5–5.3)
POTASSIUM SERPL-SCNC: 4.4 MMOL/L — SIGNIFICANT CHANGE UP (ref 3.5–5.3)
PROT SERPL-MCNC: 6.4 G/DL — SIGNIFICANT CHANGE UP (ref 6–8.3)
PROT UR-MCNC: NEGATIVE MG/DL — SIGNIFICANT CHANGE UP
RBC # BLD: 3.78 M/UL — LOW (ref 3.8–5.2)
RBC # FLD: 16.3 % — HIGH (ref 10.3–14.5)
RBC CASTS # UR COMP ASSIST: 58 /HPF — HIGH (ref 0–4)
REVIEW: SIGNIFICANT CHANGE UP
SODIUM SERPL-SCNC: 140 MMOL/L — SIGNIFICANT CHANGE UP (ref 135–145)
SODIUM SERPL-SCNC: 141 MMOL/L — SIGNIFICANT CHANGE UP (ref 135–145)
SP GR SPEC: 1.01 — SIGNIFICANT CHANGE UP (ref 1–1.03)
SQUAMOUS # UR AUTO: 12 /HPF — HIGH (ref 0–5)
UROBILINOGEN FLD QL: 0.2 MG/DL — SIGNIFICANT CHANGE UP (ref 0.2–1)
WBC # BLD: 5.34 K/UL — SIGNIFICANT CHANGE UP (ref 3.8–10.5)
WBC # FLD AUTO: 5.34 K/UL — SIGNIFICANT CHANGE UP (ref 3.8–10.5)
WBC UR QL: 1 /HPF — SIGNIFICANT CHANGE UP (ref 0–5)

## 2025-07-05 PROCEDURE — 99232 SBSQ HOSP IP/OBS MODERATE 35: CPT

## 2025-07-05 RX ORDER — B1/B2/B3/B5/B6/B12/VIT C/FOLIC 500-0.5 MG
1 TABLET ORAL DAILY
Refills: 0 | Status: DISCONTINUED | OUTPATIENT
Start: 2025-07-05 | End: 2025-07-09

## 2025-07-05 RX ORDER — FUROSEMIDE 10 MG/ML
5 INJECTION INTRAMUSCULAR; INTRAVENOUS
Qty: 500 | Refills: 0 | Status: DISCONTINUED | OUTPATIENT
Start: 2025-07-05 | End: 2025-07-07

## 2025-07-05 RX ADMIN — Medication 25 MILLIGRAM(S): at 05:44

## 2025-07-05 RX ADMIN — INSULIN LISPRO 2 UNIT(S): 100 INJECTION, SOLUTION INTRAVENOUS; SUBCUTANEOUS at 17:03

## 2025-07-05 RX ADMIN — CYANOCOBALAMIN 1000 MICROGRAM(S): 1000 INJECTION INTRAMUSCULAR; SUBCUTANEOUS at 11:51

## 2025-07-05 RX ADMIN — Medication 1 TABLET(S): at 17:56

## 2025-07-05 RX ADMIN — FUROSEMIDE 20 MILLIGRAM(S): 10 INJECTION INTRAMUSCULAR; INTRAVENOUS at 05:44

## 2025-07-05 RX ADMIN — Medication 2000 UNIT(S): at 11:51

## 2025-07-05 RX ADMIN — INSULIN LISPRO 2: 100 INJECTION, SOLUTION INTRAVENOUS; SUBCUTANEOUS at 17:03

## 2025-07-05 RX ADMIN — FUROSEMIDE 2.5 MG/HR: 10 INJECTION INTRAMUSCULAR; INTRAVENOUS at 13:55

## 2025-07-05 RX ADMIN — GABAPENTIN 600 MILLIGRAM(S): 400 CAPSULE ORAL at 21:26

## 2025-07-05 RX ADMIN — INSULIN LISPRO 2 UNIT(S): 100 INJECTION, SOLUTION INTRAVENOUS; SUBCUTANEOUS at 11:52

## 2025-07-05 RX ADMIN — ESCITALOPRAM OXALATE 10 MILLIGRAM(S): 20 TABLET ORAL at 11:51

## 2025-07-05 RX ADMIN — OXYBUTYNIN CHLORIDE 5 MILLIGRAM(S): 5 TABLET, FILM COATED, EXTENDED RELEASE ORAL at 05:44

## 2025-07-05 RX ADMIN — GABAPENTIN 600 MILLIGRAM(S): 400 CAPSULE ORAL at 13:55

## 2025-07-05 RX ADMIN — INSULIN LISPRO 2 UNIT(S): 100 INJECTION, SOLUTION INTRAVENOUS; SUBCUTANEOUS at 08:00

## 2025-07-05 RX ADMIN — APIXABAN 5 MILLIGRAM(S): 5 TABLET, FILM COATED ORAL at 05:44

## 2025-07-05 RX ADMIN — APIXABAN 5 MILLIGRAM(S): 5 TABLET, FILM COATED ORAL at 17:04

## 2025-07-05 RX ADMIN — Medication 325 MILLIGRAM(S): at 11:51

## 2025-07-05 RX ADMIN — Medication 125 MICROGRAM(S): at 05:44

## 2025-07-05 RX ADMIN — BUPROPION HYDROBROMIDE 150 MILLIGRAM(S): 522 TABLET, EXTENDED RELEASE ORAL at 11:51

## 2025-07-05 RX ADMIN — ATORVASTATIN CALCIUM 40 MILLIGRAM(S): 80 TABLET, FILM COATED ORAL at 21:26

## 2025-07-05 RX ADMIN — INSULIN GLARGINE-YFGN 6 UNIT(S): 100 INJECTION, SOLUTION SUBCUTANEOUS at 21:26

## 2025-07-05 RX ADMIN — GABAPENTIN 600 MILLIGRAM(S): 400 CAPSULE ORAL at 05:44

## 2025-07-05 RX ADMIN — Medication 40 MILLIGRAM(S): at 06:35

## 2025-07-05 RX ADMIN — OXYBUTYNIN CHLORIDE 5 MILLIGRAM(S): 5 TABLET, FILM COATED, EXTENDED RELEASE ORAL at 17:04

## 2025-07-05 RX ADMIN — METOPROLOL SUCCINATE 12.5 MILLIGRAM(S): 50 TABLET, EXTENDED RELEASE ORAL at 05:44

## 2025-07-05 NOTE — PHYSICAL THERAPY INITIAL EVALUATION ADULT - PERTINENT HX OF CURRENT PROBLEM, REHAB EVAL
70F PMHx Afib s/p ablation and ILR on Eliquis, T2DM, HTN, HLD, CKD3, venous insufficiency, fibromyalgia (on fentanyl patch), depression, and RLS who presents from nursing home for volume overload

## 2025-07-05 NOTE — DIETITIAN INITIAL EVALUATION ADULT - NUTRITIONGOAL OUTCOME1
Pt to gradually reduce weight towards IBW by means of adherence to calorie-controlled diet and physical activity as allowed/appropriate.

## 2025-07-05 NOTE — DIETITIAN INITIAL EVALUATION ADULT - PERTINENT LABORATORY DATA
07-05    141  |  102  |  30[H]  ----------------------------<  129[H]  4.4   |  27  |  1.34[H]    Ca    9.0      05 Jul 2025 07:08  Phos  4.9     07-05  Mg     2.0     07-05    TPro  6.4  /  Alb  3.3  /  TBili  0.2  /  DBili  x   /  AST  11  /  ALT  <5[L]  /  AlkPhos  86  07-05    CAPILLARY BLOOD GLUCOSE  POCT Blood Glucose.: 140 mg/dL (05 Jul 2025 11:50)  POCT Blood Glucose.: 127 mg/dL (05 Jul 2025 07:39)  POCT Blood Glucose.: 157 mg/dL (04 Jul 2025 21:28)  POCT Blood Glucose.: 128 mg/dL (04 Jul 2025 16:26)    A1C with Estimated Average Glucose Result: 6.3 % (07-04-25 @ 07:15)  A1C with Estimated Average Glucose Result: 7.2 % (03-14-25 @ 06:58)  A1C with Estimated Average Glucose Result: 6.5 % (07-07-24 @ 06:54)

## 2025-07-05 NOTE — DIETITIAN INITIAL EVALUATION ADULT - ETIOLOGY
suspected prolonged energy intake in excess of energy expenditure increased physiological demand for nutrients

## 2025-07-05 NOTE — DIETITIAN INITIAL EVALUATION ADULT - PROBLEM SELECTOR PLAN 8
- C/w fentanyl 50mcg patch q72hrs, currently has one on which was placed late AM on 7/1, next patch to be placed tomorrow 11 AM  - C/w gabapentin 600mg TID

## 2025-07-05 NOTE — DIETITIAN INITIAL EVALUATION ADULT - OTHER INFO
Reports weighing 344 lbs at present. States she lost about 16 lbs while at rehab due to decreased overall intake in setting of dislike of their food (was there from March-May).  Dosing wt: 300 lbs (07-03, stated)  Wt history per chart: 340 lbs (05-13), 350 lbs (03-13), 367 lbs (08/04/24). 6.3% wt loss x 11 months, not clinically significant. RD to continue to monitor weight trends as able/available.     Additional nutrition-related concerns:  - Admitted with acute on chronic HF. Ordered for lasix infusion and po spironolactone  - Ordered for lantus, admelog before meals, and Admelog sliding scale for glycemic control  - Hx CKD3. Phos elevated x1  - Ordered for cyanocobalamin, Vitamin D3, and ferrous sulfate  - PO synthroid ordered

## 2025-07-05 NOTE — DIETITIAN INITIAL EVALUATION ADULT - NSFNSADHERENCEPTAFT_GEN_A_CORE
Pt with hx of type 2 DM. Confirms she takes metformin, lantus, and lispro 3x/day. Endorses wearing a dexcom she checks before meals and if she isn't feeling well, with readings that vary  mg/dl. A1c 6% noted, suggesting adequate overall blood glucose control.

## 2025-07-05 NOTE — DIETITIAN INITIAL EVALUATION ADULT - NSFNSPHYEXAMSKINFT_GEN_A_CORE
No pressure injuries noted per flowsheets, however per patient profile, pt noted with suspected deep tissue injuries to bilateral posterior thighs.

## 2025-07-05 NOTE — DIETITIAN INITIAL EVALUATION ADULT - ORAL INTAKE PTA/DIET HISTORY
Pt reports fair appetite since she got COVID in March, with suboptimal po intake PTA as she lives in [facility] and the food there isn't always great. States there are days she skips some meals because of this. States she tries to choose low sodium and low sugar foods there but the options are limited.  Reports food allergy to squash (rxn: headache). Denies any additional known food allergies.

## 2025-07-05 NOTE — DIETITIAN INITIAL EVALUATION ADULT - PERTINENT MEDS FT
MEDICATIONS  (STANDING):  apixaban 5 milliGRAM(s) Oral every 12 hours  atorvastatin 40 milliGRAM(s) Oral at bedtime  buPROPion XL (24-Hour) . 150 milliGRAM(s) Oral daily  cholecalciferol 2000 Unit(s) Oral daily  cyanocobalamin 1000 MICROGram(s) Oral daily  dextrose 5%. 1000 milliLiter(s) (100 mL/Hr) IV Continuous <Continuous>  dextrose 5%. 1000 milliLiter(s) (50 mL/Hr) IV Continuous <Continuous>  dextrose 50% Injectable 25 Gram(s) IV Push once  dextrose 50% Injectable 12.5 Gram(s) IV Push once  dextrose 50% Injectable 25 Gram(s) IV Push once  escitalopram 10 milliGRAM(s) Oral daily  fentaNYL   Patch  50 MICROgram(s)/Hr 1 Patch Transdermal every 72 hours  ferrous    sulfate 325 milliGRAM(s) Oral daily  furosemide Infusion 5 mG/Hr (2.5 mL/Hr) IV Continuous <Continuous>  gabapentin 600 milliGRAM(s) Oral three times a day  glucagon  Injectable 1 milliGRAM(s) IntraMuscular once  insulin glargine Injectable (LANTUS) 6 Unit(s) SubCutaneous at bedtime  insulin lispro (ADMELOG) corrective regimen sliding scale   SubCutaneous three times a day before meals  insulin lispro (ADMELOG) corrective regimen sliding scale   SubCutaneous at bedtime  insulin lispro Injectable (ADMELOG) 2 Unit(s) SubCutaneous three times a day before meals  levothyroxine 125 MICROGram(s) Oral daily  metoprolol succinate ER 12.5 milliGRAM(s) Oral daily  oxybutynin 5 milliGRAM(s) Oral two times a day  pantoprazole    Tablet 40 milliGRAM(s) Oral before breakfast  spironolactone 25 milliGRAM(s) Oral daily    MEDICATIONS  (PRN):  acetaminophen     Tablet .. 650 milliGRAM(s) Oral every 6 hours PRN Temp greater or equal to 38C (100.4F), Mild Pain (1 - 3)  dextrose Oral Gel 15 Gram(s) Oral once PRN Blood Glucose LESS THAN 70 milliGRAM(s)/deciliter  melatonin 3 milliGRAM(s) Oral at bedtime PRN Insomnia

## 2025-07-05 NOTE — DIETITIAN INITIAL EVALUATION ADULT - PROBLEM SELECTOR PLAN 4
- Takes metformin 1000mg BID + Lantus 22u QHS + Lispro 10u TID w/ meals  - Patient states her sugars have been low at home  - Lantus 12u QHS + Lispro 3u TID + ISS while admitted, adjust as needed  - F/u A1c

## 2025-07-05 NOTE — DIETITIAN INITIAL EVALUATION ADULT - REASON FOR ADMISSION
Cellulitis    Per chart, pt is a 70 year old Female with PMH Afib s/p ablation and ILR on Eliquis, T2DM, HTN, HLD, CKD3, venous insufficiency, fibromyalgia (on fentanyl patch), depression, and RLS who presents from nursing home for volume overload.

## 2025-07-05 NOTE — PHYSICAL THERAPY INITIAL EVALUATION ADULT - ADDITIONAL COMMENTS
PTA pt states she was discharged from rehab in may back to her retirement. Pt uses motorized wheelchair for long distance, uses rollator for short distance ambulation within apartment. Pt performs dressing independently, states she receives assistance for toileting as needed, assistance for showers every week, has shower bench & grab bars. Pt owns raised toilet seat, has a bed that elevates, states whenever she transfers from bed to wheelchair she elevates bed.

## 2025-07-05 NOTE — DIETITIAN INITIAL EVALUATION ADULT - PROBLEM SELECTOR PLAN 2
- Takes bumex 0.5mg daily + spironolactone 25mg daily  - C/w spironolactone, given increased LE swelling, will hold PO bumex and start IVP lasix 20mg BID

## 2025-07-05 NOTE — PHYSICAL THERAPY INITIAL EVALUATION ADULT - TRANSFER SAFETY CONCERNS NOTED: SIT/STAND, REHAB EVAL
Price (Use Numbers Only, No Special Characters Or $): 60.00
Detail Level: Zone
Blade: 10 blade scalpel
Treatment Areas: face
Treatment Area Prep: acetone
Post-Care Instructions: I reviewed with the patient in detail post-care instructions.
Pre-Procedure Text: The patient was placed in a recumbant position on the procedure table.
decreased balance during turns/decreased step length

## 2025-07-05 NOTE — DIETITIAN INITIAL EVALUATION ADULT - OTHER CALCULATIONS
Fluid needs deferred to team. Energy needs based on reported  lbs/156 kg, and protein needs based on IBW: 140 lbs/63.5 kg

## 2025-07-05 NOTE — PHYSICAL THERAPY INITIAL EVALUATION ADULT - GENERAL OBSERVATIONS, REHAB EVAL
. Pt a/w volume overload, ADHF, LE swelling/redness. Pt received semi-supine in bed in NAD, VSS, +primafit, pt is A&Ox4, agreeable to PT.

## 2025-07-05 NOTE — DIETITIAN INITIAL EVALUATION ADULT - ADD RECOMMEND
1) Would suggest Low Sodium, Consistent Carbohydrate diet. RD to add high protein snacks to trays to help support optimal protein-energy intake.  2) Add Nephro-Analisa, pending no medical contraindications, to aid in wound healing. Can continue additional micronutrient supplementation as indicated (Vitamin D3, cyanocobalamin, ferrous sulfate).  3) Monitor po intake, GI tolerance, skin integrity, labs, weight, and BM regularity.   4) Honor food preferences as feasible. Assist with meals PRN and encourage po intake.  5) BMI >40 alert placed in chart

## 2025-07-05 NOTE — DIETITIAN INITIAL EVALUATION ADULT - PERSON TAUGHT/METHOD
Discussed heart failure nutrition therapy, sodium and fluid intake, and importance of diet adherence. Encouraged pt to choose low sodium and lower sugar options as feasible. Discussed hypoglycemia protocol. Encouraged Consistent Carbohydrate diet and choosing whole rains as feasible over refined. Made aware RD remains available upon request and will follow-up per protocol./verbal instruction/patient instructed

## 2025-07-06 LAB
ANION GAP SERPL CALC-SCNC: 14 MMOL/L — SIGNIFICANT CHANGE UP (ref 5–17)
ANION GAP SERPL CALC-SCNC: 14 MMOL/L — SIGNIFICANT CHANGE UP (ref 5–17)
BUN SERPL-MCNC: 36 MG/DL — HIGH (ref 7–23)
BUN SERPL-MCNC: 37 MG/DL — HIGH (ref 7–23)
CALCIUM SERPL-MCNC: 9.3 MG/DL — SIGNIFICANT CHANGE UP (ref 8.4–10.5)
CALCIUM SERPL-MCNC: 9.5 MG/DL — SIGNIFICANT CHANGE UP (ref 8.4–10.5)
CHLORIDE SERPL-SCNC: 97 MMOL/L — SIGNIFICANT CHANGE UP (ref 96–108)
CHLORIDE SERPL-SCNC: 98 MMOL/L — SIGNIFICANT CHANGE UP (ref 96–108)
CO2 SERPL-SCNC: 29 MMOL/L — SIGNIFICANT CHANGE UP (ref 22–31)
CO2 SERPL-SCNC: 29 MMOL/L — SIGNIFICANT CHANGE UP (ref 22–31)
CREAT SERPL-MCNC: 1.5 MG/DL — HIGH (ref 0.5–1.3)
CREAT SERPL-MCNC: 1.59 MG/DL — HIGH (ref 0.5–1.3)
EGFR: 35 ML/MIN/1.73M2 — LOW
EGFR: 35 ML/MIN/1.73M2 — LOW
EGFR: 37 ML/MIN/1.73M2 — LOW
EGFR: 37 ML/MIN/1.73M2 — LOW
GLUCOSE BLDC GLUCOMTR-MCNC: 144 MG/DL — HIGH (ref 70–99)
GLUCOSE BLDC GLUCOMTR-MCNC: 163 MG/DL — HIGH (ref 70–99)
GLUCOSE BLDC GLUCOMTR-MCNC: 173 MG/DL — HIGH (ref 70–99)
GLUCOSE BLDC GLUCOMTR-MCNC: 179 MG/DL — HIGH (ref 70–99)
GLUCOSE SERPL-MCNC: 163 MG/DL — HIGH (ref 70–99)
GLUCOSE SERPL-MCNC: 170 MG/DL — HIGH (ref 70–99)
MAGNESIUM SERPL-MCNC: 1.7 MG/DL — SIGNIFICANT CHANGE UP (ref 1.6–2.6)
MAGNESIUM SERPL-MCNC: 2 MG/DL — SIGNIFICANT CHANGE UP (ref 1.6–2.6)
PHOSPHATE SERPL-MCNC: 5 MG/DL — HIGH (ref 2.5–4.5)
POTASSIUM SERPL-MCNC: 4 MMOL/L — SIGNIFICANT CHANGE UP (ref 3.5–5.3)
POTASSIUM SERPL-MCNC: 4.3 MMOL/L — SIGNIFICANT CHANGE UP (ref 3.5–5.3)
POTASSIUM SERPL-SCNC: 4 MMOL/L — SIGNIFICANT CHANGE UP (ref 3.5–5.3)
POTASSIUM SERPL-SCNC: 4.3 MMOL/L — SIGNIFICANT CHANGE UP (ref 3.5–5.3)
SODIUM SERPL-SCNC: 140 MMOL/L — SIGNIFICANT CHANGE UP (ref 135–145)
SODIUM SERPL-SCNC: 141 MMOL/L — SIGNIFICANT CHANGE UP (ref 135–145)

## 2025-07-06 PROCEDURE — 99232 SBSQ HOSP IP/OBS MODERATE 35: CPT

## 2025-07-06 RX ORDER — CLINDAMYCIN PHOSPHATE 150 MG/ML
600 VIAL (ML) INJECTION EVERY 8 HOURS
Refills: 0 | Status: DISCONTINUED | OUTPATIENT
Start: 2025-07-06 | End: 2025-07-07

## 2025-07-06 RX ORDER — MAGNESIUM SULFATE 500 MG/ML
2 SYRINGE (ML) INJECTION ONCE
Refills: 0 | Status: COMPLETED | OUTPATIENT
Start: 2025-07-06 | End: 2025-07-06

## 2025-07-06 RX ADMIN — INSULIN LISPRO 2 UNIT(S): 100 INJECTION, SOLUTION INTRAVENOUS; SUBCUTANEOUS at 11:42

## 2025-07-06 RX ADMIN — Medication 125 MICROGRAM(S): at 05:38

## 2025-07-06 RX ADMIN — CYANOCOBALAMIN 1000 MICROGRAM(S): 1000 INJECTION INTRAMUSCULAR; SUBCUTANEOUS at 11:45

## 2025-07-06 RX ADMIN — Medication 325 MILLIGRAM(S): at 11:45

## 2025-07-06 RX ADMIN — Medication 25 MILLIGRAM(S): at 08:16

## 2025-07-06 RX ADMIN — INSULIN LISPRO 2 UNIT(S): 100 INJECTION, SOLUTION INTRAVENOUS; SUBCUTANEOUS at 17:21

## 2025-07-06 RX ADMIN — GABAPENTIN 600 MILLIGRAM(S): 400 CAPSULE ORAL at 13:01

## 2025-07-06 RX ADMIN — BUPROPION HYDROBROMIDE 150 MILLIGRAM(S): 522 TABLET, EXTENDED RELEASE ORAL at 11:45

## 2025-07-06 RX ADMIN — INSULIN LISPRO 2 UNIT(S): 100 INJECTION, SOLUTION INTRAVENOUS; SUBCUTANEOUS at 08:22

## 2025-07-06 RX ADMIN — Medication 2000 UNIT(S): at 11:45

## 2025-07-06 RX ADMIN — Medication 40 MILLIGRAM(S): at 05:38

## 2025-07-06 RX ADMIN — Medication 100 MILLIGRAM(S): at 21:52

## 2025-07-06 RX ADMIN — OXYBUTYNIN CHLORIDE 5 MILLIGRAM(S): 5 TABLET, FILM COATED, EXTENDED RELEASE ORAL at 05:38

## 2025-07-06 RX ADMIN — GABAPENTIN 600 MILLIGRAM(S): 400 CAPSULE ORAL at 21:54

## 2025-07-06 RX ADMIN — ATORVASTATIN CALCIUM 40 MILLIGRAM(S): 80 TABLET, FILM COATED ORAL at 21:54

## 2025-07-06 RX ADMIN — Medication 1 TABLET(S): at 11:45

## 2025-07-06 RX ADMIN — Medication 100 MILLIGRAM(S): at 15:35

## 2025-07-06 RX ADMIN — GABAPENTIN 600 MILLIGRAM(S): 400 CAPSULE ORAL at 05:37

## 2025-07-06 RX ADMIN — ESCITALOPRAM OXALATE 10 MILLIGRAM(S): 20 TABLET ORAL at 11:46

## 2025-07-06 RX ADMIN — APIXABAN 5 MILLIGRAM(S): 5 TABLET, FILM COATED ORAL at 17:21

## 2025-07-06 RX ADMIN — APIXABAN 5 MILLIGRAM(S): 5 TABLET, FILM COATED ORAL at 05:38

## 2025-07-06 RX ADMIN — Medication 25 GRAM(S): at 20:31

## 2025-07-06 RX ADMIN — INSULIN GLARGINE-YFGN 6 UNIT(S): 100 INJECTION, SOLUTION SUBCUTANEOUS at 21:53

## 2025-07-06 RX ADMIN — INSULIN LISPRO 2: 100 INJECTION, SOLUTION INTRAVENOUS; SUBCUTANEOUS at 11:43

## 2025-07-06 RX ADMIN — OXYBUTYNIN CHLORIDE 5 MILLIGRAM(S): 5 TABLET, FILM COATED, EXTENDED RELEASE ORAL at 17:21

## 2025-07-06 RX ADMIN — INSULIN LISPRO 2: 100 INJECTION, SOLUTION INTRAVENOUS; SUBCUTANEOUS at 08:22

## 2025-07-06 RX ADMIN — Medication 25 GRAM(S): at 23:15

## 2025-07-07 ENCOUNTER — RESULT REVIEW (OUTPATIENT)
Age: 70
End: 2025-07-07

## 2025-07-07 LAB
ANION GAP SERPL CALC-SCNC: 17 MMOL/L — SIGNIFICANT CHANGE UP (ref 5–17)
BUN SERPL-MCNC: 41 MG/DL — HIGH (ref 7–23)
CALCIUM SERPL-MCNC: 9.5 MG/DL — SIGNIFICANT CHANGE UP (ref 8.4–10.5)
CHLORIDE SERPL-SCNC: 94 MMOL/L — LOW (ref 96–108)
CO2 SERPL-SCNC: 28 MMOL/L — SIGNIFICANT CHANGE UP (ref 22–31)
CREAT SERPL-MCNC: 1.63 MG/DL — HIGH (ref 0.5–1.3)
EGFR: 34 ML/MIN/1.73M2 — LOW
EGFR: 34 ML/MIN/1.73M2 — LOW
GLUCOSE BLDC GLUCOMTR-MCNC: 174 MG/DL — HIGH (ref 70–99)
GLUCOSE BLDC GLUCOMTR-MCNC: 177 MG/DL — HIGH (ref 70–99)
GLUCOSE BLDC GLUCOMTR-MCNC: 190 MG/DL — HIGH (ref 70–99)
GLUCOSE BLDC GLUCOMTR-MCNC: 216 MG/DL — HIGH (ref 70–99)
GLUCOSE BLDC GLUCOMTR-MCNC: 83 MG/DL — SIGNIFICANT CHANGE UP (ref 70–99)
GLUCOSE SERPL-MCNC: 170 MG/DL — HIGH (ref 70–99)
HCT VFR BLD CALC: 35.9 % — SIGNIFICANT CHANGE UP (ref 34.5–45)
HGB BLD-MCNC: 11.1 G/DL — LOW (ref 11.5–15.5)
MAGNESIUM SERPL-MCNC: 2.3 MG/DL — SIGNIFICANT CHANGE UP (ref 1.6–2.6)
MCHC RBC-ENTMCNC: 27 PG — SIGNIFICANT CHANGE UP (ref 27–34)
MCHC RBC-ENTMCNC: 30.9 G/DL — LOW (ref 32–36)
MCV RBC AUTO: 87.3 FL — SIGNIFICANT CHANGE UP (ref 80–100)
NRBC # BLD AUTO: 0 K/UL — SIGNIFICANT CHANGE UP (ref 0–0)
NRBC # FLD: 0 K/UL — SIGNIFICANT CHANGE UP (ref 0–0)
NRBC BLD AUTO-RTO: 0 /100 WBCS — SIGNIFICANT CHANGE UP (ref 0–0)
PHOSPHATE SERPL-MCNC: 5.3 MG/DL — HIGH (ref 2.5–4.5)
PLATELET # BLD AUTO: 338 K/UL — SIGNIFICANT CHANGE UP (ref 150–400)
PMV BLD: 10.8 FL — SIGNIFICANT CHANGE UP (ref 7–13)
POTASSIUM SERPL-MCNC: 4.1 MMOL/L — SIGNIFICANT CHANGE UP (ref 3.5–5.3)
POTASSIUM SERPL-SCNC: 4.1 MMOL/L — SIGNIFICANT CHANGE UP (ref 3.5–5.3)
RBC # BLD: 4.11 M/UL — SIGNIFICANT CHANGE UP (ref 3.8–5.2)
RBC # FLD: 15.8 % — HIGH (ref 10.3–14.5)
SODIUM SERPL-SCNC: 139 MMOL/L — SIGNIFICANT CHANGE UP (ref 135–145)
WBC # BLD: 7.26 K/UL — SIGNIFICANT CHANGE UP (ref 3.8–10.5)
WBC # FLD AUTO: 7.26 K/UL — SIGNIFICANT CHANGE UP (ref 3.8–10.5)

## 2025-07-07 PROCEDURE — 82947 ASSAY GLUCOSE BLOOD QUANT: CPT

## 2025-07-07 PROCEDURE — 99222 1ST HOSP IP/OBS MODERATE 55: CPT

## 2025-07-07 PROCEDURE — 93970 EXTREMITY STUDY: CPT | Mod: 26

## 2025-07-07 PROCEDURE — 93306 TTE W/DOPPLER COMPLETE: CPT | Mod: 26

## 2025-07-07 PROCEDURE — 82962 GLUCOSE BLOOD TEST: CPT

## 2025-07-07 PROCEDURE — 84295 ASSAY OF SERUM SODIUM: CPT

## 2025-07-07 PROCEDURE — 97161 PT EVAL LOW COMPLEX 20 MIN: CPT

## 2025-07-07 PROCEDURE — 85025 COMPLETE CBC W/AUTO DIFF WBC: CPT

## 2025-07-07 PROCEDURE — 93923 UPR/LXTR ART STDY 3+ LVLS: CPT | Mod: 26

## 2025-07-07 PROCEDURE — 81001 URINALYSIS AUTO W/SCOPE: CPT

## 2025-07-07 PROCEDURE — G0545: CPT

## 2025-07-07 PROCEDURE — 93005 ELECTROCARDIOGRAM TRACING: CPT

## 2025-07-07 PROCEDURE — 99232 SBSQ HOSP IP/OBS MODERATE 35: CPT

## 2025-07-07 PROCEDURE — 84100 ASSAY OF PHOSPHORUS: CPT

## 2025-07-07 PROCEDURE — 87040 BLOOD CULTURE FOR BACTERIA: CPT

## 2025-07-07 PROCEDURE — 85014 HEMATOCRIT: CPT

## 2025-07-07 PROCEDURE — 82330 ASSAY OF CALCIUM: CPT

## 2025-07-07 PROCEDURE — 71045 X-RAY EXAM CHEST 1 VIEW: CPT

## 2025-07-07 PROCEDURE — 80053 COMPREHEN METABOLIC PANEL: CPT

## 2025-07-07 PROCEDURE — 84132 ASSAY OF SERUM POTASSIUM: CPT

## 2025-07-07 PROCEDURE — 36415 COLL VENOUS BLD VENIPUNCTURE: CPT

## 2025-07-07 PROCEDURE — 93970 EXTREMITY STUDY: CPT

## 2025-07-07 PROCEDURE — 83605 ASSAY OF LACTIC ACID: CPT

## 2025-07-07 PROCEDURE — 80048 BASIC METABOLIC PNL TOTAL CA: CPT

## 2025-07-07 PROCEDURE — 83735 ASSAY OF MAGNESIUM: CPT

## 2025-07-07 PROCEDURE — 85610 PROTHROMBIN TIME: CPT

## 2025-07-07 PROCEDURE — 85018 HEMOGLOBIN: CPT

## 2025-07-07 PROCEDURE — 82435 ASSAY OF BLOOD CHLORIDE: CPT

## 2025-07-07 PROCEDURE — 93923 UPR/LXTR ART STDY 3+ LVLS: CPT

## 2025-07-07 PROCEDURE — 85027 COMPLETE CBC AUTOMATED: CPT

## 2025-07-07 PROCEDURE — 85730 THROMBOPLASTIN TIME PARTIAL: CPT

## 2025-07-07 PROCEDURE — 83036 HEMOGLOBIN GLYCOSYLATED A1C: CPT

## 2025-07-07 PROCEDURE — 82803 BLOOD GASES ANY COMBINATION: CPT

## 2025-07-07 RX ORDER — BUMETANIDE 1 MG/1
1 TABLET ORAL DAILY
Refills: 0 | Status: DISCONTINUED | OUTPATIENT
Start: 2025-07-07 | End: 2025-07-09

## 2025-07-07 RX ORDER — AMMONIUM LACTATE 12 %
1 LOTION (ML) TOPICAL
Refills: 0 | Status: DISCONTINUED | OUTPATIENT
Start: 2025-07-07 | End: 2025-07-09

## 2025-07-07 RX ORDER — SENNA 187 MG
2 TABLET ORAL AT BEDTIME
Refills: 0 | Status: DISCONTINUED | OUTPATIENT
Start: 2025-07-07 | End: 2025-07-09

## 2025-07-07 RX ORDER — POLYETHYLENE GLYCOL 3350 17 G/17G
17 POWDER, FOR SOLUTION ORAL EVERY 12 HOURS
Refills: 0 | Status: DISCONTINUED | OUTPATIENT
Start: 2025-07-07 | End: 2025-07-09

## 2025-07-07 RX ADMIN — OXYBUTYNIN CHLORIDE 5 MILLIGRAM(S): 5 TABLET, FILM COATED, EXTENDED RELEASE ORAL at 17:13

## 2025-07-07 RX ADMIN — GABAPENTIN 600 MILLIGRAM(S): 400 CAPSULE ORAL at 13:39

## 2025-07-07 RX ADMIN — Medication 100 MILLIGRAM(S): at 05:39

## 2025-07-07 RX ADMIN — Medication 40 MILLIGRAM(S): at 05:40

## 2025-07-07 RX ADMIN — ATORVASTATIN CALCIUM 40 MILLIGRAM(S): 80 TABLET, FILM COATED ORAL at 22:04

## 2025-07-07 RX ADMIN — GABAPENTIN 600 MILLIGRAM(S): 400 CAPSULE ORAL at 22:04

## 2025-07-07 RX ADMIN — APIXABAN 5 MILLIGRAM(S): 5 TABLET, FILM COATED ORAL at 17:13

## 2025-07-07 RX ADMIN — INSULIN GLARGINE-YFGN 6 UNIT(S): 100 INJECTION, SOLUTION SUBCUTANEOUS at 22:50

## 2025-07-07 RX ADMIN — ESCITALOPRAM OXALATE 10 MILLIGRAM(S): 20 TABLET ORAL at 13:39

## 2025-07-07 RX ADMIN — CYANOCOBALAMIN 1000 MICROGRAM(S): 1000 INJECTION INTRAMUSCULAR; SUBCUTANEOUS at 13:39

## 2025-07-07 RX ADMIN — Medication 100 MILLIGRAM(S): at 13:46

## 2025-07-07 RX ADMIN — OXYBUTYNIN CHLORIDE 5 MILLIGRAM(S): 5 TABLET, FILM COATED, EXTENDED RELEASE ORAL at 05:40

## 2025-07-07 RX ADMIN — INSULIN LISPRO 2: 100 INJECTION, SOLUTION INTRAVENOUS; SUBCUTANEOUS at 07:56

## 2025-07-07 RX ADMIN — INSULIN LISPRO 2: 100 INJECTION, SOLUTION INTRAVENOUS; SUBCUTANEOUS at 13:37

## 2025-07-07 RX ADMIN — Medication 25 MILLIGRAM(S): at 05:40

## 2025-07-07 RX ADMIN — INSULIN LISPRO 2 UNIT(S): 100 INJECTION, SOLUTION INTRAVENOUS; SUBCUTANEOUS at 16:46

## 2025-07-07 RX ADMIN — POLYETHYLENE GLYCOL 3350 17 GRAM(S): 17 POWDER, FOR SOLUTION ORAL at 17:13

## 2025-07-07 RX ADMIN — BUPROPION HYDROBROMIDE 150 MILLIGRAM(S): 522 TABLET, EXTENDED RELEASE ORAL at 13:39

## 2025-07-07 RX ADMIN — INSULIN LISPRO 4: 100 INJECTION, SOLUTION INTRAVENOUS; SUBCUTANEOUS at 16:46

## 2025-07-07 RX ADMIN — INSULIN LISPRO 2 UNIT(S): 100 INJECTION, SOLUTION INTRAVENOUS; SUBCUTANEOUS at 07:56

## 2025-07-07 RX ADMIN — Medication 125 MICROGRAM(S): at 05:40

## 2025-07-07 RX ADMIN — GABAPENTIN 600 MILLIGRAM(S): 400 CAPSULE ORAL at 05:40

## 2025-07-07 RX ADMIN — Medication 2 TABLET(S): at 22:04

## 2025-07-07 RX ADMIN — Medication 325 MILLIGRAM(S): at 13:39

## 2025-07-07 RX ADMIN — INSULIN LISPRO 2 UNIT(S): 100 INJECTION, SOLUTION INTRAVENOUS; SUBCUTANEOUS at 13:38

## 2025-07-07 RX ADMIN — Medication 1 APPLICATION(S): at 17:13

## 2025-07-07 RX ADMIN — Medication 2000 UNIT(S): at 13:39

## 2025-07-07 RX ADMIN — Medication 1 TABLET(S): at 13:39

## 2025-07-07 RX ADMIN — APIXABAN 5 MILLIGRAM(S): 5 TABLET, FILM COATED ORAL at 05:40

## 2025-07-07 NOTE — CONSULT NOTE ADULT - SUBJECTIVE AND OBJECTIVE BOX
Patient is a 70y old  Female who presents with a chief complaint of Cellulitis (07 Jul 2025 10:31)    HPI:  70F PMHx Afib s/p ablation and ILR on Eliquis, T2DM, HTN, HLD, hypothyroidism, CKD3, venous insufficiency, fibromyalgia (on fentanyl patch), depression, and RLS who presents from nursing home for swelling and redness of both her legs. The symptoms began around 10 days ago. She then had a telehealth appt this Monday 6/30, and was prescribed doxycycline BID, which she has been taking for the past 3 days without any improvement. She states that her legs are also twice their normal size, and the swelling is so much that the skin has become very tight and hard. Of note, she has had to be hospitalized twice in the past for cellulitis, and compared to those times, she feels that this episode is worse because the redness is further up her calves than those times. Decided to come to the ED for further evaluation.  She also expresses that her sugars have been quite low, going down to as low as 55. She was previously on Lantus 30u QHS + Lispro 5u TID + metformin 1000mg BID, and due to this, her Lantus was dropped to 22u and Lispro was upped to 10u TID. Despite this, she is still having low sugars.     In the ED, she was afebrile and hemodynamically stable, saturating well on RA. CBC w/ normocytic anemia of 10.4. CMP w/ evidence of CKD3. CXR w/o focal consolidations. Received IV Clindamycin 600mg and 2L LR in the ED.  (03 Jul 2025 19:02)       prior hospital charts reviewed [ X ]  primary team notes reviewed [X  ]  other consultant notes reviewed [ X ]    PAST MEDICAL & SURGICAL HISTORY:  Personal History of Arthritis      Personal History of Female Genital Cancer      Personal History of Hypertension      Hypothyroidism      Diabetes Mellitus      Hyperlipidemia      High Triglycerides      Osteoarthritis of Knee  right. requires walker      Obstructive Sleep Apnea      Constipation      Diarrhea      Depression      Environmental Allergies      Morbidly Obese      Fibromyalgia      GERD with Apnea      Restless Legs Syndrome (RLS)      Umbilical Hernia      Atrial fibrillation      Personal history of pulmonary hypertension      Venous insufficiency      S/P Tonsillectomy and Adenoidectomy      S/P FESS (Functional Endoscopic Sinus Surgery)      Carpal Tunnel Syndrome  release left      Endometrial Ca s/p RODRIGO, BSO      Incarcerated Ventral Hernia      History of cardiac ablation for atrial fibrillation          Allergies  pollen (Rhinitis; Rhinorrhea)  smoke (Rhinitis; Rhinorrhea)  adhesives (Rash)  Lyrica (Rash)  dust (Rhinitis; Rhinorrhea)  Tin/costume jewelry (Rash)  cephalosporins (Rash)  aerosols, perfumes, fabric softeners (Rash; Rhinitis; Rhinorrhea)  Squash/Zucchini (Headache)    ANTIMICROBIALS (past 90 days)  MEDICATIONS  (STANDING):    clindamycin IVPB   100 mL/Hr IV Intermittent (07-03-25 @ 15:29)    clindamycin IVPB   100 mL/Hr IV Intermittent (07-04-25 @ 13:13)   100 mL/Hr IV Intermittent (07-04-25 @ 05:37)   100 mL/Hr IV Intermittent (07-03-25 @ 21:35)    clindamycin IVPB   100 mL/Hr IV Intermittent (07-07-25 @ 05:39)   100 mL/Hr IV Intermittent (07-06-25 @ 21:52)   100 mL/Hr IV Intermittent (07-06-25 @ 15:35)        clindamycin IVPB 600 every 8 hours    MEDICATIONS  (STANDING):  acetaminophen     Tablet .. 650 every 6 hours PRN  apixaban 5 every 12 hours  atorvastatin 40 at bedtime  bumetanide 1 daily  buPROPion XL (24-Hour) . 150 daily  dextrose 50% Injectable 25 once  dextrose 50% Injectable 12.5 once  dextrose 50% Injectable 25 once  dextrose Oral Gel 15 once PRN  escitalopram 10 daily  fentaNYL   Patch  50 MICROgram(s)/Hr 1 every 72 hours  gabapentin 600 three times a day  glucagon  Injectable 1 once  insulin glargine Injectable (LANTUS) 6 at bedtime  insulin lispro (ADMELOG) corrective regimen sliding scale  three times a day before meals  insulin lispro (ADMELOG) corrective regimen sliding scale  at bedtime  insulin lispro Injectable (ADMELOG) 2 three times a day before meals  levothyroxine 125 daily  melatonin 3 at bedtime PRN  metoprolol succinate ER 12.5 daily  oxybutynin 5 two times a day  pantoprazole    Tablet 40 before breakfast  spironolactone 25 daily    SOCIAL HISTORY:       FAMILY HISTORY:    REVIEW OF SYSTEMS  [  ] ROS unobtainable because:    [  X] All other systems negative except as noted below:	    Constitutional:  [ ] fever [ ] chills  [ ] weight loss  [ ] weakness  Skin:  [ ] rash [ ] phlebitis	  Eyes: [ ] icterus [ ] pain  [ ] discharge	  ENMT: [ ] sore throat  [ ] thrush [ ] ulcers [ ] exudates  Respiratory: [ ] dyspnea [ ] hemoptysis [ ] cough [ ] sputum	  Cardiovascular:  [ ] chest pain [ ] palpitations [ ] edema	  Gastrointestinal:  [ ] nausea [ ] vomiting [ ] diarrhea [ ] constipation [ ] pain	  Genitourinary:  [ ] dysuria [ ] frequency [ ] hematuria [ ] discharge [ ] flank pain  [ ] incontinence  Musculoskeletal:  [ ] myalgias [ ] arthralgias [ ] arthritis  [ ] back pain  Neurological:  [ ] headache [ ] seizures  [ ] confusion/altered mental status  Psychiatric:  [ ] anxiety [ ] depression	  Hematology/Lymphatics:  [ ] lymphadenopathy  Endocrine:  [ ] adrenal [ ] thyroid  Allergic/Immunologic:	 [ ] transplant [ ] seasonal    Vital Signs Last 24 Hrs  T(F): 98 (07-07-25 @ 04:40), Max: 98.5 (07-06-25 @ 00:08)  Vital Signs Last 24 Hrs  HR: 63 (07-07-25 @ 08:28) (58 - 63)  BP: 96/63 (07-07-25 @ 08:28) (96/63 - 134/73)  RR: 18 (07-07-25 @ 08:28)  SpO2: 94% (07-07-25 @ 08:28) (93% - 96%)  Wt(kg): --    ONSTITUTIONAL: no distress, disheveled, obese  EYES: No conjunctival or scleral injection, non-icteric;   RESPIRATORY: Breathing comfortably; lungs CTA without wheeze/rhonchi/rales  CARDIOVASCULAR: +S1S2, RRR, no M/G/R; 3+ bilateral LE pitting edema   GASTROINTESTINAL: No palpable masses or tenderness, no rebound/guarding  SKIN: Erythema to midshin on bilateral LEs, no warmth  PSYCHIATRIC: A+O x 3;                             11.1   7.26  )-----------( 338      ( 07 Jul 2025 08:21 )             35.9   07-07    139  |  94[L]  |  41[H]  ----------------------------<  170[H]  4.1   |  28  |  1.63[H]    Ca    9.5      07 Jul 2025 08:21  Phos  5.3     07-07  Mg     2.3     07-07        MICROBIOLOGY:  Urinalysis with Rflx Culture (collected 05 Jul 2025 15:39)    Culture - Blood (collected 03 Jul 2025 14:17)  Source: Blood Blood-Peripheral  Preliminary Report (06 Jul 2025 17:01):    No growth at 72 Hours    Culture - Blood (collected 03 Jul 2025 14:10)  Source: Blood Blood-Peripheral  Preliminary Report (06 Jul 2025 17:01):    No growth at 72 Hours                  RADIOLOGY:  imaging below personally reviewed and agree with findings    ACC: [id number] EXAM:  XR CHEST PORTABLE URGENT 1V   ORDERED BY: RAPHAEL ARENAS     PROCEDURE DATE:  07/03/2025          INTERPRETATION:  EXAMINATION: XR CHEST URGENT    CLINICAL INDICATION: Sepsis    TECHNIQUE: Single frontal, portable view of the chest was obtained.    COMPARISON: Chest x-ray and CT chest 5/13/2025.    FINDINGS:  Left chest wall loop recorder.  The cardiac silhouette is not well evaluated on AP film.  No focal consolidations.  There is no pneumothorax or pleural effusion.  No acute osseous abnormalities.    IMPRESSION:  No focal consolidations.    --- End of Report ---   Patient is a 70y old  Female who presents with a chief complaint of Cellulitis (07 Jul 2025 10:31)    HPI:  70F PMHx Afib s/p ablation and ILR on Eliquis, T2DM, HTN, HLD, hypothyroidism, CKD3, venous insufficiency, fibromyalgia (on fentanyl patch), depression, and RLS who presents from nursing home for swelling and redness of both her legs. The symptoms began around 10 days ago. She then had a telehealth appt this Monday 6/30, and was prescribed doxycycline BID, which she has been taking for the past 3 days without any improvement. She states that her legs are also twice their normal size, and the swelling is so much that the skin has become very tight and hard. Of note, she has had to be hospitalized twice in the past for cellulitis, and compared to those times, she feels that this episode is worse because the redness is further up her calves than those times. Decided to come to the ED for further evaluation.  She also expresses that her sugars have been quite low, going down to as low as 55. She was previously on Lantus 30u QHS + Lispro 5u TID + metformin 1000mg BID, and due to this, her Lantus was dropped to 22u and Lispro was upped to 10u TID. Despite this, she is still having low sugars.     In the ED, she was afebrile and hemodynamically stable, saturating well on RA. CBC w/ normocytic anemia of 10.4. CMP w/ evidence of CKD3. CXR w/o focal consolidations. Received IV Clindamycin 600mg and 2L LR in the ED.  (03 Jul 2025 19:02)       prior hospital charts reviewed [ X ]  primary team notes reviewed [X  ]  other consultant notes reviewed [ X ]    PAST MEDICAL & SURGICAL HISTORY:  Personal History of Arthritis      Personal History of Female Genital Cancer      Personal History of Hypertension      Hypothyroidism      Diabetes Mellitus      Hyperlipidemia      High Triglycerides      Osteoarthritis of Knee  right. requires walker      Obstructive Sleep Apnea      Constipation      Diarrhea      Depression      Environmental Allergies      Morbidly Obese      Fibromyalgia      GERD with Apnea      Restless Legs Syndrome (RLS)      Umbilical Hernia      Atrial fibrillation      Personal history of pulmonary hypertension      Venous insufficiency      S/P Tonsillectomy and Adenoidectomy      S/P FESS (Functional Endoscopic Sinus Surgery)      Carpal Tunnel Syndrome  release left      Endometrial Ca s/p RODRIGO, BSO      Incarcerated Ventral Hernia      History of cardiac ablation for atrial fibrillation          Allergies  pollen (Rhinitis; Rhinorrhea)  smoke (Rhinitis; Rhinorrhea)  adhesives (Rash)  Lyrica (Rash)  dust (Rhinitis; Rhinorrhea)  Tin/costume jewelry (Rash)  cephalosporins (Rash)  aerosols, perfumes, fabric softeners (Rash; Rhinitis; Rhinorrhea)  Squash/Zucchini (Headache)    ANTIMICROBIALS (past 90 days)  MEDICATIONS  (STANDING):    clindamycin IVPB   100 mL/Hr IV Intermittent (07-03-25 @ 15:29)    clindamycin IVPB   100 mL/Hr IV Intermittent (07-04-25 @ 13:13)   100 mL/Hr IV Intermittent (07-04-25 @ 05:37)   100 mL/Hr IV Intermittent (07-03-25 @ 21:35)    clindamycin IVPB   100 mL/Hr IV Intermittent (07-07-25 @ 05:39)   100 mL/Hr IV Intermittent (07-06-25 @ 21:52)   100 mL/Hr IV Intermittent (07-06-25 @ 15:35)        clindamycin IVPB 600 every 8 hours    MEDICATIONS  (STANDING):  acetaminophen     Tablet .. 650 every 6 hours PRN  apixaban 5 every 12 hours  atorvastatin 40 at bedtime  bumetanide 1 daily  buPROPion XL (24-Hour) . 150 daily  dextrose 50% Injectable 25 once  dextrose 50% Injectable 12.5 once  dextrose 50% Injectable 25 once  dextrose Oral Gel 15 once PRN  escitalopram 10 daily  fentaNYL   Patch  50 MICROgram(s)/Hr 1 every 72 hours  gabapentin 600 three times a day  glucagon  Injectable 1 once  insulin glargine Injectable (LANTUS) 6 at bedtime  insulin lispro (ADMELOG) corrective regimen sliding scale  three times a day before meals  insulin lispro (ADMELOG) corrective regimen sliding scale  at bedtime  insulin lispro Injectable (ADMELOG) 2 three times a day before meals  levothyroxine 125 daily  melatonin 3 at bedtime PRN  metoprolol succinate ER 12.5 daily  oxybutynin 5 two times a day  pantoprazole    Tablet 40 before breakfast  spironolactone 25 daily    SOCIAL HISTORY:   lives in assisted living   Denies smoking, alcohol     FAMILY HISTORY: no pertinent FH     REVIEW OF SYSTEMS  [  ] ROS unobtainable because:    [  X] All other systems negative except as noted below:	    Constitutional:  [ ] fever [ ] chills  [ ] weight loss  [X ] weakness  Skin:  [ ] rash [ ] phlebitis	  Eyes: [ ] icterus [ ] pain  [ ] discharge	  ENMT: [ ] sore throat  [ ] thrush [ ] ulcers [ ] exudates  Respiratory: [ ] dyspnea [ ] hemoptysis [ ] cough [ ] sputum	  Cardiovascular:  [ ] chest pain [ ] palpitations [ ] edema	  Gastrointestinal:  [ ] nausea [ ] vomiting [ ] diarrhea [ ] constipation [ ] pain	  Genitourinary:  [ ] dysuria [ ] frequency [ ] hematuria [ ] discharge [ ] flank pain  [ ] incontinence  Musculoskeletal:  [ ] myalgias [ ] arthralgias [ ] arthritis  [ X] Bilateral LE erythema, tenderness to palpation   Neurological:  [ ] headache [ ] seizures  [ ] confusion/altered mental status  Psychiatric:  [ ] anxiety [ ] depression	  Hematology/Lymphatics:  [ ] lymphadenopathy  Endocrine:  [ ] adrenal [ ] thyroid  Allergic/Immunologic:	 [ ] transplant [ ] seasonal    Vital Signs Last 24 Hrs  T(F): 98 (07-07-25 @ 04:40), Max: 98.5 (07-06-25 @ 00:08)  Vital Signs Last 24 Hrs  HR: 63 (07-07-25 @ 08:28) (58 - 63)  BP: 96/63 (07-07-25 @ 08:28) (96/63 - 134/73)  RR: 18 (07-07-25 @ 08:28)  SpO2: 94% (07-07-25 @ 08:28) (93% - 96%)  Wt(kg): --    Physical exam   CONSTITUTIONAL: no distress, obese  EYES: No conjunctival or scleral injection, non-icteric;   RESPIRATORY: Breathing comfortably; lungs CTA without wheeze/rhonchi/rales  CARDIOVASCULAR: +S1S2, RRR, no M/G/R; 3+ bilateral LE pitting edema   GASTROINTESTINAL: No palpable masses or tenderness, no rebound/guarding  SKIN: Erythema to midshin on bilateral LEs, mild warmth, B/L LE TTP   PSYCHIATRIC: A+O x 3;     LABS:                         11.1   7.26  )-----------( 338      ( 07 Jul 2025 08:21 )             35.9   07-07    139  |  94[L]  |  41[H]  ----------------------------<  170[H]  4.1   |  28  |  1.63[H]    Ca    9.5      07 Jul 2025 08:21  Phos  5.3     07-07  Mg     2.3     07-07        MICROBIOLOGY:  Urinalysis with Rflx Culture (collected 05 Jul 2025 15:39)    Culture - Blood (collected 03 Jul 2025 14:17)  Source: Blood Blood-Peripheral  Preliminary Report (06 Jul 2025 17:01):    No growth at 72 Hours    Culture - Blood (collected 03 Jul 2025 14:10)  Source: Blood Blood-Peripheral  Preliminary Report (06 Jul 2025 17:01):    No growth at 72 Hours                  RADIOLOGY:  imaging below personally reviewed and agree with findings    ACC: [id number] EXAM:  XR CHEST PORTABLE URGENT 1V   ORDERED BY: RAPHAEL ARENAS     PROCEDURE DATE:  07/03/2025          INTERPRETATION:  EXAMINATION: XR CHEST URGENT    CLINICAL INDICATION: Sepsis    TECHNIQUE: Single frontal, portable view of the chest was obtained.    COMPARISON: Chest x-ray and CT chest 5/13/2025.    FINDINGS:  Left chest wall loop recorder.  The cardiac silhouette is not well evaluated on AP film.  No focal consolidations.  There is no pneumothorax or pleural effusion.  No acute osseous abnormalities.    IMPRESSION:  No focal consolidations.    --- End of Report ---

## 2025-07-07 NOTE — ADVANCED PRACTICE NURSE CONSULT - REASON FOR CONSULT
Consult placed by RN for stage 2 pressure injury to the b/l inner thighs.    As per the H&P:  Reason for Admission: Cellulitis  History of Present Illness:   70F PMHx Afib s/p ablation and ILR on Eliquis, T2DM, HTN, HLD, hypothyroidism, CKD3, venous insufficiency, fibromyalgia (on fentanyl patch), depression, and RLS who presents from nursing home for swelling and redness of both her legs. The symptoms began around 10 days ago. She then had a telehealth appt this Monday 6/30, and was prescribed doxycycline BID, which she has been taking for the past 3 days without any improvement. She states that her legs are also twice their normal size, and the swelling is so much that the skin has become very tight and hard. Of note, she has had to be hospitalized twice in the past for cellulitis, and compared to those times, she feels that this episode is worse because the redness is further up her calves than those times. Decided to come to the ED for further evaluation.  She also expresses that her sugars have been quite low, going down to as low as 55. She was previously on Lantus 30u QHS + Lispro 5u TID + metformin 1000mg BID, and due to this, her Lantus was dropped to 22u and Lispro was upped to 10u TID. Despite this, she is still having low sugars.     In the ED, she was afebrile and hemodynamically stable, saturating well on RA. CBC w/ normocytic anemia of 10.4. CMP w/ evidence of CKD3. CXR w/o focal consolidations. Received IV Clindamycin 600mg and 2L LR in the ED.

## 2025-07-07 NOTE — INPATIENT CERTIFICATION FOR MEDICARE PATIENTS - NS ICMP TWO DAYS INPATIENT
Yes Type Of Destruction Used: Curettage Biopsy Type: H and E X Size Of Lesion In Cm: 0 Post-Care Instructions: I reviewed with the patient in detail post-care instructions. Patient is to keep the biopsy site dry overnight, and then apply petrolatum twice daily until healed. Anesthesia Volume In Cc (Will Not Render If 0): 0.5 Billing Type: Third-Party Bill Electrodesiccation And Curettage Text: The wound bed was treated with electrodesiccation and curettage after the biopsy was performed. Body Location Override (Optional - Billing Will Still Be Based On Selected Body Map Location If Applicable): R Posterior Calf Silver Nitrate Text: The wound bed was treated with silver nitrate after the biopsy was performed. Dressing: bandage Bill For Surgical Tray: no Biopsy Method: double edge Personna blade Notification Instructions: Patient will be notified of biopsy results. However, patient instructed to call the office if not contacted within 2 weeks. Hemostasis: Electrocautery and Aluminum Chloride Was A Bandage Applied: Yes Lab: [id number] Detail Level: Simple Curettage Text: The wound bed was treated with curettage after the biopsy was performed. Cryotherapy Text: The wound bed was treated with cryotherapy after the biopsy was performed. Consent: Written consent was obtained and risks were reviewed including but not limited to scarring, infection, bleeding, scabbing, incomplete removal, nerve damage and allergy to anesthesia. Wound Care: Petrolatum Size Of Lesion In Cm: 1.2 Anesthesia Type: 1% lidocaine with epinephrine and a 1:10 solution of 8.4% sodium bicarbonate Depth Of Biopsy: dermis Electrodesiccation Text: The wound bed was treated with electrodesiccation after the biopsy was performed. Billing Type: Third-Party Bill Body Location Override (Optional - Billing Will Still Be Based On Selected Body Map Location If Applicable): Left Posterior calf Lab: -[id number] Size Of Lesion In Cm: 0.4 Body Location Override (Optional - Billing Will Still Be Based On Selected Body Map Location If Applicable): Right Distal Calf Lab: -[id number]

## 2025-07-07 NOTE — ADVANCED PRACTICE NURSE CONSULT - ASSESSMENT
Met with the patient on 6MON. Patient being wheeled back to her room from [facility] testing upon arrival. She is morbidly obese and requires 1-2 person assist from the stretcher to the bed. Patient is A+Ox3 and reports being recommended to see a vascular doctor for her lower extremities, however, cannot get around easily to see one. She is incontinent of urine due to not being able to get out of bed in time to the toilet. Uses a Primafit while in the bed.    Upon assessment, b/l LEs are scaly with hemosiderin staining and a leather-like texture along the shins. +DP palpated bilaterally. Mild edema present. Skin is intact with no increased warmth or coolness, induration, or fluctuance noted. Patient reports pain with ambulation and elevation. Complains of intermittent pruritis. There is a dry scab over the R third digit from the patient's rollating walker. Heels are intact without erythema or discoloration. Presentation of the LEs consistent with chronic venous insufficiency.    R inner thigh with partial thickness skin loss 2/2 moisture. Recommended use of a thin layer of Triad paste over opened area. Inter-dry for moisture prevention within the patient's skin folds.

## 2025-07-07 NOTE — ADVANCED PRACTICE NURSE CONSULT - RECOMMEDATIONS
Venous dermatitis  - Cleanse daily with warm, soapy water and pat dry. Apply LAC-Hydrin BID.  - JANIE/PVR to r/o arterial insufficiency. Vascular consult requested by patient.    Friction injury R inner thigh 2/2 moisture  - Cover entire wound/area with a thin layer TRIAD BID/PRN soiling. If soiling occurs, use pH balanced no-rinse cleanser to remove contaminants, leaving a thin layer (avoid scrubbing). Then reapply.    Intertriginous dermatitis prevention (skin folds)  - Cleanse skin folds with NS and pat dry, then apply Inter-dry ensuring no skin to skin contact. Inter-dry can stay in place up 5 days/PRN soiling or odor.    Preventative  - Continue w/ low air loss pressure redistribution bed surface.  - Moisturize skin with Sween24 daily after bathing.  - Continue turning and positioning q2h and utilize offloading devices as per protocol (i.e. Chan-Lock for heels while in bed).  - Avoid use of diapers and continue with only one breathable underpad while in bed.  - Utilize external catheters if patient unable to toilet OOB.  - Routine christie-care with BID/PRN protective barrier cream (i.e. Thomas or Critic-Aid Clear) for soiling.  - Waffle cushion if OOB to chair.  - Nutrition Consult for optimization in pt w/ increased nutritional needs.  - Encourage high quality protein, Apolinar/Prosource, MVI & Vit C to promote wound healing.    For questions/comments regarding the recommendations in this consult, please contact Avani Olmstead via [employer] Teams. Wound care will not actively follow. For new concerns, please enter new consult. Thank you!

## 2025-07-07 NOTE — CONSULT NOTE ADULT - ASSESSMENT
70F with Afib s/p ablation and ILR on apixaban, T2DM, HTN, HLD, CKD3, venous insufficiency, fibromyalgia (on fentanyl patch), depression, and RLS who presents from nursing home for swelling and redness of both her legs.  The symptoms began around 10 days ago. She then had a telehealth appt this Monday 6/30, and was prescribed doxycycline BID, which she has been taking for the past 3 days without any improvement.     In the ED, she was afebrile and hemodynamically stable, saturating well on RA. CBC w/ stable WBC. CMP w/ evidence of CKD3. CXR w/o focal consolidations. Received IV Clindamycin 600mg and 2L LR in the ED. Found to have R heart failure likely contributing to the LE edema. Was started on diuretics. Blood cultures on admission with NGTD.  Clindamycin was stopped given B/L LE erythema favoring more stasis dermatitis.     Now ID consulted for management of LE erythema which is worsening. WBC remains stable. Has been afebrile since admission. Patient was restarted back on clindamycin 07/06.         # B/L LE erythema   # B/L Venous insufficiency   # CKD  # Acute exacerbation of chronic heart failure    Recommendations:           In addition to reviewing history, imaging, documents, labs, microbiology, took into account antibiotic stewardship, local antibiogram and infection control strategies and potential transmission issues.    Discussed with the primary team.    No Redding MD  Attending Physician, Division of Infectious Diseases  Department of Medicine   [facility]    Contact on TEAMS messaging from 9am - 5pm  Office: [phone] (after 5 PM or weekend)   70F with Afib s/p ablation and ILR on apixaban, T2DM, HTN, HLD, CKD3, venous insufficiency, fibromyalgia (on fentanyl patch), depression, and RLS who presents from nursing home for swelling and redness of both her legs.  The symptoms began around 10 days ago. She then had a telehealth appt this Monday 6/30, and was prescribed doxycycline BID, which she has been taking for the past 3 days without any improvement.    In the ED, she was afebrile and hemodynamically stable, saturating well on RA. CBC w/ stable WBC. CMP w/ evidence of CKD3. CXR w/o focal consolidations. Received IV Clindamycin 600mg and 2L LR in the ED. Found to have R heart failure likely contributing to the LE edema. Was started on diuretics. Blood cultures on admission with NGTD.  Clindamycin was stopped given B/L LE erythema favoring more stasis dermatitis.     Now ID consulted for management of LE erythema which is worsening. WBC remains stable. Has been afebrile since admission. Patient was restarted back on clindamycin 07/06.       # B/L LE erythema   # B/L Chronic Venous insufficiency   # CKD  # Acute exacerbation of chronic heart failure    Recommendations:     - Would suggesting duplex LE  - Would monitor off antimicrobials at this time, low suspicion for B/L LE cellulitis at this time.  - Encourage LE elevation.   - If fevers, leucocytosis develops, can obtain blood cultures and start empiric antimicrobials.   - F/U wound care for further recommendations.       In addition to reviewing history, imaging, documents, labs, microbiology, took into account antibiotic stewardship, local antibiogram and infection control strategies and potential transmission issues.    Discussed with the primary team.    No Redding MD  Attending Physician, Division of Infectious Diseases  Department of Medicine   [facility]    Contact on TEAMS messaging from 9am - 5pm  Office: [phone] (after 5 PM or weekend)

## 2025-07-08 LAB
ANION GAP SERPL CALC-SCNC: 17 MMOL/L — SIGNIFICANT CHANGE UP (ref 5–17)
BASOPHILS # BLD AUTO: 0.04 K/UL — SIGNIFICANT CHANGE UP (ref 0–0.2)
BASOPHILS NFR BLD AUTO: 0.6 % — SIGNIFICANT CHANGE UP (ref 0–2)
BUN SERPL-MCNC: 46 MG/DL — HIGH (ref 7–23)
CALCIUM SERPL-MCNC: 9.5 MG/DL — SIGNIFICANT CHANGE UP (ref 8.4–10.5)
CHLORIDE SERPL-SCNC: 95 MMOL/L — LOW (ref 96–108)
CO2 SERPL-SCNC: 27 MMOL/L — SIGNIFICANT CHANGE UP (ref 22–31)
CREAT SERPL-MCNC: 1.72 MG/DL — HIGH (ref 0.5–1.3)
CULTURE RESULTS: SIGNIFICANT CHANGE UP
CULTURE RESULTS: SIGNIFICANT CHANGE UP
EGFR: 32 ML/MIN/1.73M2 — LOW
EGFR: 32 ML/MIN/1.73M2 — LOW
EOSINOPHIL # BLD AUTO: 0.55 K/UL — HIGH (ref 0–0.5)
EOSINOPHIL NFR BLD AUTO: 7.9 % — HIGH (ref 0–6)
GLUCOSE BLDC GLUCOMTR-MCNC: 159 MG/DL — HIGH (ref 70–99)
GLUCOSE BLDC GLUCOMTR-MCNC: 167 MG/DL — HIGH (ref 70–99)
GLUCOSE BLDC GLUCOMTR-MCNC: 195 MG/DL — HIGH (ref 70–99)
GLUCOSE BLDC GLUCOMTR-MCNC: 199 MG/DL — HIGH (ref 70–99)
GLUCOSE SERPL-MCNC: 209 MG/DL — HIGH (ref 70–99)
HCT VFR BLD CALC: 36.3 % — SIGNIFICANT CHANGE UP (ref 34.5–45)
HGB BLD-MCNC: 11.2 G/DL — LOW (ref 11.5–15.5)
IMM GRANULOCYTES # BLD AUTO: 0.01 K/UL — SIGNIFICANT CHANGE UP (ref 0–0.07)
IMM GRANULOCYTES NFR BLD AUTO: 0.1 % — SIGNIFICANT CHANGE UP (ref 0–0.9)
LYMPHOCYTES # BLD AUTO: 1.84 K/UL — SIGNIFICANT CHANGE UP (ref 1–3.3)
LYMPHOCYTES NFR BLD AUTO: 26.6 % — SIGNIFICANT CHANGE UP (ref 13–44)
MAGNESIUM SERPL-MCNC: 2.3 MG/DL — SIGNIFICANT CHANGE UP (ref 1.6–2.6)
MCHC RBC-ENTMCNC: 26.5 PG — LOW (ref 27–34)
MCHC RBC-ENTMCNC: 30.9 G/DL — LOW (ref 32–36)
MCV RBC AUTO: 85.8 FL — SIGNIFICANT CHANGE UP (ref 80–100)
MONOCYTES # BLD AUTO: 0.89 K/UL — SIGNIFICANT CHANGE UP (ref 0–0.9)
MONOCYTES NFR BLD AUTO: 12.8 % — SIGNIFICANT CHANGE UP (ref 2–14)
NEUTROPHILS # BLD AUTO: 3.6 K/UL — SIGNIFICANT CHANGE UP (ref 1.8–7.4)
NEUTROPHILS NFR BLD AUTO: 52 % — SIGNIFICANT CHANGE UP (ref 43–77)
NRBC # BLD AUTO: 0 K/UL — SIGNIFICANT CHANGE UP (ref 0–0)
NRBC # FLD: 0 K/UL — SIGNIFICANT CHANGE UP (ref 0–0)
NRBC BLD AUTO-RTO: 0 /100 WBCS — SIGNIFICANT CHANGE UP (ref 0–0)
PHOSPHATE SERPL-MCNC: 4.5 MG/DL — SIGNIFICANT CHANGE UP (ref 2.5–4.5)
PLATELET # BLD AUTO: 340 K/UL — SIGNIFICANT CHANGE UP (ref 150–400)
PMV BLD: 10.7 FL — SIGNIFICANT CHANGE UP (ref 7–13)
POTASSIUM SERPL-MCNC: 3.8 MMOL/L — SIGNIFICANT CHANGE UP (ref 3.5–5.3)
POTASSIUM SERPL-SCNC: 3.8 MMOL/L — SIGNIFICANT CHANGE UP (ref 3.5–5.3)
RBC # BLD: 4.23 M/UL — SIGNIFICANT CHANGE UP (ref 3.8–5.2)
RBC # FLD: 15.7 % — HIGH (ref 10.3–14.5)
SODIUM SERPL-SCNC: 139 MMOL/L — SIGNIFICANT CHANGE UP (ref 135–145)
SPECIMEN SOURCE: SIGNIFICANT CHANGE UP
SPECIMEN SOURCE: SIGNIFICANT CHANGE UP
WBC # BLD: 6.93 K/UL — SIGNIFICANT CHANGE UP (ref 3.8–10.5)
WBC # FLD AUTO: 6.93 K/UL — SIGNIFICANT CHANGE UP (ref 3.8–10.5)

## 2025-07-08 PROCEDURE — 99232 SBSQ HOSP IP/OBS MODERATE 35: CPT

## 2025-07-08 PROCEDURE — G0545: CPT

## 2025-07-08 RX ORDER — B1/B2/B3/B5/B6/B12/VIT C/FOLIC 500-0.5 MG
1 TABLET ORAL
Qty: 30 | Refills: 0
Start: 2025-07-08 | End: 2025-08-06

## 2025-07-08 RX ORDER — INSULIN LISPRO 100 U/ML
5 INJECTION, SOLUTION INTRAVENOUS; SUBCUTANEOUS
Qty: 1 | Refills: 0
Start: 2025-07-08 | End: 2025-08-06

## 2025-07-08 RX ORDER — BUMETANIDE 1 MG/1
1 TABLET ORAL
Qty: 30 | Refills: 0
Start: 2025-07-08 | End: 2025-08-06

## 2025-07-08 RX ORDER — INSULIN GLARGINE-YFGN 100 [IU]/ML
15 INJECTION, SOLUTION SUBCUTANEOUS
Qty: 1 | Refills: 0
Start: 2025-07-08 | End: 2025-08-06

## 2025-07-08 RX ORDER — AMMONIUM LACTATE 12 %
1 LOTION (ML) TOPICAL
Qty: 1 | Refills: 0
Start: 2025-07-08 | End: 2025-08-06

## 2025-07-08 RX ADMIN — INSULIN LISPRO 2: 100 INJECTION, SOLUTION INTRAVENOUS; SUBCUTANEOUS at 11:59

## 2025-07-08 RX ADMIN — GABAPENTIN 600 MILLIGRAM(S): 400 CAPSULE ORAL at 05:56

## 2025-07-08 RX ADMIN — OXYBUTYNIN CHLORIDE 5 MILLIGRAM(S): 5 TABLET, FILM COATED, EXTENDED RELEASE ORAL at 05:56

## 2025-07-08 RX ADMIN — POLYETHYLENE GLYCOL 3350 17 GRAM(S): 17 POWDER, FOR SOLUTION ORAL at 05:55

## 2025-07-08 RX ADMIN — GABAPENTIN 600 MILLIGRAM(S): 400 CAPSULE ORAL at 14:02

## 2025-07-08 RX ADMIN — APIXABAN 5 MILLIGRAM(S): 5 TABLET, FILM COATED ORAL at 05:55

## 2025-07-08 RX ADMIN — INSULIN LISPRO 2 UNIT(S): 100 INJECTION, SOLUTION INTRAVENOUS; SUBCUTANEOUS at 07:57

## 2025-07-08 RX ADMIN — INSULIN LISPRO 2: 100 INJECTION, SOLUTION INTRAVENOUS; SUBCUTANEOUS at 07:57

## 2025-07-08 RX ADMIN — Medication 25 MILLIGRAM(S): at 07:27

## 2025-07-08 RX ADMIN — BUMETANIDE 1 MILLIGRAM(S): 1 TABLET ORAL at 05:55

## 2025-07-08 RX ADMIN — INSULIN LISPRO 2 UNIT(S): 100 INJECTION, SOLUTION INTRAVENOUS; SUBCUTANEOUS at 17:08

## 2025-07-08 RX ADMIN — GABAPENTIN 600 MILLIGRAM(S): 400 CAPSULE ORAL at 22:48

## 2025-07-08 RX ADMIN — METOPROLOL SUCCINATE 12.5 MILLIGRAM(S): 50 TABLET, EXTENDED RELEASE ORAL at 05:56

## 2025-07-08 RX ADMIN — Medication 40 MILLIGRAM(S): at 05:55

## 2025-07-08 RX ADMIN — INSULIN LISPRO 2: 100 INJECTION, SOLUTION INTRAVENOUS; SUBCUTANEOUS at 17:08

## 2025-07-08 RX ADMIN — Medication 2000 UNIT(S): at 12:00

## 2025-07-08 RX ADMIN — CYANOCOBALAMIN 1000 MICROGRAM(S): 1000 INJECTION INTRAMUSCULAR; SUBCUTANEOUS at 12:06

## 2025-07-08 RX ADMIN — Medication 1 TABLET(S): at 12:00

## 2025-07-08 RX ADMIN — APIXABAN 5 MILLIGRAM(S): 5 TABLET, FILM COATED ORAL at 17:07

## 2025-07-08 RX ADMIN — Medication 125 MICROGRAM(S): at 05:56

## 2025-07-08 RX ADMIN — ESCITALOPRAM OXALATE 10 MILLIGRAM(S): 20 TABLET ORAL at 12:00

## 2025-07-08 RX ADMIN — Medication 325 MILLIGRAM(S): at 11:59

## 2025-07-08 RX ADMIN — Medication 1 APPLICATION(S): at 05:56

## 2025-07-08 RX ADMIN — POLYETHYLENE GLYCOL 3350 17 GRAM(S): 17 POWDER, FOR SOLUTION ORAL at 17:07

## 2025-07-08 RX ADMIN — INSULIN LISPRO 2 UNIT(S): 100 INJECTION, SOLUTION INTRAVENOUS; SUBCUTANEOUS at 11:59

## 2025-07-08 RX ADMIN — Medication 1 APPLICATION(S): at 17:13

## 2025-07-08 RX ADMIN — OXYBUTYNIN CHLORIDE 5 MILLIGRAM(S): 5 TABLET, FILM COATED, EXTENDED RELEASE ORAL at 17:07

## 2025-07-08 RX ADMIN — BUPROPION HYDROBROMIDE 150 MILLIGRAM(S): 522 TABLET, EXTENDED RELEASE ORAL at 12:00

## 2025-07-08 RX ADMIN — INSULIN GLARGINE-YFGN 6 UNIT(S): 100 INJECTION, SOLUTION SUBCUTANEOUS at 22:47

## 2025-07-08 RX ADMIN — ATORVASTATIN CALCIUM 40 MILLIGRAM(S): 80 TABLET, FILM COATED ORAL at 22:48

## 2025-07-08 NOTE — PROGRESS NOTE ADULT - PROBLEM SELECTOR PROBLEM 1
Acute exacerbation of chronic heart failure

## 2025-07-08 NOTE — PROGRESS NOTE ADULT - PROBLEM SELECTOR PLAN 7
C/w levothyroxine 125mcg daily

## 2025-07-08 NOTE — PROGRESS NOTE ADULT - PROBLEM SELECTOR PLAN 9
C/w bupropion 150mg daily  C/w escitalopram 10mg daily

## 2025-07-08 NOTE — PROGRESS NOTE ADULT - PROBLEM SELECTOR PLAN 8
C/w fentanyl 50mcg patch q72hrs  C/w gabapentin 600mg TID

## 2025-07-08 NOTE — PROGRESS NOTE ADULT - PROBLEM SELECTOR PLAN 5
C/w metoprolol succinate 12.5mg daily

## 2025-07-08 NOTE — PROGRESS NOTE ADULT - PROBLEM SELECTOR PLAN 2
Noted to have lower extremity swelling and erythema on admission - given symmetry of erythema bilaterality of lesions suspect stasis dermatitis rather than cellulitis  Continue diuretics as above  Monitor off antibiotics
Noted to have lower extremity swelling and erythema on admission - wound care thinks less likely to be infection. ID consulted - monitor off abx. LE dopplers negative for DVT
Noted to have lower extremity swelling and erythema on admission - wound care thinks less likely to be infection. ID consulted - monitor off abx. obtain LE dopplers to r/o DVT
Noted to have lower extremity swelling and erythema on admission - lesions bilateral and symmetric thus favor stasis dermatitis rather than cellulitis - but with worsening erythema and warmth on 7/6, so will resume clindamycin  Continue diuretics as above
Noted to have lower extremity swelling and erythema on admission - given symmetry of erythema bilaterality of lesions suspect stasis dermatitis  Continue diuretics as above  Monitor off antibiotics

## 2025-07-08 NOTE — PROGRESS NOTE ADULT - PROBLEM SELECTOR PROBLEM 2
Venous insufficiency

## 2025-07-08 NOTE — PROGRESS NOTE ADULT - PROBLEM SELECTOR PLAN 10
CAREY on CKD3 suspect from aggressive diuresis  Baseline creatinine 0.9-1.2  Trend BMP daily
Baseline creatinine 0.9-1.2  Trend BMP daily
Cr at baseline  Avoid nephrotoxins  Dose meds appropriately for GFR

## 2025-07-08 NOTE — PROGRESS NOTE ADULT - PROBLEM SELECTOR PLAN 4
A1c 6.2  Takes metformin 1000mg BID + Lantus 22u QHS + Lispro 10u TID w/ meals  Reduce to Lantus 6u QHS + Lispro 2u TID + ISS given relative hypoglycemia, titrate to glucose 100-180
A1c 6.2  Home regimen metformin 1000mg BID + Lantus 22u QHS + Lispro 10u TID w/ meals  Reduce to Lantus 6u QHS + Lispro 2u TID + ISS given relative hypoglycemia, titrate to glucose 100-180
A1c 6.2  Home regimen metformin 1000mg BID + Lantus 22u QHS + Lispro 10u TID w/ meals  Reduce to Lantus 6u QHS + Lispro 2u TID + ISS given relative hypoglycemia, titrate to glucose 100-180
A1c 6.2  Takes metformin 1000mg BID + Lantus 22u QHS + Lispro 10u TID w/ meals  Reduce to Lantus 6u QHS + Lispro 2u TID + ISS given relative hypoglycemia, titrate to glucose 100-180
A1c 6.2  Home regimen metformin 1000mg BID + Lantus 22u QHS + Lispro 10u TID w/ meals  Reduce to Lantus 6u QHS + Lispro 2u TID + ISS given relative hypoglycemia, titrate to glucose 100-180

## 2025-07-08 NOTE — PROGRESS NOTE ADULT - PROBLEM SELECTOR PLAN 1
Per cardiology, volume overload suspect secondary to RV failure  Follow up repeat TTE - TTE from March 2025 without significant abnormalities- 7/7/25- shows EF 70%, no rwma seen   Continue home spironolactone  Continue home metoprolol succinate  Appreciate cardiology's recommendations - off lasix gtt and now on bumex 1mg qD
Per cardiology, volume overload suspect secondary to RV failure  Follow up repeat TTE - TTE from March 2025 without significant abnormalities- 7/7/25- shows EF 70%, no rwma seen   Continue home spironolactone  Continue home metoprolol succinate  Appreciate cardiology's recommendations - off lasix gtt and now on bumex 1mg qD - monitor response and renal function
Per cardiology, volume overload suspect secondary to RV failure  Follow up repeat TTE - TTE from March 2025 without significant abnormalities  Start furosemide ggt at 5mg/hr  Continue home spironolactone  Continue home metoprolol succinate  Appreciate cardiology's recommendations
Per cardiology, volume overload suspect secondary to RV failure  Follow up repeat TTE - TTE from March 2025 without significant abnormalities  Continue IV Lasix 20mg bid today  Continue home spironolactone  Continue home metoprolol succinate  Appreciate cardiology's recommendations
Per cardiology, volume overload suspect secondary to RV failure  Follow up repeat TTE - TTE from March 2025 without significant abnormalities  Continue furosemide ggt at 5mg/hr  Continue home spironolactone  Continue home metoprolol succinate  Appreciate cardiology's recommendations

## 2025-07-08 NOTE — PROGRESS NOTE ADULT - PROBLEM SELECTOR PLAN 3
S/p ablation, and has ILR  C/w apixaban 5mg BID  C/w metoprolol succinate 25mg daily
S/p ablation  Continue apixaban 5mg BID  Continue metoprolol succinate 25mg daily
S/p ablation, and has ILR  Continue apixaban 5mg BID  Continue metoprolol succinate 25mg daily

## 2025-07-08 NOTE — PROGRESS NOTE ADULT - NUTRITIONAL ASSESSMENT
This patient has been assessed with a concern for Malnutrition and has been determined to have a diagnosis/diagnoses of Morbid obesity (BMI > 40).    This patient is being managed with:   Diet Consistent Carbohydrate/No Snacks-  Entered: Jul  3 2025  8:01PM  

## 2025-07-08 NOTE — PROGRESS NOTE ADULT - PROBLEM SELECTOR PLAN 11
DVT PPx: on apixaban
DVT PPx: on apixaban  dc planning to assisted living hopefully 7/9
DVT PPx: on apixaban  dc planning if dopplers negative
DVT PPx: on full AC
DVT PPx: on full AC
No

## 2025-07-08 NOTE — PROGRESS NOTE ADULT - PROBLEM SELECTOR PLAN 6
C/w atorvastatin 40mg QHS

## 2025-07-08 NOTE — PROGRESS NOTE ADULT - PROBLEM SELECTOR PROBLEM 9
History of depression

## 2025-07-08 NOTE — PROGRESS NOTE ADULT - TIME BILLING
- Reviewing, and interpreting labs and testing.  - Independently obtaining a review of systems and performing a physical exam  - Reviewing consultant documentation/recommendations in addition to discussing plan of care with consultants.  - Counselling and educating patient and family regarding interpretation of aforementioned items and plan of care.
- Ordering, reviewing, and interpreting labs, testing, and imaging.  - Independently obtaining a review of systems and performing a physical exam  - Reviewing consultant documentation/recommendations in addition to discussing plan of care with consultants.  - Counselling and educating patient regarding interpretation of aforementioned items and plan of care.
- Reviewing, and interpreting labs and testing.  - Independently obtaining a review of systems and performing a physical exam  - Reviewing consultant documentation/recommendations in addition to discussing plan of care with consultants.  - Counselling and educating patient and family regarding interpretation of aforementioned items and plan of care.
- Reviewing, and interpreting labs and testing.  - Independently obtaining a review of systems and performing a physical exam  - Reviewing consultant documentation/recommendations in addition to discussing plan of care with consultants.  - Counselling and educating patient and family regarding interpretation of aforementioned items and plan of care.
- Ordering, reviewing, and interpreting labs, testing, and imaging.  - Independently obtaining a review of systems and performing a physical exam  - Reviewing consultant documentation/recommendations in addition to discussing plan of care with consultants.  - Counselling and educating family regarding interpretation of aforementioned items and plan of care.

## 2025-07-09 ENCOUNTER — TRANSCRIPTION ENCOUNTER (OUTPATIENT)
Age: 70
End: 2025-07-09

## 2025-07-09 VITALS
TEMPERATURE: 98 F | DIASTOLIC BLOOD PRESSURE: 78 MMHG | SYSTOLIC BLOOD PRESSURE: 149 MMHG | RESPIRATION RATE: 17 BRPM | OXYGEN SATURATION: 99 % | HEART RATE: 61 BPM

## 2025-07-09 LAB
ANION GAP SERPL CALC-SCNC: 14 MMOL/L — SIGNIFICANT CHANGE UP (ref 5–17)
BUN SERPL-MCNC: 41 MG/DL — HIGH (ref 7–23)
CALCIUM SERPL-MCNC: 9.3 MG/DL — SIGNIFICANT CHANGE UP (ref 8.4–10.5)
CHLORIDE SERPL-SCNC: 98 MMOL/L — SIGNIFICANT CHANGE UP (ref 96–108)
CO2 SERPL-SCNC: 26 MMOL/L — SIGNIFICANT CHANGE UP (ref 22–31)
CREAT SERPL-MCNC: 1.53 MG/DL — HIGH (ref 0.5–1.3)
EGFR: 36 ML/MIN/1.73M2 — LOW
EGFR: 36 ML/MIN/1.73M2 — LOW
GLUCOSE BLDC GLUCOMTR-MCNC: 185 MG/DL — HIGH (ref 70–99)
GLUCOSE SERPL-MCNC: 181 MG/DL — HIGH (ref 70–99)
HCT VFR BLD CALC: 34.6 % — SIGNIFICANT CHANGE UP (ref 34.5–45)
HGB BLD-MCNC: 10.8 G/DL — LOW (ref 11.5–15.5)
MAGNESIUM SERPL-MCNC: 2.1 MG/DL — SIGNIFICANT CHANGE UP (ref 1.6–2.6)
MCHC RBC-ENTMCNC: 27 PG — SIGNIFICANT CHANGE UP (ref 27–34)
MCHC RBC-ENTMCNC: 31.2 G/DL — LOW (ref 32–36)
MCV RBC AUTO: 86.5 FL — SIGNIFICANT CHANGE UP (ref 80–100)
NRBC # BLD AUTO: 0 K/UL — SIGNIFICANT CHANGE UP (ref 0–0)
NRBC # FLD: 0 K/UL — SIGNIFICANT CHANGE UP (ref 0–0)
NRBC BLD AUTO-RTO: 0 /100 WBCS — SIGNIFICANT CHANGE UP (ref 0–0)
PHOSPHATE SERPL-MCNC: 3.5 MG/DL — SIGNIFICANT CHANGE UP (ref 2.5–4.5)
PLATELET # BLD AUTO: 308 K/UL — SIGNIFICANT CHANGE UP (ref 150–400)
PMV BLD: 10.8 FL — SIGNIFICANT CHANGE UP (ref 7–13)
POTASSIUM SERPL-MCNC: 3.8 MMOL/L — SIGNIFICANT CHANGE UP (ref 3.5–5.3)
POTASSIUM SERPL-SCNC: 3.8 MMOL/L — SIGNIFICANT CHANGE UP (ref 3.5–5.3)
RBC # BLD: 4 M/UL — SIGNIFICANT CHANGE UP (ref 3.8–5.2)
RBC # FLD: 15.3 % — HIGH (ref 10.3–14.5)
SODIUM SERPL-SCNC: 138 MMOL/L — SIGNIFICANT CHANGE UP (ref 135–145)
WBC # BLD: 6.28 K/UL — SIGNIFICANT CHANGE UP (ref 3.8–10.5)
WBC # FLD AUTO: 6.28 K/UL — SIGNIFICANT CHANGE UP (ref 3.8–10.5)

## 2025-07-09 PROCEDURE — 80053 COMPREHEN METABOLIC PANEL: CPT

## 2025-07-09 PROCEDURE — 84295 ASSAY OF SERUM SODIUM: CPT

## 2025-07-09 PROCEDURE — 82803 BLOOD GASES ANY COMBINATION: CPT

## 2025-07-09 PROCEDURE — 82330 ASSAY OF CALCIUM: CPT

## 2025-07-09 PROCEDURE — 93005 ELECTROCARDIOGRAM TRACING: CPT

## 2025-07-09 PROCEDURE — 36415 COLL VENOUS BLD VENIPUNCTURE: CPT

## 2025-07-09 PROCEDURE — 85610 PROTHROMBIN TIME: CPT

## 2025-07-09 PROCEDURE — 81001 URINALYSIS AUTO W/SCOPE: CPT

## 2025-07-09 PROCEDURE — 85025 COMPLETE CBC W/AUTO DIFF WBC: CPT

## 2025-07-09 PROCEDURE — 84132 ASSAY OF SERUM POTASSIUM: CPT

## 2025-07-09 PROCEDURE — 85018 HEMOGLOBIN: CPT

## 2025-07-09 PROCEDURE — 99239 HOSP IP/OBS DSCHRG MGMT >30: CPT

## 2025-07-09 PROCEDURE — C8929: CPT

## 2025-07-09 PROCEDURE — 82947 ASSAY GLUCOSE BLOOD QUANT: CPT

## 2025-07-09 PROCEDURE — 82435 ASSAY OF BLOOD CHLORIDE: CPT

## 2025-07-09 PROCEDURE — 96374 THER/PROPH/DIAG INJ IV PUSH: CPT

## 2025-07-09 PROCEDURE — 83036 HEMOGLOBIN GLYCOSYLATED A1C: CPT

## 2025-07-09 PROCEDURE — 80048 BASIC METABOLIC PNL TOTAL CA: CPT

## 2025-07-09 PROCEDURE — 85027 COMPLETE CBC AUTOMATED: CPT

## 2025-07-09 PROCEDURE — 85014 HEMATOCRIT: CPT

## 2025-07-09 PROCEDURE — 84100 ASSAY OF PHOSPHORUS: CPT

## 2025-07-09 PROCEDURE — 85730 THROMBOPLASTIN TIME PARTIAL: CPT

## 2025-07-09 PROCEDURE — 83735 ASSAY OF MAGNESIUM: CPT

## 2025-07-09 PROCEDURE — 99285 EMERGENCY DEPT VISIT HI MDM: CPT | Mod: 25

## 2025-07-09 PROCEDURE — 99232 SBSQ HOSP IP/OBS MODERATE 35: CPT

## 2025-07-09 PROCEDURE — 83605 ASSAY OF LACTIC ACID: CPT

## 2025-07-09 PROCEDURE — 87040 BLOOD CULTURE FOR BACTERIA: CPT

## 2025-07-09 PROCEDURE — 93970 EXTREMITY STUDY: CPT

## 2025-07-09 PROCEDURE — 97161 PT EVAL LOW COMPLEX 20 MIN: CPT

## 2025-07-09 PROCEDURE — G0545: CPT

## 2025-07-09 PROCEDURE — 82962 GLUCOSE BLOOD TEST: CPT

## 2025-07-09 PROCEDURE — 71045 X-RAY EXAM CHEST 1 VIEW: CPT

## 2025-07-09 PROCEDURE — 93923 UPR/LXTR ART STDY 3+ LVLS: CPT

## 2025-07-09 RX ORDER — GABAPENTIN 400 MG/1
1 CAPSULE ORAL
Qty: 90 | Refills: 0
Start: 2025-07-09 | End: 2025-08-07

## 2025-07-09 RX ADMIN — ESCITALOPRAM OXALATE 10 MILLIGRAM(S): 20 TABLET ORAL at 12:06

## 2025-07-09 RX ADMIN — Medication 1 APPLICATION(S): at 05:47

## 2025-07-09 RX ADMIN — METOPROLOL SUCCINATE 12.5 MILLIGRAM(S): 50 TABLET, EXTENDED RELEASE ORAL at 05:45

## 2025-07-09 RX ADMIN — BUMETANIDE 1 MILLIGRAM(S): 1 TABLET ORAL at 05:46

## 2025-07-09 RX ADMIN — Medication 325 MILLIGRAM(S): at 12:05

## 2025-07-09 RX ADMIN — APIXABAN 5 MILLIGRAM(S): 5 TABLET, FILM COATED ORAL at 05:46

## 2025-07-09 RX ADMIN — Medication 1 TABLET(S): at 12:05

## 2025-07-09 RX ADMIN — Medication 2000 UNIT(S): at 12:06

## 2025-07-09 RX ADMIN — INSULIN LISPRO 2: 100 INJECTION, SOLUTION INTRAVENOUS; SUBCUTANEOUS at 08:36

## 2025-07-09 RX ADMIN — INSULIN LISPRO 2 UNIT(S): 100 INJECTION, SOLUTION INTRAVENOUS; SUBCUTANEOUS at 08:36

## 2025-07-09 RX ADMIN — GABAPENTIN 600 MILLIGRAM(S): 400 CAPSULE ORAL at 05:45

## 2025-07-09 RX ADMIN — BUPROPION HYDROBROMIDE 150 MILLIGRAM(S): 522 TABLET, EXTENDED RELEASE ORAL at 12:05

## 2025-07-09 RX ADMIN — OXYBUTYNIN CHLORIDE 5 MILLIGRAM(S): 5 TABLET, FILM COATED, EXTENDED RELEASE ORAL at 05:46

## 2025-07-09 RX ADMIN — Medication 125 MICROGRAM(S): at 05:46

## 2025-07-09 RX ADMIN — Medication 25 MILLIGRAM(S): at 05:46

## 2025-07-09 RX ADMIN — CYANOCOBALAMIN 1000 MICROGRAM(S): 1000 INJECTION INTRAMUSCULAR; SUBCUTANEOUS at 12:05

## 2025-07-09 RX ADMIN — Medication 40 MILLIGRAM(S): at 06:14

## 2025-07-09 NOTE — DISCHARGE NOTE PROVIDER - NSDCFUADDAPPT_GEN_ALL_CORE_FT
APPTS ARE READY TO BE MADE: [X] YES    Best Family or Patient Contact (if needed):    Additional Information about above appointments (if needed):    1:   2:   3:     Other comments or requests:    APPTS ARE READY TO BE MADE: [X] YES    Best Family or Patient Contact (if needed):    Additional Information about above appointments (if needed):    1:   2:   3:     Other comments or requests:   Patient advises they do not want our assistance and prefer to coordinate the appointments on their own. No information was provided to the patient. Patient advised she has limited mobility but declined assistance securing virtual/telehealth appointments. Patient will return our call if assistance is needed.

## 2025-07-09 NOTE — DISCHARGE NOTE NURSING/CASE MANAGEMENT/SOCIAL WORK - NSDCVIVACCINE_GEN_ALL_CORE_FT
COVID-19 vaccine, vector-nr, [id number], PF, 0.5 mL (Anika); 06-May-2021 11:14; Katey Burdick (RN); Anika; [id number] (Exp. Date: 21-Jun-2021); IntraMuscular; Deltoid Left.; 0.5 milliLiter(s);   Tdap; 06-Jul-2024 01:55; Jam Blanco); [employer]; [id number] (Exp. Date: 06-Jul-2024); IntraMuscular; Deltoid Left.; 0.5 milliLiter(s); VIS (VIS Published: 09-May-2013, VIS Presented: 06-Jul-2024);

## 2025-07-09 NOTE — DISCHARGE NOTE NURSING/CASE MANAGEMENT/SOCIAL WORK - PATIENT PORTAL LINK FT
You can access the FollowMyHealth Patient Portal offered by [facility] by registering at the following website: [email or web address]. By joining [facility]’s FollowMyHealth portal, you will also be able to view your health information using other applications (apps) compatible with our system.

## 2025-07-09 NOTE — PROGRESS NOTE ADULT - REASON FOR ADMISSION
Cellulitis

## 2025-07-09 NOTE — PROGRESS NOTE ADULT - SUBJECTIVE AND OBJECTIVE BOX
Date of Service: 07-04-25 @ 12:30           CARDIOLOGY     PROGRESS  NOTE   ________________________________________________  CHIEF COMPLAINT:Patient is a 70y old  Female who presents with a chief complaint of Cellulitis (03 Jul 2025 23:00)  no complain  	  REVIEW OF SYSTEMS:  CONSTITUTIONAL: No fever, weight loss, or fatigue  EYES: No eye pain, visual disturbances, or discharge  ENT:  No difficulty hearing, tinnitus, vertigo; No sinus or throat pain  NECK: No pain or stiffness  RESPIRATORY: No cough, wheezing, chills or hemoptysis; + mild  Shortness of Breath  CARDIOVASCULAR: No chest pain, palpitations, passing out, dizziness, or leg swelling  GASTROINTESTINAL: No abdominal or epigastric pain. No nausea, vomiting, or hematemesis; No diarrhea or constipation. No melena or hematochezia.  GENITOURINARY: No dysuria, frequency, hematuria, or incontinence  NEUROLOGICAL: No headaches, memory loss, loss of strength, numbness, or tremors  SKIN: No itching, burning, rashes, or lesions   LYMPH Nodes: No enlarged glands  ENDOCRINE: No heat or cold intolerance; No hair loss  MUSCULOSKELETAL: No joint pain or swelling; No muscle, back, + extremity pain  PSYCHIATRIC: No depression, anxiety, mood swings, or difficulty sleeping  HEME/LYMPH: No easy bruising, or bleeding gums  ALLERGY AND IMMUNOLOGIC: No hives or eczema	    [ x] All others negative	  [ ] Unable to obtain    PHYSICAL EXAM:  T(C): 36.7 (07-04-25 @ 08:46), Max: 36.7 (07-03-25 @ 20:08)  HR: 60 (07-04-25 @ 08:46) (57 - 70)  BP: 128/66 (07-04-25 @ 08:46) (118/53 - 141/72)  RR: 18 (07-04-25 @ 08:46) (18 - 22)  SpO2: 96% (07-04-25 @ 08:46) (95% - 100%)  Wt(kg): --  I&O's Summary      Appearance: Normal	  HEENT:   Normal oral mucosa, PERRL, EOMI	  Lymphatic: No lymphadenopathy  Cardiovascular: Normal S1 S2, No JVD, +murmurs, + edema  Respiratory: Lungs clear to auscultation	  Psychiatry: A & O x 3, Mood & affect appropriate  Gastrointestinal:  Soft, Non-tender, + BS	  Skin: No rashes, No ecchymoses, No cyanosis	  Neurologic: Non-focal  Extremities: Normal range of motion, No clubbing, cyanosis + erythema/  edema  Vascular: Peripheral pulses palpable 2+ bilaterally    MEDICATIONS  (STANDING):  apixaban 5 milliGRAM(s) Oral every 12 hours  atorvastatin 40 milliGRAM(s) Oral at bedtime  buPROPion XL (24-Hour) . 150 milliGRAM(s) Oral daily  cholecalciferol 2000 Unit(s) Oral daily  clindamycin IVPB      clindamycin IVPB 600 milliGRAM(s) IV Intermittent every 8 hours  cyanocobalamin 1000 MICROGram(s) Oral daily  dextrose 5%. 1000 milliLiter(s) (100 mL/Hr) IV Continuous <Continuous>  dextrose 5%. 1000 milliLiter(s) (50 mL/Hr) IV Continuous <Continuous>  dextrose 50% Injectable 25 Gram(s) IV Push once  dextrose 50% Injectable 12.5 Gram(s) IV Push once  dextrose 50% Injectable 25 Gram(s) IV Push once  escitalopram 10 milliGRAM(s) Oral daily  fentaNYL   Patch  50 MICROgram(s)/Hr 1 Patch Transdermal every 72 hours  ferrous    sulfate 325 milliGRAM(s) Oral daily  furosemide   Injectable 20 milliGRAM(s) IV Push two times a day  gabapentin 600 milliGRAM(s) Oral three times a day  glucagon  Injectable 1 milliGRAM(s) IntraMuscular once  insulin glargine Injectable (LANTUS) 12 Unit(s) SubCutaneous at bedtime  insulin lispro (ADMELOG) corrective regimen sliding scale   SubCutaneous three times a day before meals  insulin lispro (ADMELOG) corrective regimen sliding scale   SubCutaneous at bedtime  insulin lispro Injectable (ADMELOG) 3 Unit(s) SubCutaneous three times a day before meals  levothyroxine 125 MICROGram(s) Oral daily  metoprolol succinate ER 12.5 milliGRAM(s) Oral daily  oxybutynin 5 milliGRAM(s) Oral two times a day  pantoprazole    Tablet 40 milliGRAM(s) Oral before breakfast  spironolactone 25 milliGRAM(s) Oral daily      TELEMETRY: 	    ECG:  	  RADIOLOGY:  OTHER: 	  	  LABS:	 	    CARDIAC MARKERS:                                10.0   6.35  )-----------( 323      ( 04 Jul 2025 07:15 )             33.2     07-04    141  |  103  |  26[H]  ----------------------------<  94  4.3   |  24  |  1.25    Ca    9.2      04 Jul 2025 07:15  Phos  4.5     07-04  Mg     1.7     07-04    TPro  6.5  /  Alb  3.2[L]  /  TBili  0.3  /  DBili  x   /  AST  8[L]  /  ALT  6[L]  /  AlkPhos  91  07-04    proBNP:   Lipid Profile:   HgA1c:   TSH:   PT/INR - ( 03 Jul 2025 14:27 )   PT: 17.2 sec;   INR: 1.50 ratio         PTT - ( 03 Jul 2025 14:27 )  PTT:38.4 sec      Assessment and plan  ---------------------------  70F PMHx Afib s/p ablation and ILR on Eliquis, T2DM, HTN, HLD, hypothyroidism, CKD3, venous insufficiency, fibromyalgia (on fentanyl patch), depression, and RLS who presents from nursing home for swelling and redness of both her legs. The symptoms began around 10 days ago. She then had a telehealth appt this Monday 6/30, and was prescribed doxycycline BID, which she has been taking for the past 3 days without any improvement. She states that her legs are also twice their normal size, and the swelling is so much that the skin has become very tight and hard. Of note, she has had to be hospitalized twice in the past for cellulitis, and compared to those times, she feels that this episode is worse because the redness is further up her calves than those times. Decided to come to the ED for further evaluation.  She also expresses that her sugars have been quite low, going down to as low as 55. She was previously on Lantus 30u QHS + Lispro 5u TID + metformin 1000mg BID, and due to this, her Lantus was dropped to 22u and Lispro was upped to 10u TID. Despite this, she is still having low sugars.   In the ED, she was afebrile and hemodynamically stable, saturating well on RA. CBC w/ normocytic anemia of 10.4. CMP w/ evidence of CKD3. CXR w/o focal consolidations. Received IV Clindamycin 600mg and 2L LR in the ED.  pt is well known to me with sig cardiac and medical hx with s/p ablation of a.fib, PHTN nad RV dysfunction  LE edema sec to Right heart failure  continue aldactone, add iv lasix 20 mg bid, otherwise will switch to iv bumex  will review old echo  will add ACE/ norvasc if bp elevated  dvt prophylaxis on po eliquis  suspect sig Sleep apnea  continue diuresis    	        
Patient is a 70y old  Female who presents with a chief complaint of Cellulitis (08 Jul 2025 08:29)    Being followed by ID for LE edema and erythema     Interval history:  B/L LE erythema still present,   swelling appears to be improving   WBC stable   afebrile       ROS:  No cough,SOB,CP  No N/V/D  No abd pain      Antimicrobials:      Vital Signs Last 24 Hrs  T(C): 36.5 (07-08-25 @ 12:36), Max: 37.1 (07-08-25 @ 01:09)  T(F): 97.7 (07-08-25 @ 12:36), Max: 98.8 (07-08-25 @ 08:32)  HR: 60 (07-08-25 @ 12:36) (60 - 79)  BP: 104/64 (07-08-25 @ 12:36) (103/61 - 158/79)  BP(mean): --  RR: 18 (07-08-25 @ 12:36) (18 - 18)  SpO2: 96% (07-08-25 @ 12:36) (91% - 96%)    Physical exam   CONSTITUTIONAL: no distress, obese  EYES: No conjunctival or scleral injection, non-icteric;   RESPIRATORY: Breathing comfortably; lungs CTA without wheeze/rhonchi/rales  CARDIOVASCULAR: +S1S2, RRR, no M/G/R; 3+ bilateral LE pitting edema   GASTROINTESTINAL: No palpable masses or tenderness, no rebound/guarding  SKIN: Erythema to midshin on bilateral LEs, mild warmth, B/L LE TTP   PSYCHIATRIC: A+O x 3;     Lab Data:                        11.2   6.93  )-----------( 340      ( 08 Jul 2025 07:27 )             36.3     07-08    139  |  95[L]  |  46[H]  ----------------------------<  209[H]  3.8   |  27  |  1.72[H]    Ca    9.5      08 Jul 2025 07:27  Phos  4.5     07-08  Mg     2.3     07-08        Blood Blood-Peripheral  07-03-25   No growth at 4 days  --  --      Blood Blood-Peripheral  07-03-25   No growth at 4 days  --  --                              RADIOLOGY:  imaging below personally reviewed and agree with findings    ACC: [id number] EXAM:  XR CHEST PORTABLE URGENT 1V   ORDERED BY: RAPHAEL ARENAS     PROCEDURE DATE:  07/03/2025          INTERPRETATION:  EXAMINATION: XR CHEST URGENT    CLINICAL INDICATION: Sepsis    TECHNIQUE: Single frontal, portable view of the chest was obtained.    COMPARISON: Chest x-ray and CT chest 5/13/2025.    FINDINGS:  Left chest wall loop recorder.  The cardiac silhouette is not well evaluated on AP film.  No focal consolidations.  There is no pneumothorax or pleural effusion.  No acute osseous abnormalities.    IMPRESSION:  No focal consolidations.    --- End of Report ---      
Date of Service: 07-05-25 @ 12:14           CARDIOLOGY     PROGRESS  NOTE   ________________________________________________  CHIEF COMPLAINT:Patient is a 70y old  Female who presents with a chief complaint of Cellulitis (04 Jul 2025 15:58)  still with edema  	  REVIEW OF SYSTEMS:  CONSTITUTIONAL: No fever, weight loss, or fatigue  EYES: No eye pain, visual disturbances, or discharge  ENT:  No difficulty hearing, tinnitus, vertigo; No sinus or throat pain  NECK: No pain or stiffness  RESPIRATORY: No cough, wheezing, chills or hemoptysis; No Shortness of Breath  CARDIOVASCULAR: No chest pain, palpitations, passing out, dizziness, or leg swelling  GASTROINTESTINAL: No abdominal or epigastric pain. No nausea, vomiting, or hematemesis; No diarrhea or constipation. No melena or hematochezia.  GENITOURINARY: No dysuria, frequency, hematuria, or incontinence  NEUROLOGICAL: No headaches, memory loss, loss of strength, numbness, or tremors  SKIN: No itching, burning, rashes, or lesions   LYMPH Nodes: No enlarged glands  ENDOCRINE: No heat or cold intolerance; No hair loss  MUSCULOSKELETAL: No joint pain or swelling; No muscle, back, + extremity pain  PSYCHIATRIC: No depression, anxiety, mood swings, or difficulty sleeping  HEME/LYMPH: No easy bruising, or bleeding gums  ALLERGY AND IMMUNOLOGIC: No hives or eczema	    [x ] All others negative	  [ ] Unable to obtain    PHYSICAL EXAM:  T(C): 36.5 (07-05-25 @ 08:42), Max: 36.9 (07-04-25 @ 17:19)  HR: 54 (07-05-25 @ 11:40) (54 - 65)  BP: 145/78 (07-05-25 @ 11:40) (101/62 - 145/78)  RR: 18 (07-05-25 @ 08:42) (18 - 18)  SpO2: 97% (07-05-25 @ 11:40) (94% - 98%)  Wt(kg): --  I&O's Summary    04 Jul 2025 07:01  -  05 Jul 2025 07:00  --------------------------------------------------------  IN: 0 mL / OUT: 2450 mL / NET: -2450 mL        Appearance: Normal	  HEENT:   Normal oral mucosa, PERRL, EOMI	  Lymphatic: No lymphadenopathy  Cardiovascular: Normal S1 S2, No JVD, + murmurs, + edema  Respiratory: Lungs clear to auscultation	  Psychiatry: A & O x 3, Mood & affect appropriate  Gastrointestinal:  Soft, Non-tender, + BS	  Skin: No rashes, No ecchymoses, No cyanosis	  Neurologic: Non-focal  Extremities: Normal range of motion, No clubbing, cyanosis , +  edema/ erythema  Vascular: Peripheral pulses palpable 2+ bilaterally    MEDICATIONS  (STANDING):  apixaban 5 milliGRAM(s) Oral every 12 hours  atorvastatin 40 milliGRAM(s) Oral at bedtime  buPROPion XL (24-Hour) . 150 milliGRAM(s) Oral daily  cholecalciferol 2000 Unit(s) Oral daily  cyanocobalamin 1000 MICROGram(s) Oral daily  dextrose 5%. 1000 milliLiter(s) (100 mL/Hr) IV Continuous <Continuous>  dextrose 5%. 1000 milliLiter(s) (50 mL/Hr) IV Continuous <Continuous>  dextrose 50% Injectable 25 Gram(s) IV Push once  dextrose 50% Injectable 12.5 Gram(s) IV Push once  dextrose 50% Injectable 25 Gram(s) IV Push once  escitalopram 10 milliGRAM(s) Oral daily  fentaNYL   Patch  50 MICROgram(s)/Hr 1 Patch Transdermal every 72 hours  ferrous    sulfate 325 milliGRAM(s) Oral daily  furosemide   Injectable 20 milliGRAM(s) IV Push two times a day  gabapentin 600 milliGRAM(s) Oral three times a day  glucagon  Injectable 1 milliGRAM(s) IntraMuscular once  insulin glargine Injectable (LANTUS) 6 Unit(s) SubCutaneous at bedtime  insulin lispro (ADMELOG) corrective regimen sliding scale   SubCutaneous three times a day before meals  insulin lispro (ADMELOG) corrective regimen sliding scale   SubCutaneous at bedtime  insulin lispro Injectable (ADMELOG) 2 Unit(s) SubCutaneous three times a day before meals  levothyroxine 125 MICROGram(s) Oral daily  metoprolol succinate ER 12.5 milliGRAM(s) Oral daily  oxybutynin 5 milliGRAM(s) Oral two times a day  pantoprazole    Tablet 40 milliGRAM(s) Oral before breakfast  spironolactone 25 milliGRAM(s) Oral daily      TELEMETRY: 	    ECG:  	  RADIOLOGY:  OTHER: 	  	  LABS:	 	    CARDIAC MARKERS:                          10.2   5.34  )-----------( 317      ( 05 Jul 2025 07:07 )             32.8     07-05    141  |  102  |  30[H]  ----------------------------<  129[H]  4.4   |  27  |  1.34[H]    Ca    9.0      05 Jul 2025 07:08  Phos  4.9     07-05  Mg     2.0     07-05    TPro  6.4  /  Alb  3.3  /  TBili  0.2  /  DBili  x   /  AST  11  /  ALT  <5[L]  /  AlkPhos  86  07-05    proBNP:   Lipid Profile:   HgA1c:   TSH:   PT/INR - ( 03 Jul 2025 14:27 )   PT: 17.2 sec;   INR: 1.50 ratio         PTT - ( 03 Jul 2025 14:27 )  PTT:38.4 sec      Assessment and plan  ---------------------------  70F PMHx Afib s/p ablation and ILR on Eliquis, T2DM, HTN, HLD, hypothyroidism, CKD3, venous insufficiency, fibromyalgia (on fentanyl patch), depression, and RLS who presents from nursing home for swelling and redness of both her legs. The symptoms began around 10 days ago. She then had a telehealth appt this Monday 6/30, and was prescribed doxycycline BID, which she has been taking for the past 3 days without any improvement. She states that her legs are also twice their normal size, and the swelling is so much that the skin has become very tight and hard. Of note, she has had to be hospitalized twice in the past for cellulitis, and compared to those times, she feels that this episode is worse because the redness is further up her calves than those times. Decided to come to the ED for further evaluation.  She also expresses that her sugars have been quite low, going down to as low as 55. She was previously on Lantus 30u QHS + Lispro 5u TID + metformin 1000mg BID, and due to this, her Lantus was dropped to 22u and Lispro was upped to 10u TID. Despite this, she is still having low sugars.   In the ED, she was afebrile and hemodynamically stable, saturating well on RA. CBC w/ normocytic anemia of 10.4. CMP w/ evidence of CKD3. CXR w/o focal consolidations. Received IV Clindamycin 600mg and 2L LR in the ED.  pt is well known to me with sig cardiac and medical hx with s/p ablation of a.fib, PHTN nad RV dysfunction  LE edema sec to Right heart failure  continue aldactone, add iv lasix 20 mg bid, otherwise will switch to iv bumex  will review old echo  will add ACE/ norvasc if bp elevated  dvt prophylaxis on po eliquis  suspect sig Sleep apnea  continue diuresis, not sig may start on lasix drip    	        
Date of Service: 07-06-25 @ 11:58           CARDIOLOGY     PROGRESS  NOTE   ________________________________________________  CHIEF COMPLAINT:Patient is a 70y old  Female who presents with a chief complaint of Cellulitis (05 Jul 2025 14:24)  good uo  	  REVIEW OF SYSTEMS:  CONSTITUTIONAL: No fever, weight loss, or fatigue  EYES: No eye pain, visual disturbances, or discharge  ENT:  No difficulty hearing, tinnitus, vertigo; No sinus or throat pain  NECK: No pain or stiffness  RESPIRATORY: No cough, wheezing, chills or hemoptysis; decrease  Shortness of Breath  CARDIOVASCULAR: No chest pain, palpitations, passing out, dizziness, or leg swelling  GASTROINTESTINAL: No abdominal or epigastric pain. No nausea, vomiting, or hematemesis; No diarrhea or constipation. No melena or hematochezia.  GENITOURINARY: No dysuria, frequency, hematuria, or incontinence  NEUROLOGICAL: No headaches, memory loss, loss of strength, numbness, or tremors  SKIN: No itching, burning, rashes, or lesions   LYMPH Nodes: No enlarged glands  ENDOCRINE: No heat or cold intolerance; No hair loss  MUSCULOSKELETAL: No joint pain or swelling; No muscle, back, or extremity pain  PSYCHIATRIC: No depression, anxiety, mood swings, or difficulty sleeping  HEME/LYMPH: No easy bruising, or bleeding gums  ALLERGY AND IMMUNOLOGIC: No hives or eczema	    x[ ] All others negative	  [ ] Unable to obtain    PHYSICAL EXAM:  T(C): 36.8 (07-06-25 @ 05:43), Max: 36.9 (07-06-25 @ 00:08)  HR: 56 (07-06-25 @ 08:15) (52 - 61)  BP: 109/68 (07-06-25 @ 08:15) (103/63 - 134/64)  RR: 18 (07-06-25 @ 08:15) (18 - 18)  SpO2: 95% (07-06-25 @ 08:15) (94% - 96%)  Wt(kg): --  I&O's Summary    05 Jul 2025 07:01  -  06 Jul 2025 07:00  --------------------------------------------------------  IN: 497.5 mL / OUT: 3500 mL / NET: -3002.5 mL        Appearance: Normal	  HEENT:   Normal oral mucosa, PERRL, EOMI	  Lymphatic: No lymphadenopathy  Cardiovascular: Normal S1 S2, No JVD, + murmurs, No edema  Respiratory: rhonchi  Psychiatry: A & O x 3, Mood & affect appropriate  Gastrointestinal:  Soft, Non-tender, + BS	  Skin: No rashes, No ecchymoses, No cyanosis	  Extremities: Normal range of motion, No clubbing, cyanosis , decrease  edema  Vascular: Peripheral pulses palpable 2+ bilaterally    MEDICATIONS  (STANDING):  apixaban 5 milliGRAM(s) Oral every 12 hours  atorvastatin 40 milliGRAM(s) Oral at bedtime  buPROPion XL (24-Hour) . 150 milliGRAM(s) Oral daily  cholecalciferol 2000 Unit(s) Oral daily  cyanocobalamin 1000 MICROGram(s) Oral daily  dextrose 5%. 1000 milliLiter(s) (100 mL/Hr) IV Continuous <Continuous>  dextrose 5%. 1000 milliLiter(s) (50 mL/Hr) IV Continuous <Continuous>  dextrose 50% Injectable 25 Gram(s) IV Push once  dextrose 50% Injectable 12.5 Gram(s) IV Push once  dextrose 50% Injectable 25 Gram(s) IV Push once  escitalopram 10 milliGRAM(s) Oral daily  fentaNYL   Patch  50 MICROgram(s)/Hr 1 Patch Transdermal every 72 hours  ferrous    sulfate 325 milliGRAM(s) Oral daily  furosemide Infusion 5 mG/Hr (2.5 mL/Hr) IV Continuous <Continuous>  gabapentin 600 milliGRAM(s) Oral three times a day  glucagon  Injectable 1 milliGRAM(s) IntraMuscular once  insulin glargine Injectable (LANTUS) 6 Unit(s) SubCutaneous at bedtime  insulin lispro (ADMELOG) corrective regimen sliding scale   SubCutaneous three times a day before meals  insulin lispro (ADMELOG) corrective regimen sliding scale   SubCutaneous at bedtime  insulin lispro Injectable (ADMELOG) 2 Unit(s) SubCutaneous three times a day before meals  levothyroxine 125 MICROGram(s) Oral daily  metoprolol succinate ER 12.5 milliGRAM(s) Oral daily  Nephro-kristyn 1 Tablet(s) Oral daily  oxybutynin 5 milliGRAM(s) Oral two times a day  pantoprazole    Tablet 40 milliGRAM(s) Oral before breakfast  spironolactone 25 milliGRAM(s) Oral daily      TELEMETRY: 	    ECG:  	  RADIOLOGY:  OTHER: 	  	  LABS:	 	    CARDIAC MARKERS:                                10.2   5.34  )-----------( 317      ( 05 Jul 2025 07:07 )             32.8     07-06    140  |  97  |  36[H]  ----------------------------<  163[H]  4.0   |  29  |  1.59[H]    Ca    9.3      06 Jul 2025 07:03  Phos  5.0     07-06  Mg     2.0     07-06    TPro  6.4  /  Alb  3.3  /  TBili  0.2  /  DBili  x   /  AST  11  /  ALT  <5[L]  /  AlkPhos  86  07-05    proBNP:   Lipid Profile:   HgA1c:   TSH:         Assessment and plan  ---------------------------  70F PMHx Afib s/p ablation and ILR on Eliquis, T2DM, HTN, HLD, hypothyroidism, CKD3, venous insufficiency, fibromyalgia (on fentanyl patch), depression, and RLS who presents from nursing home for swelling and redness of both her legs. The symptoms began around 10 days ago. She then had a telehealth appt this Monday 6/30, and was prescribed doxycycline BID, which she has been taking for the past 3 days without any improvement. She states that her legs are also twice their normal size, and the swelling is so much that the skin has become very tight and hard. Of note, she has had to be hospitalized twice in the past for cellulitis, and compared to those times, she feels that this episode is worse because the redness is further up her calves than those times. Decided to come to the ED for further evaluation.  She also expresses that her sugars have been quite low, going down to as low as 55. She was previously on Lantus 30u QHS + Lispro 5u TID + metformin 1000mg BID, and due to this, her Lantus was dropped to 22u and Lispro was upped to 10u TID. Despite this, she is still having low sugars.   In the ED, she was afebrile and hemodynamically stable, saturating well on RA. CBC w/ normocytic anemia of 10.4. CMP w/ evidence of CKD3. CXR w/o focal consolidations. Received IV Clindamycin 600mg and 2L LR in the ED.  pt is well known to me with sig cardiac and medical hx with s/p ablation of a.fib, PHTN nad RV dysfunction  LE edema sec to Right heart failure  continue aldactone, add iv lasix 20 mg bid, otherwise will switch to iv bumex  will review old echo  will add ACE/ norvasc if bp elevated  dvt prophylaxis on po eliquis  suspect sig Sleep apnea  continue diuresis, not sig may start on lasix drip, with good UPO  will observe closely will switch to po bumex when euvolemic    	        
Date of Service: 07-07-25 @ 10:31           CARDIOLOGY     PROGRESS  NOTE   ________________________________________________  CHIEF COMPLAINT:Patient is a 70y old  Female who presents with a chief complaint of Cellulitis (06 Jul 2025 15:44)  doing better  	  REVIEW OF SYSTEMS:  CONSTITUTIONAL: No fever, weight loss, or fatigue  EYES: No eye pain, visual disturbances, or discharge  ENT:  No difficulty hearing, tinnitus, vertigo; No sinus or throat pain  NECK: No pain or stiffness  RESPIRATORY: No cough, wheezing, chills or hemoptysis; decrease  Shortness of Breath  CARDIOVASCULAR: No chest pain, palpitations, passing out, dizziness, or leg swelling  GASTROINTESTINAL: No abdominal or epigastric pain. No nausea, vomiting, or hematemesis; No diarrhea or constipation. No melena or hematochezia.  GENITOURINARY: No dysuria, frequency, hematuria, or incontinence  NEUROLOGICAL: No headaches, memory loss, loss of strength, numbness, or tremors  SKIN: No itching, burning, rashes, or lesions   LYMPH Nodes: No enlarged glands  ENDOCRINE: No heat or cold intolerance; No hair loss  MUSCULOSKELETAL: No joint pain or swelling; No muscle, back, or extremity pain  PSYCHIATRIC: No depression, anxiety, mood swings, or difficulty sleeping  HEME/LYMPH: No easy bruising, or bleeding gums  ALLERGY AND IMMUNOLOGIC: No hives or eczema	    [ x] All others negative	  [ ] Unable to obtain    PHYSICAL EXAM:  T(C): 36.7 (07-07-25 @ 04:40), Max: 36.7 (07-07-25 @ 04:40)  HR: 63 (07-07-25 @ 08:28) (58 - 63)  BP: 96/63 (07-07-25 @ 08:28) (96/63 - 134/73)  RR: 18 (07-07-25 @ 08:28) (18 - 18)  SpO2: 94% (07-07-25 @ 08:28) (93% - 96%)  Wt(kg): --  I&O's Summary    06 Jul 2025 07:01  -  07 Jul 2025 07:00  --------------------------------------------------------  IN: 480 mL / OUT: 5800 mL / NET: -5320 mL        Appearance: Normal	  HEENT:   Normal oral mucosa, PERRL, EOMI	  Lymphatic: No lymphadenopathy  Cardiovascular: Normal S1 S2, No JVD, No murmurs,  decrease  edema  Respiratory: Lungs clear to auscultation	  Psychiatry: A & O x 3, Mood & affect appropriate  Gastrointestinal:  Soft, Non-tender, + BS	  Skin: No rashes, No ecchymoses, No cyanosis	  Neurologic: Non-focal  Extremities: Normal range of motion, No clubbing, cyanosis , decrease erythema and  edema  Vascular: Peripheral pulses palpable 2+ bilaterally    MEDICATIONS  (STANDING):  apixaban 5 milliGRAM(s) Oral every 12 hours  atorvastatin 40 milliGRAM(s) Oral at bedtime  buPROPion XL (24-Hour) . 150 milliGRAM(s) Oral daily  cholecalciferol 2000 Unit(s) Oral daily  clindamycin IVPB 600 milliGRAM(s) IV Intermittent every 8 hours  cyanocobalamin 1000 MICROGram(s) Oral daily  dextrose 5%. 1000 milliLiter(s) (100 mL/Hr) IV Continuous <Continuous>  dextrose 5%. 1000 milliLiter(s) (50 mL/Hr) IV Continuous <Continuous>  dextrose 50% Injectable 25 Gram(s) IV Push once  dextrose 50% Injectable 12.5 Gram(s) IV Push once  dextrose 50% Injectable 25 Gram(s) IV Push once  escitalopram 10 milliGRAM(s) Oral daily  fentaNYL   Patch  50 MICROgram(s)/Hr 1 Patch Transdermal every 72 hours  ferrous    sulfate 325 milliGRAM(s) Oral daily  furosemide Infusion 5 mG/Hr (2.5 mL/Hr) IV Continuous <Continuous>  gabapentin 600 milliGRAM(s) Oral three times a day  glucagon  Injectable 1 milliGRAM(s) IntraMuscular once  insulin glargine Injectable (LANTUS) 6 Unit(s) SubCutaneous at bedtime  insulin lispro (ADMELOG) corrective regimen sliding scale   SubCutaneous three times a day before meals  insulin lispro (ADMELOG) corrective regimen sliding scale   SubCutaneous at bedtime  insulin lispro Injectable (ADMELOG) 2 Unit(s) SubCutaneous three times a day before meals  levothyroxine 125 MICROGram(s) Oral daily  metoprolol succinate ER 12.5 milliGRAM(s) Oral daily  Nephro-kristyn 1 Tablet(s) Oral daily  oxybutynin 5 milliGRAM(s) Oral two times a day  pantoprazole    Tablet 40 milliGRAM(s) Oral before breakfast  spironolactone 25 milliGRAM(s) Oral daily      TELEMETRY: 	    ECG:  	  RADIOLOGY:  OTHER: 	  	  LABS:	 	    CARDIAC MARKERS:                                11.1   7.26  )-----------( 338      ( 07 Jul 2025 08:21 )             35.9     07-07    139  |  94[L]  |  41[H]  ----------------------------<  170[H]  4.1   |  28  |  1.63[H]    Ca    9.5      07 Jul 2025 08:21  Phos  5.3     07-07  Mg     2.3     07-07      proBNP:   Lipid Profile:   HgA1c:   TSH:         Assessment and plan  ---------------------------  70F PMHx Afib s/p ablation and ILR on Eliquis, T2DM, HTN, HLD, hypothyroidism, CKD3, venous insufficiency, fibromyalgia (on fentanyl patch), depression, and RLS who presents from nursing home for swelling and redness of both her legs. The symptoms began around 10 days ago. She then had a telehealth appt this Monday 6/30, and was prescribed doxycycline BID, which she has been taking for the past 3 days without any improvement. She states that her legs are also twice their normal size, and the swelling is so much that the skin has become very tight and hard. Of note, she has had to be hospitalized twice in the past for cellulitis, and compared to those times, she feels that this episode is worse because the redness is further up her calves than those times. Decided to come to the ED for further evaluation.  She also expresses that her sugars have been quite low, going down to as low as 55. She was previously on Lantus 30u QHS + Lispro 5u TID + metformin 1000mg BID, and due to this, her Lantus was dropped to 22u and Lispro was upped to 10u TID. Despite this, she is still having low sugars.   In the ED, she was afebrile and hemodynamically stable, saturating well on RA. CBC w/ normocytic anemia of 10.4. CMP w/ evidence of CKD3. CXR w/o focal consolidations. Received IV Clindamycin 600mg and 2L LR in the ED.  pt is well known to me with sig cardiac and medical hx with s/p ablation of a.fib, PHTN nad RV dysfunction  LE edema sec to Right heart failure  continue aldactone, add iv lasix 20 mg bid, otherwise will switch to iv bumex  will review old echo  will add ACE/ norvasc if bp elevated  dvt prophylaxis on po eliquis  suspect sig Sleep apnea  continue diuresis, not sig may start on lasix drip, with good UPO  will observe closely will switch to po bumex when euvolemic  more than !) liters negative, will dc lasix drip start on bumex one mg po daily, fu lytes closely    	        
Date of Service: 07-08-25 @ 08:30           CARDIOLOGY     PROGRESS  NOTE   ________________________________________________  CHIEF COMPLAINT:Patient is a 70y old  Female who presents with a chief complaint of Cellulitis (07 Jul 2025 15:42)  doing better  	  REVIEW OF SYSTEMS:  CONSTITUTIONAL: No fever, weight loss, or fatigue  EYES: No eye pain, visual disturbances, or discharge  ENT:  No difficulty hearing, tinnitus, vertigo; No sinus or throat pain  NECK: No pain or stiffness  RESPIRATORY: No cough, wheezing, chills or hemoptysis; No Shortness of Breath  CARDIOVASCULAR: No chest pain, palpitations, passing out, dizziness, or leg swelling  GASTROINTESTINAL: No abdominal or epigastric pain. No nausea, vomiting, or hematemesis; No diarrhea or constipation. No melena or hematochezia.  GENITOURINARY: No dysuria, frequency, hematuria, or incontinence  NEUROLOGICAL: No headaches, memory loss, loss of strength, numbness, or tremors  SKIN: No itching, burning, rashes, or lesions   LYMPH Nodes: No enlarged glands  ENDOCRINE: No heat or cold intolerance; No hair loss  MUSCULOSKELETAL: No joint pain or swelling; No muscle, back, or extremity pain  PSYCHIATRIC: No depression, anxiety, mood swings, or difficulty sleeping  HEME/LYMPH: No easy bruising, or bleeding gums  ALLERGY AND IMMUNOLOGIC: No hives or eczema	    [x ] All others negative	  [ ] Unable to obtain    PHYSICAL EXAM:  T(C): 36.7 (07-08-25 @ 04:31), Max: 37.1 (07-08-25 @ 01:09)  HR: 68 (07-08-25 @ 04:31) (64 - 79)  BP: 158/79 (07-08-25 @ 04:31) (103/61 - 158/79)  RR: 18 (07-08-25 @ 04:31) (18 - 18)  SpO2: 96% (07-08-25 @ 04:31) (91% - 96%)  Wt(kg): --  I&O's Summary    07 Jul 2025 07:01  -  08 Jul 2025 07:00  --------------------------------------------------------  IN: 840 mL / OUT: 850 mL / NET: -10 mL        Appearance: Normal	  HEENT:   Normal oral mucosa, PERRL, EOMI	  Lymphatic: No lymphadenopathy  Cardiovascular: Normal S1 S2, No JVD, + murmurs, decrease edema  Respiratory: Lungs clear to auscultation	  Psychiatry: A & O x 3, Mood & affect appropriate  Gastrointestinal:  Soft, Non-tender, + BS	  Skin: No rashes, No ecchymoses, No cyanosis	  Extremities No clubbing, cyanosis , decrease erythema/ edema  Vascular: Peripheral pulses palpable 2+ bilaterally    MEDICATIONS  (STANDING):  ammonium lactate 12% Lotion 1 Application(s) Topical two times a day  apixaban 5 milliGRAM(s) Oral every 12 hours  atorvastatin 40 milliGRAM(s) Oral at bedtime  bumetanide 1 milliGRAM(s) Oral daily  buPROPion XL (24-Hour) . 150 milliGRAM(s) Oral daily  cholecalciferol 2000 Unit(s) Oral daily  cyanocobalamin 1000 MICROGram(s) Oral daily  dextrose 5%. 1000 milliLiter(s) (100 mL/Hr) IV Continuous <Continuous>  dextrose 5%. 1000 milliLiter(s) (50 mL/Hr) IV Continuous <Continuous>  dextrose 50% Injectable 25 Gram(s) IV Push once  dextrose 50% Injectable 12.5 Gram(s) IV Push once  dextrose 50% Injectable 25 Gram(s) IV Push once  escitalopram 10 milliGRAM(s) Oral daily  fentaNYL   Patch  50 MICROgram(s)/Hr 1 Patch Transdermal every 72 hours  ferrous    sulfate 325 milliGRAM(s) Oral daily  gabapentin 600 milliGRAM(s) Oral three times a day  glucagon  Injectable 1 milliGRAM(s) IntraMuscular once  insulin glargine Injectable (LANTUS) 6 Unit(s) SubCutaneous at bedtime  insulin lispro (ADMELOG) corrective regimen sliding scale   SubCutaneous three times a day before meals  insulin lispro (ADMELOG) corrective regimen sliding scale   SubCutaneous at bedtime  insulin lispro Injectable (ADMELOG) 2 Unit(s) SubCutaneous three times a day before meals  levothyroxine 125 MICROGram(s) Oral daily  metoprolol succinate ER 12.5 milliGRAM(s) Oral daily  Nephro-kristyn 1 Tablet(s) Oral daily  oxybutynin 5 milliGRAM(s) Oral two times a day  pantoprazole    Tablet 40 milliGRAM(s) Oral before breakfast  polyethylene glycol 3350 17 Gram(s) Oral every 12 hours  senna 2 Tablet(s) Oral at bedtime  spironolactone 25 milliGRAM(s) Oral daily      TELEMETRY: 	    ECG:  	  RADIOLOGY:  OTHER: 	  	  LABS:	 	    CARDIAC MARKERS:                                11.2   6.93  )-----------( 340      ( 08 Jul 2025 07:27 )             36.3     07-08    139  |  95[L]  |  46[H]  ----------------------------<  209[H]  3.8   |  27  |  1.72[H]    Ca    9.5      08 Jul 2025 07:27  Phos  4.5     07-08  Mg     2.3     07-08      proBNP:   Lipid Profile:   HgA1c:   TSH:     < from: TTE W or WO Ultrasound Enhancing Agent (07.07.25 @ 09:12) >   1. Definity ultrasound enhancing agent was given for enhanced left ventricular opacification and improved delineation of the left ventricular endocardial borders.   2. Left ventricular cavity is normal in size. Leftventricular systolic function is normal with an ejection fraction visually estimated at 70 %. There are no regional wall motion abnormalities seen.   3. Compared to the transthoracic echocardiogram performed on 3/14/2025, there have been no significant interval changes.        Assessment and plan  ---------------------------  70F PMHx Afib s/p ablation and ILR on Eliquis, T2DM, HTN, HLD, hypothyroidism, CKD3, venous insufficiency, fibromyalgia (on fentanyl patch), depression, and RLS who presents from nursing home for swelling and redness of both her legs. The symptoms began around 10 days ago. She then had a telehealth appt this Monday 6/30, and was prescribed doxycycline BID, which she has been taking for the past 3 days without any improvement. She states that her legs are also twice their normal size, and the swelling is so much that the skin has become very tight and hard. Of note, she has had to be hospitalized twice in the past for cellulitis, and compared to those times, she feels that this episode is worse because the redness is further up her calves than those times. Decided to come to the ED for further evaluation.  She also expresses that her sugars have been quite low, going down to as low as 55. She was previously on Lantus 30u QHS + Lispro 5u TID + metformin 1000mg BID, and due to this, her Lantus was dropped to 22u and Lispro was upped to 10u TID. Despite this, she is still having low sugars.   In the ED, she was afebrile and hemodynamically stable, saturating well on RA. CBC w/ normocytic anemia of 10.4. CMP w/ evidence of CKD3. CXR w/o focal consolidations. Received IV Clindamycin 600mg and 2L LR in the ED.  pt is well known to me with sig cardiac and medical hx with s/p ablation of a.fib, PHTN nad RV dysfunction  LE edema sec to Right heart failure, HFPEF acute on chronic  continue aldactone, add iv lasix 20 mg bid, otherwise will switch to iv bumex  will review old echo  will add ACE/ norvasc if bp elevated  dvt prophylaxis on po eliquis  suspect sig Sleep apnea  continue diuresis, not sig may start on lasix drip, with good UPO  will observe closely will switch to po bumex when euvolemic  more than 11 liters negative, will dc lasix drip start on bumex one mg po daily, fu lytes closely  echo noted continue po bumex daily  	        
Date of Service: 07-09-25 @ 09:09           CARDIOLOGY     PROGRESS  NOTE   ________________________________________________  CHIEF COMPLAINT:Patient is a 70y old  Female who presents with a chief complaint of Cellulitis (08 Jul 2025 14:55)  doing better  	  REVIEW OF SYSTEMS:  CONSTITUTIONAL: No fever, weight loss, or fatigue  EYES: No eye pain, visual disturbances, or discharge  ENT:  No difficulty hearing, tinnitus, vertigo; No sinus or throat pain  NECK: No pain or stiffness  RESPIRATORY: No cough, wheezing, chills or hemoptysis; No Shortness of Breath  CARDIOVASCULAR: No chest pain, palpitations, passing out, dizziness, decrease  leg swelling  GASTROINTESTINAL: No abdominal or epigastric pain. No nausea, vomiting, or hematemesis; No diarrhea or constipation. No melena or hematochezia.  GENITOURINARY: No dysuria, frequency, hematuria, or incontinence  NEUROLOGICAL: No headaches, memory loss, loss of strength, numbness, or tremors  SKIN: No itching, burning, rashes, or lesions   LYMPH Nodes: No enlarged glands  ENDOCRINE: No heat or cold intolerance; No hair loss  MUSCULOSKELETAL: No joint pain or swelling; No muscle, back, or extremity pain  PSYCHIATRIC: No depression, anxiety, mood swings, or difficulty sleeping  HEME/LYMPH: No easy bruising, or bleeding gums  ALLERGY AND IMMUNOLOGIC: No hives or eczema	    [x ] All others negative	  [ ] Unable to obtain    PHYSICAL EXAM:  T(C): 36.8 (07-09-25 @ 04:42), Max: 36.8 (07-09-25 @ 00:26)  HR: 63 (07-09-25 @ 04:42) (60 - 63)  BP: 132/62 (07-09-25 @ 04:42) (104/64 - 132/62)  RR: 18 (07-09-25 @ 04:42) (18 - 18)  SpO2: 91% (07-09-25 @ 04:42) (91% - 96%)  Wt(kg): --  I&O's Summary    08 Jul 2025 07:01  -  09 Jul 2025 07:00  --------------------------------------------------------  IN: 740 mL / OUT: 0 mL / NET: 740 mL        Appearance: Normal	  HEENT:   Normal oral mucosa, PERRL, EOMI	  Lymphatic: No lymphadenopathy  Cardiovascular: Normal S1 S2, No JVD, + murmurs, decrease  edema  Respiratory: rhonchi  Psychiatry: A & O x 3, Mood & affect appropriate  Gastrointestinal:  Soft, Non-tender, + BS	  Skin: No rashes, No ecchymoses, No cyanosis	  Extremities: Normal range of motion, No clubbing, cyanosis , decrease  edema  Vascular: Peripheral pulses palpable 2+ bilaterally    MEDICATIONS  (STANDING):  ammonium lactate 12% Lotion 1 Application(s) Topical two times a day  apixaban 5 milliGRAM(s) Oral every 12 hours  atorvastatin 40 milliGRAM(s) Oral at bedtime  bumetanide 1 milliGRAM(s) Oral daily  buPROPion XL (24-Hour) . 150 milliGRAM(s) Oral daily  cholecalciferol 2000 Unit(s) Oral daily  cyanocobalamin 1000 MICROGram(s) Oral daily  dextrose 5%. 1000 milliLiter(s) (50 mL/Hr) IV Continuous <Continuous>  dextrose 5%. 1000 milliLiter(s) (100 mL/Hr) IV Continuous <Continuous>  dextrose 50% Injectable 25 Gram(s) IV Push once  dextrose 50% Injectable 12.5 Gram(s) IV Push once  dextrose 50% Injectable 25 Gram(s) IV Push once  escitalopram 10 milliGRAM(s) Oral daily  fentaNYL   Patch  50 MICROgram(s)/Hr 1 Patch Transdermal every 72 hours  ferrous    sulfate 325 milliGRAM(s) Oral daily  gabapentin 600 milliGRAM(s) Oral three times a day  glucagon  Injectable 1 milliGRAM(s) IntraMuscular once  insulin glargine Injectable (LANTUS) 6 Unit(s) SubCutaneous at bedtime  insulin lispro (ADMELOG) corrective regimen sliding scale   SubCutaneous three times a day before meals  insulin lispro (ADMELOG) corrective regimen sliding scale   SubCutaneous at bedtime  insulin lispro Injectable (ADMELOG) 2 Unit(s) SubCutaneous three times a day before meals  levothyroxine 125 MICROGram(s) Oral daily  metoprolol succinate ER 12.5 milliGRAM(s) Oral daily  Nephro-kristyn 1 Tablet(s) Oral daily  oxybutynin 5 milliGRAM(s) Oral two times a day  pantoprazole    Tablet 40 milliGRAM(s) Oral before breakfast  polyethylene glycol 3350 17 Gram(s) Oral every 12 hours  senna 2 Tablet(s) Oral at bedtime  spironolactone 25 milliGRAM(s) Oral daily      TELEMETRY: 	    ECG:  	  RADIOLOGY:  OTHER: 	  	  LABS:	 	    CARDIAC MARKERS:                                10.8   6.28  )-----------( 308      ( 09 Jul 2025 07:04 )             34.6     07-09    138  |  98  |  41[H]  ----------------------------<  181[H]  3.8   |  26  |  1.53[H]    Ca    9.3      09 Jul 2025 07:05  Phos  3.5     07-09  Mg     2.1     07-09      proBNP:   Lipid Profile:   HgA1c:   TSH:         Assessment and plan  ---------------------------  70F PMHx Afib s/p ablation and ILR on Eliquis, T2DM, HTN, HLD, hypothyroidism, CKD3, venous insufficiency, fibromyalgia (on fentanyl patch), depression, and RLS who presents from nursing home for swelling and redness of both her legs. The symptoms began around 10 days ago. She then had a telehealth appt this Monday 6/30, and was prescribed doxycycline BID, which she has been taking for the past 3 days without any improvement. She states that her legs are also twice their normal size, and the swelling is so much that the skin has become very tight and hard. Of note, she has had to be hospitalized twice in the past for cellulitis, and compared to those times, she feels that this episode is worse because the redness is further up her calves than those times. Decided to come to the ED for further evaluation.  She also expresses that her sugars have been quite low, going down to as low as 55. She was previously on Lantus 30u QHS + Lispro 5u TID + metformin 1000mg BID, and due to this, her Lantus was dropped to 22u and Lispro was upped to 10u TID. Despite this, she is still having low sugars.   In the ED, she was afebrile and hemodynamically stable, saturating well on RA. CBC w/ normocytic anemia of 10.4. CMP w/ evidence of CKD3. CXR w/o focal consolidations. Received IV Clindamycin 600mg and 2L LR in the ED.  pt is well known to me with sig cardiac and medical hx with s/p ablation of a.fib, PHTN nad RV dysfunction  LE edema sec to Right heart failure, HFPEF acute on chronic  continue aldactone, add iv lasix 20 mg bid, otherwise will switch to iv bumex  will review old echo  will add ACE/ norvasc if bp elevated  dvt prophylaxis on po eliquis  suspect sig Sleep apnea  continue diuresis, not sig may start on lasix drip, with good UPO  will observe closely will switch to po bumex when euvolemic  more than 11 liters negative, will dc lasix drip start on bumex one mg po daily, fu lytes closely  echo noted continue po bumex daily, follow up renal function and K level  closely  fu as out pt    	        
Patient is a 70y old  Female who presents with a chief complaint of Cellulitis (09 Jul 2025 10:49)    Being followed by ID for LE edema and erythema     Interval history:  erythema appears to be improving   mildly tender   WBC stable   afebrile   Planned for discharge today   Chest with rash noted +     ROS:  No cough,SOB,CP  No N/V/D  No abd pain      Vital Signs Last 24 Hrs  T(C): 36.8 (07-09-25 @ 11:46), Max: 36.8 (07-09-25 @ 00:26)  T(F): 98.3 (07-09-25 @ 11:46), Max: 98.3 (07-09-25 @ 00:26)  HR: 61 (07-09-25 @ 11:46) (60 - 63)  BP: 149/78 (07-09-25 @ 11:46) (116/68 - 149/78)  BP(mean): --  RR: 17 (07-09-25 @ 11:46) (17 - 18)  SpO2: 99% (07-09-25 @ 11:46) (91% - 99%)    Physical exam   CONSTITUTIONAL: no distress, obese  EYES: No conjunctival or scleral injection, non-icteric;   RESPIRATORY: Breathing comfortably; lungs CTA without wheeze/rhonchi/rales  CARDIOVASCULAR: +S1S2, RRR, no M/G/R; 3+ bilateral LE pitting edema   GASTROINTESTINAL: No palpable masses or tenderness, no rebound/guarding  SKIN: Erythema to midshin on bilateral LEs, mild warmth, B/L LE TTP   PSYCHIATRIC: A+O x 3;   Lab Data:                        10.8   6.28  )-----------( 308      ( 09 Jul 2025 07:04 )             34.6     07-09    138  |  98  |  41[H]  ----------------------------<  181[H]  3.8   |  26  |  1.53[H]    Ca    9.3      09 Jul 2025 07:05  Phos  3.5     07-09  Mg     2.1     07-09        Blood Blood-Peripheral  07-03-25   No growth at 5 days  --  --      Blood Blood-Peripheral  07-03-25   No growth at 5 days  --  --                  RADIOLOGY:  below reviewed        RADIOLOGY:  imaging below personally reviewed and agree with findings    ACC: [id number] EXAM:  XR CHEST PORTABLE URGENT 1V   ORDERED BY: RAPHAEL ARENAS     PROCEDURE DATE:  07/03/2025          INTERPRETATION:  EXAMINATION: XR CHEST URGENT    CLINICAL INDICATION: Sepsis    TECHNIQUE: Single frontal, portable view of the chest was obtained.    COMPARISON: Chest x-ray and CT chest 5/13/2025.    FINDINGS:  Left chest wall loop recorder.  The cardiac silhouette is not well evaluated on AP film.  No focal consolidations.  There is no pneumothorax or pleural effusion.  No acute osseous abnormalities.    IMPRESSION:  No focal consolidations.    --- End of Report ---      
Nikhil Florez MD  Division of Hospital Medicine  Available on [employer] Teams    PROGRESS NOTE:     Patient is a 70y old  Female who presents with a chief complaint of Cellulitis (04 Jul 2025 12:30)      SUBJECTIVE / OVERNIGHT EVENTS: Patient seen and examined. No acute overnight events. Lower extremities remain more swollen than her baseline.    MEDICATIONS  (STANDING):  apixaban 5 milliGRAM(s) Oral every 12 hours  atorvastatin 40 milliGRAM(s) Oral at bedtime  buPROPion XL (24-Hour) . 150 milliGRAM(s) Oral daily  cholecalciferol 2000 Unit(s) Oral daily  cyanocobalamin 1000 MICROGram(s) Oral daily  dextrose 5%. 1000 milliLiter(s) (100 mL/Hr) IV Continuous <Continuous>  dextrose 5%. 1000 milliLiter(s) (50 mL/Hr) IV Continuous <Continuous>  dextrose 50% Injectable 25 Gram(s) IV Push once  dextrose 50% Injectable 12.5 Gram(s) IV Push once  dextrose 50% Injectable 25 Gram(s) IV Push once  escitalopram 10 milliGRAM(s) Oral daily  fentaNYL   Patch  50 MICROgram(s)/Hr 1 Patch Transdermal every 72 hours  ferrous    sulfate 325 milliGRAM(s) Oral daily  furosemide   Injectable 20 milliGRAM(s) IV Push two times a day  gabapentin 600 milliGRAM(s) Oral three times a day  glucagon  Injectable 1 milliGRAM(s) IntraMuscular once  insulin glargine Injectable (LANTUS) 12 Unit(s) SubCutaneous at bedtime  insulin lispro (ADMELOG) corrective regimen sliding scale   SubCutaneous three times a day before meals  insulin lispro (ADMELOG) corrective regimen sliding scale   SubCutaneous at bedtime  insulin lispro Injectable (ADMELOG) 3 Unit(s) SubCutaneous three times a day before meals  levothyroxine 125 MICROGram(s) Oral daily  metoprolol succinate ER 12.5 milliGRAM(s) Oral daily  oxybutynin 5 milliGRAM(s) Oral two times a day  pantoprazole    Tablet 40 milliGRAM(s) Oral before breakfast  spironolactone 25 milliGRAM(s) Oral daily    MEDICATIONS  (PRN):  acetaminophen     Tablet .. 650 milliGRAM(s) Oral every 6 hours PRN Temp greater or equal to 38C (100.4F), Mild Pain (1 - 3)  dextrose Oral Gel 15 Gram(s) Oral once PRN Blood Glucose LESS THAN 70 milliGRAM(s)/deciliter  melatonin 3 milliGRAM(s) Oral at bedtime PRN Insomnia      CAPILLARY BLOOD GLUCOSE      POCT Blood Glucose.: 126 mg/dL (04 Jul 2025 11:23)  POCT Blood Glucose.: 98 mg/dL (04 Jul 2025 07:17)  POCT Blood Glucose.: 103 mg/dL (03 Jul 2025 22:51)    I&O's Summary    04 Jul 2025 07:01  -  04 Jul 2025 15:58  --------------------------------------------------------  IN: 0 mL / OUT: 950 mL / NET: -950 mL        PHYSICAL EXAM:  Vital Signs Last 24 Hrs  T(C): 36.8 (04 Jul 2025 12:50), Max: 36.8 (04 Jul 2025 12:50)  T(F): 98.2 (04 Jul 2025 12:50), Max: 98.2 (04 Jul 2025 12:50)  HR: 56 (04 Jul 2025 13:13) (56 - 70)  BP: 108/51 (04 Jul 2025 13:13) (108/51 - 141/72)  BP(mean): 85 (03 Jul 2025 18:24) (85 - 85)  RR: 18 (04 Jul 2025 12:50) (18 - 22)  SpO2: 98% (04 Jul 2025 12:50) (95% - 98%)    Parameters below as of 04 Jul 2025 12:50  Patient On (Oxygen Delivery Method): room air    CONSTITUTIONAL: no distress, disheveled elderly appearing, obese  EYES: No conjunctival or scleral injection, non-icteric;   RESPIRATORY: Breathing comfortably; lungs CTA without wheeze/rhonchi/rales  CARDIOVASCULAR: +S1S2, RRR, no M/G/R; 3+ bilateral LE pitting edema   GASTROINTESTINAL: No palpable masses or tenderness, no rebound/guarding  SKIN: Erythema, and swelling noted on bilateral LEs, no warmth  NEUROLOGIC: Sensation intact in LEs b/l to light touch  PSYCHIATRIC: A+O x 3; mood and affect appropriate; appropriate insight and judgment    LABS:                        10.0   6.35  )-----------( 323      ( 04 Jul 2025 07:15 )             33.2     07-04    141  |  103  |  26[H]  ----------------------------<  94  4.3   |  24  |  1.25    Ca    9.2      04 Jul 2025 07:15  Phos  4.5     07-04  Mg     1.7     07-04    TPro  6.5  /  Alb  3.2[L]  /  TBili  0.3  /  DBili  x   /  AST  8[L]  /  ALT  6[L]  /  AlkPhos  91  07-04    PT/INR - ( 03 Jul 2025 14:27 )   PT: 17.2 sec;   INR: 1.50 ratio         PTT - ( 03 Jul 2025 14:27 )  PTT:38.4 sec      Urinalysis Basic - ( 04 Jul 2025 07:15 )    Color: x / Appearance: x / SG: x / pH: x  Gluc: 94 mg/dL / Ketone: x  / Bili: x / Urobili: x   Blood: x / Protein: x / Nitrite: x   Leuk Esterase: x / RBC: x / WBC x   Sq Epi: x / Non Sq Epi: x / Bacteria: x          RADIOLOGY & ADDITIONAL TESTS:  Results Reviewed: Y  Imaging Personally Reviewed:Y  Electrocardiogram Personally Reviewed:Y    COORDINATION OF CARE:  Care Discussed with Consultants/Other Providers [Y/N]:Y  Prior or Outpatient Records Reviewed [Y/N]:Y  
Nikhil Florez MD  Division of Hospital Medicine  Available on [employer] Teams    PROGRESS NOTE:     Patient is a 70y old  Female who presents with a chief complaint of Cellulitis (05 Jul 2025 12:14)      SUBJECTIVE / OVERNIGHT EVENTS: Patient seen and examined. No acute overnight events. Lower extremity edema still bothersome.    MEDICATIONS  (STANDING):  apixaban 5 milliGRAM(s) Oral every 12 hours  atorvastatin 40 milliGRAM(s) Oral at bedtime  buPROPion XL (24-Hour) . 150 milliGRAM(s) Oral daily  cholecalciferol 2000 Unit(s) Oral daily  cyanocobalamin 1000 MICROGram(s) Oral daily  dextrose 5%. 1000 milliLiter(s) (100 mL/Hr) IV Continuous <Continuous>  dextrose 5%. 1000 milliLiter(s) (50 mL/Hr) IV Continuous <Continuous>  dextrose 50% Injectable 25 Gram(s) IV Push once  dextrose 50% Injectable 12.5 Gram(s) IV Push once  dextrose 50% Injectable 25 Gram(s) IV Push once  escitalopram 10 milliGRAM(s) Oral daily  fentaNYL   Patch  50 MICROgram(s)/Hr 1 Patch Transdermal every 72 hours  ferrous    sulfate 325 milliGRAM(s) Oral daily  furosemide Infusion 5 mG/Hr (2.5 mL/Hr) IV Continuous <Continuous>  gabapentin 600 milliGRAM(s) Oral three times a day  glucagon  Injectable 1 milliGRAM(s) IntraMuscular once  insulin glargine Injectable (LANTUS) 6 Unit(s) SubCutaneous at bedtime  insulin lispro (ADMELOG) corrective regimen sliding scale   SubCutaneous three times a day before meals  insulin lispro (ADMELOG) corrective regimen sliding scale   SubCutaneous at bedtime  insulin lispro Injectable (ADMELOG) 2 Unit(s) SubCutaneous three times a day before meals  levothyroxine 125 MICROGram(s) Oral daily  metoprolol succinate ER 12.5 milliGRAM(s) Oral daily  oxybutynin 5 milliGRAM(s) Oral two times a day  pantoprazole    Tablet 40 milliGRAM(s) Oral before breakfast  spironolactone 25 milliGRAM(s) Oral daily    MEDICATIONS  (PRN):  acetaminophen     Tablet .. 650 milliGRAM(s) Oral every 6 hours PRN Temp greater or equal to 38C (100.4F), Mild Pain (1 - 3)  dextrose Oral Gel 15 Gram(s) Oral once PRN Blood Glucose LESS THAN 70 milliGRAM(s)/deciliter  melatonin 3 milliGRAM(s) Oral at bedtime PRN Insomnia      CAPILLARY BLOOD GLUCOSE      POCT Blood Glucose.: 140 mg/dL (05 Jul 2025 11:50)  POCT Blood Glucose.: 127 mg/dL (05 Jul 2025 07:39)  POCT Blood Glucose.: 157 mg/dL (04 Jul 2025 21:28)  POCT Blood Glucose.: 128 mg/dL (04 Jul 2025 16:26)    I&O's Summary    04 Jul 2025 07:01  -  05 Jul 2025 07:00  --------------------------------------------------------  IN: 0 mL / OUT: 2450 mL / NET: -2450 mL    05 Jul 2025 07:01  -  05 Jul 2025 14:25  --------------------------------------------------------  IN: 480 mL / OUT: 1000 mL / NET: -520 mL        PHYSICAL EXAM:  Vital Signs Last 24 Hrs  T(C): 36.6 (05 Jul 2025 12:31), Max: 36.9 (04 Jul 2025 17:19)  T(F): 97.8 (05 Jul 2025 12:31), Max: 98.4 (04 Jul 2025 17:19)  HR: 52 (05 Jul 2025 12:31) (52 - 63)  BP: 106/65 (05 Jul 2025 12:31) (101/62 - 145/78)  BP(mean): --  RR: 18 (05 Jul 2025 12:31) (18 - 18)  SpO2: 96% (05 Jul 2025 12:31) (94% - 97%)    Parameters below as of 05 Jul 2025 12:31  Patient On (Oxygen Delivery Method): room air    CONSTITUTIONAL: no distress, disheveled, obese  EYES: No conjunctival or scleral injection, non-icteric;   RESPIRATORY: Breathing comfortably; lungs CTA without wheeze/rhonchi/rales  CARDIOVASCULAR: +S1S2, RRR, no M/G/R; 3+ bilateral LE pitting edema   GASTROINTESTINAL: No palpable masses or tenderness, no rebound/guarding  SKIN: Erythema to midshin on bilateral LEs, no warmth  NEUROLOGIC: Sensation intact in LEs b/l to light touch  PSYCHIATRIC: A+O x 3; mood and affect appropriate; appropriate insight and judgment    LABS:                        10.2   5.34  )-----------( 317      ( 05 Jul 2025 07:07 )             32.8     07-05    141  |  102  |  30[H]  ----------------------------<  129[H]  4.4   |  27  |  1.34[H]    Ca    9.0      05 Jul 2025 07:08  Phos  4.9     07-05  Mg     2.0     07-05    TPro  6.4  /  Alb  3.3  /  TBili  0.2  /  DBili  x   /  AST  11  /  ALT  <5[L]  /  AlkPhos  86  07-05    PT/INR - ( 03 Jul 2025 14:27 )   PT: 17.2 sec;   INR: 1.50 ratio         PTT - ( 03 Jul 2025 14:27 )  PTT:38.4 sec      Urinalysis Basic - ( 05 Jul 2025 07:08 )    Color: x / Appearance: x / SG: x / pH: x  Gluc: 129 mg/dL / Ketone: x  / Bili: x / Urobili: x   Blood: x / Protein: x / Nitrite: x   Leuk Esterase: x / RBC: x / WBC x   Sq Epi: x / Non Sq Epi: x / Bacteria: x        Culture - Blood (collected 03 Jul 2025 14:17)  Source: Blood Blood-Peripheral  Preliminary Report (04 Jul 2025 17:02):    No growth at 24 hours    Culture - Blood (collected 03 Jul 2025 14:10)  Source: Blood Blood-Peripheral  Preliminary Report (04 Jul 2025 17:02):    No growth at 24 hours        RADIOLOGY & ADDITIONAL TESTS:  Results Reviewed: Y  Imaging Personally Reviewed:Y  Electrocardiogram Personally Reviewed:Y    COORDINATION OF CARE:  Care Discussed with Consultants/Other Providers [Y/N]:Y  Prior or Outpatient Records Reviewed [Y/N]:Y  
Tania Mendoza MD  [facility] Division of Hospital Medicine    SUBJECTIVE / OVERNIGHT EVENTS:  - no events overnight, states she feels okay but not walking much, wants to be more active. no nausea, vomiting, headaches, shortness of breath, cough.     MEDICATIONS  (STANDING):  ammonium lactate 12% Lotion 1 Application(s) Topical two times a day  apixaban 5 milliGRAM(s) Oral every 12 hours  atorvastatin 40 milliGRAM(s) Oral at bedtime  bumetanide 1 milliGRAM(s) Oral daily  buPROPion XL (24-Hour) . 150 milliGRAM(s) Oral daily  cholecalciferol 2000 Unit(s) Oral daily  cyanocobalamin 1000 MICROGram(s) Oral daily  dextrose 5%. 1000 milliLiter(s) (100 mL/Hr) IV Continuous <Continuous>  dextrose 5%. 1000 milliLiter(s) (50 mL/Hr) IV Continuous <Continuous>  dextrose 50% Injectable 25 Gram(s) IV Push once  dextrose 50% Injectable 12.5 Gram(s) IV Push once  dextrose 50% Injectable 25 Gram(s) IV Push once  escitalopram 10 milliGRAM(s) Oral daily  fentaNYL   Patch  50 MICROgram(s)/Hr 1 Patch Transdermal every 72 hours  ferrous    sulfate 325 milliGRAM(s) Oral daily  gabapentin 600 milliGRAM(s) Oral three times a day  glucagon  Injectable 1 milliGRAM(s) IntraMuscular once  insulin glargine Injectable (LANTUS) 6 Unit(s) SubCutaneous at bedtime  insulin lispro (ADMELOG) corrective regimen sliding scale   SubCutaneous three times a day before meals  insulin lispro (ADMELOG) corrective regimen sliding scale   SubCutaneous at bedtime  insulin lispro Injectable (ADMELOG) 2 Unit(s) SubCutaneous three times a day before meals  levothyroxine 125 MICROGram(s) Oral daily  metoprolol succinate ER 12.5 milliGRAM(s) Oral daily  Nephro-kristyn 1 Tablet(s) Oral daily  oxybutynin 5 milliGRAM(s) Oral two times a day  pantoprazole    Tablet 40 milliGRAM(s) Oral before breakfast  polyethylene glycol 3350 17 Gram(s) Oral every 12 hours  senna 2 Tablet(s) Oral at bedtime  spironolactone 25 milliGRAM(s) Oral daily    MEDICATIONS  (PRN):  acetaminophen     Tablet .. 650 milliGRAM(s) Oral every 6 hours PRN Temp greater or equal to 38C (100.4F), Mild Pain (1 - 3)  dextrose Oral Gel 15 Gram(s) Oral once PRN Blood Glucose LESS THAN 70 milliGRAM(s)/deciliter  melatonin 3 milliGRAM(s) Oral at bedtime PRN Insomnia      I&O's Summary    06 Jul 2025 07:01  -  07 Jul 2025 07:00  --------------------------------------------------------  IN: 480 mL / OUT: 5800 mL / NET: -5320 mL    07 Jul 2025 07:01  -  07 Jul 2025 15:43  --------------------------------------------------------  IN: 480 mL / OUT: 550 mL / NET: -70 mL        PHYSICAL EXAM:  Vital Signs Last 24 Hrs  T(C): 36.8 (07 Jul 2025 13:40), Max: 36.8 (07 Jul 2025 13:40)  T(F): 98.3 (07 Jul 2025 13:40), Max: 98.3 (07 Jul 2025 13:40)  HR: 79 (07 Jul 2025 13:40) (58 - 79)  BP: 103/61 (07 Jul 2025 13:40) (96/63 - 134/73)  BP(mean): --  RR: 18 (07 Jul 2025 13:40) (18 - 18)  SpO2: 94% (07 Jul 2025 13:40) (93% - 96%)    Parameters below as of 07 Jul 2025 13:40  Patient On (Oxygen Delivery Method): room air      CONSTITUTIONAL: no distress, disheveled, obese  EYES: No conjunctival or scleral injection, non-icteric;   RESPIRATORY: Breathing comfortably; lungs CTA without wheeze/rhonchi/rales  CARDIOVASCULAR: +S1S2, RRR, no M/G/R; trace bilateral LE pitting edema   GASTROINTESTINAL: No palpable masses or tenderness, no rebound/guarding  SKIN: Erythema to midshin on bilateral LEs, no warmth  NEUROLOGIC: Sensation intact in LEs b/l to light touch  PSYCHIATRIC: A+O x 3; mood and affect appropriate; appropriate insight and judgment    LABS:                        11.1   7.26  )-----------( 338      ( 07 Jul 2025 08:21 )             35.9     07-07    139  |  94[L]  |  41[H]  ----------------------------<  170[H]  4.1   |  28  |  1.63[H]    Ca    9.5      07 Jul 2025 08:21  Phos  5.3     07-07  Mg     2.3     07-07            Urinalysis Basic - ( 07 Jul 2025 08:21 )    Color: x / Appearance: x / SG: x / pH: x  Gluc: 170 mg/dL / Ketone: x  / Bili: x / Urobili: x   Blood: x / Protein: x / Nitrite: x   Leuk Esterase: x / RBC: x / WBC x   Sq Epi: x / Non Sq Epi: x / Bacteria: x        Urinalysis with Rflx Culture (collected 05 Jul 2025 15:39)        
Nikhil Florez MD  Division of Hospital Medicine  Available on [employer] Teams    PROGRESS NOTE:     Patient is a 70y old  Female who presents with a chief complaint of Cellulitis (06 Jul 2025 11:58)      SUBJECTIVE / OVERNIGHT EVENTS: Patient seen and examined. No acute overnight events. Feels leg swelling has improved. No improvement in leg erythema.    MEDICATIONS  (STANDING):  apixaban 5 milliGRAM(s) Oral every 12 hours  atorvastatin 40 milliGRAM(s) Oral at bedtime  buPROPion XL (24-Hour) . 150 milliGRAM(s) Oral daily  cholecalciferol 2000 Unit(s) Oral daily  clindamycin IVPB 600 milliGRAM(s) IV Intermittent every 8 hours  cyanocobalamin 1000 MICROGram(s) Oral daily  dextrose 5%. 1000 milliLiter(s) (50 mL/Hr) IV Continuous <Continuous>  dextrose 5%. 1000 milliLiter(s) (100 mL/Hr) IV Continuous <Continuous>  dextrose 50% Injectable 25 Gram(s) IV Push once  dextrose 50% Injectable 12.5 Gram(s) IV Push once  dextrose 50% Injectable 25 Gram(s) IV Push once  escitalopram 10 milliGRAM(s) Oral daily  fentaNYL   Patch  50 MICROgram(s)/Hr 1 Patch Transdermal every 72 hours  ferrous    sulfate 325 milliGRAM(s) Oral daily  furosemide Infusion 5 mG/Hr (2.5 mL/Hr) IV Continuous <Continuous>  gabapentin 600 milliGRAM(s) Oral three times a day  glucagon  Injectable 1 milliGRAM(s) IntraMuscular once  insulin glargine Injectable (LANTUS) 6 Unit(s) SubCutaneous at bedtime  insulin lispro (ADMELOG) corrective regimen sliding scale   SubCutaneous three times a day before meals  insulin lispro (ADMELOG) corrective regimen sliding scale   SubCutaneous at bedtime  insulin lispro Injectable (ADMELOG) 2 Unit(s) SubCutaneous three times a day before meals  levothyroxine 125 MICROGram(s) Oral daily  metoprolol succinate ER 12.5 milliGRAM(s) Oral daily  Nephro-kristyn 1 Tablet(s) Oral daily  oxybutynin 5 milliGRAM(s) Oral two times a day  pantoprazole    Tablet 40 milliGRAM(s) Oral before breakfast  spironolactone 25 milliGRAM(s) Oral daily    MEDICATIONS  (PRN):  acetaminophen     Tablet .. 650 milliGRAM(s) Oral every 6 hours PRN Temp greater or equal to 38C (100.4F), Mild Pain (1 - 3)  dextrose Oral Gel 15 Gram(s) Oral once PRN Blood Glucose LESS THAN 70 milliGRAM(s)/deciliter  melatonin 3 milliGRAM(s) Oral at bedtime PRN Insomnia      CAPILLARY BLOOD GLUCOSE      POCT Blood Glucose.: 179 mg/dL (06 Jul 2025 11:27)  POCT Blood Glucose.: 163 mg/dL (06 Jul 2025 07:28)  POCT Blood Glucose.: 151 mg/dL (05 Jul 2025 20:57)  POCT Blood Glucose.: 192 mg/dL (05 Jul 2025 16:43)    I&O's Summary    05 Jul 2025 07:01  -  06 Jul 2025 07:00  --------------------------------------------------------  IN: 497.5 mL / OUT: 3500 mL / NET: -3002.5 mL    06 Jul 2025 07:01  -  06 Jul 2025 15:44  --------------------------------------------------------  IN: 480 mL / OUT: 2100 mL / NET: -1620 mL        PHYSICAL EXAM:  Vital Signs Last 24 Hrs  T(C): 36.6 (06 Jul 2025 12:53), Max: 36.9 (06 Jul 2025 00:08)  T(F): 97.8 (06 Jul 2025 12:53), Max: 98.5 (06 Jul 2025 00:08)  HR: 59 (06 Jul 2025 12:53) (56 - 61)  BP: 114/60 (06 Jul 2025 12:53) (103/63 - 134/64)  BP(mean): --  RR: 18 (06 Jul 2025 12:53) (18 - 18)  SpO2: 94% (06 Jul 2025 12:53) (94% - 96%)    Parameters below as of 06 Jul 2025 12:53  Patient On (Oxygen Delivery Method): room air    CONSTITUTIONAL: no distress, disheveled, obese  EYES: No conjunctival or scleral injection, non-icteric;   RESPIRATORY: Breathing comfortably; lungs CTA without wheeze/rhonchi/rales  CARDIOVASCULAR: +S1S2, RRR, no M/G/R; 3+ bilateral LE pitting edema   GASTROINTESTINAL: No palpable masses or tenderness, no rebound/guarding  SKIN: Erythema to midshin on bilateral LEs, no warmth  NEUROLOGIC: Sensation intact in LEs b/l to light touch  PSYCHIATRIC: A+O x 3; mood and affect appropriate; appropriate insight and judgment      LABS:                        10.2   5.34  )-----------( 317      ( 05 Jul 2025 07:07 )             32.8     07-06    140  |  97  |  36[H]  ----------------------------<  163[H]  4.0   |  29  |  1.59[H]    Ca    9.3      06 Jul 2025 07:03  Phos  5.0     07-06  Mg     2.0     07-06    TPro  6.4  /  Alb  3.3  /  TBili  0.2  /  DBili  x   /  AST  11  /  ALT  <5[L]  /  AlkPhos  86  07-05          Urinalysis Basic - ( 06 Jul 2025 07:03 )    Color: x / Appearance: x / SG: x / pH: x  Gluc: 163 mg/dL / Ketone: x  / Bili: x / Urobili: x   Blood: x / Protein: x / Nitrite: x   Leuk Esterase: x / RBC: x / WBC x   Sq Epi: x / Non Sq Epi: x / Bacteria: x        Urinalysis with Rflx Culture (collected 05 Jul 2025 15:39)        RADIOLOGY & ADDITIONAL TESTS:  Results Reviewed: Y  Imaging Personally Reviewed:Y  Electrocardiogram Personally Reviewed:Y    COORDINATION OF CARE:  Care Discussed with Consultants/Other Providers [Y/N]:Y  Prior or Outpatient Records Reviewed [Y/N]:Y  
Tania Mendoza MD  [facility] Division of Hospital Medicine    SUBJECTIVE / OVERNIGHT EVENTS:  - no events overnight, resting comfortably in bed at this time. no nausea, vomiting, headaches, shortness of breath, cough.     MEDICATIONS  (STANDING):  ammonium lactate 12% Lotion 1 Application(s) Topical two times a day  apixaban 5 milliGRAM(s) Oral every 12 hours  atorvastatin 40 milliGRAM(s) Oral at bedtime  bumetanide 1 milliGRAM(s) Oral daily  buPROPion XL (24-Hour) . 150 milliGRAM(s) Oral daily  cholecalciferol 2000 Unit(s) Oral daily  cyanocobalamin 1000 MICROGram(s) Oral daily  dextrose 5%. 1000 milliLiter(s) (100 mL/Hr) IV Continuous <Continuous>  dextrose 5%. 1000 milliLiter(s) (50 mL/Hr) IV Continuous <Continuous>  dextrose 50% Injectable 25 Gram(s) IV Push once  dextrose 50% Injectable 12.5 Gram(s) IV Push once  dextrose 50% Injectable 25 Gram(s) IV Push once  escitalopram 10 milliGRAM(s) Oral daily  fentaNYL   Patch  50 MICROgram(s)/Hr 1 Patch Transdermal every 72 hours  ferrous    sulfate 325 milliGRAM(s) Oral daily  gabapentin 600 milliGRAM(s) Oral three times a day  glucagon  Injectable 1 milliGRAM(s) IntraMuscular once  insulin glargine Injectable (LANTUS) 6 Unit(s) SubCutaneous at bedtime  insulin lispro (ADMELOG) corrective regimen sliding scale   SubCutaneous three times a day before meals  insulin lispro (ADMELOG) corrective regimen sliding scale   SubCutaneous at bedtime  insulin lispro Injectable (ADMELOG) 2 Unit(s) SubCutaneous three times a day before meals  levothyroxine 125 MICROGram(s) Oral daily  metoprolol succinate ER 12.5 milliGRAM(s) Oral daily  Nephro-kristyn 1 Tablet(s) Oral daily  oxybutynin 5 milliGRAM(s) Oral two times a day  pantoprazole    Tablet 40 milliGRAM(s) Oral before breakfast  polyethylene glycol 3350 17 Gram(s) Oral every 12 hours  senna 2 Tablet(s) Oral at bedtime  spironolactone 25 milliGRAM(s) Oral daily    MEDICATIONS  (PRN):  acetaminophen     Tablet .. 650 milliGRAM(s) Oral every 6 hours PRN Temp greater or equal to 38C (100.4F), Mild Pain (1 - 3)  dextrose Oral Gel 15 Gram(s) Oral once PRN Blood Glucose LESS THAN 70 milliGRAM(s)/deciliter  melatonin 3 milliGRAM(s) Oral at bedtime PRN Insomnia      I&O's Summary    07 Jul 2025 07:01  -  08 Jul 2025 07:00  --------------------------------------------------------  IN: 840 mL / OUT: 850 mL / NET: -10 mL    08 Jul 2025 07:01  -  08 Jul 2025 14:56  --------------------------------------------------------  IN: 500 mL / OUT: 0 mL / NET: 500 mL        PHYSICAL EXAM:  Vital Signs Last 24 Hrs  T(C): 36.5 (08 Jul 2025 12:36), Max: 37.1 (08 Jul 2025 01:09)  T(F): 97.7 (08 Jul 2025 12:36), Max: 98.8 (08 Jul 2025 08:32)  HR: 60 (08 Jul 2025 12:36) (60 - 68)  BP: 104/64 (08 Jul 2025 12:36) (104/64 - 158/79)  BP(mean): --  RR: 18 (08 Jul 2025 12:36) (18 - 18)  SpO2: 96% (08 Jul 2025 12:36) (91% - 96%)    Parameters below as of 08 Jul 2025 12:36  Patient On (Oxygen Delivery Method): room air      CONSTITUTIONAL: no distress, disheveled, obese  EYES: No conjunctival or scleral injection, non-icteric;   RESPIRATORY: Breathing comfortably; lungs CTA without wheeze/rhonchi/rales  CARDIOVASCULAR: +S1S2, RRR, no M/G/R; trace bilateral LE pitting edema   GASTROINTESTINAL: No palpable masses or tenderness, no rebound/guarding  SKIN: Erythema to midshin on bilateral LEs, no warmth  NEUROLOGIC: Sensation intact in LEs b/l to light touch  PSYCHIATRIC: A+O x 3; mood and affect appropriate; appropriate insight and judgment    LABS:                        11.2   6.93  )-----------( 340      ( 08 Jul 2025 07:27 )             36.3     07-08    139  |  95[L]  |  46[H]  ----------------------------<  209[H]  3.8   |  27  |  1.72[H]    Ca    9.5      08 Jul 2025 07:27  Phos  4.5     07-08  Mg     2.3     07-08            Urinalysis Basic - ( 08 Jul 2025 07:27 )    Color: x / Appearance: x / SG: x / pH: x  Gluc: 209 mg/dL / Ketone: x  / Bili: x / Urobili: x   Blood: x / Protein: x / Nitrite: x   Leuk Esterase: x / RBC: x / WBC x   Sq Epi: x / Non Sq Epi: x / Bacteria: x        Urinalysis with Rflx Culture (collected 05 Jul 2025 15:39)

## 2025-07-09 NOTE — DISCHARGE NOTE PROVIDER - CARE PROVIDERS DIRECT ADDRESSES
[email or web address] [email or web address] [email or web address]

## 2025-07-09 NOTE — DISCHARGE NOTE NURSING/CASE MANAGEMENT/SOCIAL WORK - FINANCIAL ASSISTANCE
[facility] provides services at a reduced cost to those who are determined to be eligible through [facility]’s financial assistance program. Information regarding [facility]’s financial assistance program can be found by going to [email or web address] or by calling [phone].

## 2025-07-09 NOTE — PROGRESS NOTE ADULT - PROVIDER SPECIALTY LIST ADULT
Cardiology
Infectious Disease
Infectious Disease
Hospitalist

## 2025-07-09 NOTE — PROGRESS NOTE ADULT - ASSESSMENT
70F with Afib s/p ablation and ILR on apixaban, T2DM, HTN, HLD, CKD3, venous insufficiency, fibromyalgia (on fentanyl patch), depression, and RLS who presents from nursing home for swelling and redness of both her legs.  The symptoms began around 10 days ago. She then had a telehealth appt this Monday 6/30, and was prescribed doxycycline BID, which she has been taking for the past 3 days without any improvement.    In the ED, she was afebrile and hemodynamically stable, saturating well on RA. CBC w/ stable WBC. CMP w/ evidence of CKD3. CXR w/o focal consolidations. Received IV Clindamycin 600mg and 2L LR in the ED. Found to have R heart failure likely contributing to the LE edema. Was started on diuretics. Blood cultures on admission with NGTD.  Clindamycin was stopped given B/L LE erythema favoring more stasis dermatitis.     Now ID consulted for management of LE erythema which is worsening. WBC remains stable. Has been afebrile since admission. Patient was restarted back on clindamycin 07/06.       # B/L LE erythema - suspected venous dermatitis   # B/L Chronic Venous insufficiency   # CKD  # Acute exacerbation of chronic heart failure    Recommendations:     - Duplex LE- no DVTs  - Would monitor off antimicrobials at this time, low suspicion for B/L LE cellulitis at this time.  - Encourage LE elevation.   - If fevers, leucocytosis develops, can obtain blood cultures and start empiric antimicrobials.   - F/U wound care for further recommendations.       In addition to reviewing history, imaging, documents, labs, microbiology, took into account antibiotic stewardship, local antibiogram and infection control strategies and potential transmission issues.    Discussed with the primary team.    No Redding MD  Attending Physician, Division of Infectious Diseases  Department of Medicine   [facility]    Contact on TEAMS messaging from 9am - 5pm  Office: [phone] (after 5 PM or weekend)          
70F with Afib s/p ablation and ILR on apixaban, T2DM, HTN, HLD, CKD3, venous insufficiency, fibromyalgia (on fentanyl patch), depression, and RLS who presents from nursing home for swelling and redness of both her legs.  The symptoms began around 10 days ago. She then had a telehealth appt this Monday 6/30, and was prescribed doxycycline BID, which she has been taking for the past 3 days without any improvement.    In the ED, she was afebrile and hemodynamically stable, saturating well on RA. CBC w/ stable WBC. CMP w/ evidence of CKD3. CXR w/o focal consolidations. Received IV Clindamycin 600mg and 2L LR in the ED. Found to have R heart failure likely contributing to the LE edema. Was started on diuretics. Blood cultures on admission with NGTD.  Clindamycin was stopped given B/L LE erythema favoring more stasis dermatitis.     Now ID consulted for management of LE erythema which is worsening. WBC remains stable. Has been afebrile since admission. Patient was restarted back on clindamycin 07/06.       # B/L LE erythema - suspected venous dermatitis   # B/L Chronic Venous insufficiency   # CKD  # Acute exacerbation of chronic heart failure    Recommendations:     - Duplex LE- no DVTs  - Would monitor off antimicrobials at this time, low suspicion for B/L LE cellulitis at this time.  - Encourage LE elevation.   - If fevers, leucocytosis develops, can obtain blood cultures and start empiric antimicrobials.   - F/U wound care for further recommendations.       In addition to reviewing history, imaging, documents, labs, microbiology, took into account antibiotic stewardship, local antibiogram and infection control strategies and potential transmission issues.    Discussed with the primary team.  ID will sign off today. Thank you for the consultation. Please feel free to reach out with any questions or concerns.   Inpatient ID consult team will discontinue active follow-up for this patient.      No Redding MD  Attending Physician, Division of Infectious Diseases  Department of Medicine   [facility]    Contact on TEAMS messaging from 9am - 5pm  Office: [phone] (after 5 PM or weekend)    
70F with Afib s/p ablation and ILR on apixaban, T2DM, HTN, HLD, CKD3, venous insufficiency, fibromyalgia (on fentanyl patch), depression, and RLS who presents from nursing home for volume overload and possible heart failure exacerbation.
70F PMHx Afib s/p ablation and ILR on Eliquis, T2DM, HTN, HLD, CKD3, venous insufficiency, fibromyalgia (on fentanyl patch), depression, and RLS who presents from nursing home for volume overload.
70F with Afib s/p ablation and ILR on apixaban, T2DM, HTN, HLD, CKD3, venous insufficiency, fibromyalgia (on fentanyl patch), depression, and RLS who presents from nursing home for volume overload and possible heart failure exacerbation.

## 2025-07-09 NOTE — DISCHARGE NOTE PROVIDER - NSDCCPCAREPLAN_GEN_ALL_CORE_FT
PRINCIPAL DISCHARGE DIAGNOSIS  Diagnosis: HF (heart failure)  Assessment and Plan of Treatment: You came in with heart failure exacerbation. We initially had you on a continous infusion of lasix which removed a lot of fluid from your body. We eventually switched you to bumex 1mg every day. Please continue to take bumex 1mg every day. Continue aldactone 25mg as well. Continue your beta blockers as written as well. If you develop worsening swelling in your leg, worsening cough, shortness of breath, please return to the emergency room as soon as possible. Please see your cardiologist within 1-2 weeks.      SECONDARY DISCHARGE DIAGNOSES  Diagnosis: Fibromyalgia  Assessment and Plan of Treatment: We decreased your dose of gabapentin to 400mg every 8 hours to prevent over sedation and sleepiness.    Diagnosis: History of venous stasis ulcer of lower extremity  Assessment and Plan of Treatment: Your legs did not show any evidence of clots or narrowing of the arteries. You likely have venous statis and should see a vascular surgeon out of the hospital for more evaluation.

## 2025-07-09 NOTE — DISCHARGE NOTE PROVIDER - ATTENDING DISCHARGE PHYSICAL EXAMINATION:
CONSTITUTIONAL: no distress, obese  RESPIRATORY: Breathing comfortably; lungs CTA without wheeze/rhonchi/rales  CARDIOVASCULAR: +S1S2, RRR, no M/G/R; no bilateral LE pitting edema   GASTROINTESTINAL: No palpable masses or tenderness, no rebound/guarding  SKIN: Erythema to midshin on bilateral LEs, no warmth  NEUROLOGIC: Sensation intact in LEs b/l to light touch  PSYCHIATRIC: A+O x 3; mood and affect appropriate; appropriate insight and judgment

## 2025-07-09 NOTE — DISCHARGE NOTE PROVIDER - CARE PROVIDER_API CALL
Octaviano Solis S  Internal Medicine  [address] [place] [address]  Phone: [phone]  Fax: [phone]  Follow Up Time: 1 week    Manvar-Singh, Pallavi Buddhadev  Vascular Surgery  [address] [facility], [address]  [place] [address]  Phone: [phone]  Fax: [phone]  Follow Up Time: 1 week    Carlos Alberto Cardenas  Cardiovascular Disease  [address]  [place] [address]  Phone: [phone]  Fax: [phone]  Follow Up Time: 1 week    No Redding  Infectious Disease  [address] [place] [address]  Phone: [phone]  Fax: [phone]  Follow Up Time: 1 week

## 2025-07-09 NOTE — DISCHARGE NOTE PROVIDER - NSDCMRMEDTOKEN_GEN_ALL_CORE_FT
acetaminophen 325 mg oral tablet: 2 tab(s) orally every 6 hours As needed Temp greater or equal to 38C (100.4F), Mild Pain (1 - 3)  ammonium lactate 12% topical lotion: Apply topically to affected area 2 times a day 1 Apply topically to affected area 2 times a day  apixaban 5 mg oral tablet: 1 tab(s) orally every 12 hours  atorvastatin 40 mg oral tablet: 1 tab(s) orally once a day (at bedtime)  bumetanide 1 mg oral tablet: 1 tab(s) orally once a day  buPROPion 150 mg/24 hours (XL) oral tablet, extended release: 1 tab(s) orally once a day  cholecalciferol 50 mcg (2000 intl units) oral tablet: 1 tab(s) orally once a day  cyanocobalamin 1000 mcg oral tablet: 1 tab(s) orally once a day  escitalopram 10 mg oral tablet: 1 tab(s) orally once a day  fentaNYL 50 mcg/hr transdermal film, extended release: 1 patch transdermally every 72 hours  ferrous sulfate 325 mg (65 mg elemental iron) oral tablet: 1 tab(s) orally once a day  gabapentin 400 mg oral capsule: 1 cap(s) orally 3 times a day  insulin glargine 100 units/mL subcutaneous solution: 15 unit(s) subcutaneous once a day (at bedtime)  insulin lispro 100 units/mL injectable solution: 5 unit(s) injectable 3 times a day (with meals) with sliding scale  levothyroxine 125 mcg (0.125 mg) oral tablet: 1 tab(s) orally once a day  metFORMIN 1000 mg oral tablet: 1 tab(s) orally 2 times a day  metoprolol succinate 25 mg oral tablet, extended release: 0.5 tab(s) orally once a day  mirabegron 25 mg oral tablet, extended release: 1 tab(s) orally once a day  Nephro-Analisa oral tablet: 1 tab(s) orally once a day  pantoprazole 40 mg oral delayed release tablet: 1 tab(s) orally once a day  spironolactone 25 mg oral tablet: 1 tab(s) orally once a day

## 2025-07-09 NOTE — DISCHARGE NOTE PROVIDER - NSDCCPTREATMENT_GEN_ALL_CORE_FT
PRINCIPAL PROCEDURE  Procedure: US vein extremity lower left  Findings and Treatment: FINDINGS:  The right and the left common femoral, femoral and popliteal veins are   patent andfree of thrombus.  Below the knee, the right posterior tibial and peroneal veins are patent   and free of thrombus.  The left posterior tibial and peroneal veins were not diagnostically   imaged.  IMPRESSION:  No evidence of deep venous thrombosis in either lower extremity.        SECONDARY PROCEDURE  Procedure: JANIE (ankle brachial index)  Findings and Treatment: IMPRESSION:  The right JANIE of 1.12 and the left JANIE of 1.17 are normal.  The right TBI of 0.96 and the left TBI of 0.89 are normal.  This is a normal examination.

## 2025-07-09 NOTE — DISCHARGE NOTE PROVIDER - DETAILS OF MALNUTRITION DIAGNOSIS/DIAGNOSES
This patient has been assessed with a concern for Malnutrition and was treated during this hospitalization for the following Nutrition diagnosis/diagnoses:     -  07/05/2025: Morbid obesity (BMI > 40)

## 2025-07-09 NOTE — DISCHARGE NOTE PROVIDER - HOSPITAL COURSE
70F with Afib s/p ablation and ILR on apixaban, T2DM, HTN, HLD, CKD3, venous insufficiency, fibromyalgia (on fentanyl patch), depression, and RLS who presents from nursing home for volume overload and possible heart failure exacerbation.    # Acute exacerbation of chronic heart failure.   Per cardiology, volume overload suspect secondary to RV failure  Follow up repeat TTE - TTE from March 2025 without significant abnormalities- 7/7/25- shows EF 70%, no rwma seen   Continue home spironolactone  Continue home metoprolol succinate  Appreciate cardiology's recommendations - off lasix gtt and now on bumex 1mg qD - monitored response and renal function, and remains euvolemic     # Venous insufficiency.   - Noted to have lower extremity swelling and erythema on admission - wound care thinks less likely to be infection. ID consulted - monitor off abx. LE dopplers negative for DVT.    #Paroxysmal atrial fibrillation.   S/p ablation  Continue apixaban 5mg BID  Continue metoprolol succinate 25mg daily.    # T2DM (type 2 diabetes mellitus).   - A1c 6.2  Home regimen metformin 1000mg BID + Lantus 22u QHS + Lispro 10u TID w/ meals  Reduce to Lantus 6u QHS + Lispro 2u TID + ISS given relative hypoglycemia, titrate to glucose 100-180.    #HLD (hyperlipidemia).   ·  Plan: C/w atorvastatin 40mg QHS.    #Hypothyroidism.   ·  Plan: C/w levothyroxine 125mcg daily.    # Fibromyalgia.   ·  Plan: C/w fentanyl 50mcg patch q72hrs  will decrease gabapentin to 400mg TID instead     # History of depression.   ·  Plan: C/w bupropion 150mg daily  C/w escitalopram 10mg daily.    #Stage 3 chronic kidney disease.   ·  Plan; CAREY on CKD3 suspect from aggressive diuresis  Baseline creatinine 0.9-1.2    Medically cleared for discharge with close outpatient followup.

## 2025-07-09 NOTE — DISCHARGE NOTE PROVIDER - PROVIDER TOKENS
PROVIDER:[TOKEN:[[id number]],FOLLOWUP:[1 week]],PROVIDER:[TOKEN:[[id number]],FOLLOWUP:[1 week]],PROVIDER:[TOKEN:[[id number]],FOLLOWUP:[1 week]],PROVIDER:[TOKEN:[[id number]],FOLLOWUP:[1 week]]

## 2025-07-17 ENCOUNTER — TRANSCRIPTION ENCOUNTER (OUTPATIENT)
Age: 70
End: 2025-07-17

## 2025-07-29 ENCOUNTER — TRANSCRIPTION ENCOUNTER (OUTPATIENT)
Age: 70
End: 2025-07-29